# Patient Record
Sex: MALE | Race: BLACK OR AFRICAN AMERICAN | NOT HISPANIC OR LATINO
[De-identification: names, ages, dates, MRNs, and addresses within clinical notes are randomized per-mention and may not be internally consistent; named-entity substitution may affect disease eponyms.]

---

## 2017-02-23 ENCOUNTER — APPOINTMENT (OUTPATIENT)
Dept: ELECTROPHYSIOLOGY | Facility: CLINIC | Age: 72
End: 2017-02-23

## 2017-03-08 ENCOUNTER — APPOINTMENT (OUTPATIENT)
Dept: ELECTROPHYSIOLOGY | Facility: CLINIC | Age: 72
End: 2017-03-08

## 2017-03-08 VITALS
WEIGHT: 214 LBS | SYSTOLIC BLOOD PRESSURE: 128 MMHG | HEIGHT: 71 IN | DIASTOLIC BLOOD PRESSURE: 79 MMHG | HEART RATE: 75 BPM | BODY MASS INDEX: 29.96 KG/M2

## 2017-06-08 ENCOUNTER — APPOINTMENT (OUTPATIENT)
Dept: ELECTROPHYSIOLOGY | Facility: CLINIC | Age: 72
End: 2017-06-08

## 2017-06-08 DIAGNOSIS — J44.9 CHRONIC OBSTRUCTIVE PULMONARY DISEASE, UNSPECIFIED: ICD-10-CM

## 2017-06-08 RX ORDER — LACTULOSE 10 G/15 ML
SOLUTION, ORAL ORAL
Refills: 0 | Status: ACTIVE | COMMUNITY

## 2017-06-13 ENCOUNTER — APPOINTMENT (OUTPATIENT)
Dept: GASTROENTEROLOGY | Facility: CLINIC | Age: 72
End: 2017-06-13

## 2017-06-20 ENCOUNTER — APPOINTMENT (OUTPATIENT)
Dept: GASTROENTEROLOGY | Facility: CLINIC | Age: 72
End: 2017-06-20

## 2017-06-20 VITALS
TEMPERATURE: 98.5 F | HEART RATE: 84 BPM | RESPIRATION RATE: 16 BRPM | SYSTOLIC BLOOD PRESSURE: 128 MMHG | DIASTOLIC BLOOD PRESSURE: 82 MMHG | WEIGHT: 221 LBS | BODY MASS INDEX: 30.94 KG/M2 | HEIGHT: 71 IN

## 2017-06-20 DIAGNOSIS — K72.90 HEPATIC FAILURE, UNSPECIFIED W/OUT COMA: ICD-10-CM

## 2017-06-21 ENCOUNTER — OTHER (OUTPATIENT)
Age: 72
End: 2017-06-21

## 2017-06-21 ENCOUNTER — RESULT CHARGE (OUTPATIENT)
Age: 72
End: 2017-06-21

## 2017-06-21 LAB
ALBUMIN SERPL ELPH-MCNC: 4.1 G/DL
ALP BLD-CCNC: 87 U/L
ALT SERPL-CCNC: 12 U/L
ANION GAP SERPL CALC-SCNC: 16 MMOL/L
AST SERPL-CCNC: 19 U/L
BASOPHILS # BLD AUTO: 0.04 K/UL
BASOPHILS NFR BLD AUTO: 0.7 %
BILIRUB SERPL-MCNC: 1.1 MG/DL
BUN SERPL-MCNC: 16 MG/DL
CALCIUM SERPL-MCNC: 10.1 MG/DL
CHLORIDE SERPL-SCNC: 106 MMOL/L
CO2 SERPL-SCNC: 23 MMOL/L
CREAT SERPL-MCNC: 1.14 MG/DL
EOSINOPHIL # BLD AUTO: 0.12 K/UL
EOSINOPHIL NFR BLD AUTO: 2 %
GLUCOSE SERPL-MCNC: 106 MG/DL
HCT VFR BLD CALC: 39.6 %
HGB BLD-MCNC: 13.4 G/DL
IMM GRANULOCYTES NFR BLD AUTO: 0.3 %
INR PPP: >15 RATIO
LYMPHOCYTES # BLD AUTO: 1.87 K/UL
LYMPHOCYTES NFR BLD AUTO: 30.8 %
MAN DIFF?: NORMAL
MCHC RBC-ENTMCNC: 29.8 PG
MCHC RBC-ENTMCNC: 33.8 GM/DL
MCV RBC AUTO: 88 FL
MONOCYTES # BLD AUTO: 0.53 K/UL
MONOCYTES NFR BLD AUTO: 8.7 %
NEUTROPHILS # BLD AUTO: 3.49 K/UL
NEUTROPHILS NFR BLD AUTO: 57.5 %
PLATELET # BLD AUTO: 158 K/UL
POTASSIUM SERPL-SCNC: 4.1 MMOL/L
PROT SERPL-MCNC: 6.7 G/DL
PT BLD: > 200 SEC
RBC # BLD: 4.5 M/UL
RBC # FLD: 17 %
SODIUM SERPL-SCNC: 145 MMOL/L
WBC # FLD AUTO: 6.07 K/UL

## 2017-06-23 LAB
INR PPP: 1.02 RATIO
PT BLD: 11.5 SEC

## 2017-08-02 ENCOUNTER — RESULT REVIEW (OUTPATIENT)
Age: 72
End: 2017-08-02

## 2017-08-02 ENCOUNTER — OUTPATIENT (OUTPATIENT)
Dept: OUTPATIENT SERVICES | Facility: HOSPITAL | Age: 72
LOS: 1 days | Discharge: HOME | End: 2017-08-02
Payer: COMMERCIAL

## 2017-08-02 DIAGNOSIS — Z13.810 ENCOUNTER FOR SCREENING FOR UPPER GASTROINTESTINAL DISORDER: ICD-10-CM

## 2017-08-02 PROCEDURE — 88312 SPECIAL STAINS GROUP 1: CPT | Mod: 26

## 2017-08-02 PROCEDURE — 88305 TISSUE EXAM BY PATHOLOGIST: CPT | Mod: 26

## 2017-08-02 PROCEDURE — 88305 TISSUE EXAM BY PATHOLOGIST: CPT

## 2017-08-02 PROCEDURE — 43239 EGD BIOPSY SINGLE/MULTIPLE: CPT

## 2017-08-02 PROCEDURE — 88312 SPECIAL STAINS GROUP 1: CPT

## 2017-08-08 DIAGNOSIS — K29.50 UNSPECIFIED CHRONIC GASTRITIS WITHOUT BLEEDING: ICD-10-CM

## 2017-08-08 DIAGNOSIS — F17.210 NICOTINE DEPENDENCE, CIGARETTES, UNCOMPLICATED: ICD-10-CM

## 2017-08-08 DIAGNOSIS — Z95.5 PRESENCE OF CORONARY ANGIOPLASTY IMPLANT AND GRAFT: ICD-10-CM

## 2017-08-08 DIAGNOSIS — Z79.82 LONG TERM (CURRENT) USE OF ASPIRIN: ICD-10-CM

## 2017-08-08 DIAGNOSIS — M10.9 GOUT, UNSPECIFIED: ICD-10-CM

## 2017-08-08 DIAGNOSIS — I10 ESSENTIAL (PRIMARY) HYPERTENSION: ICD-10-CM

## 2017-08-08 DIAGNOSIS — I73.9 PERIPHERAL VASCULAR DISEASE, UNSPECIFIED: ICD-10-CM

## 2017-08-08 DIAGNOSIS — Z72.89 OTHER PROBLEMS RELATED TO LIFESTYLE: ICD-10-CM

## 2017-08-08 DIAGNOSIS — I50.20 UNSPECIFIED SYSTOLIC (CONGESTIVE) HEART FAILURE: ICD-10-CM

## 2017-08-08 DIAGNOSIS — E78.5 HYPERLIPIDEMIA, UNSPECIFIED: ICD-10-CM

## 2017-08-08 DIAGNOSIS — Z91.013 ALLERGY TO SEAFOOD: ICD-10-CM

## 2017-08-08 DIAGNOSIS — I25.810 ATHEROSCLEROSIS OF CORONARY ARTERY BYPASS GRAFT(S) WITHOUT ANGINA PECTORIS: ICD-10-CM

## 2017-08-08 DIAGNOSIS — J44.9 CHRONIC OBSTRUCTIVE PULMONARY DISEASE, UNSPECIFIED: ICD-10-CM

## 2017-08-08 DIAGNOSIS — E11.9 TYPE 2 DIABETES MELLITUS WITHOUT COMPLICATIONS: ICD-10-CM

## 2017-08-10 ENCOUNTER — APPOINTMENT (OUTPATIENT)
Dept: ELECTROPHYSIOLOGY | Facility: CLINIC | Age: 72
End: 2017-08-10
Payer: COMMERCIAL

## 2017-08-10 PROCEDURE — 93283 PRGRMG EVAL IMPLANTABLE DFB: CPT

## 2017-09-12 ENCOUNTER — NON-APPOINTMENT (OUTPATIENT)
Age: 72
End: 2017-09-12

## 2017-09-12 ENCOUNTER — APPOINTMENT (OUTPATIENT)
Dept: ELECTROPHYSIOLOGY | Facility: CLINIC | Age: 72
End: 2017-09-12
Payer: COMMERCIAL

## 2017-09-12 VITALS
WEIGHT: 231 LBS | HEART RATE: 69 BPM | BODY MASS INDEX: 32.34 KG/M2 | OXYGEN SATURATION: 98 % | HEIGHT: 71 IN | SYSTOLIC BLOOD PRESSURE: 119 MMHG | DIASTOLIC BLOOD PRESSURE: 75 MMHG

## 2017-09-12 DIAGNOSIS — Z80.0 FAMILY HISTORY OF MALIGNANT NEOPLASM OF DIGESTIVE ORGANS: ICD-10-CM

## 2017-09-12 DIAGNOSIS — Z45.02 ENCOUNTER FOR ADJUSTMENT AND MANAGEMENT OF AUTOMATIC IMPLANTABLE CARDIAC DEFIBRILLATOR: ICD-10-CM

## 2017-09-12 DIAGNOSIS — Z80.3 FAMILY HISTORY OF MALIGNANT NEOPLASM OF BREAST: ICD-10-CM

## 2017-09-12 DIAGNOSIS — I50.20 UNSPECIFIED SYSTOLIC (CONGESTIVE) HEART FAILURE: ICD-10-CM

## 2017-09-12 DIAGNOSIS — Z82.49 FAMILY HISTORY OF ISCHEMIC HEART DISEASE AND OTHER DISEASES OF THE CIRCULATORY SYSTEM: ICD-10-CM

## 2017-09-12 PROCEDURE — 99215 OFFICE O/P EST HI 40 MIN: CPT

## 2017-09-12 PROCEDURE — 93000 ELECTROCARDIOGRAM COMPLETE: CPT

## 2017-09-12 RX ORDER — LISINOPRIL 40 MG/1
40 TABLET ORAL DAILY
Refills: 0 | Status: ACTIVE | COMMUNITY

## 2017-09-14 LAB
ALBUMIN SERPL ELPH-MCNC: 4 G/DL
ALP BLD-CCNC: 90 U/L
ALT SERPL-CCNC: 10 U/L
ANION GAP SERPL CALC-SCNC: 21 MMOL/L
AST SERPL-CCNC: 19 U/L
BASOPHILS # BLD AUTO: 0.05 K/UL
BASOPHILS NFR BLD AUTO: 0.7 %
BILIRUB SERPL-MCNC: 0.7 MG/DL
BUN SERPL-MCNC: 12 MG/DL
CALCIUM SERPL-MCNC: 10.1 MG/DL
CHLORIDE SERPL-SCNC: 103 MMOL/L
CO2 SERPL-SCNC: 21 MMOL/L
CREAT SERPL-MCNC: 1.07 MG/DL
EOSINOPHIL # BLD AUTO: 0.08 K/UL
EOSINOPHIL NFR BLD AUTO: 1.2 %
GLUCOSE SERPL-MCNC: 98 MG/DL
HCT VFR BLD CALC: 43 %
HGB BLD-MCNC: 14.7 G/DL
IMM GRANULOCYTES NFR BLD AUTO: 0.3 %
LYMPHOCYTES # BLD AUTO: 1.96 K/UL
LYMPHOCYTES NFR BLD AUTO: 28.9 %
MAN DIFF?: NORMAL
MCHC RBC-ENTMCNC: 29.5 PG
MCHC RBC-ENTMCNC: 34.2 GM/DL
MCV RBC AUTO: 86.2 FL
MONOCYTES # BLD AUTO: 0.56 K/UL
MONOCYTES NFR BLD AUTO: 8.2 %
NEUTROPHILS # BLD AUTO: 4.12 K/UL
NEUTROPHILS NFR BLD AUTO: 60.7 %
PLATELET # BLD AUTO: 158 K/UL
POTASSIUM SERPL-SCNC: 4.2 MMOL/L
PROT SERPL-MCNC: 7.5 G/DL
RBC # BLD: 4.99 M/UL
RBC # FLD: 15.1 %
SODIUM SERPL-SCNC: 145 MMOL/L
WBC # FLD AUTO: 6.79 K/UL

## 2017-09-28 ENCOUNTER — OUTPATIENT (OUTPATIENT)
Dept: OUTPATIENT SERVICES | Facility: HOSPITAL | Age: 72
LOS: 1 days | Discharge: HOME | End: 2017-09-28
Payer: MEDICARE

## 2017-09-28 DIAGNOSIS — I50.20 UNSPECIFIED SYSTOLIC (CONGESTIVE) HEART FAILURE: ICD-10-CM

## 2017-09-28 PROCEDURE — 33263 RMVL & RPLCMT DFB GEN 2 LEAD: CPT

## 2017-09-28 PROCEDURE — 93010 ELECTROCARDIOGRAM REPORT: CPT

## 2017-09-28 NOTE — H&P CARDIOLOGY - PMH
AICD (automatic cardioverter/defibrillator) present    Chronic obstructive pulmonary disease    Coronary Artery Disease    Diabetes mellitus    Gout    Hypercholesteremia    Hypertension    Myocardial Infarction    Peripheral vascular disease    Renal insufficiency    S/P aortobifemoral bypass surgery    Sickle cell trait

## 2017-09-28 NOTE — CHART NOTE - NSCHARTNOTEFT_GEN_A_CORE
s/p ICD generator change by    Patient seen and examined.             New device Pacing and sensing well.             May shower in AM.            Post procedure device care provided and written instructions provided to patient.            Patient advised to be cautious with heavy lifting and arm activities as this may compromise the new implanted device            Discharge home today with PO abx as per protocol             Follow up in device clinic in 2 weeks (10/11/17 @ 2:45 pm)

## 2017-09-28 NOTE — H&P CARDIOLOGY - NEGATIVE NEUROLOGICAL SYMPTOMS
no difficulty walking/no loss of consciousness/no paresthesias/no syncope/no weakness/no facial palsy/no generalized seizures/no vertigo/no loss of sensation/no headache/no transient paralysis/no tremors/no hemiparesis/no focal seizures/no confusion

## 2017-09-28 NOTE — H&P CARDIOLOGY - NEGATIVE CARDIOVASCULAR SYMPTOMS
no dyspnea on exertion/no orthopnea/no peripheral edema/no paroxysmal nocturnal dyspnea/no palpitations/no claudication/no chest pain

## 2017-09-28 NOTE — H&P CARDIOLOGY - HISTORY OF PRESENT ILLNESS
72M PMHx CAD s/p MI and stents 2007, hypertension, dyslipidemia, diabetes, gout, renal insufficiency, COPD, peripheral vascular disease with aortobifemoral bypass presents for ICD gen. change 2/2 DOROTHY. See hard copy H&P from 9/12/17 in chart. No new complaints at this time.

## 2017-09-28 NOTE — H&P CARDIOLOGY - RS GEN PE MLT RESP DETAILS PC
no chest wall tenderness/respirations non-labored/clear to auscultation bilaterally/breath sounds equal/airway patent/good air movement

## 2017-10-11 ENCOUNTER — APPOINTMENT (OUTPATIENT)
Dept: ELECTROPHYSIOLOGY | Facility: CLINIC | Age: 72
End: 2017-10-11

## 2018-07-25 PROBLEM — Z80.0 FAMILY HISTORY OF COLON CANCER: Status: ACTIVE | Noted: 2017-09-12

## 2020-04-21 ENCOUNTER — INPATIENT (INPATIENT)
Facility: HOSPITAL | Age: 75
LOS: 5 days | Discharge: NURSING, REHAB OR LONG-TERM CARE | End: 2020-04-27
Attending: INTERNAL MEDICINE | Admitting: INTERNAL MEDICINE
Payer: MEDICARE

## 2020-04-21 VITALS
DIASTOLIC BLOOD PRESSURE: 98 MMHG | RESPIRATION RATE: 24 BRPM | TEMPERATURE: 98 F | SYSTOLIC BLOOD PRESSURE: 152 MMHG | OXYGEN SATURATION: 99 % | HEART RATE: 97 BPM | HEIGHT: 70 IN | WEIGHT: 210.1 LBS

## 2020-04-21 DIAGNOSIS — I10 ESSENTIAL (PRIMARY) HYPERTENSION: ICD-10-CM

## 2020-04-21 DIAGNOSIS — E83.52 HYPERCALCEMIA: ICD-10-CM

## 2020-04-21 DIAGNOSIS — R79.89 OTHER SPECIFIED ABNORMAL FINDINGS OF BLOOD CHEMISTRY: ICD-10-CM

## 2020-04-21 DIAGNOSIS — E11.9 TYPE 2 DIABETES MELLITUS WITHOUT COMPLICATIONS: ICD-10-CM

## 2020-04-21 DIAGNOSIS — E80.6 OTHER DISORDERS OF BILIRUBIN METABOLISM: ICD-10-CM

## 2020-04-21 DIAGNOSIS — I26.99 OTHER PULMONARY EMBOLISM WITHOUT ACUTE COR PULMONALE: ICD-10-CM

## 2020-04-21 DIAGNOSIS — I50.22 CHRONIC SYSTOLIC (CONGESTIVE) HEART FAILURE: ICD-10-CM

## 2020-04-21 DIAGNOSIS — G93.41 METABOLIC ENCEPHALOPATHY: ICD-10-CM

## 2020-04-21 DIAGNOSIS — E78.5 HYPERLIPIDEMIA, UNSPECIFIED: ICD-10-CM

## 2020-04-21 DIAGNOSIS — I25.10 ATHEROSCLEROTIC HEART DISEASE OF NATIVE CORONARY ARTERY WITHOUT ANGINA PECTORIS: ICD-10-CM

## 2020-04-21 LAB
ALBUMIN SERPL ELPH-MCNC: 3.2 G/DL — LOW (ref 3.3–5)
ALP SERPL-CCNC: 111 U/L — SIGNIFICANT CHANGE UP (ref 40–120)
ALT FLD-CCNC: 64 U/L — SIGNIFICANT CHANGE UP (ref 12–78)
ANION GAP SERPL CALC-SCNC: 11 MMOL/L — SIGNIFICANT CHANGE UP (ref 5–17)
AST SERPL-CCNC: 83 U/L — HIGH (ref 15–37)
BASE EXCESS BLDA CALC-SCNC: 1.9 MMOL/L — SIGNIFICANT CHANGE UP (ref -2–2)
BASOPHILS # BLD AUTO: 0.01 K/UL — SIGNIFICANT CHANGE UP (ref 0–0.2)
BASOPHILS NFR BLD AUTO: 0.1 % — SIGNIFICANT CHANGE UP (ref 0–2)
BILIRUB SERPL-MCNC: 10.4 MG/DL — HIGH (ref 0.2–1.2)
BLOOD GAS COMMENTS: SIGNIFICANT CHANGE UP
BLOOD GAS COMMENTS: SIGNIFICANT CHANGE UP
BLOOD GAS SOURCE: SIGNIFICANT CHANGE UP
BUN SERPL-MCNC: 23 MG/DL — SIGNIFICANT CHANGE UP (ref 7–23)
CALCIUM SERPL-MCNC: 10.9 MG/DL — HIGH (ref 8.5–10.1)
CHLORIDE SERPL-SCNC: 107 MMOL/L — SIGNIFICANT CHANGE UP (ref 96–108)
CO2 SERPL-SCNC: 25 MMOL/L — SIGNIFICANT CHANGE UP (ref 22–31)
CREAT SERPL-MCNC: 1.34 MG/DL — HIGH (ref 0.5–1.3)
CRP SERPL-MCNC: 12.23 MG/DL — HIGH (ref 0–0.4)
D DIMER BLD IA.RAPID-MCNC: 2605 NG/ML DDU — HIGH
EOSINOPHIL # BLD AUTO: 0.01 K/UL — SIGNIFICANT CHANGE UP (ref 0–0.5)
EOSINOPHIL NFR BLD AUTO: 0.1 % — SIGNIFICANT CHANGE UP (ref 0–6)
ETHANOL SERPL-MCNC: <10 MG/DL — SIGNIFICANT CHANGE UP (ref 0–10)
FERRITIN SERPL-MCNC: 88 NG/ML — SIGNIFICANT CHANGE UP (ref 30–400)
GLUCOSE SERPL-MCNC: 119 MG/DL — HIGH (ref 70–99)
HCO3 BLDA-SCNC: 26 MMOL/L — SIGNIFICANT CHANGE UP (ref 21–29)
HCT VFR BLD CALC: 46.6 % — SIGNIFICANT CHANGE UP (ref 39–50)
HGB BLD-MCNC: 15.1 G/DL — SIGNIFICANT CHANGE UP (ref 13–17)
HOROWITZ INDEX BLDA+IHG-RTO: 21 — SIGNIFICANT CHANGE UP
IMM GRANULOCYTES NFR BLD AUTO: 0.7 % — SIGNIFICANT CHANGE UP (ref 0–1.5)
LACTATE SERPL-SCNC: 3.9 MMOL/L — HIGH (ref 0.7–2)
LIDOCAIN IGE QN: 91 U/L — SIGNIFICANT CHANGE UP (ref 73–393)
LYMPHOCYTES # BLD AUTO: 0.72 K/UL — LOW (ref 1–3.3)
LYMPHOCYTES # BLD AUTO: 7.6 % — LOW (ref 13–44)
MCHC RBC-ENTMCNC: 25.7 PG — LOW (ref 27–34)
MCHC RBC-ENTMCNC: 32.4 GM/DL — SIGNIFICANT CHANGE UP (ref 32–36)
MCV RBC AUTO: 79.3 FL — LOW (ref 80–100)
MONOCYTES # BLD AUTO: 1.07 K/UL — HIGH (ref 0–0.9)
MONOCYTES NFR BLD AUTO: 11.3 % — SIGNIFICANT CHANGE UP (ref 2–14)
NEUTROPHILS # BLD AUTO: 7.62 K/UL — HIGH (ref 1.8–7.4)
NEUTROPHILS NFR BLD AUTO: 80.2 % — HIGH (ref 43–77)
NRBC # BLD: 0 /100 WBCS — SIGNIFICANT CHANGE UP (ref 0–0)
NT-PROBNP SERPL-SCNC: 9230 PG/ML — HIGH (ref 0–125)
PCO2 BLDA: 39 MMHG — SIGNIFICANT CHANGE UP (ref 32–46)
PH BLD: 7.44 — SIGNIFICANT CHANGE UP (ref 7.35–7.45)
PLATELET # BLD AUTO: 214 K/UL — SIGNIFICANT CHANGE UP (ref 150–400)
PO2 BLDA: 62 MMHG — LOW (ref 74–108)
POTASSIUM SERPL-MCNC: 4.4 MMOL/L — SIGNIFICANT CHANGE UP (ref 3.5–5.3)
POTASSIUM SERPL-SCNC: 4.4 MMOL/L — SIGNIFICANT CHANGE UP (ref 3.5–5.3)
PROCALCITONIN SERPL-MCNC: 0.43 NG/ML — HIGH (ref 0.02–0.1)
PROT SERPL-MCNC: 7.4 GM/DL — SIGNIFICANT CHANGE UP (ref 6–8.3)
RBC # BLD: 5.88 M/UL — HIGH (ref 4.2–5.8)
RBC # FLD: 18.3 % — HIGH (ref 10.3–14.5)
SAO2 % BLDA: 89 % — LOW (ref 92–96)
SARS-COV-2 RNA SPEC QL NAA+PROBE: SIGNIFICANT CHANGE UP
SODIUM SERPL-SCNC: 143 MMOL/L — SIGNIFICANT CHANGE UP (ref 135–145)
TROPONIN I SERPL-MCNC: 0.11 NG/ML — HIGH (ref 0.01–0.04)
WBC # BLD: 9.5 K/UL — SIGNIFICANT CHANGE UP (ref 3.8–10.5)
WBC # FLD AUTO: 9.5 K/UL — SIGNIFICANT CHANGE UP (ref 3.8–10.5)

## 2020-04-21 PROCEDURE — 71045 X-RAY EXAM CHEST 1 VIEW: CPT | Mod: 26

## 2020-04-21 PROCEDURE — 74177 CT ABD & PELVIS W/CONTRAST: CPT | Mod: 26

## 2020-04-21 PROCEDURE — 70450 CT HEAD/BRAIN W/O DYE: CPT | Mod: 26

## 2020-04-21 PROCEDURE — 71275 CT ANGIOGRAPHY CHEST: CPT | Mod: 26

## 2020-04-21 PROCEDURE — 93010 ELECTROCARDIOGRAM REPORT: CPT

## 2020-04-21 PROCEDURE — 99223 1ST HOSP IP/OBS HIGH 75: CPT

## 2020-04-21 PROCEDURE — 99285 EMERGENCY DEPT VISIT HI MDM: CPT

## 2020-04-21 RX ORDER — LISINOPRIL 2.5 MG/1
40 TABLET ORAL DAILY
Refills: 0 | Status: DISCONTINUED | OUTPATIENT
Start: 2020-04-21 | End: 2020-04-22

## 2020-04-21 RX ORDER — INSULIN LISPRO 100/ML
VIAL (ML) SUBCUTANEOUS
Refills: 0 | Status: DISCONTINUED | OUTPATIENT
Start: 2020-04-21 | End: 2020-04-27

## 2020-04-21 RX ORDER — DEXTROSE 50 % IN WATER 50 %
12.5 SYRINGE (ML) INTRAVENOUS ONCE
Refills: 0 | Status: DISCONTINUED | OUTPATIENT
Start: 2020-04-21 | End: 2020-04-27

## 2020-04-21 RX ORDER — HEPARIN SODIUM 5000 [USP'U]/ML
INJECTION INTRAVENOUS; SUBCUTANEOUS
Qty: 25000 | Refills: 0 | Status: DISCONTINUED | OUTPATIENT
Start: 2020-04-21 | End: 2020-04-21

## 2020-04-21 RX ORDER — HEPARIN SODIUM 5000 [USP'U]/ML
7500 INJECTION INTRAVENOUS; SUBCUTANEOUS EVERY 6 HOURS
Refills: 0 | Status: DISCONTINUED | OUTPATIENT
Start: 2020-04-21 | End: 2020-04-21

## 2020-04-21 RX ORDER — FUROSEMIDE 40 MG
40 TABLET ORAL DAILY
Refills: 0 | Status: DISCONTINUED | OUTPATIENT
Start: 2020-04-21 | End: 2020-04-27

## 2020-04-21 RX ORDER — HEPARIN SODIUM 5000 [USP'U]/ML
5000 INJECTION INTRAVENOUS; SUBCUTANEOUS EVERY 8 HOURS
Refills: 0 | Status: DISCONTINUED | OUTPATIENT
Start: 2020-04-21 | End: 2020-04-21

## 2020-04-21 RX ORDER — HEPARIN SODIUM 5000 [USP'U]/ML
3500 INJECTION INTRAVENOUS; SUBCUTANEOUS EVERY 6 HOURS
Refills: 0 | Status: DISCONTINUED | OUTPATIENT
Start: 2020-04-21 | End: 2020-04-21

## 2020-04-21 RX ORDER — HEPARIN SODIUM 5000 [USP'U]/ML
7500 INJECTION INTRAVENOUS; SUBCUTANEOUS ONCE
Refills: 0 | Status: DISCONTINUED | OUTPATIENT
Start: 2020-04-21 | End: 2020-04-21

## 2020-04-21 RX ORDER — SODIUM CHLORIDE 9 MG/ML
1000 INJECTION, SOLUTION INTRAVENOUS
Refills: 0 | Status: DISCONTINUED | OUTPATIENT
Start: 2020-04-21 | End: 2020-04-27

## 2020-04-21 RX ORDER — DEXTROSE 50 % IN WATER 50 %
25 SYRINGE (ML) INTRAVENOUS ONCE
Refills: 0 | Status: DISCONTINUED | OUTPATIENT
Start: 2020-04-21 | End: 2020-04-27

## 2020-04-21 RX ORDER — AZITHROMYCIN 500 MG/1
500 TABLET, FILM COATED ORAL ONCE
Refills: 0 | Status: COMPLETED | OUTPATIENT
Start: 2020-04-21 | End: 2020-04-21

## 2020-04-21 RX ORDER — DEXTROSE 50 % IN WATER 50 %
15 SYRINGE (ML) INTRAVENOUS ONCE
Refills: 0 | Status: DISCONTINUED | OUTPATIENT
Start: 2020-04-21 | End: 2020-04-27

## 2020-04-21 RX ORDER — ENOXAPARIN SODIUM 100 MG/ML
90 INJECTION SUBCUTANEOUS ONCE
Refills: 0 | Status: COMPLETED | OUTPATIENT
Start: 2020-04-21 | End: 2020-04-21

## 2020-04-21 RX ORDER — LATANOPROST 0.05 MG/ML
1 SOLUTION/ DROPS OPHTHALMIC; TOPICAL AT BEDTIME
Refills: 0 | Status: DISCONTINUED | OUTPATIENT
Start: 2020-04-21 | End: 2020-04-27

## 2020-04-21 RX ORDER — SIMVASTATIN 20 MG/1
40 TABLET, FILM COATED ORAL AT BEDTIME
Refills: 0 | Status: DISCONTINUED | OUTPATIENT
Start: 2020-04-21 | End: 2020-04-22

## 2020-04-21 RX ORDER — LACTULOSE 10 G/15ML
10 SOLUTION ORAL DAILY
Refills: 0 | Status: DISCONTINUED | OUTPATIENT
Start: 2020-04-21 | End: 2020-04-27

## 2020-04-21 RX ORDER — ASPIRIN/CALCIUM CARB/MAGNESIUM 324 MG
81 TABLET ORAL DAILY
Refills: 0 | Status: DISCONTINUED | OUTPATIENT
Start: 2020-04-21 | End: 2020-04-27

## 2020-04-21 RX ORDER — ALLOPURINOL 300 MG
300 TABLET ORAL DAILY
Refills: 0 | Status: DISCONTINUED | OUTPATIENT
Start: 2020-04-21 | End: 2020-04-27

## 2020-04-21 RX ORDER — GLUCAGON INJECTION, SOLUTION 0.5 MG/.1ML
1 INJECTION, SOLUTION SUBCUTANEOUS ONCE
Refills: 0 | Status: DISCONTINUED | OUTPATIENT
Start: 2020-04-21 | End: 2020-04-27

## 2020-04-21 RX ORDER — CARVEDILOL PHOSPHATE 80 MG/1
25 CAPSULE, EXTENDED RELEASE ORAL EVERY 12 HOURS
Refills: 0 | Status: DISCONTINUED | OUTPATIENT
Start: 2020-04-21 | End: 2020-04-27

## 2020-04-21 RX ORDER — CEFTRIAXONE 500 MG/1
1000 INJECTION, POWDER, FOR SOLUTION INTRAMUSCULAR; INTRAVENOUS ONCE
Refills: 0 | Status: COMPLETED | OUTPATIENT
Start: 2020-04-21 | End: 2020-04-21

## 2020-04-21 RX ADMIN — ENOXAPARIN SODIUM 90 MILLIGRAM(S): 100 INJECTION SUBCUTANEOUS at 22:57

## 2020-04-21 RX ADMIN — SIMVASTATIN 40 MILLIGRAM(S): 20 TABLET, FILM COATED ORAL at 22:57

## 2020-04-21 RX ADMIN — CEFTRIAXONE 100 MILLIGRAM(S): 500 INJECTION, POWDER, FOR SOLUTION INTRAMUSCULAR; INTRAVENOUS at 20:31

## 2020-04-21 RX ADMIN — AZITHROMYCIN 255 MILLIGRAM(S): 500 TABLET, FILM COATED ORAL at 20:31

## 2020-04-21 NOTE — ED PROVIDER NOTE - ATTENDING CONTRIBUTION TO CARE
I have seen the patient with the PA and agree with above examination and assessment and plan with the following addendum:        Focused PE:   General: NAD, alert and oriented  Head: Normocephalic, atraumatic  Eyes: PERRLA, EOMI. Scleral icterus  Cardiac: RRR, no murmurs, rubs or gallops  Resp: CTA, no wheezes, rales or ronchi  GI: Distended, nontender abdomen. Well healed Ex Lap scar.   Neuro: Alert and oriented, no focal deficits. Normal gait  Ext: Non edematous, nontender.    Ddx: CHF exacerbation/ Covid/ hepatitis/ No fluid wave or tenderness to suggest sbp  Plan: Cbc, cmp, cxr, ecg, abg, reassess

## 2020-04-21 NOTE — H&P ADULT - HISTORY OF PRESENT ILLNESS
75yo male with pmh of AICD, MI, DM, HTN (poor historian) presents complaining of sob. Says that it has been hard to breath since "the day the virus came". Denies sick contacts, cough, fever, chills, body aches, chest pain lower extremity swelling and other associated sx. Patient is a 74M with a PMH of CAD s/p MI w/stents 2007, CHF with 20% LVEF in 2016,  HTN, HLD, DM, gout, CKD, COPD, PVD s/p aortobifemoral bypass who presents to the ED for dyspnea.  Patient currently AAOx1 and unable to provide history.  As per ED attending, patient was sent in by family members due to cough and dyspnea.  Reportedly has been feeling ill since COVID pandemic started.  Vitals stable.  Labs show hypercalcemia, mild tropinema and elevated bilirubin.  CT head performed and was negative.  CT chest shows  bilateral PNA and a JUAREZ acute PE.  Started on therapeutic lovenox, will admit to tele.

## 2020-04-21 NOTE — H&P ADULT - NSHPLABSRESULTS_GEN_ALL_CORE
Recent Vitals  T(C): 36.8 (04-21-20 @ 22:24), Max: 36.8 (04-21-20 @ 22:24)  HR: 87 (04-21-20 @ 22:24) (85 - 97)  BP: 144/96 (04-21-20 @ 22:24) (142/90 - 152/98)  RR: 18 (04-21-20 @ 22:24) (16 - 24)  SpO2: 96% (04-21-20 @ 22:24) (95% - 99%)                        15.1   9.50  )-----------( 214      ( 21 Apr 2020 16:49 )             46.6     04-21    143  |  107  |  23  ----------------------------<  119<H>  4.4   |  25  |  1.34<H>    Ca    10.9<H>      21 Apr 2020 16:49    TPro  7.4  /  Alb  3.2<L>  /  TBili  10.4<H>  /  DBili  x   /  AST  83<H>  /  ALT  64  /  AlkPhos  111  04-21      LIVER FUNCTIONS - ( 21 Apr 2020 16:49 )  Alb: 3.2 g/dL / Pro: 7.4 gm/dL / ALK PHOS: 111 U/L / ALT: 64 U/L / AST: 83 U/L / GGT: x               Home Medications:  allopurinol 300 mg oral tablet: 1 tab(s) orally once a day (28 Sep 2017 13:45)  aspirin 81 mg oral tablet: 1 tab(s) orally once a day (28 Sep 2017 13:45)  Coreg 25 mg oral tablet: 1 tab(s) orally 2 times a day (28 Sep 2017 13:45)  lactulose 10 g/15 mL oral syrup: 15 milliliter(s) orally once a day (28 Sep 2017 13:45)  Lasix 40 mg oral tablet: 1 tab(s) orally once a day (28 Sep 2017 13:45)  lisinopril 40 mg oral tablet: 1 tab(s) orally once a day (28 Sep 2017 13:45)  simvastatin 40 mg oral tablet: 1 tab(s) orally once a day (at bedtime) (28 Sep 2017 13:45)  Travatan Z 0.004% ophthalmic solution: 1 drop(s) to each affected eye once a day (in the evening) (28 Sep 2017 13:45)

## 2020-04-21 NOTE — ED ADULT NURSE REASSESSMENT NOTE - REASSESS COMMUNICATION
put call out to nursing supervior and attending patient states that he dropped his plate of food and leaned over to pick it up and subsequently slipped on the floor, no obvious injuries noted will endorse to ED RN

## 2020-04-21 NOTE — ED ADULT NURSE NOTE - NSIMPLEMENTINTERV_GEN_ALL_ED
Implemented All Fall Risk Interventions:  [place] to call system. Call bell, personal items and telephone within reach. Instruct patient to call for assistance. Room bathroom lighting operational. Non-slip footwear when patient is off stretcher. Physically safe environment: no spills, clutter or unnecessary equipment. Stretcher in lowest position, wheels locked, appropriate side rails in place. Provide visual cue, wrist band, yellow gown, etc. Monitor gait and stability. Monitor for mental status changes and reorient to person, place, and time. Review medications for side effects contributing to fall risk. Reinforce activity limits and safety measures with patient and family.

## 2020-04-21 NOTE — ED PROVIDER NOTE - CPE EDP NEURO NORM
Patient is medically cleared for cataract surgery, no cardiopulmonary complaints, exam today is normal   Patient has chronic conditions of hypercholesterolemia and hypertension are well controlled  normal...

## 2020-04-21 NOTE — ED ADULT NURSE REASSESSMENT NOTE - REASSESS COMMUNICATION
notified Dr valenzuela of the fall, and no obvious injuries noted MD aware no further orders at this time.,

## 2020-04-21 NOTE — ED ADULT NURSE NOTE - OBJECTIVE STATEMENT
ptc/o SOB for 10 days worsening, denies pain, sts has defibrillator placed 10 years ago and just changed batteries

## 2020-04-21 NOTE — H&P ADULT - NSICDXPASTMEDICALHX_GEN_ALL_CORE_FT
PAST MEDICAL HISTORY:  AICD (automatic cardioverter/defibrillator) present     Chronic obstructive pulmonary disease     Coronary Artery Disease     Diabetes mellitus     Gout     Hypercholesteremia     Hypertension     Myocardial Infarction     Peripheral vascular disease     Renal insufficiency     S/P aortobifemoral bypass surgery     Sickle cell trait

## 2020-04-21 NOTE — ED PROVIDER NOTE - CARE PLAN
Principal Discharge DX:	CHF exacerbation Principal Discharge DX:	PE (pulmonary thromboembolism)  Secondary Diagnosis:	Pneumonia  Secondary Diagnosis:	CHF (congestive heart failure)

## 2020-04-21 NOTE — H&P ADULT - NSHPPHYSICALEXAM_GEN_ALL_CORE
ICU Vital Signs Last 24 Hrs  T(C): 36.6 (21 Apr 2020 15:45), Max: 36.6 (21 Apr 2020 15:45)  T(F): 97.9 (21 Apr 2020 15:45), Max: 97.9 (21 Apr 2020 15:45)  HR: 85 (21 Apr 2020 16:57) (85 - 97)  BP: 146/81 (21 Apr 2020 16:57) (146/81 - 152/98)  BP(mean): --  ABP: --  ABP(mean): --  RR: 19 (21 Apr 2020 16:57) (19 - 24)  SpO2: 96% (21 Apr 2020 16:57) (96% - 99%)  GENERAL: NAD well-developed  HEAD:  Atraumatic, Normocephalic  EYES: EOMI, PERRLA, conjunctiva and sclera clear  ENMT: No tonsillar erythema, exudates, or enlargement; Moist mucous membranes, Good dentition, No lesions  NECK: Supple, No JVD, Normal thyroid  NERVOUS SYSTEM:  Alert & Oriented X3, Good concentration; Motor Strength 5/5 B/L upper and lower extremities; DTRs 2+ intact and symmetric  CHEST/LUNG: Clear to percussion bilaterally; No rales, rhonchi, wheezing, or rubs  HEART: Regular rate and rhythm; No murmurs, rubs, or gallops  ABDOMEN: Soft, Nontender, Nondistended; Bowel sounds present  EXTREMITIES:  2+ Peripheral Pulses, No clubbing, cyanosis, or edema  LYMPH: No lymphadenopathy   SKIN: No rashes or lesions Physical exam:  General: patient in no acute distress, resting comfortably  Head:  Atraumatic, Normocephalic  Eyes: EOMI, PERRLA, clear sclera  Neck: Supple, thyroid nontender, non enlarged  Cardio: S1/S2 +ve, regular rate and rhythm, no M/G/R  Resp: bilaterally rales, trace pedal edema, no rales or wheezes  GI: abdomen soft, nontender, non distended, no guarding, BS +ve x 4  Ext: no significant pedal edema

## 2020-04-21 NOTE — H&P ADULT - PROBLEM SELECTOR PLAN 9
[address] [facility]  T [phone]  [email or web address]      8/7/2017      [address] [phone]            Do not take the metformin on the day of or the next day.  Then resume the metformin on the 3rd post operative day              Casandra Wall MD home meds

## 2020-04-21 NOTE — ED PROVIDER NOTE - CLINICAL SUMMARY MEDICAL DECISION MAKING FREE TEXT BOX
75yo male with pmh of AICD, MI, DM, HTN (poor historian) presents complaining of sob. Tachypneic, conversational dyspneic. O2 sat 99 on RA. Will get labs, cxr and covid testing.

## 2020-04-21 NOTE — H&P ADULT - PROBLEM SELECTOR PLAN 3
AMS? unknown baseline.  Given ceftriaxone and azithromycin in the ED.  Follow blood cultures.  CRP elevated but ferritin low.  Will reswab for COVID19

## 2020-04-21 NOTE — ED PROVIDER NOTE - OBJECTIVE STATEMENT
75yo male with pmh of AICD, MI, DM, HTN (poor historian) presents complaining of sob. Says that it has been hard to breath since "the day the virus came". Denies sick contacts, cough, fever, chills, body aches, chest pain lower extremity swelling and other associated sx.

## 2020-04-21 NOTE — ED ADULT NURSE REASSESSMENT NOTE - REASSESS COMMUNICATION
notified nursing supervisor that the patient states he lowered himself to the floor no obvious injuries decided to clean up the floor of the food he dropped, pt is cheerful and laughing, will endorse to the primary RN

## 2020-04-21 NOTE — H&P ADULT - ASSESSMENT
Patient is a 74M with a PMH of CAD s/p MI w/stents 2007, CHF with 20% LVEF in 2016,  HTN, HLD, DM, gout, CKD, COPD, PVD s/p aortobifemoral bypass who presents to the ED for dyspnea.  Patient currently AAOx1 and unable to provide history.  As per ED attending, patient was sent in by family members due to cough and dyspnea.  Reportedly has been feeling ill since COVID pandemic started.  Vitals stable.  Labs show hypercalcemia, mild tropinemia and elevated bilirubin.  CT head performed and was negative.  CT chest shows  bilateral PNA and a JUAREZ acute PE.  Started on therapeutic lovenox, will admit to tele.    IMPROVE VTE Individual Risk Assessment          RISK                                                          Points  [  ] Previous VTE                                                3  [  ] Thrombophilia                                             2  [  ] Lower limb paralysis                                    2        (unable to hold up >15 seconds)    [  ] Current Cancer                                             2         (within 6 months)  [  ] Immobilization > 24 hrs                              1  [  ] ICU/CCU stay > 24 hours                            1  [  ] Age > 60                                                    1    IMPROVE VTE Score - 1

## 2020-04-21 NOTE — ED ADULT TRIAGE NOTE - CHIEF COMPLAINT QUOTE
ems states, " Pt sent by family due to dry cough and sob , pt denies hx , pt admits he is unable to catch his breath , denies sick exposure "

## 2020-04-22 LAB
A1C WITH ESTIMATED AVERAGE GLUCOSE RESULT: 6.3 % — HIGH (ref 4–5.6)
ALBUMIN SERPL ELPH-MCNC: 2.7 G/DL — LOW (ref 3.3–5)
ALP SERPL-CCNC: 101 U/L — SIGNIFICANT CHANGE UP (ref 40–120)
ALT FLD-CCNC: 62 U/L — SIGNIFICANT CHANGE UP (ref 12–78)
ANION GAP SERPL CALC-SCNC: 10 MMOL/L — SIGNIFICANT CHANGE UP (ref 5–17)
APTT BLD: 33.3 SEC — SIGNIFICANT CHANGE UP (ref 28.5–37)
AST SERPL-CCNC: 81 U/L — HIGH (ref 15–37)
BILIRUB DIRECT SERPL-MCNC: 6.37 MG/DL — HIGH (ref 0.05–0.2)
BILIRUB INDIRECT FLD-MCNC: 3.2 MG/DL — HIGH (ref 0.2–1)
BILIRUB SERPL-MCNC: 9.3 MG/DL — HIGH (ref 0.2–1.2)
BILIRUB SERPL-MCNC: 9.6 MG/DL — HIGH (ref 0.2–1.2)
BUN SERPL-MCNC: 26 MG/DL — HIGH (ref 7–23)
CALCIUM SERPL-MCNC: 10.8 MG/DL — HIGH (ref 8.5–10.1)
CHLORIDE SERPL-SCNC: 109 MMOL/L — HIGH (ref 96–108)
CO2 SERPL-SCNC: 22 MMOL/L — SIGNIFICANT CHANGE UP (ref 22–31)
CREAT SERPL-MCNC: 1.37 MG/DL — HIGH (ref 0.5–1.3)
ESTIMATED AVERAGE GLUCOSE: 134 MG/DL — HIGH (ref 68–114)
GLUCOSE BLDC GLUCOMTR-MCNC: 120 MG/DL — HIGH (ref 70–99)
GLUCOSE BLDC GLUCOMTR-MCNC: 130 MG/DL — HIGH (ref 70–99)
GLUCOSE SERPL-MCNC: 143 MG/DL — HIGH (ref 70–99)
INR BLD: 2.42 RATIO — HIGH (ref 0.88–1.16)
POTASSIUM SERPL-MCNC: 4.4 MMOL/L — SIGNIFICANT CHANGE UP (ref 3.5–5.3)
POTASSIUM SERPL-SCNC: 4.4 MMOL/L — SIGNIFICANT CHANGE UP (ref 3.5–5.3)
PROT SERPL-MCNC: 6.9 GM/DL — SIGNIFICANT CHANGE UP (ref 6–8.3)
PROTHROM AB SERPL-ACNC: 27.8 SEC — HIGH (ref 10–12.9)
SARS-COV-2 RNA SPEC QL NAA+PROBE: SIGNIFICANT CHANGE UP
SODIUM SERPL-SCNC: 141 MMOL/L — SIGNIFICANT CHANGE UP (ref 135–145)
TROPONIN I SERPL-MCNC: 0.11 NG/ML — HIGH (ref 0.01–0.04)
TROPONIN I SERPL-MCNC: 0.15 NG/ML — HIGH (ref 0.01–0.04)

## 2020-04-22 PROCEDURE — 99223 1ST HOSP IP/OBS HIGH 75: CPT

## 2020-04-22 PROCEDURE — 93306 TTE W/DOPPLER COMPLETE: CPT | Mod: 26

## 2020-04-22 RX ORDER — ENOXAPARIN SODIUM 100 MG/ML
90 INJECTION SUBCUTANEOUS
Refills: 0 | Status: DISCONTINUED | OUTPATIENT
Start: 2020-04-22 | End: 2020-04-23

## 2020-04-22 RX ORDER — SACUBITRIL AND VALSARTAN 24; 26 MG/1; MG/1
1 TABLET, FILM COATED ORAL
Refills: 0 | Status: DISCONTINUED | OUTPATIENT
Start: 2020-04-23 | End: 2020-04-23

## 2020-04-22 RX ORDER — PIPERACILLIN AND TAZOBACTAM 4; .5 G/20ML; G/20ML
3.38 INJECTION, POWDER, LYOPHILIZED, FOR SOLUTION INTRAVENOUS EVERY 8 HOURS
Refills: 0 | Status: DISCONTINUED | OUTPATIENT
Start: 2020-04-22 | End: 2020-04-27

## 2020-04-22 RX ADMIN — LATANOPROST 1 DROP(S): 0.05 SOLUTION/ DROPS OPHTHALMIC; TOPICAL at 21:39

## 2020-04-22 RX ADMIN — Medication 40 MILLIGRAM(S): at 06:59

## 2020-04-22 RX ADMIN — Medication 300 MILLIGRAM(S): at 15:56

## 2020-04-22 RX ADMIN — ENOXAPARIN SODIUM 90 MILLIGRAM(S): 100 INJECTION SUBCUTANEOUS at 17:09

## 2020-04-22 RX ADMIN — CARVEDILOL PHOSPHATE 25 MILLIGRAM(S): 80 CAPSULE, EXTENDED RELEASE ORAL at 06:58

## 2020-04-22 RX ADMIN — LISINOPRIL 40 MILLIGRAM(S): 2.5 TABLET ORAL at 07:00

## 2020-04-22 RX ADMIN — Medication 81 MILLIGRAM(S): at 15:56

## 2020-04-22 RX ADMIN — PIPERACILLIN AND TAZOBACTAM 25 GRAM(S): 4; .5 INJECTION, POWDER, LYOPHILIZED, FOR SOLUTION INTRAVENOUS at 07:00

## 2020-04-22 RX ADMIN — PIPERACILLIN AND TAZOBACTAM 25 GRAM(S): 4; .5 INJECTION, POWDER, LYOPHILIZED, FOR SOLUTION INTRAVENOUS at 21:39

## 2020-04-22 RX ADMIN — LACTULOSE 10 GRAM(S): 10 SOLUTION ORAL at 15:56

## 2020-04-22 RX ADMIN — ENOXAPARIN SODIUM 90 MILLIGRAM(S): 100 INJECTION SUBCUTANEOUS at 06:59

## 2020-04-22 RX ADMIN — CARVEDILOL PHOSPHATE 25 MILLIGRAM(S): 80 CAPSULE, EXTENDED RELEASE ORAL at 17:09

## 2020-04-22 NOTE — PROGRESS NOTE ADULT - PROBLEM SELECTOR PLAN 3
AMS? unknown baseline.    Given ceftriaxone and azithromycin in the ED.    Follow blood cultures.    c/w Zosyn ID EVAL  CRP elevated but ferritin low.    reswab for COVID19 is neg

## 2020-04-22 NOTE — PROGRESS NOTE ADULT - ASSESSMENT
Patient is a 74M with a PMH of CAD s/p MI w/stents 2007, CHF with 20% LVEF in 2016, HTN, HLD, DM, gout, CKD, COPD, PVD s/p aortobifemoral bypass who presents to the ED for dyspnea.  Patient currently AAOx1 and unable to provide history.  As per ED attending, patient was sent in by family members due to cough and dyspnea.  Reportedly has been feeling ill since COVID pandemic started.  Vitals stable.  Labs show hypercalcemia, mild tropinemia and elevated bilirubin.  CT head performed and was negative.  CT chest shows  bilateral PNA and a JUAREZ acute PE.  Started on therapeutic lovenox

## 2020-04-22 NOTE — DIETITIAN INITIAL EVALUATION ADULT. - PERTINENT LABORATORY DATA
04-21 Na141 mmol/L Glu 143 mg/dL<H> K+ 4.4 mmol/L Cr  1.37 mg/dL<H> BUN 26 mg/dL<H> 04-21 Alb 2.7 g/dL<L>; 04-22 A1c 6.3; average Glu 134

## 2020-04-22 NOTE — PROGRESS NOTE ADULT - SUBJECTIVE AND OBJECTIVE BOX
Patient is a 74y old  Male who presents with a chief complaint of dyspnea (22 Apr 2020 11:09)      OVERNIGHT EVENTS: confused    REVIEW OF SYSTEMS: poor historian      MEDICATIONS  (STANDING):  allopurinol 300 milliGRAM(s) Oral daily  aspirin  chewable 81 milliGRAM(s) Oral daily  carvedilol 25 milliGRAM(s) Oral every 12 hours  dextrose 5%. 1000 milliLiter(s) (50 mL/Hr) IV Continuous <Continuous>  dextrose 50% Injectable 12.5 Gram(s) IV Push once  dextrose 50% Injectable 25 Gram(s) IV Push once  dextrose 50% Injectable 25 Gram(s) IV Push once  enoxaparin Injectable 90 milliGRAM(s) SubCutaneous two times a day  furosemide    Tablet 40 milliGRAM(s) Oral daily  insulin lispro (HumaLOG) corrective regimen sliding scale   SubCutaneous three times a day before meals  lactulose Syrup 10 Gram(s) Oral daily  latanoprost 0.005% Ophthalmic Solution 1 Drop(s) Both EYES at bedtime  lisinopril 40 milliGRAM(s) Oral daily  piperacillin/tazobactam IVPB.. 3.375 Gram(s) IV Intermittent every 8 hours  simvastatin 40 milliGRAM(s) Oral at bedtime    MEDICATIONS  (PRN):  dextrose 40% Gel 15 Gram(s) Oral once PRN Blood Glucose LESS THAN 70 milliGRAM(s)/deciliter  glucagon  Injectable 1 milliGRAM(s) IntraMuscular once PRN Glucose LESS THAN 70 milligrams/deciliter      Allergies    No Known Drug Allergies  shellfish (Other; Urticaria)    Intolerances        T(F): 97 (04-22-20 @ 11:20), Max: 98.3 (04-21-20 @ 22:24)  HR: 52 (04-22-20 @ 11:20) (52 - 87)  BP: 107/61 (04-22-20 @ 11:20) (107/61 - 146/81)  RR: 20 (04-22-20 @ 11:20) (16 - 20)  SpO2: 96% (04-22-20 @ 11:20) (95% - 100%)  Wt(kg): --    PHYSICAL EXAM:  GENERAL: NAD, obese  HEAD:  Atraumatic, Normocephalic  EYES:  PERRLA, conjunctiva and sclera clear  ENMT: Moist mucous membranes  NECK: Supple, No JVD, Normal thyroid  NERVOUS SYSTEM:  sleepy   CHEST/LUNG: decreased bs bilaterally;  HEART: Regular rate and rhythm;   ABDOMEN: Soft, Nontender, Nondistended; Bowel sounds present  EXTREMITIES:  2+ Peripheral Pulses, No clubbing, cyanosis, or edema BL LE  SKIN: moist    LABS:                        15.1   9.50  )-----------( 214      ( 21 Apr 2020 16:49 )             46.6     04-21    141  |  109<H>  |  26<H>  ----------------------------<  143<H>  4.4   |  22  |  1.37<H>    Ca    10.8<H>      21 Apr 2020 23:58    TPro  x   /  Alb  x   /  TBili  9.6<H>  /  DBili  6.37<H>  /  AST  x   /  ALT  x   /  AlkPhos  x   04-22    PT/INR - ( 21 Apr 2020 23:58 )   PT: 27.8 sec;   INR: 2.42 ratio         PTT - ( 21 Apr 2020 23:58 )  PTT:33.3 sec    Cultures;   CAPILLARY BLOOD GLUCOSE      POCT Blood Glucose.: 130 mg/dL (22 Apr 2020 16:01)    Lipid panel:     CARDIAC MARKERS ( 22 Apr 2020 04:09 )  .149 ng/mL / x     / x     / x     / x      CARDIAC MARKERS ( 21 Apr 2020 23:58 )  .108 ng/mL / x     / x     / x     / x      CARDIAC MARKERS ( 21 Apr 2020 16:49 )  .106 ng/mL / x     / x     / x     / x            RADIOLOGY & ADDITIONAL TESTS:    Imaging Personally Reviewed:  [x ] YES    < from: CT Angio Chest w/ IV Cont (04.21.20 @ 19:49) >  Bilateral pneumonia is most prominent in the left lower lobe.  Subsegmental left upper lobe pulmonary embolism  No acute pathology in the abdomen or pelvis  Left renal cyst  Enlarged prostate  Status post aortic bypass.    < from: TTE Echo Doppler w/o Cont (10.19.16 @ 20:03) >  Technically limited study  1. Dilated left ventricle with severe global left ventricular systolic   dysfunction  2. Mitral annular calcification with moderate mitral regurgitation and   left atrial enlargement  3. Aortic sclerosis with mild to moderate aortic regurgitation  4. Moderate tricuspid regurgitation  5. Bilateral pleural effusions      < end of copied text >      Consultant(s) Notes Reviewed:  [x ] YES     Care Discussed with [x ] Consultants [X ] Patient [ ] Family  [x ]    [x ]  Other; RN

## 2020-04-22 NOTE — DIETITIAN INITIAL EVALUATION ADULT. - OTHER INFO
Unable to conduct face-to-face interview c pt due to isolation precautions for possible COVID-19 (not noted in header but isolation instruction as provider order & room door indicates pt on isolation); also pt c AMS at this time (septic encephalopathy); unable to contact family for information; phone # for daughter listed in medical record not in service. Pt was placed on Dysphagia 1 diet (pureed/nectar) as chewing difficulty noted in flow sheet; no swallow eval conducted. Per conversation c RN pt c poor dentition; agreed mechanical soft better choice; & thin liquids are appropriate as pt has no swallowing difficulty.  No reports of N/V/C/D.  Will send educational material to home address for future reference.

## 2020-04-22 NOTE — DIETITIAN INITIAL EVALUATION ADULT. - ENERGY NEEDS
Height (cm): 177.8 (04-22)  Weight (kg): 108.7 (04-21)  BMI (kg/m2): 34.4 (04-22)  IBW:   75.5 kg +/- 10%  % IBW:  144%   UBW: unknown    %UBW: unknown

## 2020-04-22 NOTE — CONSULT NOTE ADULT - SUBJECTIVE AND OBJECTIVE BOX
CARDIOLOGY CONSULTATION NOTE                                                                               ALEXANDR SARAVIA is a 74y Male with a known h/o CAD s/p MI (+PCI in 2007), h/o chr systolic/diastolic HF with reported EF of 20% in 2016,  HTN, HLD, DM, gout, CKD, COPD, PVD s/p aortobifemoral bypass.    He presented to the ED with reported c/o dyspnea, patient was disoriented and unable to give a history.  As per ED attending, patient was sent in by family members due to cough and dyspnea.  Reportedly has been feeling ill since COVID pandemic started.  Vitals stable.  Labs show hypercalcemia, mild tropinema and elevated bilirubin.  CT head performed and was negative.    CXR suggestive of heart failure but CT chest shows  bilateral PNA and evidence of a JUAREZ acute PE.     Consult requested for CHf exacerbation and elevated cardiac enzymes.    REVIEW OF SYSTEMS: NA  ------------------------------    Home Medications:  allopurinol 300 mg oral tablet: 1 tab(s) orally once a day (28 Sep 2017 13:45)  aspirin 81 mg oral tablet: 1 tab(s) orally once a day (28 Sep 2017 13:45)  Coreg 25 mg oral tablet: 1 tab(s) orally 2 times a day (28 Sep 2017 13:45)  lactulose 10 g/15 mL oral syrup: 15 milliliter(s) orally once a day (28 Sep 2017 13:45)  Lasix 40 mg oral tablet: 1 tab(s) orally once a day (28 Sep 2017 13:45)  lisinopril 40 mg oral tablet: 1 tab(s) orally once a day (28 Sep 2017 13:45)  simvastatin 40 mg oral tablet: 1 tab(s) orally once a day (at bedtime) (28 Sep 2017 13:45)  Travatan Z 0.004% ophthalmic solution: 1 drop(s) to each affected eye once a day (in the evening) (28 Sep 2017 13:45)      MEDICATIONS  (STANDING):  allopurinol 300 milliGRAM(s) Oral daily  aspirin  chewable 81 milliGRAM(s) Oral daily  carvedilol 25 milliGRAM(s) Oral every 12 hours  dextrose 5%. 1000 milliLiter(s) (50 mL/Hr) IV Continuous <Continuous>  dextrose 50% Injectable 12.5 Gram(s) IV Push once  dextrose 50% Injectable 25 Gram(s) IV Push once  dextrose 50% Injectable 25 Gram(s) IV Push once  enoxaparin Injectable 90 milliGRAM(s) SubCutaneous two times a day  furosemide    Tablet 40 milliGRAM(s) Oral daily  insulin lispro (HumaLOG) corrective regimen sliding scale   SubCutaneous three times a day before meals  lactulose Syrup 10 Gram(s) Oral daily  latanoprost 0.005% Ophthalmic Solution 1 Drop(s) Both EYES at bedtime  lisinopril 40 milliGRAM(s) Oral daily  piperacillin/tazobactam IVPB.. 3.375 Gram(s) IV Intermittent every 8 hours  simvastatin 40 milliGRAM(s) Oral at bedtime      ALLERGIES: No Known Drug Allergies  shellfish (Other; Urticaria)      FAMILY HISTORY: No pertinent family history in first degree relatives      PHYSICAL EXAMINATION:  -----------------------------  T(C): 36.2 (04-22-20 @ 03:40), Max: 36.8 (04-21-20 @ 22:24)  HR: 72 (04-22-20 @ 03:40) (72 - 97)  BP: 135/72 (04-22-20 @ 03:40) (135/72 - 152/98)  RR: 20 (04-22-20 @ 03:40) (16 - 24)  SpO2: 100% (04-22-20 @ 03:40) (95% - 100%)  Wt(kg): --    Height (cm): 177.8 (04-22 @ 03:40)  Weight (kg): 95.3 (04-21 @ 15:45)  BMI (kg/m2): 30.1 (04-22 @ 03:40)  BSA (m2): 2.13 (04-22 @ 03:40)    Constitutional: well developed, normal appearance, well groomed, well nourished, no deformities and no acute distress.   Eyes: the conjunctiva exhibited no abnormalities and the eyelids demonstrated no xanthelasmas.   HEENT: normal oral mucosa, no oral pallor and no oral cyanosis.   Neck: normal jugular venous A waves present, normal jugular venous V waves present and no jugular venous nagel A waves.   Pulmonary: no respiratory distress, normal respiratory rhythm and effort, no accessory muscle use and lungs were clear to auscultation bilaterally.   Cardiovascular: heart rate and rhythm were normal, normal S1 and S2 and no murmur, gallop, rub, heave or thrill are present.   Abdomen: soft, non-tender, no hepato-splenomegaly and no abdominal mass palpated.   Musculoskeletal: the gait could not be assessed..   Extremities: no clubbing of the fingernails, no localized cyanosis, no petechial hemorrhages and no ischemic changes.   Skin: normal skin color and pigmentation, no rash, no venous stasis, no skin lesions, no skin ulcer and no xanthoma was observed.   Psychiatric: oriented to person, place, and time, the affect was normal, the mood was normal and not feeling anxious.     ECG:  -------        LABS:   --------  04-21    141  |  109<H>  |  26<H>  ----------------------------<  143<H>  4.4   |  22  |  1.37<H>    Ca    10.8<H>      21 Apr 2020 23:58    TPro  x   /  Alb  x   /  TBili  9.6<H>  /  DBili  6.37<H>  /  AST  x   /  ALT  x   /  AlkPhos  x   04-22                         15.1   9.50  )-----------( 214      ( 21 Apr 2020 16:49 )             46.6     PT/INR - ( 21 Apr 2020 23:58 )   PT: 27.8 sec;   INR: 2.42 ratio         PTT - ( 21 Apr 2020 23:58 )  PTT:33.3 sec  04-21 @ 16:49 BNP: 9230 pg/mL    04-22 @ 04:09 CPK total:--, CKMB --, Troponin I - .149 ng/mL<H>  04-21 @ 23:58 CPK total:--, CKMB --, Troponin I - .108 ng/mL<H>  04-21 @ 16:49 CPK total:--, CKMB --, Troponin I - .106 ng/mL<H>          RADIOLOGY REPORTS:  -----------------------------  < from: CT Angio Chest w/ IV Cont (04.21.20 @ 19:49) >    EXAM:  CT ABDOMEN AND PELVIS IC                          EXAM:  CT ANGIO CHEST (W)AW IC                            PROCEDURE DATE:  04/21/2020          INTERPRETATION:  CLINICAL INFORMATION: Painless jaundice with elevated bilirubin, elevated d-dimer, rule out pulmonary embolism     COMPARISON: CT  10/19/2016    PROCEDURE:   CT of the Chest, Abdomen and Pelvis was performed with intravenous contrast.   Intravenous contrast: 90 ml Omnipaque 350. 10 ml discarded.  Oral contrast: None.  Sagittal and coronal reformats were performed. The patient could not raise the arms above the head for the study.    FINDINGS:    CHEST:     LUNGS AND LARGE AIRWAYS: Patent central airways. There is extensive left lower lobe infiltrate and a smaller anterior right lower lobe infiltrate. There is small focal infiltrate in the left upper lobe anterolaterally.  PLEURA: Small right pleural effusion.  VESSELS: Left-sided pacemaker is seen. There is atherosclerotic calcification of the aorta and coronary arteries. There is subsegmental pulmonary embolism to the left upper lobe best seen on image 197 of series 4  HEART: Heart size is normal. No pericardial effusion.  MEDIASTINUM AND MIKAL: No lymphadenopathy.  CHEST WALL AND LOWER NECK: Within normal limits.    ABDOMEN AND PELVIS:    LIVER: Within normal limits.  BILE DUCTS: Normal caliber.  GALLBLADDER: Within normal limits.  SPLEEN: Within normal limits.  PANCREAS: Within normal limits.  ADRENALS: Within normal limits.  KIDNEYS/URETERS: Left renal cysts are noted.    BLADDER: Within normal limits.  REPRODUCTIVE ORGANS: Prostate is enlarged.    BOWEL: No bowel obstruction. Appendix is normal.  PERITONEUM: No ascites.  VESSELS: Within normal limits.  RETROPERITONEUM/LYMPH NODES: No lymphadenopathy. Retroperitoneal clips are seen. Patient appears status post vascular bypass bypass  ABDOMINAL WALL: Within normal limits.  BONES: Within normal limits.    IMPRESSION: Bilateral pneumonia is most prominent in the left lower lobe.  Subsegmental left upper lobe pulmonary embolism  No acute pathology in the abdomen or pelvis  Left renal cyst  Enlarged prostate  Status post aortic bypass.  results discussed with Dr. Mar at 8:00 PM on 4/21/2020.      MARCELL RIBERA M.D.,ATTENDING RADIOLOGIST  This document has been electronically signed. Apr 21 2020  8:04PM                < end of copied text >        ECHOCARDIOGRAM:  ---------------------------  < from: TTE Echo Doppler w/o Cont (10.19.16 @ 20:03) >     EXAM:  ECHO TTE W/O CON COMP W/DOPPLR           PROCEDURE DATE:  10/19/2016      INTERPRETATION:  Ordering Physician: HERMELINDO SUH    Indication: Dyspnea    Technician: BHAVIN    Study Quality: Poor   A complete echocardiographic study was performed utilizing standard   protocol including spectral and color Doppler in all echocardiographic   windows.    Height: 177 cm  Weight: 121 kg  BSA: 2.36m2  Blood Pressure: 120/80    MEASUREMENTS  IVS: 0.9cm  PWT: 0.1cm  LA: 4.7cm  AO: 3.4cm  LVIDd: 5.4cm  LVIDs: 4.9cm  LVOT:    cm    LVEF: 20%  RVSP: 32mm/Hg  RA Pressure: 10mm/Hg  IVC:    cm    FINDINGS  Left Ventricle: The left ventricle is dilated with severe global left   ventricular systolic dysfunction  Right Ventricle: Normal  Left Atrium: Moderate dilatation  Right Atrium: Normal  Mitral Valve: Mitral annular calcification with moderate mitral   regurgitation  Aortic Valve: Aortic sclerosis with mild to moderate aortic regurgitation  Tricuspid Valve: Moderate tricuspid regurgitation  Pulmonic Valve: Not visualized  Diastolic Function:      Pericardium/Pleura: Bilateral pleural effusions      CONCLUSIONS:  Technically limited study  1. Dilated left ventricle with severe global left ventricular systolic   dysfunction  2. Mitral annular calcification with moderate mitral regurgitation and   left atrial enlargement  3. Aortic sclerosis with mild to moderate aortic regurgitation  4. Moderate tricuspid regurgitation  5. Bilateral pleural effusions      LANE MCPHERSON M.D., ATTENDING CARDIOLOGIST  This document has been electronically signed. Oct 20 2016  4:52P CARDIOLOGY CONSULTATION NOTE                                                                               ALEXANDR SARAVIA is a 74y Male with a known h/o CAD s/p MI (+PCI in 2007), h/o chr systolic/diastolic HF with reported EF of 20% in 2016,  HTN, HLD, DM, gout, CKD, COPD, PVD s/p aortobifemoral bypass.    He presented to the ED with reported c/o dyspnea, patient was disoriented and unable to give a history.  As per ED attending, patient was sent in by family members due to cough and dyspnea.  Reportedly has been feeling ill since COVID pandemic started.  Vitals stable.  Labs show hypercalcemia, mild tropinema and elevated bilirubin.  CT head performed and was negative.    CXR suggestive of heart failure but CT chest shows  bilateral PNA and evidence of a JUAREZ acute PE.     Consult requested for CHf exacerbation and elevated cardiac enzymes.    Patient somewhat limited historian. Says he was seen recently by his Cardiologist and told everything was fine. Denies any chest pain, palpitations or LE edema.  COVID not detected x 2.    REVIEW OF SYSTEMS: NA  ------------------------------    Home Medications:  allopurinol 300 mg oral tablet: 1 tab(s) orally once a day (28 Sep 2017 13:45)  aspirin 81 mg oral tablet: 1 tab(s) orally once a day (28 Sep 2017 13:45)  Coreg 25 mg oral tablet: 1 tab(s) orally 2 times a day (28 Sep 2017 13:45)  lactulose 10 g/15 mL oral syrup: 15 milliliter(s) orally once a day (28 Sep 2017 13:45)  Lasix 40 mg oral tablet: 1 tab(s) orally once a day (28 Sep 2017 13:45)  lisinopril 40 mg oral tablet: 1 tab(s) orally once a day (28 Sep 2017 13:45)  simvastatin 40 mg oral tablet: 1 tab(s) orally once a day (at bedtime) (28 Sep 2017 13:45)  Travatan Z 0.004% ophthalmic solution: 1 drop(s) to each affected eye once a day (in the evening) (28 Sep 2017 13:45)      MEDICATIONS  (STANDING):  allopurinol 300 milliGRAM(s) Oral daily  aspirin  chewable 81 milliGRAM(s) Oral daily  carvedilol 25 milliGRAM(s) Oral every 12 hours  dextrose 5%. 1000 milliLiter(s) (50 mL/Hr) IV Continuous <Continuous>  dextrose 50% Injectable 12.5 Gram(s) IV Push once  dextrose 50% Injectable 25 Gram(s) IV Push once  dextrose 50% Injectable 25 Gram(s) IV Push once  enoxaparin Injectable 90 milliGRAM(s) SubCutaneous two times a day  furosemide    Tablet 40 milliGRAM(s) Oral daily  insulin lispro (HumaLOG) corrective regimen sliding scale   SubCutaneous three times a day before meals  lactulose Syrup 10 Gram(s) Oral daily  latanoprost 0.005% Ophthalmic Solution 1 Drop(s) Both EYES at bedtime  lisinopril 40 milliGRAM(s) Oral daily  piperacillin/tazobactam IVPB.. 3.375 Gram(s) IV Intermittent every 8 hours  simvastatin 40 milliGRAM(s) Oral at bedtime      ALLERGIES: No Known Drug Allergies  shellfish (Other; Urticaria)      FAMILY HISTORY: No pertinent family history in first degree relatives      PHYSICAL EXAMINATION:  -----------------------------  T(C): 36.2 (04-22-20 @ 03:40), Max: 36.8 (04-21-20 @ 22:24)  HR: 72 (04-22-20 @ 03:40) (72 - 97)  BP: 135/72 (04-22-20 @ 03:40) (135/72 - 152/98)  RR: 20 (04-22-20 @ 03:40) (16 - 24)  SpO2: 100% (04-22-20 @ 03:40) (95% - 100%)  Wt(kg): --    Height (cm): 177.8 (04-22 @ 03:40)  Weight (kg): 95.3 (04-21 @ 15:45)  BMI (kg/m2): 30.1 (04-22 @ 03:40)  BSA (m2): 2.13 (04-22 @ 03:40)    Constitutional: well developed, normal appearance, well groomed, well nourished, no deformities and no acute distress.   Eyes: the conjunctiva exhibited no abnormalities and the eyelids demonstrated no xanthelasmas.   HEENT: normal oral mucosa, no oral pallor and no oral cyanosis.   Neck: normal jugular venous A waves present, normal jugular venous V waves present and no jugular venous nagel A waves.   Pulmonary: no respiratory distress, normal respiratory rhythm and effort, no accessory muscle use and lungs demonstrated diminished a/e bilaterally at bases.   Cardiovascular: heart rate and rhythm were normal, normal S1 and S2 and no murmur, gallop, rub, heave or thrill are present.   Abdomen: soft, non-tender, no hepato-splenomegaly and no abdominal mass palpated.   Musculoskeletal: the gait could not be assessed..   Extremities: no clubbing of the fingernails, no localized cyanosis, no petechial hemorrhages and no ischemic changes.   Skin: normal skin color and pigmentation, no rash, no venous stasis, no skin lesions, no skin ulcer and no xanthoma was observed.   Psychiatric: oriented to person, place, and time, the affect was normal, the mood was normal and not feeling anxious.     ECG:  -------  < from: 12 Lead ECG (04.21.20 @ 16:00) >    Ventricular Rate 90 BPM    Atrial Rate 90 BPM    QRS Duration 118 ms    Q-T Interval 388 ms    QTC Calculation(Bezet) 474 ms    R Axis -33 degrees    T Axis 118 degrees    Diagnosis Line *** Poor data quality, interpretation may be adversely affected  Sinus rhythm with  premature atrial complexes  Left axis deviation  Nonspecific intraventricular conduction delay  T wave abnormality, consider lateral ischemia  Abnormal ECG  No previous ECGs available  Confirmed by Nick BLAIR, Justus ([id number]) on 4/22/2020 11:55:11 AM    < end of copied text >        LABS:   --------  04-21    141  |  109<H>  |  26<H>  ----------------------------<  143<H>  4.4   |  22  |  1.37<H>    Ca    10.8<H>      21 Apr 2020 23:58    TPro  x   /  Alb  x   /  TBili  9.6<H>  /  DBili  6.37<H>  /  AST  x   /  ALT  x   /  AlkPhos  x   04-22                         15.1   9.50  )-----------( 214      ( 21 Apr 2020 16:49 )             46.6     PT/INR - ( 21 Apr 2020 23:58 )   PT: 27.8 sec;   INR: 2.42 ratio         PTT - ( 21 Apr 2020 23:58 )  PTT:33.3 sec  04-21 @ 16:49 BNP: 9230 pg/mL    04-22 @ 04:09 CPK total:--, CKMB --, Troponin I - .149 ng/mL<H>  04-21 @ 23:58 CPK total:--, CKMB --, Troponin I - .108 ng/mL<H>  04-21 @ 16:49 CPK total:--, CKMB --, Troponin I - .106 ng/mL<H>          RADIOLOGY REPORTS:  -----------------------------  < from: CT Angio Chest w/ IV Cont (04.21.20 @ 19:49) >    EXAM:  CT ABDOMEN AND PELVIS IC                          EXAM:  CT ANGIO CHEST (W)AW IC                            PROCEDURE DATE:  04/21/2020          INTERPRETATION:  CLINICAL INFORMATION: Painless jaundice with elevated bilirubin, elevated d-dimer, rule out pulmonary embolism     COMPARISON: CT  10/19/2016    PROCEDURE:   CT of the Chest, Abdomen and Pelvis was performed with intravenous contrast.   Intravenous contrast: 90 ml Omnipaque 350. 10 ml discarded.  Oral contrast: None.  Sagittal and coronal reformats were performed. The patient could not raise the arms above the head for the study.    FINDINGS:    CHEST:     LUNGS AND LARGE AIRWAYS: Patent central airways. There is extensive left lower lobe infiltrate and a smaller anterior right lower lobe infiltrate. There is small focal infiltrate in the left upper lobe anterolaterally.  PLEURA: Small right pleural effusion.  VESSELS: Left-sided pacemaker is seen. There is atherosclerotic calcification of the aorta and coronary arteries. There is subsegmental pulmonary embolism to the left upper lobe best seen on image 197 of series 4  HEART: Heart size is normal. No pericardial effusion.  MEDIASTINUM AND MIKAL: No lymphadenopathy.  CHEST WALL AND LOWER NECK: Within normal limits.    ABDOMEN AND PELVIS:    LIVER: Within normal limits.  BILE DUCTS: Normal caliber.  GALLBLADDER: Within normal limits.  SPLEEN: Within normal limits.  PANCREAS: Within normal limits.  ADRENALS: Within normal limits.  KIDNEYS/URETERS: Left renal cysts are noted.    BLADDER: Within normal limits.  REPRODUCTIVE ORGANS: Prostate is enlarged.    BOWEL: No bowel obstruction. Appendix is normal.  PERITONEUM: No ascites.  VESSELS: Within normal limits.  RETROPERITONEUM/LYMPH NODES: No lymphadenopathy. Retroperitoneal clips are seen. Patient appears status post vascular bypass bypass  ABDOMINAL WALL: Within normal limits.  BONES: Within normal limits.    IMPRESSION: Bilateral pneumonia is most prominent in the left lower lobe.  Subsegmental left upper lobe pulmonary embolism  No acute pathology in the abdomen or pelvis  Left renal cyst  Enlarged prostate  Status post aortic bypass.  results discussed with Dr. Mar at 8:00 PM on 4/21/2020.      MARCELL RIBERA M.D.,ATTENDING RADIOLOGIST  This document has been electronically signed. Apr 21 2020  8:04PM                < end of copied text >        ECHOCARDIOGRAM:  ---------------------------  < from: TTE Echo Doppler w/o Cont (10.19.16 @ 20:03) >     EXAM:  ECHO TTE W/O CON COMP W/DOPPLR           PROCEDURE DATE:  10/19/2016      INTERPRETATION:  Ordering Physician: HERMELINDO SUH    Indication: Dyspnea    Technician: MM    Study Quality: Poor   A complete echocardiographic study was performed utilizing standard   protocol including spectral and color Doppler in all echocardiographic   windows.    Height: 177 cm  Weight: 121 kg  BSA: 2.36m2  Blood Pressure: 120/80    MEASUREMENTS  IVS: 0.9cm  PWT: 0.1cm  LA: 4.7cm  AO: 3.4cm  LVIDd: 5.4cm  LVIDs: 4.9cm  LVOT:    cm    LVEF: 20%  RVSP: 32mm/Hg  RA Pressure: 10mm/Hg  IVC:    cm    FINDINGS  Left Ventricle: The left ventricle is dilated with severe global left   ventricular systolic dysfunction  Right Ventricle: Normal  Left Atrium: Moderate dilatation  Right Atrium: Normal  Mitral Valve: Mitral annular calcification with moderate mitral   regurgitation  Aortic Valve: Aortic sclerosis with mild to moderate aortic regurgitation  Tricuspid Valve: Moderate tricuspid regurgitation  Pulmonic Valve: Not visualized  Diastolic Function:      Pericardium/Pleura: Bilateral pleural effusions      CONCLUSIONS:  Technically limited study  1. Dilated left ventricle with severe global left ventricular systolic   dysfunction  2. Mitral annular calcification with moderate mitral regurgitation and   left atrial enlargement  3. Aortic sclerosis with mild to moderate aortic regurgitation  4. Moderate tricuspid regurgitation  5. Bilateral pleural effusions      LANE MCPHERSON M.D., ATTENDING CARDIOLOGIST  This document has been electronically signed. Oct 20 2016  4:52P

## 2020-04-22 NOTE — CONSULT NOTE ADULT - SUBJECTIVE AND OBJECTIVE BOX
HPI:  Patient is a 74M with a PMH of CAD s/p MI w/stents 2007, CHF with 20% LVEF in 2016,  HTN, HLD, DM, gout, CKD, COPD, PVD s/p aortobifemoral bypass who presents to the ED for dyspnea.  Patient currently AAOx1 and unable to provide history.  As per ED attending, patient was sent in by family members due to cough and dyspnea.  Reportedly has been feeling ill since COVID pandemic started.  Vitals stable.  Labs show hypercalcemia, mild tropinema and elevated bilirubin.  CT head performed and was negative.  CT chest shows  bilateral PNA and a JUAREZ acute PE.  Started on therapeutic lovenox, will admit to tele. (21 Apr 2020 18:15)  --------------- As Above ------------ Patient seen earlier today  Consult called for elevated bilirubin. Patient confused. The patient denies history of liver disease, melena, hematochezia, hematemesis, nausea, vomiting, abdominal pain, constipation, diarrhea, or change in bowel movements Denies ETOH abuse. History of EF ~ 20 %  See labs / CT scan      PAST MEDICAL & SURGICAL HISTORY:  AICD (automatic cardioverter/defibrillator) present  Sickle cell trait  S/P aortobifemoral bypass surgery  Peripheral vascular disease  Chronic obstructive pulmonary disease  Renal insufficiency  Diabetes mellitus  Gout  Hypertension  Myocardial Infarction  Hypercholesteremia  Coronary Artery Disease  s/p Femoral-Popliteal Bypass Surgery  Abdominal Hernia      MEDICATIONS  (STANDING):  allopurinol 300 milliGRAM(s) Oral daily  aspirin  chewable 81 milliGRAM(s) Oral daily  carvedilol 25 milliGRAM(s) Oral every 12 hours  dextrose 5%. 1000 milliLiter(s) (50 mL/Hr) IV Continuous <Continuous>  dextrose 50% Injectable 12.5 Gram(s) IV Push once  dextrose 50% Injectable 25 Gram(s) IV Push once  dextrose 50% Injectable 25 Gram(s) IV Push once  enoxaparin Injectable 90 milliGRAM(s) SubCutaneous two times a day  furosemide    Tablet 40 milliGRAM(s) Oral daily  insulin lispro (HumaLOG) corrective regimen sliding scale   SubCutaneous three times a day before meals  lactulose Syrup 10 Gram(s) Oral daily  latanoprost 0.005% Ophthalmic Solution 1 Drop(s) Both EYES at bedtime  piperacillin/tazobactam IVPB.. 3.375 Gram(s) IV Intermittent every 8 hours  simvastatin 40 milliGRAM(s) Oral at bedtime    MEDICATIONS  (PRN):  dextrose 40% Gel 15 Gram(s) Oral once PRN Blood Glucose LESS THAN 70 milliGRAM(s)/deciliter  glucagon  Injectable 1 milliGRAM(s) IntraMuscular once PRN Glucose LESS THAN 70 milligrams/deciliter      Allergies    No Known Drug Allergies  shellfish (Other; Urticaria)    Intolerances        FAMILY HISTORY:  No pertinent family history in first degree relatives      REVIEW OF SYSTEMS:    CONSTITUTIONAL: No fever, weight loss,  EYES: No eye pain, visual disturbances, or discharge  ENMT:  No difficulty hearing, tinnitus, vertigo; No sinus or throat pain  NECK: No pain or stiffness  BREASTS: No pain, masses, or nipple discharge  RESPIRATORY: No cough, wheezing, chills or hemoptysis; No shortness of breath  CARDIOVASCULAR: No chest pain, palpitations, dizziness, or leg swelling  GASTROINTESTINAL:  See above  GENITOURINARY: No dysuria, frequency, hematuria, or incontinence  NEUROLOGICAL: No headaches, memory loss, loss of strength, numbness, or tremors  SKIN: No itching, burning, rashes, or lesions   LYMPH NODES: No enlarged glands  ENDOCRINE: No heat or cold intolerance; No hair loss  MUSCULOSKELETAL: No joint pain or swelling; No muscle, back, or extremity pain  PSYCHIATRIC: No depression, anxiety, mood swings, or difficulty sleeping  HEME/LYMPH: No easy bruising, or bleeding gums  ALLERGY AND IMMUNOLOGIC: No hives or eczema          SOCIAL HISTORY:    FAMILY HISTORY:  No pertinent family history in first degree relatives      Vital Signs Last 24 Hrs  T(C): 36.1 (22 Apr 2020 11:20), Max: 36.8 (21 Apr 2020 22:24)  T(F): 97 (22 Apr 2020 11:20), Max: 98.3 (21 Apr 2020 22:24)  HR: 52 (22 Apr 2020 11:20) (52 - 87)  BP: 107/61 (22 Apr 2020 11:20) (107/61 - 144/96)  BP(mean): --  RR: 20 (22 Apr 2020 11:20) (16 - 20)  SpO2: 96% (22 Apr 2020 11:20) (95% - 100%)    PHYSICAL EXAM:    GENERAL: NAD, well-groomed, well-developed  HEAD:  Atraumatic, Normocephalic  EYES: EOMI, PERRLA, conjunctiva and sclera clear  ENMT: No tonsillar erythema, exudates, or enlargement; Moist mucous membranes, Good dentition, No lesions  NECK: Supple, No JVD, Normal thyroid  NERVOUS SYSTEM:  Alert & Oriented X3, Good concentration; Motor Strength 5/5 B/L upper and lower extremities; DTRs 2+ intact and symmetric  CHEST/LUNG: Clear to percussion bilaterally; No rales, rhonchi, wheezing, or rubs  HEART: Regular rate and rhythm; No murmurs, rubs, or gallops  ABDOMEN: Soft, Nontender, Nondistended; Bowel sounds present  EXTREMITIES:  2+ Peripheral Pulses, No clubbing, cyanosis, or edema  LYMPH: No lymphadenopathy noted   RECTAL: No masses, prostate normal size and smooth, Guaiaci negative   BREAST: No palpable masses, skin no lesions no retractions, no discharges. adnexal no palpable masses noted   GYN: uterus normal size, adnexal, no palpable masses, no CMT, no uterine discharge   SKIN: No rashes or lesions    LABS:                        15.1   9.50  )-----------( 214      ( 21 Apr 2020 16:49 )             46.6       CBC:  04-21 @ 16:49  WBC  9.50  HGB 15.1  HCT 46.6 Plate 214  MCV 79.3           21 Apr 2020 23:58    141    |  109    |  26     ----------------------------<  143    4.4     |  22     |  1.37   21 Apr 2020 16:49    143    |  107    |  23     ----------------------------<  119    4.4     |  25     |  1.34     Ca    10.8       21 Apr 2020 23:58  Ca    10.9       21 Apr 2020 16:49    TPro  x      /  Alb  x      /  TBili  9.6    /  DBili  6.37   /  AST  x      /  ALT  x      /  AlkPhos  x      22 Apr 2020 04:09  TPro  6.9    /  Alb  2.7    /  TBili  9.3    /  DBili  x      /  AST  81     /  ALT  62     /  AlkPhos  101    21 Apr 2020 23:58  TPro  7.4    /  Alb  3.2    /  TBili  10.4   /  DBili  x      /  AST  83     /  ALT  64     /  AlkPhos  111    21 Apr 2020 16:49    PT/INR - ( 21 Apr 2020 23:58 )   PT: 27.8 sec;   INR: 2.42 ratio         PTT - ( 21 Apr 2020 23:58 )  PTT:33.3 sec    C-Reactive Protein, Serum: 12.23 mg/dL (04-21 @ 22:29)      RADIOLOGY & ADDITIONAL STUDIES: HPI:  Patient is a 74M with a PMH of CAD s/p MI w/stents 2007, CHF with 20% LVEF in 2016,  HTN, HLD, DM, gout, CKD, COPD, PVD s/p aortobifemoral bypass who presents to the ED for dyspnea.  Patient currently AAOx1 and unable to provide history.  As per ED attending, patient was sent in by family members due to cough and dyspnea.  Reportedly has been feeling ill since COVID pandemic started.  Vitals stable.  Labs show hypercalcemia, mild tropinema and elevated bilirubin.  CT head performed and was negative.  CT chest shows  bilateral PNA and a JUAREZ acute PE.  Started on therapeutic lovenox, will admit to tele. (21 Apr 2020 18:15)  --------------- As Above ------------ Patient seen earlier today  Consult called for elevated bilirubin. Patient confused. The patient denies history of liver disease, melena, hematochezia, hematemesis, nausea, vomiting, abdominal pain, constipation, diarrhea, or change in bowel movements Denies ETOH abuse. History of EF ~ 20 %  On Zocor  See labs / CT scan      PAST MEDICAL & SURGICAL HISTORY:  AICD (automatic cardioverter/defibrillator) present  Sickle cell trait  S/P aortobifemoral bypass surgery  Peripheral vascular disease  Chronic obstructive pulmonary disease  Renal insufficiency  Diabetes mellitus  Gout  Hypertension  Myocardial Infarction  Hypercholesteremia  Coronary Artery Disease  s/p Femoral-Popliteal Bypass Surgery  Abdominal Hernia      MEDICATIONS  (STANDING):  allopurinol 300 milliGRAM(s) Oral daily  aspirin  chewable 81 milliGRAM(s) Oral daily  carvedilol 25 milliGRAM(s) Oral every 12 hours  dextrose 5%. 1000 milliLiter(s) (50 mL/Hr) IV Continuous <Continuous>  dextrose 50% Injectable 12.5 Gram(s) IV Push once  dextrose 50% Injectable 25 Gram(s) IV Push once  dextrose 50% Injectable 25 Gram(s) IV Push once  enoxaparin Injectable 90 milliGRAM(s) SubCutaneous two times a day  furosemide    Tablet 40 milliGRAM(s) Oral daily  insulin lispro (HumaLOG) corrective regimen sliding scale   SubCutaneous three times a day before meals  lactulose Syrup 10 Gram(s) Oral daily  latanoprost 0.005% Ophthalmic Solution 1 Drop(s) Both EYES at bedtime  piperacillin/tazobactam IVPB.. 3.375 Gram(s) IV Intermittent every 8 hours  simvastatin 40 milliGRAM(s) Oral at bedtime    MEDICATIONS  (PRN):  dextrose 40% Gel 15 Gram(s) Oral once PRN Blood Glucose LESS THAN 70 milliGRAM(s)/deciliter  glucagon  Injectable 1 milliGRAM(s) IntraMuscular once PRN Glucose LESS THAN 70 milligrams/deciliter      Allergies    No Known Drug Allergies  shellfish (Other; Urticaria)    Intolerances        FAMILY HISTORY:  No pertinent family history in first degree relatives      REVIEW OF SYSTEMS:    CONSTITUTIONAL: No fever, weight loss,  EYES: No eye pain, visual disturbances, or discharge  ENMT:  No difficulty hearing, tinnitus, vertigo; No sinus or throat pain  NECK: No pain or stiffness  BREASTS: No pain, masses, or nipple discharge  RESPIRATORY: No cough, wheezing, chills or hemoptysis; No shortness of breath  CARDIOVASCULAR: No chest pain, palpitations, dizziness,   GASTROINTESTINAL:  See above  GENITOURINARY: No dysuria, frequency, hematuria, or incontinence  NEUROLOGICAL: No headaches, memory loss, loss of strength, numbness, or tremors  SKIN: No itching, burning, rashes, or lesions   LYMPH NODES: No enlarged glands  ENDOCRINE: No heat or cold intolerance; No hair loss  MUSCULOSKELETAL: No joint pain or swelling; No muscle, back, or extremity pain  PSYCHIATRIC: No depression, anxiety, mood swings, or difficulty sleeping  HEME/LYMPH: No easy bruising, or bleeding gums  ALLERGY AND IMMUNOLOGIC: No hives or eczema          SOCIAL HISTORY:    FAMILY HISTORY:  No pertinent family history in first degree relatives      Vital Signs Last 24 Hrs  T(C): 36.1 (22 Apr 2020 11:20), Max: 36.8 (21 Apr 2020 22:24)  T(F): 97 (22 Apr 2020 11:20), Max: 98.3 (21 Apr 2020 22:24)  HR: 52 (22 Apr 2020 11:20) (52 - 87)  BP: 107/61 (22 Apr 2020 11:20) (107/61 - 144/96)  BP(mean): --  RR: 20 (22 Apr 2020 11:20) (16 - 20)  SpO2: 96% (22 Apr 2020 11:20) (95% - 100%)    PHYSICAL EXAM:    GENERAL: NAD, well-groomed, well-developed  HEAD:  Atraumatic, Normocephalic  EYES: EOMI, PERRLA, conjunctiva and sclera clear  NECK: Supple, No JVD, Normal thyroid  NERVOUS SYSTEM:  Alert & Confused, Good concentration; Motor Strength 5/5 B/L upper and lower extremities;   CHEST/LUNG: Clear to percussion bilaterally; No rales, rhonchi, wheezing, or rubs  HEART: Regular rate and rhythm; No murmurs, rubs, or gallops  ABDOMEN: Soft, Nontender, Nondistended; Bowel sounds present  EXTREMITIES:  2+ Peripheral Pulses, No clubbing, cyanosis,   LYMPH: No lymphadenopathy noted   RECTAL: No masses, brown stool  SKIN: No rashes or lesions    LABS:                        15.1   9.50  )-----------( 214      ( 21 Apr 2020 16:49 )             46.6       CBC:  04-21 @ 16:49  WBC  9.50  HGB 15.1  HCT 46.6 Plate 214  MCV 79.3           21 Apr 2020 23:58    141    |  109    |  26     ----------------------------<  143    4.4     |  22     |  1.37   21 Apr 2020 16:49    143    |  107    |  23     ----------------------------<  119    4.4     |  25     |  1.34     Ca    10.8       21 Apr 2020 23:58  Ca    10.9       21 Apr 2020 16:49    TPro  x      /  Alb  x      /  TBili  9.6    /  DBili  6.37   /  AST  x      /  ALT  x      /  AlkPhos  x      22 Apr 2020 04:09  TPro  6.9    /  Alb  2.7    /  TBili  9.3    /  DBili  x      /  AST  81     /  ALT  62     /  AlkPhos  101    21 Apr 2020 23:58  TPro  7.4    /  Alb  3.2    /  TBili  10.4   /  DBili  x      /  AST  83     /  ALT  64     /  AlkPhos  111    21 Apr 2020 16:49    PT/INR - ( 21 Apr 2020 23:58 )   PT: 27.8 sec;   INR: 2.42 ratio         PTT - ( 21 Apr 2020 23:58 )  PTT:33.3 sec    C-Reactive Protein, Serum: 12.23 mg/dL (04-21 @ 22:29)      RADIOLOGY & ADDITIONAL STUDIES:  < from: CT Abdomen and Pelvis w/ IV Cont (04.21.20 @ 19:49) >    EXAM:  CT ABDOMEN AND PELVIS IC                          EXAM:  CT ANGIO CHEST (W)AW IC                            PROCEDURE DATE:  04/21/2020          INTERPRETATION:  CLINICAL INFORMATION: Painless jaundice with elevated bilirubin, elevated d-dimer, rule out pulmonary embolism     COMPARISON: CT  10/19/2016    PROCEDURE:   CT of the Chest, Abdomen and Pelvis was performed with intravenous contrast.   Intravenous contrast: 90 ml Omnipaque 350. 10 ml discarded.  Oral contrast: None.  Sagittal and coronal reformats were performed. The patient could not raise the arms above the head for the study.    FINDINGS:    CHEST:     LUNGS AND LARGE AIRWAYS: Patent central airways. There is extensive left lower lobe infiltrate and a smaller anterior right lower lobe infiltrate. There is small focal infiltrate in the left upper lobe anterolaterally.  PLEURA: Small right pleural effusion.  VESSELS: Left-sided pacemaker is seen. There is atherosclerotic calcification of the aorta and coronary arteries. There is subsegmental pulmonary embolism to the left upper lobe best seen on image 197 of series 4  HEART: Heart size is normal. No pericardial effusion.  MEDIASTINUM AND MIKAL: No lymphadenopathy.  CHEST WALL AND LOWER NECK: Within normal limits.    ABDOMEN AND PELVIS:    LIVER: Within normal limits.  BILE DUCTS: Normal caliber.  GALLBLADDER: Within normal limits.  SPLEEN: Within normal limits.  PANCREAS: Within normal limits.  ADRENALS: Within normal limits.  KIDNEYS/URETERS: Left renal cysts are noted.    BLADDER: Within normal limits.  REPRODUCTIVE ORGANS: Prostate is enlarged.    BOWEL: No bowel obstruction. Appendix is normal.  PERITONEUM: No ascites.  VESSELS: Within normal limits.  RETROPERITONEUM/LYMPH NODES: No lymphadenopathy. Retroperitoneal clips are seen. Patient appears status post vascular bypass bypass  ABDOMINAL WALL: Within normal limits.  BONES: Within normal limits.    IMPRESSION: Bilateral pneumonia is most prominent in the left lower lobe.  Subsegmental left upper lobe pulmonary embolism  No acute pathology in the abdomen or pelvis  Left renal cyst  Enlarged prostate  Status post aortic bypass.  results discussed with Dr. Mar at 8:00 PM on 4/21/2020.                    MARCELL RIBERA M.D.,ATTENDING RADIOLOGIST  This document has been electronically signed. Apr 21 2020  8:04PM              < end of copied text >

## 2020-04-23 DIAGNOSIS — J96.01 ACUTE RESPIRATORY FAILURE WITH HYPOXIA: ICD-10-CM

## 2020-04-23 LAB
ALBUMIN SERPL ELPH-MCNC: 2.3 G/DL — LOW (ref 3.3–5)
ALP SERPL-CCNC: 108 U/L — SIGNIFICANT CHANGE UP (ref 40–120)
ALT FLD-CCNC: 54 U/L — SIGNIFICANT CHANGE UP (ref 12–78)
ANION GAP SERPL CALC-SCNC: 8 MMOL/L — SIGNIFICANT CHANGE UP (ref 5–17)
AST SERPL-CCNC: 66 U/L — HIGH (ref 15–37)
BILIRUB DIRECT SERPL-MCNC: 5.33 MG/DL — HIGH (ref 0.05–0.2)
BILIRUB DIRECT SERPL-MCNC: 5.33 MG/DL — HIGH (ref 0.05–0.2)
BILIRUB INDIRECT FLD-MCNC: 2.2 MG/DL — HIGH (ref 0.2–1)
BILIRUB INDIRECT FLD-MCNC: 2.2 MG/DL — HIGH (ref 0.2–1)
BILIRUB SERPL-MCNC: 7.5 MG/DL — HIGH (ref 0.2–1.2)
BILIRUB SERPL-MCNC: 7.5 MG/DL — HIGH (ref 0.2–1.2)
BUN SERPL-MCNC: 33 MG/DL — HIGH (ref 7–23)
CA-I BLD-SCNC: 1.41 MMOL/L — HIGH (ref 1.12–1.3)
CALCIUM SERPL-MCNC: 9.9 MG/DL — SIGNIFICANT CHANGE UP (ref 8.5–10.1)
CHLORIDE SERPL-SCNC: 111 MMOL/L — HIGH (ref 96–108)
CO2 SERPL-SCNC: 28 MMOL/L — SIGNIFICANT CHANGE UP (ref 22–31)
CREAT SERPL-MCNC: 1.31 MG/DL — HIGH (ref 0.5–1.3)
CRP SERPL-MCNC: 8.58 MG/DL — HIGH (ref 0–0.4)
D DIMER BLD IA.RAPID-MCNC: 2264 NG/ML DDU — HIGH
FERRITIN SERPL-MCNC: 74 NG/ML — SIGNIFICANT CHANGE UP (ref 30–400)
GLUCOSE BLDC GLUCOMTR-MCNC: 115 MG/DL — HIGH (ref 70–99)
GLUCOSE BLDC GLUCOMTR-MCNC: 123 MG/DL — HIGH (ref 70–99)
GLUCOSE BLDC GLUCOMTR-MCNC: 131 MG/DL — HIGH (ref 70–99)
GLUCOSE BLDC GLUCOMTR-MCNC: 138 MG/DL — HIGH (ref 70–99)
GLUCOSE SERPL-MCNC: 112 MG/DL — HIGH (ref 70–99)
HCT VFR BLD CALC: 37.9 % — LOW (ref 39–50)
HGB BLD-MCNC: 12.5 G/DL — LOW (ref 13–17)
INR BLD: 1.87 RATIO — HIGH (ref 0.88–1.16)
LDH SERPL L TO P-CCNC: 366 U/L — HIGH (ref 50–242)
MCHC RBC-ENTMCNC: 25.7 PG — LOW (ref 27–34)
MCHC RBC-ENTMCNC: 33 GM/DL — SIGNIFICANT CHANGE UP (ref 32–36)
MCV RBC AUTO: 78 FL — LOW (ref 80–100)
NRBC # BLD: 0 /100 WBCS — SIGNIFICANT CHANGE UP (ref 0–0)
PLATELET # BLD AUTO: 152 K/UL — SIGNIFICANT CHANGE UP (ref 150–400)
POTASSIUM SERPL-MCNC: 4.1 MMOL/L — SIGNIFICANT CHANGE UP (ref 3.5–5.3)
POTASSIUM SERPL-SCNC: 4.1 MMOL/L — SIGNIFICANT CHANGE UP (ref 3.5–5.3)
PROT SERPL-MCNC: 6.1 GM/DL — SIGNIFICANT CHANGE UP (ref 6–8.3)
PROTHROM AB SERPL-ACNC: 21.3 SEC — HIGH (ref 10–12.9)
RBC # BLD: 4.86 M/UL — SIGNIFICANT CHANGE UP (ref 4.2–5.8)
RBC # FLD: 17.6 % — HIGH (ref 10.3–14.5)
SODIUM SERPL-SCNC: 147 MMOL/L — HIGH (ref 135–145)
WBC # BLD: 8.73 K/UL — SIGNIFICANT CHANGE UP (ref 3.8–10.5)
WBC # FLD AUTO: 8.73 K/UL — SIGNIFICANT CHANGE UP (ref 3.8–10.5)

## 2020-04-23 PROCEDURE — 99233 SBSQ HOSP IP/OBS HIGH 50: CPT

## 2020-04-23 PROCEDURE — 93970 EXTREMITY STUDY: CPT | Mod: 26

## 2020-04-23 PROCEDURE — 99222 1ST HOSP IP/OBS MODERATE 55: CPT

## 2020-04-23 PROCEDURE — 76700 US EXAM ABDOM COMPLETE: CPT | Mod: 26

## 2020-04-23 RX ORDER — ALBUTEROL 90 UG/1
2 AEROSOL, METERED ORAL EVERY 4 HOURS
Refills: 0 | Status: COMPLETED | OUTPATIENT
Start: 2020-04-23 | End: 2021-01-01

## 2020-04-23 RX ORDER — SACUBITRIL AND VALSARTAN 24; 26 MG/1; MG/1
1 TABLET, FILM COATED ORAL
Refills: 0 | Status: DISCONTINUED | OUTPATIENT
Start: 2020-04-23 | End: 2020-04-27

## 2020-04-23 RX ORDER — ENOXAPARIN SODIUM 100 MG/ML
90 INJECTION SUBCUTANEOUS
Refills: 0 | Status: COMPLETED | OUTPATIENT
Start: 2020-04-23 | End: 2020-04-23

## 2020-04-23 RX ORDER — APIXABAN 2.5 MG/1
10 TABLET, FILM COATED ORAL EVERY 12 HOURS
Refills: 0 | Status: DISCONTINUED | OUTPATIENT
Start: 2020-04-24 | End: 2020-04-27

## 2020-04-23 RX ORDER — ALBUTEROL 90 UG/1
2 AEROSOL, METERED ORAL EVERY 4 HOURS
Refills: 0 | Status: DISCONTINUED | OUTPATIENT
Start: 2020-04-23 | End: 2020-04-27

## 2020-04-23 RX ADMIN — PIPERACILLIN AND TAZOBACTAM 25 GRAM(S): 4; .5 INJECTION, POWDER, LYOPHILIZED, FOR SOLUTION INTRAVENOUS at 22:13

## 2020-04-23 RX ADMIN — Medication 40 MILLIGRAM(S): at 05:17

## 2020-04-23 RX ADMIN — LATANOPROST 1 DROP(S): 0.05 SOLUTION/ DROPS OPHTHALMIC; TOPICAL at 22:13

## 2020-04-23 RX ADMIN — ENOXAPARIN SODIUM 90 MILLIGRAM(S): 100 INJECTION SUBCUTANEOUS at 17:13

## 2020-04-23 RX ADMIN — Medication 300 MILLIGRAM(S): at 12:25

## 2020-04-23 RX ADMIN — PIPERACILLIN AND TAZOBACTAM 25 GRAM(S): 4; .5 INJECTION, POWDER, LYOPHILIZED, FOR SOLUTION INTRAVENOUS at 05:17

## 2020-04-23 RX ADMIN — PIPERACILLIN AND TAZOBACTAM 25 GRAM(S): 4; .5 INJECTION, POWDER, LYOPHILIZED, FOR SOLUTION INTRAVENOUS at 12:26

## 2020-04-23 RX ADMIN — Medication 81 MILLIGRAM(S): at 12:25

## 2020-04-23 RX ADMIN — ENOXAPARIN SODIUM 90 MILLIGRAM(S): 100 INJECTION SUBCUTANEOUS at 05:16

## 2020-04-23 RX ADMIN — LACTULOSE 10 GRAM(S): 10 SOLUTION ORAL at 12:25

## 2020-04-23 NOTE — PHYSICAL THERAPY INITIAL EVALUATION ADULT - ADDITIONAL COMMENTS
As per pt, he lives in an apartment building with 3 steps to enter with bilateral rails to the building, once inside there is an elevator access, Pt was independent in all functional mobility including community ambulation without any AD

## 2020-04-23 NOTE — PROGRESS NOTE ADULT - SUBJECTIVE AND OBJECTIVE BOX
Patient is a 74y old  Male who presents with a chief complaint of dyspnea (22 Apr 2020 18:16)    PAST MEDICAL & SURGICAL HISTORY:  AICD (automatic cardioverter/defibrillator) present  Sickle cell trait  S/P aortobifemoral bypass surgery  Peripheral vascular disease  Chronic obstructive pulmonary disease  Renal insufficiency  Diabetes mellitus  Gout  Hypertension  Myocardial Infarction  Hypercholesteremia  Coronary Artery Disease  s/p Femoral-Popliteal Bypass Surgery  Abdominal Hernia    INTERVAL HISTORY:  	  MEDICATIONS:  MEDICATIONS  (STANDING):  allopurinol 300 milliGRAM(s) Oral daily  aspirin  chewable 81 milliGRAM(s) Oral daily  carvedilol 25 milliGRAM(s) Oral every 12 hours  enoxaparin Injectable 90 milliGRAM(s) SubCutaneous two times a day  furosemide Tablet 40 milliGRAM(s) Oral daily  insulin lispro (HumaLOG) corrective regimen sliding scale SubCutaneous three times a day before meals  lactulose Syrup 10 Gram(s) Oral daily  latanoprost 0.005% Ophthalmic Solution 1 Drop(s) Both EYES at bedtime  piperacillin/tazobactam IVPB.. 3.375 Gram(s) IV Intermittent every 8 hours  sacubitril 49 mG/valsartan 51 mG 1 Tablet(s) Oral two times a day    MEDICATIONS  (PRN):  dextrose 40% Gel 15 Gram(s) Oral once PRN Blood Glucose LESS THAN 70 milliGRAM(s)/deciliter  glucagon  Injectable 1 milliGRAM(s) IntraMuscular once PRN Glucose LESS THAN 70 milligrams/deciliter    Vitals:  T(F): 97 (04-23-20 @ 00:34), Max: 97.6 (04-22-20 @ 19:21)  HR: 52 (04-23-20 @ 00:34) (52 - 59)  BP: 127/94 (04-23-20 @ 00:34) (127/94 - 138/70)  RR: 19 (04-22-20 @ 19:21) (19 - 19)  SpO2: 97% (04-23-20 @ 00:34) (97% - 98%)  Wt(kg): --    Weight (kg): 95.3 (04-21 @ 15:45)  BMI (kg/m2): 30.1 (04-22 @ 03:40)    PHYSICAL EXAM:  Neuro: Awake, responsive  CV: S1 S2 RRR  Lungs: CTABL  GI: Soft, BS +, ND, NT  Extremities: No edema    TELEMETRY: Paced sinus rhythm @56bpm  	    ECG:   < from: 12 Lead ECG (04.21.20 @ 16:00) >  Diagnosis Line *** Poor data quality, interpretation may be adversely affected  Sinus rhythm with  premature atrial complexes  Left axis deviation  Nonspecific intraventricular conduction delay  T wave abnormality, consider lateral ischemia  Abnormal ECG  No previous ECGs available    < end of copied text >   	    RADIOLOGY:   < from: CT Angio Chest w/ IV Cont (04.21.20 @ 19:49) >  IMPRESSION: Bilateral pneumonia is most prominent in the left lower lobe.  Subsegmental left upper lobe pulmonary embolism  No acute pathology in the abdomen or pelvis  Left renal cyst  Enlarged prostate  Status post aortic bypass.    < end of copied text >    < from: CT Head No Cont (04.21.20 @ 19:48) >  IMPRESSION:    1)  scattered chronic ischemic changes with volume loss. No acute abnormality suggested..  2)  no intracerebral hemorrhage or contusion is identified.    < end of copied text >    DIAGNOSTIC TESTING:  [x] Echocardiogram:    < from: TTE Echo Complete w/o contrast w/ Doppler (04.22.20 @ 17:37) >  Summary:   1. Left ventricular ejection fraction, by visual estimation, is 25 to 30%.   2. Technically difficult study.   3. Severely decreased segmental left ventricular systolic function.   4. Dilated cardiomyopathy.   5. Elevated mean left atrial pressure.   6. Spectral Doppler shows pseudonormal pattern of left ventricular myocardial filling (Grade II diastolic dysfunction).   7. Apical and lateral wall segments are not clearly visualized in this study; suggest contrast study, if clinically warranted, for better evaluation of wall motion abnormalites.   8. Moderate tricuspid regurgitation.   9. Aortic valve is sclerotic.  10. Moderate aortic regurgitation.  11. Estimated pulmonary artery systolic pressure is 53.7 mmHg assuming a right atrial pressure of 15 mmHg, which is consistent with moderate pulmonary hypertension.    < end of copied text >    [x] Stress Test:    < from: Nuclear stress test, pharmacologic (08.04.08 @ 09:30) >  IMPRESSIONS: Abnormal Study  * Negative Regadenoson ECG.  * Myocardial Perfusion SPECT results are abnormal.  * There are large, severe defects in the apical, mid and  distal anterior and anterolateral walls that are fixed,  suggestive of infarct.  * There is a small, mild defect in the basal inferolateral  wall that is fixed and consistent with diaphragmatic  attenuation.  * Post-stress gated imaging was performed  (LVEF = 37 %;  LVEDV = 215 ml.), revealing moderately reduced  LV  function with markely dilated LV at baseline.  There is  severe hypokinesis of the apex, anterior and anterolateral  walls.  The time activity curve suggests diastolic  dysfunction.  * No clear evidence of ischemia.    < end of copied text >    LABS: 	 	    CARDIAC MARKERS:  Troponin I, Serum: .149 ng/mL (04-22 @ 04:09)  Troponin I, Serum: .108 ng/mL (04-21 @ 23:58)  Troponin I, Serum: .106 ng/mL (04-21 @ 16:49)    23 Apr 2020 08:51    147    |  111    |  33     ----------------------------<  112    4.1     |  28     |  1.31   21 Apr 2020 23:58    141    |  109    |  26     ----------------------------<  143    4.4     |  22     |  1.37   21 Apr 2020 16:49    143    |  107    |  23     ----------------------------<  119    4.4     |  25     |  1.34     Ca    9.9        23 Apr 2020 08:51    TPro  6.1    /  Alb  2.3    /  TBili  7.5    /  DBili  5.33   /  AST  66     /  ALT  54     /  AlkPhos  108    23 Apr 2020 08:51                          12.5   8.73  )-----------( 152      ( 23 Apr 2020 08:51 )             37.9 ,                       15.1   9.50  )-----------( 214      ( 21 Apr 2020 16:49 )             46.6   proBNP: Serum Pro-Brain Natriuretic Peptide: 9230 pg/mL (04-21 @ 16:49)      PT/PTT- ( 23 Apr 2020 08:51 )   PT: 21.3 sec;  PTT: x             INR: 1.87 ratio (04-23 @ 08:51)  INR: 2.42 ratio (04-21 @ 23:58) Patient is a 74y old  Male who presents with a chief complaint of dyspnea (22 Apr 2020 18:16)    PAST MEDICAL & SURGICAL HISTORY:  AICD (automatic cardioverter/defibrillator) present  Sickle cell trait  S/P aortobifemoral bypass surgery  Peripheral vascular disease  Chronic obstructive pulmonary disease  Renal insufficiency  Diabetes mellitus  Gout  Hypertension  Myocardial Infarction  Hypercholesteremia  Coronary Artery Disease  s/p Femoral-Popliteal Bypass Surgery  Abdominal Hernia    INTERVAL HISTORY:  	  MEDICATIONS:  MEDICATIONS  (STANDING):  allopurinol 300 milliGRAM(s) Oral daily  aspirin  chewable 81 milliGRAM(s) Oral daily  carvedilol 25 milliGRAM(s) Oral every 12 hours  enoxaparin Injectable 90 milliGRAM(s) SubCutaneous two times a day  furosemide Tablet 40 milliGRAM(s) Oral daily  insulin lispro (HumaLOG) corrective regimen sliding scale SubCutaneous three times a day before meals  lactulose Syrup 10 Gram(s) Oral daily  latanoprost 0.005% Ophthalmic Solution 1 Drop(s) Both EYES at bedtime  piperacillin/tazobactam IVPB.. 3.375 Gram(s) IV Intermittent every 8 hours  sacubitril 49 mG/valsartan 51 mG 1 Tablet(s) Oral two times a day    MEDICATIONS  (PRN):  dextrose 40% Gel 15 Gram(s) Oral once PRN Blood Glucose LESS THAN 70 milliGRAM(s)/deciliter  glucagon  Injectable 1 milliGRAM(s) IntraMuscular once PRN Glucose LESS THAN 70 milligrams/deciliter    Vitals:  T(F): 97 (04-23-20 @ 00:34), Max: 97.6 (04-22-20 @ 19:21)  HR: 52 (04-23-20 @ 00:34) (52 - 59)  BP: 127/94 (04-23-20 @ 00:34) (127/94 - 138/70)  RR: 19 (04-22-20 @ 19:21) (19 - 19)  SpO2: 97% (04-23-20 @ 00:34) (97% - 98%)  Wt(kg): --    Weight (kg): 95.3 (04-21 @ 15:45)  BMI (kg/m2): 30.1 (04-22 @ 03:40)    PHYSICAL EXAM:  Neuro: Awake, responsive  CV: S1 S2 RRR  Lungs: CTABL  GI: Soft, BS +, ND, NT  Extremities: No edema    TELEMETRY: Paced sinus rhythm @56bpm  	    ECG:   < from: 12 Lead ECG (04.21.20 @ 16:00) >  Diagnosis Line *** Poor data quality, interpretation may be adversely affected  Sinus rhythm with  premature atrial complexes  Left axis deviation  Nonspecific intraventricular conduction delay  T wave abnormality, consider lateral ischemia  Abnormal ECG  No previous ECGs available    < end of copied text >   	    RADIOLOGY:   < from: Xray Chest 1 View- PORTABLE-Urgent (04.21.20 @ 16:51) >  There is an AICD device overlying the left chest wall.    There is new moderate pulmonary edema with small layering bilateral pleural effusions. The heart size is enlarged. Multilevel degenerative changes are noted within the imaged potions of the spine.    IMPRESSION: Congestive heart failure.    < end of copied text >  	  < from: CT Angio Chest w/ IV Cont (04.21.20 @ 19:49) >  IMPRESSION: Bilateral pneumonia is most prominent in the left lower lobe.  Subsegmental left upper lobe pulmonary embolism  No acute pathology in the abdomen or pelvis  Left renal cyst  Enlarged prostate  Status post aortic bypass.    < end of copied text >    < from: CT Head No Cont (04.21.20 @ 19:48) >  IMPRESSION:    1)  scattered chronic ischemic changes with volume loss. No acute abnormality suggested..  2)  no intracerebral hemorrhage or contusion is identified.    < end of copied text >    DIAGNOSTIC TESTING:  [x] Echocardiogram:    < from: TTE Echo Complete w/o contrast w/ Doppler (04.22.20 @ 17:37) >  Summary:   1. Left ventricular ejection fraction, by visual estimation, is 25 to 30%.   2. Technically difficult study.   3. Severely decreased segmental left ventricular systolic function.   4. Dilated cardiomyopathy.   5. Elevated mean left atrial pressure.   6. Spectral Doppler shows pseudonormal pattern of left ventricular myocardial filling (Grade II diastolic dysfunction).   7. Apical and lateral wall segments are not clearly visualized in this study; suggest contrast study, if clinically warranted, for better evaluation of wall motion abnormalites.   8. Moderate tricuspid regurgitation.   9. Aortic valve is sclerotic.  10. Moderate aortic regurgitation.  11. Estimated pulmonary artery systolic pressure is 53.7 mmHg assuming a right atrial pressure of 15 mmHg, which is consistent with moderate pulmonary hypertension.    < end of copied text >    [x] Stress Test:    < from: Nuclear stress test, pharmacologic (08.04.08 @ 09:30) >  IMPRESSIONS: Abnormal Study  * Negative Regadenoson ECG.  * Myocardial Perfusion SPECT results are abnormal.  * There are large, severe defects in the apical, mid and  distal anterior and anterolateral walls that are fixed,  suggestive of infarct.  * There is a small, mild defect in the basal inferolateral  wall that is fixed and consistent with diaphragmatic  attenuation.  * Post-stress gated imaging was performed  (LVEF = 37 %;  LVEDV = 215 ml.), revealing moderately reduced  LV  function with markely dilated LV at baseline.  There is  severe hypokinesis of the apex, anterior and anterolateral  walls.  The time activity curve suggests diastolic  dysfunction.  * No clear evidence of ischemia.    < end of copied text >    LABS: 	 	    CARDIAC MARKERS:  Troponin I, Serum: .149 ng/mL (04-22 @ 04:09)  Troponin I, Serum: .108 ng/mL (04-21 @ 23:58)  Troponin I, Serum: .106 ng/mL (04-21 @ 16:49)    23 Apr 2020 08:51    147    |  111    |  33     ----------------------------<  112    4.1     |  28     |  1.31   21 Apr 2020 23:58    141    |  109    |  26     ----------------------------<  143    4.4     |  22     |  1.37   21 Apr 2020 16:49    143    |  107    |  23     ----------------------------<  119    4.4     |  25     |  1.34     Ca    9.9        23 Apr 2020 08:51    TPro  6.1    /  Alb  2.3    /  TBili  7.5    /  DBili  5.33   /  AST  66     /  ALT  54     /  AlkPhos  108    23 Apr 2020 08:51                          12.5   8.73  )-----------( 152      ( 23 Apr 2020 08:51 )             37.9 ,                       15.1   9.50  )-----------( 214      ( 21 Apr 2020 16:49 )             46.6   proBNP: Serum Pro-Brain Natriuretic Peptide: 9230 pg/mL (04-21 @ 16:49)      PT/PTT- ( 23 Apr 2020 08:51 )   PT: 21.3 sec;  PTT: x             INR: 1.87 ratio (04-23 @ 08:51)  INR: 2.42 ratio (04-21 @ 23:58) Patient is a 74y old  Male who presents with a chief complaint of dyspnea (22 Apr 2020 18:16)    PAST MEDICAL & SURGICAL HISTORY:  AICD (automatic cardioverter/defibrillator) present  Sickle cell trait  S/P aortobifemoral bypass surgery  Peripheral vascular disease  Chronic obstructive pulmonary disease  Renal insufficiency  Diabetes mellitus  Gout  Hypertension  Myocardial Infarction  Hypercholesteremia  Coronary Artery Disease  s/p Femoral-Popliteal Bypass Surgery  Abdominal Hernia    INTERVAL HISTORY: Pt sitting in bed, states feels better. Appears in no acute distress. Mild sob.  	  MEDICATIONS:  MEDICATIONS  (STANDING):  allopurinol 300 milliGRAM(s) Oral daily  aspirin  chewable 81 milliGRAM(s) Oral daily  carvedilol 25 milliGRAM(s) Oral every 12 hours  enoxaparin Injectable 90 milliGRAM(s) SubCutaneous two times a day  furosemide Tablet 40 milliGRAM(s) Oral daily  insulin lispro (HumaLOG) corrective regimen sliding scale SubCutaneous three times a day before meals  lactulose Syrup 10 Gram(s) Oral daily  latanoprost 0.005% Ophthalmic Solution 1 Drop(s) Both EYES at bedtime  piperacillin/tazobactam IVPB.. 3.375 Gram(s) IV Intermittent every 8 hours  sacubitril 49 mG/valsartan 51 mG 1 Tablet(s) Oral two times a day    MEDICATIONS  (PRN):  dextrose 40% Gel 15 Gram(s) Oral once PRN Blood Glucose LESS THAN 70 milliGRAM(s)/deciliter  glucagon  Injectable 1 milliGRAM(s) IntraMuscular once PRN Glucose LESS THAN 70 milligrams/deciliter    Vitals:  T(F): 97 (04-23-20 @ 00:34), Max: 97.6 (04-22-20 @ 19:21)  HR: 52 (04-23-20 @ 00:34) (52 - 59)  BP: 127/94 (04-23-20 @ 00:34) (127/94 - 138/70)  RR: 19 (04-22-20 @ 19:21) (19 - 19)  SpO2: 97% (04-23-20 @ 00:34) (97% - 98%)  Wt(kg): --    Weight (kg): 95.3 (04-21 @ 15:45)  BMI (kg/m2): 30.1 (04-22 @ 03:40)    PHYSICAL EXAM:  Neuro: Awake, responsive  CV: S1 S2 RRR  Lungs: CTABL  GI: Soft, BS +, ND, NT  Extremities: No edema    TELEMETRY: Paced sinus rhythm @56bpm  	    ECG:   < from: 12 Lead ECG (04.21.20 @ 16:00) >  Diagnosis Line *** Poor data quality, interpretation may be adversely affected  Sinus rhythm with  premature atrial complexes  Left axis deviation  Nonspecific intraventricular conduction delay  T wave abnormality, consider lateral ischemia  Abnormal ECG  No previous ECGs available    < end of copied text >   	    RADIOLOGY:   < from: Xray Chest 1 View- PORTABLE-Urgent (04.21.20 @ 16:51) >  There is an AICD device overlying the left chest wall.    There is new moderate pulmonary edema with small layering bilateral pleural effusions. The heart size is enlarged. Multilevel degenerative changes are noted within the imaged potions of the spine.    IMPRESSION: Congestive heart failure.    < end of copied text >  	  < from: CT Angio Chest w/ IV Cont (04.21.20 @ 19:49) >  IMPRESSION: Bilateral pneumonia is most prominent in the left lower lobe.  Subsegmental left upper lobe pulmonary embolism  No acute pathology in the abdomen or pelvis  Left renal cyst  Enlarged prostate  Status post aortic bypass.    < end of copied text >    < from: CT Head No Cont (04.21.20 @ 19:48) >  IMPRESSION:    1)  scattered chronic ischemic changes with volume loss. No acute abnormality suggested..  2)  no intracerebral hemorrhage or contusion is identified.    < end of copied text >    DIAGNOSTIC TESTING:  [x] Echocardiogram:    < from: TTE Echo Complete w/o contrast w/ Doppler (04.22.20 @ 17:37) >  Summary:   1. Left ventricular ejection fraction, by visual estimation, is 25 to 30%.   2. Technically difficult study.   3. Severely decreased segmental left ventricular systolic function.   4. Dilated cardiomyopathy.   5. Elevated mean left atrial pressure.   6. Spectral Doppler shows pseudonormal pattern of left ventricular myocardial filling (Grade II diastolic dysfunction).   7. Apical and lateral wall segments are not clearly visualized in this study; suggest contrast study, if clinically warranted, for better evaluation of wall motion abnormalites.   8. Moderate tricuspid regurgitation.   9. Aortic valve is sclerotic.  10. Moderate aortic regurgitation.  11. Estimated pulmonary artery systolic pressure is 53.7 mmHg assuming a right atrial pressure of 15 mmHg, which is consistent with moderate pulmonary hypertension.    < end of copied text >    [x] Stress Test:    < from: Nuclear stress test, pharmacologic (08.04.08 @ 09:30) >  IMPRESSIONS: Abnormal Study  * Negative Regadenoson ECG.  * Myocardial Perfusion SPECT results are abnormal.  * There are large, severe defects in the apical, mid and  distal anterior and anterolateral walls that are fixed,  suggestive of infarct.  * There is a small, mild defect in the basal inferolateral  wall that is fixed and consistent with diaphragmatic  attenuation.  * Post-stress gated imaging was performed  (LVEF = 37 %;  LVEDV = 215 ml.), revealing moderately reduced  LV  function with markely dilated LV at baseline.  There is  severe hypokinesis of the apex, anterior and anterolateral  walls.  The time activity curve suggests diastolic  dysfunction.  * No clear evidence of ischemia.    < end of copied text >    LABS: 	 	    CARDIAC MARKERS:  Troponin I, Serum: .149 ng/mL (04-22 @ 04:09)  Troponin I, Serum: .108 ng/mL (04-21 @ 23:58)  Troponin I, Serum: .106 ng/mL (04-21 @ 16:49)    23 Apr 2020 08:51    147    |  111    |  33     ----------------------------<  112    4.1     |  28     |  1.31   21 Apr 2020 23:58    141    |  109    |  26     ----------------------------<  143    4.4     |  22     |  1.37   21 Apr 2020 16:49    143    |  107    |  23     ----------------------------<  119    4.4     |  25     |  1.34     Ca    9.9        23 Apr 2020 08:51    TPro  6.1    /  Alb  2.3    /  TBili  7.5    /  DBili  5.33   /  AST  66     /  ALT  54     /  AlkPhos  108    23 Apr 2020 08:51                          12.5   8.73  )-----------( 152      ( 23 Apr 2020 08:51 )             37.9 ,                       15.1   9.50  )-----------( 214      ( 21 Apr 2020 16:49 )             46.6   proBNP: Serum Pro-Brain Natriuretic Peptide: 9230 pg/mL (04-21 @ 16:49)      PT/PTT- ( 23 Apr 2020 08:51 )   PT: 21.3 sec;  PTT: x             INR: 1.87 ratio (04-23 @ 08:51)  INR: 2.42 ratio (04-21 @ 23:58) Patient is a 74y old  Male who presents with a chief complaint of dyspnea (22 Apr 2020 18:16)    PAST MEDICAL & SURGICAL HISTORY:  AICD (automatic cardioverter/defibrillator) present  Sickle cell trait  S/P aortobifemoral bypass surgery  Peripheral vascular disease  Chronic obstructive pulmonary disease  Renal insufficiency  Diabetes mellitus  Gout  Hypertension  Myocardial Infarction  Hypercholesteremia  Coronary Artery Disease  s/p Femoral-Popliteal Bypass Surgery  Abdominal Hernia    INTERVAL HISTORY: Pt sitting in bed, states feels better. Appears in no acute distress. Mild sob.  	  MEDICATIONS:  MEDICATIONS  (STANDING):  allopurinol 300 milliGRAM(s) Oral daily  aspirin  chewable 81 milliGRAM(s) Oral daily  carvedilol 25 milliGRAM(s) Oral every 12 hours  enoxaparin Injectable 90 milliGRAM(s) SubCutaneous two times a day  furosemide Tablet 40 milliGRAM(s) Oral daily  insulin lispro (HumaLOG) corrective regimen sliding scale SubCutaneous three times a day before meals  lactulose Syrup 10 Gram(s) Oral daily  latanoprost 0.005% Ophthalmic Solution 1 Drop(s) Both EYES at bedtime  piperacillin/tazobactam IVPB.. 3.375 Gram(s) IV Intermittent every 8 hours  sacubitril 49 mG/valsartan 51 mG 1 Tablet(s) Oral two times a day    MEDICATIONS  (PRN):  dextrose 40% Gel 15 Gram(s) Oral once PRN Blood Glucose LESS THAN 70 milliGRAM(s)/deciliter  glucagon  Injectable 1 milliGRAM(s) IntraMuscular once PRN Glucose LESS THAN 70 milligrams/deciliter    Vitals:  T(F): 97 (04-23-20 @ 00:34), Max: 97.6 (04-22-20 @ 19:21)  HR: 52 (04-23-20 @ 00:34) (52 - 59)  BP: 127/94 (04-23-20 @ 00:34) (127/94 - 138/70)  RR: 19 (04-22-20 @ 19:21) (19 - 19)  SpO2: 97% (04-23-20 @ 00:34) (97% - 98%)  Wt(kg): --    Weight (kg): 95.3 (04-21 @ 15:45)  BMI (kg/m2): 30.1 (04-22 @ 03:40)    PHYSICAL EXAM:  Neuro: Awake, responsive, obese, mild sob  CV: S1 S2, paced sinus @56  Lungs: CTABL  GI: Soft, BS +, ND, NT  Extremities: No edema    TELEMETRY: Paced sinus rhythm @56bpm  	    ECG:   < from: 12 Lead ECG (04.21.20 @ 16:00) >  Diagnosis Line *** Poor data quality, interpretation may be adversely affected  Sinus rhythm with  premature atrial complexes  Left axis deviation  Nonspecific intraventricular conduction delay  T wave abnormality, consider lateral ischemia  Abnormal ECG  No previous ECGs available    < end of copied text >   	    RADIOLOGY:   < from: Xray Chest 1 View- PORTABLE-Urgent (04.21.20 @ 16:51) >  There is an AICD device overlying the left chest wall.    There is new moderate pulmonary edema with small layering bilateral pleural effusions. The heart size is enlarged. Multilevel degenerative changes are noted within the imaged potions of the spine.    IMPRESSION: Congestive heart failure.    < end of copied text >  	  < from: CT Angio Chest w/ IV Cont (04.21.20 @ 19:49) >  IMPRESSION: Bilateral pneumonia is most prominent in the left lower lobe.  Subsegmental left upper lobe pulmonary embolism  No acute pathology in the abdomen or pelvis  Left renal cyst  Enlarged prostate  Status post aortic bypass.    < end of copied text >    < from: CT Head No Cont (04.21.20 @ 19:48) >  IMPRESSION:    1)  scattered chronic ischemic changes with volume loss. No acute abnormality suggested..  2)  no intracerebral hemorrhage or contusion is identified.    < end of copied text >    DIAGNOSTIC TESTING:  [x] Echocardiogram:    < from: TTE Echo Complete w/o contrast w/ Doppler (04.22.20 @ 17:37) >  Summary:   1. Left ventricular ejection fraction, by visual estimation, is 25 to 30%.   2. Technically difficult study.   3. Severely decreased segmental left ventricular systolic function.   4. Dilated cardiomyopathy.   5. Elevated mean left atrial pressure.   6. Spectral Doppler shows pseudonormal pattern of left ventricular myocardial filling (Grade II diastolic dysfunction).   7. Apical and lateral wall segments are not clearly visualized in this study; suggest contrast study, if clinically warranted, for better evaluation of wall motion abnormalites.   8. Moderate tricuspid regurgitation.   9. Aortic valve is sclerotic.  10. Moderate aortic regurgitation.  11. Estimated pulmonary artery systolic pressure is 53.7 mmHg assuming a right atrial pressure of 15 mmHg, which is consistent with moderate pulmonary hypertension.    < end of copied text >    [x] Stress Test:    < from: Nuclear stress test, pharmacologic (08.04.08 @ 09:30) >  IMPRESSIONS: Abnormal Study  * Negative Regadenoson ECG.  * Myocardial Perfusion SPECT results are abnormal.  * There are large, severe defects in the apical, mid and  distal anterior and anterolateral walls that are fixed,  suggestive of infarct.  * There is a small, mild defect in the basal inferolateral  wall that is fixed and consistent with diaphragmatic  attenuation.  * Post-stress gated imaging was performed  (LVEF = 37 %;  LVEDV = 215 ml.), revealing moderately reduced  LV  function with markely dilated LV at baseline.  There is  severe hypokinesis of the apex, anterior and anterolateral  walls.  The time activity curve suggests diastolic  dysfunction.  * No clear evidence of ischemia.    < end of copied text >    LABS: 	 	    CARDIAC MARKERS:  Troponin I, Serum: .149 ng/mL (04-22 @ 04:09)  Troponin I, Serum: .108 ng/mL (04-21 @ 23:58)  Troponin I, Serum: .106 ng/mL (04-21 @ 16:49)    23 Apr 2020 08:51    147    |  111    |  33     ----------------------------<  112    4.1     |  28     |  1.31   21 Apr 2020 23:58    141    |  109    |  26     ----------------------------<  143    4.4     |  22     |  1.37   21 Apr 2020 16:49    143    |  107    |  23     ----------------------------<  119    4.4     |  25     |  1.34     Ca    9.9        23 Apr 2020 08:51    TPro  6.1    /  Alb  2.3    /  TBili  7.5    /  DBili  5.33   /  AST  66     /  ALT  54     /  AlkPhos  108    23 Apr 2020 08:51                          12.5   8.73  )-----------( 152      ( 23 Apr 2020 08:51 )             37.9 ,                       15.1   9.50  )-----------( 214      ( 21 Apr 2020 16:49 )             46.6   proBNP: Serum Pro-Brain Natriuretic Peptide: 9230 pg/mL (04-21 @ 16:49)      PT/PTT- ( 23 Apr 2020 08:51 )   PT: 21.3 sec;  PTT: x             INR: 1.87 ratio (04-23 @ 08:51)  INR: 2.42 ratio (04-21 @ 23:58)

## 2020-04-23 NOTE — PROGRESS NOTE ADULT - ASSESSMENT
HPI:  Patient is a 74M with a PMH of CAD s/p MI w/stents 2007, CHF with 20% LVEF in 2016,  HTN, HLD, DM, gout, CKD, COPD, PVD s/p aortobifemoral bypass who presents to the ED for dyspnea.  Patient currently AAOx1 and unable to provide history.  As per ED attending, patient was sent in by family members due to cough and dyspnea.  Reportedly has been feeling ill since COVID pandemic started.  Vitals stable.  Labs show hypercalcemia, mild tropinema and elevated bilirubin.  CT head performed and was negative.  CT chest shows  bilateral PNA and a JUAREZ acute PE.  Started on therapeutic lovenox, will admit to tele. (21 Apr 2020 18:15)  --------------- As Above ------------ Patient seen earlier today  Consult called for elevated bilirubin. Patient confused. The patient denies history of liver disease, melena, hematochezia, hematemesis, nausea, vomiting, abdominal pain, constipation, diarrhea, or change in bowel movements Denies ETOH abuse. History of EF ~ 20 %  On Zocor  See labs / CT scan    Elevated LFTs  Bili>>  Better . 10.4 / 83 / 64 / 111--> 9.3 / 81 / 62 / 101 ( 9.6 total, 6.37 direct ) --> 7.5 ( 5.33 ) 66/54/108 . r/o congestive liver, meds, cirrhosis , Bud Chiari  1) abdominal sonogram with doppler 2) Treat CHF 3) Hold Lipitor 4) f/u labs 5) additional blood work HPI:  Patient is a 74M with a PMH of CAD s/p MI w/stents 2007, CHF with 20% LVEF in 2016,  HTN, HLD, DM, gout, CKD, COPD, PVD s/p aortobifemoral bypass who presents to the ED for dyspnea.  Patient currently AAOx1 and unable to provide history.  As per ED attending, patient was sent in by family members due to cough and dyspnea.  Reportedly has been feeling ill since COVID pandemic started.  Vitals stable.  Labs show hypercalcemia, mild tropinema and elevated bilirubin.  CT head performed and was negative.  CT chest shows  bilateral PNA and a JUAREZ acute PE.  Started on therapeutic lovenox, will admit to tele. (21 Apr 2020 18:15)  --------------- As Above ------------ Patient seen earlier today  Consult called for elevated bilirubin. Patient confused. The patient denies history of liver disease, melena, hematochezia, hematemesis, nausea, vomiting, abdominal pain, constipation, diarrhea, or change in bowel movements Denies ETOH abuse. History of EF ~ 20 %  On Zocor  See labs / CT scan    Elevated LFTs  Bili>>  Better . 10.4 / 83 / 64 / 111--> 9.3 / 81 / 62 / 101 ( 9.6 total, 6.37 direct ) --> 7.5 ( 5.33 ) 66/54/108 . r/o congestive liver, meds, cirrhosis , Bud Chiari  : Ultra sound - Mild hepatomegaly 1) Treat CHF 2) Hold Lipitor 3) f/u labs 4) additional blood work

## 2020-04-23 NOTE — PHYSICAL THERAPY INITIAL EVALUATION ADULT - TRANSFER TRAINING, PT EVAL
Pt will be able to perform sit to stand, stand pivot transfer using [RW] without LOB independently in 2 weeks

## 2020-04-23 NOTE — PROGRESS NOTE ADULT - PROBLEM SELECTOR PLAN 3
Lasix qd  cards on board Gram-neg PNA  AMS? unknown baseline.    CT Angio Chest w/Bilateral pneumonia is most prominent in the left lower lobe.  Subsegmental left upper lobe pulmonary embolism  Given ceftriaxone and azithromycin in the ED.    Follow blood cultures. NGTD   COVID-19 PCR neg  c/w Zosyn 4/22, ID EVAL  CRP elevated but ferritin low.

## 2020-04-23 NOTE — CONSULT NOTE ADULT - SUBJECTIVE AND OBJECTIVE BOX
ALEXANDR SARAVIA    [address]    Patient is a 74y old  Male who presents with a chief complaint of dyspnea (23 Apr 2020 14:39)       Allergies    No Known Drug Allergies  shellfish (Other; Urticaria)    Intolerances        HPI:  Patient is a 74M with a PMH of CAD s/p MI w/stents 2007, CHF with 20% LVEF in 2016,  HTN, HLD, DM, gout, CKD, COPD, PVD s/p aortobifemoral bypass who presents to the ED for dyspnea.  Patient currently AAOx1 and unable to provide history.  As per ED attending, patient was sent in by family members due to cough and dyspnea.  Reportedly has been feeling ill since COVID pandemic started.  Vitals stable.  Labs show hypercalcemia, mild tropinema and elevated bilirubin.  CT head performed and was negative.  CT chest shows  bilateral PNA and a JUAREZ acute PE.  Started on therapeutic lovenox, will admit to tele. (21 Apr 2020 18:15)      PAST MEDICAL & SURGICAL HISTORY:  AICD (automatic cardioverter/defibrillator) present  Sickle cell trait  S/P aortobifemoral bypass surgery  Peripheral vascular disease  Chronic obstructive pulmonary disease  Renal insufficiency  Diabetes mellitus  Gout  Hypertension  Myocardial Infarction  Hypercholesteremia  Coronary Artery Disease  s/p Femoral-Popliteal Bypass Surgery  Abdominal Hernia      FAMILY HISTORY:  No pertinent family history in first degree relatives        MEDICATIONS   ALBUTerol    90 MICROgram(s) HFA Inhaler 2 Puff(s) Inhalation every 4 hours PRN  allopurinol 300 milliGRAM(s) Oral daily  aspirin  chewable 81 milliGRAM(s) Oral daily  carvedilol 25 milliGRAM(s) Oral every 12 hours  dextrose 40% Gel 15 Gram(s) Oral once PRN  dextrose 5%. 1000 milliLiter(s) IV Continuous <Continuous>  dextrose 50% Injectable 12.5 Gram(s) IV Push once  dextrose 50% Injectable 25 Gram(s) IV Push once  dextrose 50% Injectable 25 Gram(s) IV Push once  enoxaparin Injectable 90 milliGRAM(s) SubCutaneous <User Schedule>  furosemide    Tablet 40 milliGRAM(s) Oral daily  glucagon  Injectable 1 milliGRAM(s) IntraMuscular once PRN  insulin lispro (HumaLOG) corrective regimen sliding scale   SubCutaneous three times a day before meals  lactulose Syrup 10 Gram(s) Oral daily  latanoprost 0.005% Ophthalmic Solution 1 Drop(s) Both EYES at bedtime  piperacillin/tazobactam IVPB.. 3.375 Gram(s) IV Intermittent every 8 hours  sacubitril 24 mG/valsartan 26 mG 1 Tablet(s) Oral two times a day      Vital Signs Last 24 Hrs  T(C): 35.7 (23 Apr 2020 15:35), Max: 36.4 (22 Apr 2020 19:21)  T(F): 96.2 (23 Apr 2020 15:35), Max: 97.6 (22 Apr 2020 19:21)  HR: 53 (23 Apr 2020 15:35) (52 - 59)  BP: 110/50 (23 Apr 2020 15:35) (91/55 - 138/70)  BP(mean): --  RR: 32 (23 Apr 2020 15:35) (19 - 32)  SpO2: 97% (23 Apr 2020 15:35) (97% - 98%)            LABS:                        12.5   8.73  )-----------( 152      ( 23 Apr 2020 08:51 )             37.9     04-23    147<H>  |  111<H>  |  33<H>  ----------------------------<  112<H>  4.1   |  28  |  1.31<H>    Ca    9.9      23 Apr 2020 08:51    TPro  6.1  /  Alb  2.3<L>  /  TBili  7.5<H>  /  DBili  5.33<H>  /  AST  66<H>  /  ALT  54  /  AlkPhos  108  04-23    PT/INR - ( 23 Apr 2020 08:51 )   PT: 21.3 sec;   INR: 1.87 ratio         PTT - ( 21 Apr 2020 23:58 )  PTT:33.3 sec      ABG - ( 21 Apr 2020 17:42 )  pH, Arterial: x     pH, Blood: 7.44  /  pCO2: 39    /  pO2: 62    / HCO3: 26    / Base Excess: 1.9   /  SaO2: 89                  WBC:  WBC Count: 8.73 K/uL (04-23 @ 08:51)  WBC Count: 9.50 K/uL (04-21 @ 16:49)      MICROBIOLOGY:  RECENT CULTURES:  04-22 .Blood Blood. aero rec'd XXXX XXXX   No growth to date.    04-22 .Blood Blood. only aero rec'd XXXX XXXX   No growth to date.          CARDIAC MARKERS ( 22 Apr 2020 04:09 )  .149 ng/mL / x     / x     / x     / x      CARDIAC MARKERS ( 21 Apr 2020 23:58 )  .108 ng/mL / x     / x     / x     / x      CARDIAC MARKERS ( 21 Apr 2020 16:49 )  .106 ng/mL / x     / x     / x     / x            PT/INR - ( 23 Apr 2020 08:51 )   PT: 21.3 sec;   INR: 1.87 ratio         PTT - ( 21 Apr 2020 23:58 )  PTT:33.3 sec    Sodium:  Sodium, Serum: 147 mmol/L (04-23 @ 08:51)  Sodium, Serum: 141 mmol/L (04-21 @ 23:58)  Sodium, Serum: 143 mmol/L (04-21 @ 16:49)      1.31 mg/dL 04-23 @ 08:51  1.37 mg/dL 04-21 @ 23:58  1.34 mg/dL 04-21 @ 16:49      Hemoglobin:  Hemoglobin: 12.5 g/dL (04-23 @ 08:51)  Hemoglobin: 15.1 g/dL (04-21 @ 16:49)      Platelets: Platelet Count - Automated: 152 K/uL (04-23 @ 08:51)  Platelet Count - Automated: 214 K/uL (04-21 @ 16:49)      LIVER FUNCTIONS - ( 23 Apr 2020 08:51 )  Alb: 2.3 g/dL / Pro: 6.1 gm/dL / ALK PHOS: 108 U/L / ALT: 54 U/L / AST: 66 U/L / GGT: x                 RADIOLOGY & ADDITIONAL STUDIES:

## 2020-04-23 NOTE — PROGRESS NOTE ADULT - PROBLEM SELECTOR PLAN 4
AMS? unknown baseline.    Given ceftriaxone and azithromycin in the ED.    Follow blood cultures.    c/w Zosyn ID EVAL  CRP elevated but ferritin low.    reswab for COVID19 is neg Continue low dose ASA, bbl, Entresto  Lasix qd  No ischemia evaluation needed at present per cards

## 2020-04-23 NOTE — PHYSICAL THERAPY INITIAL EVALUATION ADULT - BALANCE TRAINING, PT EVAL
Pt will improve static & dynamic standing balance to Good using [Rolling walker] without LOB to perform ADL, Gait independently in 2 weeks

## 2020-04-23 NOTE — PROGRESS NOTE ADULT - PROBLEM SELECTOR PLAN 5
Troponin elevated.    No current chest pain.  Cardio recs to follow  will obtain TTE Troponin elevated.    No current chest pain.  Cardio recs to follow

## 2020-04-23 NOTE — PHYSICAL THERAPY INITIAL EVALUATION ADULT - STRENGTHENING, PT EVAL
Pt will improve muscle strength in all extremities to WFL in 1 to 2 weeks to perform Gait & ADL independently without LOB

## 2020-04-23 NOTE — PHYSICAL THERAPY INITIAL EVALUATION ADULT - CRITERIA FOR SKILLED THERAPEUTIC INTERVENTIONS
rehab potential/anticipated discharge recommendation/functional limitations in following categories/predicted duration of therapy intervention/anticipated equipment needs at discharge/therapy frequency/impairments found/risk reduction/prevention

## 2020-04-23 NOTE — PROGRESS NOTE ADULT - ASSESSMENT
74 YOM with PMHx of Sickle Cell, PVD s/p aortobifemoral bypass, COPD, renal insufficiency, DM, Gout, HTN, CAD, MI s/p AICD, Abd hernia p/w dyspnea and cough.  Significant h/o ischemic cardiomyopathy and h/o heart failure.  Respiratory distress may be multi factorial - evidence on CXR for pulm vascular congestion but CT chest shows bilateral pneumonia and possible JUAREZ acute PE.  Minimally elevated cardiac enzymes appear consistent with demand ischemia rather than primary cardiac event.  Echo EF: 25% with dilated cardiomyopathy    Plan:  - Continue tx for CAP as per medicine.   - ON PE dosing of Lovenox, segue to oral anticoagulant in AM.  - Suggest LE dopplers for source of embolism.  - Treated empirically for acute on chronic systolic/diastolic heart failure but patient appears clinically euvolemic.  - Continue low dose ASA, bbl   - Continue Entresto 1mg BID, hold lisinopril  - No ischemia evaluation needed at present. 74 YOM with PMHx of Sickle Cell, PVD s/p aortobifemoral bypass, COPD, renal insufficiency, DM, Gout, HTN, CAD, MI s/p AICD, Abd hernia p/w dyspnea and cough.  Significant h/o ischemic cardiomyopathy and h/o heart failure.  Respiratory distress may be multi factorial - CXR with pulm vascular congestion but CT chest with B/L PNA and possible JUAREZ acute PE.  Minimally elevated cardiac enzymes appear consistent with demand ischemia rather than primary cardiac event.  Echo EF: 25% with dilated cardiomyopathy, Grade II DD, Mod TR/AR/pHTN,      Plan:  - Continue tx for CAP as per medicine.   - On PE dosing of Lovenox, can be transitioned to DOAC   - Treated empirically for acute on chronic systolic/diastolic heart failure but patient appears clinically euvolemic.  - Continue low dose ASA, bbl   - Continue Entresto BID, will titrate dose as per BP  - No ischemia evaluation needed at present.  - Continue Telemetry 74 YOM with PMHx of Sickle Cell, PVD s/p aortobifemoral bypass, COPD, renal insufficiency, DM, Gout, HTN, CAD, MI s/p AICD, Abd hernia p/w dyspnea and cough. COVID neg x 2. Elevated D-dimer 2200 and Cr 1.31.  Significant h/o ischemic cardiomyopathy and h/o heart failure.  Respiratory distress may be multi factorial - CXR with pulm vascular congestion but CT chest with B/L PNA and possible JUAREZ acute PE.  Minimally elevated cardiac enzymes appear consistent with demand ischemia rather than primary cardiac event.  Echo with acute on chronic combined SHF/DHF, EF: 25% with dilated cardiomyopathy, Grade II DD, Mod TR/AR/pHTN,      Plan:  - Continue tx for CAP as per medicine.   - On PE dosing of Lovenox, can be transitioned to DOAC   - Treated empirically for acute on chronic systolic/diastolic heart failure but patient appears clinically euvolemic.  - Continue low dose ASA, bbl   - Continue Entresto BID, will lower dose as BP on low side  - Monitor daily electrolytes and weight  - No ischemia evaluation needed at present.  - Continue Telemetry 74 YOM with PMHx of Sickle Cell, PVD s/p aortobifemoral bypass, COPD, renal insufficiency, DM, Gout, HTN, CAD, MI s/p AICD, Abd hernia p/w dyspnea and cough. COVID neg x 2. Elevated D-dimer 2200 and Cr 1.31.  Significant h/o ischemic cardiomyopathy and h/o heart failure.  Respiratory distress may be multi factorial - CXR with pulm vascular congestion but CT chest with B/L PNA and possible JUAREZ acute PE.  Minimally elevated cardiac enzymes appear consistent with demand ischemia rather than primary cardiac event.  Echo with acute on chronic combined SHF/DHF, EF: 25% with dilated cardiomyopathy, Grade II DD, Mod TR/AR/pHTN    Former smoker, [id number] quit in October 19.  Follows up with outpatient Cardiologist Dr Escoto in [facility]. Last PPM interrogation 4 weeks ago.      Plan:  - Continue tx for CAP as per medicine.   - On PE dosing of Lovenox, can be transitioned to DOAC   - Treated empirically for acute on chronic systolic/diastolic heart failure but patient appears clinically euvolemic.  - Continue low dose ASA, bbl   - Continue Entresto BID, will lower dose as BP on low side  - Monitor daily electrolytes and weight  - No ischemia evaluation needed at present.  - Former smoker, albuterol inhaler for dyspnea  - Continue Telemetry 74 YOM with PMHx of Sickle Cell, PVD s/p aortobifemoral bypass, COPD, renal insufficiency, DM, Gout, HTN, CAD, MI s/p AICD, Abd hernia p/w dyspnea and cough. COVID neg x 2. Elevated D-dimer 2200 and Cr 1.31.  Significant h/o ischemic cardiomyopathy and h/o heart failure.  Respiratory distress may be multi factorial - CXR with pulm vascular congestion but CT chest with B/L PNA and possible JUAREZ acute PE.  Minimally elevated cardiac enzymes appear consistent with demand ischemia rather than primary cardiac event.  Echo with acute on chronic combined SHF/DHF, EF: 25% with dilated cardiomyopathy, Grade II DD, Mod TR/AR/pHTN    Former smoker, [id number] quit in October 19.  Follows up with outpatient Cardiologist Dr Escoto in [facility]. Last AICD interrogation 4 weeks ago with normal results as per pt.      Plan:  - Continue tx for CAP as per medicine.   - On PE dosing of Lovenox, can be transitioned to DOAC   - Treated empirically for acute on chronic systolic/diastolic heart failure but patient appears clinically euvolemic.  - Continue low dose ASA, bbl   - Continue Entresto BID, will lower dose as BP on low side  - Monitor daily electrolytes and weight  - No ischemia evaluation needed at present.  - Former smoker, albuterol inhaler for dyspnea  - Continue Telemetry 74 YOM with PMHx of Sickle Cell, PVD s/p aortobifemoral bypass, COPD, renal insufficiency, DM, Gout, HTN, CAD, MI s/p AICD, Abd hernia p/w dyspnea and cough. COVID neg x 2. Elevated D-dimer 2200 and Cr 1.31.  Significant h/o ischemic cardiomyopathy and h/o heart failure.  Respiratory distress may be multi factorial - CXR with pulm vascular congestion but CT chest with B/L PNA and possible JUAREZ acute PE.  Minimally elevated cardiac enzymes appear consistent with demand ischemia rather than primary cardiac event.  Echo with acute on chronic combined SHF/DHF, EF: 25% with dilated cardiomyopathy, Grade II DD, Mod TR/AR/pHTN    Former smoker, [id number] quit in October 2019.     Plan:  - Continue tx for CAP as per medicine.   - On PE dosing of Lovenox, can be transitioned to DOAC   - Treated empirically for acute on chronic systolic/diastolic heart failure but patient appears clinically euvolemic.  - Continue low dose ASA, bbl   - Continue Entresto BID, will lower dose as BP on low side  - Monitor daily electrolytes and weight and renal function  - No ischemia evaluation needed at present.  - Former smoker, will add albuterol inhaler for dyspnea   - Continue Telemetry  - Follows up with outpatient Cardiologist Dr Escoto in [facility].   - Last AICD interrogation 4 weeks ago with normal results as per pt.

## 2020-04-23 NOTE — PROGRESS NOTE ADULT - PROBLEM SELECTOR PLAN 1
Started on therapeutic lovenox  Will add Coumadin today, monitor INR  Pulm eval Started on therapeutic lovenox  Will add eliquis today  Pulm eval  will obtain LE dopplers for source of embolism.

## 2020-04-23 NOTE — PROGRESS NOTE ADULT - PROBLEM SELECTOR PROBLEM 7
Hypercalcemia Coronary artery disease involving native coronary artery of native heart without angina pectoris

## 2020-04-23 NOTE — PROGRESS NOTE ADULT - SUBJECTIVE AND OBJECTIVE BOX
Patient is a 74y old  Male who presents with a chief complaint of dyspnea (23 Apr 2020 16:37)      HPI:  Patient is a 74M with a PMH of CAD s/p MI w/stents 2007, CHF with 20% LVEF in 2016,  HTN, HLD, DM, gout, CKD, COPD, PVD s/p aortobifemoral bypass who presents to the ED for dyspnea.  Patient currently AAOx1 and unable to provide history.  As per ED attending, patient was sent in by family members due to cough and dyspnea.  Reportedly has been feeling ill since COVID pandemic started.  Vitals stable.  Labs show hypercalcemia, mild tropinema and elevated bilirubin.  CT head performed and was negative.  CT chest shows  bilateral PNA and a JUAREZ acute PE.  Started on therapeutic lovenox, will admit to tele. (21 Apr 2020 18:15)      INTERVAL HPI/OVERNIGHT EVENTS:  The patient denies melena, hematochezia, hematemesis, nausea, vomiting, abdominal pain, constipation, diarrhea, or change in bowel movements     MEDICATIONS  (STANDING):  allopurinol 300 milliGRAM(s) Oral daily  aspirin  chewable 81 milliGRAM(s) Oral daily  carvedilol 25 milliGRAM(s) Oral every 12 hours  dextrose 5%. 1000 milliLiter(s) (50 mL/Hr) IV Continuous <Continuous>  dextrose 50% Injectable 12.5 Gram(s) IV Push once  dextrose 50% Injectable 25 Gram(s) IV Push once  dextrose 50% Injectable 25 Gram(s) IV Push once  furosemide    Tablet 40 milliGRAM(s) Oral daily  insulin lispro (HumaLOG) corrective regimen sliding scale   SubCutaneous three times a day before meals  lactulose Syrup 10 Gram(s) Oral daily  latanoprost 0.005% Ophthalmic Solution 1 Drop(s) Both EYES at bedtime  piperacillin/tazobactam IVPB.. 3.375 Gram(s) IV Intermittent every 8 hours  sacubitril 24 mG/valsartan 26 mG 1 Tablet(s) Oral two times a day    MEDICATIONS  (PRN):  ALBUTerol    90 MICROgram(s) HFA Inhaler 2 Puff(s) Inhalation every 4 hours PRN Shortness of Breath  dextrose 40% Gel 15 Gram(s) Oral once PRN Blood Glucose LESS THAN 70 milliGRAM(s)/deciliter  glucagon  Injectable 1 milliGRAM(s) IntraMuscular once PRN Glucose LESS THAN 70 milligrams/deciliter      FAMILY HISTORY:  No pertinent family history in first degree relatives      Allergies    No Known Drug Allergies  shellfish (Other; Urticaria)    Intolerances        PMH/PSH:  AICD (automatic cardioverter/defibrillator) present  Sickle cell trait  S/P aortobifemoral bypass surgery  Peripheral vascular disease  Chronic obstructive pulmonary disease  Renal insufficiency  Diabetes mellitus  Gout  Hypertension  Myocardial Infarction  Hypercholesteremia  Coronary Artery Disease  s/p Femoral-Popliteal Bypass Surgery  Abdominal Hernia  Peripheral Vascular Disease        REVIEW OF SYSTEMS:  CONSTITUTIONAL: No fever, weight loss,   EYES: No eye pain, visual disturbances, or discharge  ENMT:  No difficulty hearing, tinnitus, vertigo; No sinus or throat pain  NECK: No pain or stiffness  BREASTS: No pain, masses, or nipple discharge  RESPIRATORY: No cough, wheezing, chills or hemoptysis; No shortness of breath  CARDIOVASCULAR: No chest pain, palpitations, dizziness, or leg swelling  GASTROINTESTINAL: See above  GENITOURINARY: No dysuria, frequency, hematuria, or incontinence  NEUROLOGICAL: No headaches, memory loss, loss of strength, numbness, or tremors  SKIN: No itching, burning, rashes, or lesions   LYMPH NODES: No enlarged glands  ENDOCRINE: No heat or cold intolerance; No hair loss  MUSCULOSKELETAL: No joint pain or swelling; No muscle, back, or extremity pain  PSYCHIATRIC: No depression, anxiety, mood swings, or difficulty sleeping  HEME/LYMPH: No easy bruising, or bleeding gums  ALLERGY AND IMMUNOLOGIC: No hives or eczema    Vital Signs Last 24 Hrs  T(C): 36.3 (23 Apr 2020 18:33), Max: 36.3 (23 Apr 2020 18:33)  T(F): 97.4 (23 Apr 2020 18:33), Max: 97.4 (23 Apr 2020 18:33)  HR: 62 (23 Apr 2020 18:33) (52 - 62)  BP: 92/53 (23 Apr 2020 18:33) (91/55 - 127/94)  BP(mean): --  RR: 22 (23 Apr 2020 18:33) (19 - 32)  SpO2: 98% (23 Apr 2020 18:33) (96% - 98%)    PHYSICAL EXAM:  GENERAL: NAD, well-groomed, well-developed  HEAD:  Atraumatic, Normocephalic  EYES: EOMI, PERRLA, conjunctiva and sclera clear  NECK: Supple, No JVD, Normal thyroid  NERVOUS SYSTEM:  Alert & Oriented X3, Good concentration; Motor Strength 5/5 B/L upper and lower extremities;   CHEST/LUNG: Clear to percussion bilaterally; No rales, rhonchi, wheezing, or rubs  HEART: Regular rate and rhythm; No murmurs, rubs, or gallops  ABDOMEN: Soft, Nontender, Nondistended; Bowel sounds present  EXTREMITIES:  2+ Peripheral Pulses, No clubbing, cyanosis, or edema  LYMPH: No lymphadenopathy noted  SKIN: No rashes or lesions    LAB  04-21 @ 16:49  amylase --   lipase 91                           12.5   8.73  )-----------( 152      ( 23 Apr 2020 08:51 )             37.9       CBC:  04-23 @ 08:51  WBC 8.73   Hgb 12.5   Hct 37.9   Plts 152  MCV 78.0  04-21 @ 16:49  WBC 9.50   Hgb 15.1   Hct 46.6   Plts 214  MCV 79.3      Chemistry:  04-23 @ 08:51  Na+ 147  K+ 4.1  Cl- 111  CO2 28  BUN 33  Cr 1.31     04-21 @ 23:58  Na+ 141  K+ 4.4  Cl- 109  CO2 22  BUN 26  Cr 1.37     04-21 @ 16:49  Na+ 143  K+ 4.4  Cl- 107  CO2 25  BUN 23  Cr 1.34         Glucose, Serum: 112 mg/dL (04-23 @ 08:51)  Glucose, Serum: 143 mg/dL (04-21 @ 23:58)  Glucose, Serum: 119 mg/dL (04-21 @ 16:49)      23 Apr 2020 08:51    147    |  111    |  33     ----------------------------<  112    4.1     |  28     |  1.31   21 Apr 2020 23:58    141    |  109    |  26     ----------------------------<  143    4.4     |  22     |  1.37   21 Apr 2020 16:49    143    |  107    |  23     ----------------------------<  119    4.4     |  25     |  1.34     Ca    9.9        23 Apr 2020 08:51  Ca    10.8       21 Apr 2020 23:58  Ca    10.9       21 Apr 2020 16:49    TPro  6.1    /  Alb  2.3    /  TBili  7.5    /  DBili  5.33   /  AST  66     /  ALT  54     /  AlkPhos  108    23 Apr 2020 08:51  TPro  x      /  Alb  x      /  TBili  9.6    /  DBili  6.37   /  AST  x      /  ALT  x      /  AlkPhos  x      22 Apr 2020 04:09  TPro  6.9    /  Alb  2.7    /  TBili  9.3    /  DBili  x      /  AST  81     /  ALT  62     /  AlkPhos  101    21 Apr 2020 23:58  TPro  7.4    /  Alb  3.2    /  TBili  10.4   /  DBili  x      /  AST  83     /  ALT  64     /  AlkPhos  111    21 Apr 2020 16:49      PT/INR - ( 23 Apr 2020 08:51 )   PT: 21.3 sec;   INR: 1.87 ratio         PTT - ( 21 Apr 2020 23:58 )  PTT:33.3 sec        CAPILLARY BLOOD GLUCOSE      POCT Blood Glucose.: 123 mg/dL (23 Apr 2020 16:07)  POCT Blood Glucose.: 138 mg/dL (23 Apr 2020 11:28)  POCT Blood Glucose.: 115 mg/dL (23 Apr 2020 08:21)  POCT Blood Glucose.: 120 mg/dL (22 Apr 2020 21:58)      C-Reactive Protein, Serum: 8.58 mg/dL (04-23 @ 10:30)  C-Reactive Protein, Serum: 12.23 mg/dL (04-21 @ 22:29)      RADIOLOGY & ADDITIONAL TESTS:    Imaging Personally Reviewed:  [ ] YES  [ ] NO    Consultant(s) Notes Reviewed:  [ ] YES  [ ] NO    Care Discussed with Consultants/Other Providers [ ] YES  [ ] NO

## 2020-04-23 NOTE — PROGRESS NOTE ADULT - SUBJECTIVE AND OBJECTIVE BOX
Patient is a 74y old  Male who presents with a chief complaint of dyspnea (23 Apr 2020 13:52)      OVERNIGHT EVENTS: on 3 L NC    REVIEW OF SYSTEMS: poor historian     MEDICATIONS  (STANDING):  allopurinol 300 milliGRAM(s) Oral daily  aspirin  chewable 81 milliGRAM(s) Oral daily  carvedilol 25 milliGRAM(s) Oral every 12 hours  dextrose 5%. 1000 milliLiter(s) (50 mL/Hr) IV Continuous <Continuous>  dextrose 50% Injectable 12.5 Gram(s) IV Push once  dextrose 50% Injectable 25 Gram(s) IV Push once  dextrose 50% Injectable 25 Gram(s) IV Push once  enoxaparin Injectable 90 milliGRAM(s) SubCutaneous two times a day  furosemide    Tablet 40 milliGRAM(s) Oral daily  insulin lispro (HumaLOG) corrective regimen sliding scale   SubCutaneous three times a day before meals  lactulose Syrup 10 Gram(s) Oral daily  latanoprost 0.005% Ophthalmic Solution 1 Drop(s) Both EYES at bedtime  piperacillin/tazobactam IVPB.. 3.375 Gram(s) IV Intermittent every 8 hours  sacubitril 49 mG/valsartan 51 mG 1 Tablet(s) Oral two times a day    MEDICATIONS  (PRN):  dextrose 40% Gel 15 Gram(s) Oral once PRN Blood Glucose LESS THAN 70 milliGRAM(s)/deciliter  glucagon  Injectable 1 milliGRAM(s) IntraMuscular once PRN Glucose LESS THAN 70 milligrams/deciliter      Allergies    No Known Drug Allergies  shellfish (Other; Urticaria)    Intolerances        T(F): 96.2 (04-23-20 @ 12:50), Max: 97.6 (04-22-20 @ 19:21)  HR: 52 (04-23-20 @ 12:50) (52 - 59)  BP: 91/55 (04-23-20 @ 12:50) (91/55 - 138/70)  RR: 28 (04-23-20 @ 12:50) (19 - 28)  SpO2: 97% (04-23-20 @ 12:50) (97% - 98%)  Wt(kg): --    PHYSICAL EXAM:  GENERAL: NAD, obese  HEAD:  Atraumatic, Normocephalic  EYES:  PERRLA, conjunctiva and sclera clear  ENMT: Moist mucous membranes  NECK: Supple, No JVD, Normal thyroid  NERVOUS SYSTEM:  sleepy   CHEST/LUNG: decreased bs bilaterally;  HEART: Regular rate and rhythm;   ABDOMEN: Soft, Nontender, Nondistended; Bowel sounds present  EXTREMITIES:  2+ Peripheral Pulses, No clubbing, cyanosis, or edema BL LE  SKIN: moist      LABS:                        12.5   8.73  )-----------( 152      ( 23 Apr 2020 08:51 )             37.9     04-23    147<H>  |  111<H>  |  33<H>  ----------------------------<  112<H>  4.1   |  28  |  1.31<H>    Ca    9.9      23 Apr 2020 08:51    TPro  6.1  /  Alb  2.3<L>  /  TBili  7.5<H>  /  DBili  5.33<H>  /  AST  66<H>  /  ALT  54  /  AlkPhos  108  04-23    PT/INR - ( 23 Apr 2020 08:51 )   PT: 21.3 sec;   INR: 1.87 ratio         PTT - ( 21 Apr 2020 23:58 )  PTT:33.3 sec    Cultures;   CAPILLARY BLOOD GLUCOSE      POCT Blood Glucose.: 138 mg/dL (23 Apr 2020 11:28)  POCT Blood Glucose.: 115 mg/dL (23 Apr 2020 08:21)  POCT Blood Glucose.: 120 mg/dL (22 Apr 2020 21:58)  POCT Blood Glucose.: 130 mg/dL (22 Apr 2020 16:01)    Lipid panel:     CARDIAC MARKERS ( 22 Apr 2020 04:09 )  .149 ng/mL / x     / x     / x     / x      CARDIAC MARKERS ( 21 Apr 2020 23:58 )  .108 ng/mL / x     / x     / x     / x      CARDIAC MARKERS ( 21 Apr 2020 16:49 )  .106 ng/mL / x     / x     / x     / x            RADIOLOGY & ADDITIONAL TESTS:    Imaging Personally Reviewed:  [x ] YES    < from: TTE Echo Complete w/o contrast w/ Doppler (04.22.20 @ 17:37) >  1. Left ventricular ejection fraction, by visual estimation, is 25 to 30%.   2. Technically difficult study.   3. Severely decreased segmental left ventricular systolic function.   4. Dilated cardiomyopathy.   5. Elevated mean left atrial pressure.   6. Spectral Doppler shows pseudonormal pattern of left ventricular myocardial filling (Grade II diastolic dysfunction).   7. Apical and lateral wall segments are not clearly visualized in this study; suggest contrast study, if clinically warranted, for better evaluation of wall motion abnormalites.   8. Moderate tricuspid regurgitation.   9. Aortic valve is sclerotic.  10. Moderate aortic regurgitation.  11. Estimated pulmonary artery systolic pressure is 53.7 mmHg assuming a right atrial pressure of 15 mmHg, which is consistent with moderate pulmonary hypertension.    < end of copied text >    Consultant(s) Notes Reviewed:  [x ] YES     Care Discussed with [x ] Consultants [X ] Patient [ ] Family  [x ]    [x ]  Other; RN

## 2020-04-23 NOTE — PROGRESS NOTE ADULT - PROBLEM SELECTOR PROBLEM 8
Coronary artery disease involving native coronary artery of native heart without angina pectoris Type 2 diabetes mellitus without complication, without long-term current use of insulin

## 2020-04-24 LAB
ALBUMIN SERPL ELPH-MCNC: 2.4 G/DL — LOW (ref 3.3–5)
ALP SERPL-CCNC: 138 U/L — HIGH (ref 40–120)
ALT FLD-CCNC: 64 U/L — SIGNIFICANT CHANGE UP (ref 12–78)
ANION GAP SERPL CALC-SCNC: 7 MMOL/L — SIGNIFICANT CHANGE UP (ref 5–17)
AST SERPL-CCNC: 88 U/L — HIGH (ref 15–37)
BILIRUB SERPL-MCNC: 7.4 MG/DL — HIGH (ref 0.2–1.2)
BUN SERPL-MCNC: 38 MG/DL — HIGH (ref 7–23)
CALCIUM SERPL-MCNC: 9.9 MG/DL — SIGNIFICANT CHANGE UP (ref 8.5–10.1)
CHLORIDE SERPL-SCNC: 106 MMOL/L — SIGNIFICANT CHANGE UP (ref 96–108)
CO2 SERPL-SCNC: 30 MMOL/L — SIGNIFICANT CHANGE UP (ref 22–31)
CREAT SERPL-MCNC: 1.53 MG/DL — HIGH (ref 0.5–1.3)
CRP SERPL-MCNC: 8.74 MG/DL — HIGH (ref 0–0.4)
D DIMER BLD IA.RAPID-MCNC: 802 NG/ML DDU — HIGH
FERRITIN SERPL-MCNC: 81 NG/ML — SIGNIFICANT CHANGE UP (ref 30–400)
GLUCOSE BLDC GLUCOMTR-MCNC: 110 MG/DL — HIGH (ref 70–99)
GLUCOSE BLDC GLUCOMTR-MCNC: 115 MG/DL — HIGH (ref 70–99)
GLUCOSE BLDC GLUCOMTR-MCNC: 118 MG/DL — HIGH (ref 70–99)
GLUCOSE SERPL-MCNC: 107 MG/DL — HIGH (ref 70–99)
HCT VFR BLD CALC: 38.3 % — LOW (ref 39–50)
HGB BLD-MCNC: 12.5 G/DL — LOW (ref 13–17)
INR BLD: 1.67 RATIO — HIGH (ref 0.88–1.16)
LDH SERPL L TO P-CCNC: 410 U/L — HIGH (ref 50–242)
MAGNESIUM SERPL-MCNC: 2.6 MG/DL — SIGNIFICANT CHANGE UP (ref 1.6–2.6)
MCHC RBC-ENTMCNC: 25 PG — LOW (ref 27–34)
MCHC RBC-ENTMCNC: 32.6 GM/DL — SIGNIFICANT CHANGE UP (ref 32–36)
MCV RBC AUTO: 76.4 FL — LOW (ref 80–100)
NRBC # BLD: 0 /100 WBCS — SIGNIFICANT CHANGE UP (ref 0–0)
PHOSPHATE SERPL-MCNC: 2.2 MG/DL — LOW (ref 2.5–4.5)
PLATELET # BLD AUTO: 163 K/UL — SIGNIFICANT CHANGE UP (ref 150–400)
POTASSIUM SERPL-MCNC: 4.1 MMOL/L — SIGNIFICANT CHANGE UP (ref 3.5–5.3)
POTASSIUM SERPL-SCNC: 4.1 MMOL/L — SIGNIFICANT CHANGE UP (ref 3.5–5.3)
PROCALCITONIN SERPL-MCNC: 0.54 NG/ML — HIGH (ref 0.02–0.1)
PROT SERPL-MCNC: 7.1 GM/DL — SIGNIFICANT CHANGE UP (ref 6–8.3)
PROTHROM AB SERPL-ACNC: 19 SEC — HIGH (ref 10–12.9)
RBC # BLD: 5.01 M/UL — SIGNIFICANT CHANGE UP (ref 4.2–5.8)
RBC # FLD: 17.6 % — HIGH (ref 10.3–14.5)
SODIUM SERPL-SCNC: 143 MMOL/L — SIGNIFICANT CHANGE UP (ref 135–145)
TROPONIN I SERPL-MCNC: 0.08 NG/ML — HIGH (ref 0.01–0.04)
WBC # BLD: 8.98 K/UL — SIGNIFICANT CHANGE UP (ref 3.8–10.5)
WBC # FLD AUTO: 8.98 K/UL — SIGNIFICANT CHANGE UP (ref 3.8–10.5)

## 2020-04-24 PROCEDURE — 99233 SBSQ HOSP IP/OBS HIGH 50: CPT

## 2020-04-24 PROCEDURE — 99283 EMERGENCY DEPT VISIT LOW MDM: CPT

## 2020-04-24 PROCEDURE — 99231 SBSQ HOSP IP/OBS SF/LOW 25: CPT

## 2020-04-24 PROCEDURE — 99232 SBSQ HOSP IP/OBS MODERATE 35: CPT

## 2020-04-24 RX ORDER — POTASSIUM PHOSPHATE, MONOBASIC POTASSIUM PHOSPHATE, DIBASIC 236; 224 MG/ML; MG/ML
15 INJECTION, SOLUTION INTRAVENOUS ONCE
Refills: 0 | Status: COMPLETED | OUTPATIENT
Start: 2020-04-24 | End: 2020-04-24

## 2020-04-24 RX ADMIN — PIPERACILLIN AND TAZOBACTAM 25 GRAM(S): 4; .5 INJECTION, POWDER, LYOPHILIZED, FOR SOLUTION INTRAVENOUS at 12:37

## 2020-04-24 RX ADMIN — Medication 300 MILLIGRAM(S): at 12:37

## 2020-04-24 RX ADMIN — Medication 40 MILLIGRAM(S): at 06:40

## 2020-04-24 RX ADMIN — Medication 81 MILLIGRAM(S): at 12:37

## 2020-04-24 RX ADMIN — POTASSIUM PHOSPHATE, MONOBASIC POTASSIUM PHOSPHATE, DIBASIC 62.5 MILLIMOLE(S): 236; 224 INJECTION, SOLUTION INTRAVENOUS at 12:13

## 2020-04-24 RX ADMIN — LACTULOSE 10 GRAM(S): 10 SOLUTION ORAL at 12:37

## 2020-04-24 RX ADMIN — APIXABAN 10 MILLIGRAM(S): 2.5 TABLET, FILM COATED ORAL at 06:39

## 2020-04-24 RX ADMIN — SACUBITRIL AND VALSARTAN 1 TABLET(S): 24; 26 TABLET, FILM COATED ORAL at 06:40

## 2020-04-24 RX ADMIN — PIPERACILLIN AND TAZOBACTAM 25 GRAM(S): 4; .5 INJECTION, POWDER, LYOPHILIZED, FOR SOLUTION INTRAVENOUS at 22:11

## 2020-04-24 RX ADMIN — PIPERACILLIN AND TAZOBACTAM 25 GRAM(S): 4; .5 INJECTION, POWDER, LYOPHILIZED, FOR SOLUTION INTRAVENOUS at 06:40

## 2020-04-24 RX ADMIN — LATANOPROST 1 DROP(S): 0.05 SOLUTION/ DROPS OPHTHALMIC; TOPICAL at 22:11

## 2020-04-24 NOTE — PROGRESS NOTE ADULT - SUBJECTIVE AND OBJECTIVE BOX
Patient is a 74y old  Male who presents with a chief complaint of dyspnea (24 Apr 2020 16:18)      HPI:  Patient is a 74M with a PMH of CAD s/p MI w/stents 2007, CHF with 20% LVEF in 2016,  HTN, HLD, DM, gout, CKD, COPD, PVD s/p aortobifemoral bypass who presents to the ED for dyspnea.  Patient currently AAOx1 and unable to provide history.  As per ED attending, patient was sent in by family members due to cough and dyspnea.  Reportedly has been feeling ill since COVID pandemic started.  Vitals stable.  Labs show hypercalcemia, mild tropinema and elevated bilirubin.  CT head performed and was negative.  CT chest shows  bilateral PNA and a JUAREZ acute PE.  Started on therapeutic lovenox, will admit to tele. (21 Apr 2020 18:15)      INTERVAL HPI/OVERNIGHT EVENTS:    MEDICATIONS  (STANDING):  allopurinol 300 milliGRAM(s) Oral daily  apixaban 10 milliGRAM(s) Oral every 12 hours  aspirin  chewable 81 milliGRAM(s) Oral daily  carvedilol 25 milliGRAM(s) Oral every 12 hours  dextrose 5%. 1000 milliLiter(s) (50 mL/Hr) IV Continuous <Continuous>  dextrose 50% Injectable 12.5 Gram(s) IV Push once  dextrose 50% Injectable 25 Gram(s) IV Push once  dextrose 50% Injectable 25 Gram(s) IV Push once  furosemide    Tablet 40 milliGRAM(s) Oral daily  insulin lispro (HumaLOG) corrective regimen sliding scale   SubCutaneous three times a day before meals  lactulose Syrup 10 Gram(s) Oral daily  latanoprost 0.005% Ophthalmic Solution 1 Drop(s) Both EYES at bedtime  piperacillin/tazobactam IVPB.. 3.375 Gram(s) IV Intermittent every 8 hours  sacubitril 24 mG/valsartan 26 mG 1 Tablet(s) Oral two times a day    MEDICATIONS  (PRN):  ALBUTerol    90 MICROgram(s) HFA Inhaler 2 Puff(s) Inhalation every 4 hours PRN Shortness of Breath  dextrose 40% Gel 15 Gram(s) Oral once PRN Blood Glucose LESS THAN 70 milliGRAM(s)/deciliter  glucagon  Injectable 1 milliGRAM(s) IntraMuscular once PRN Glucose LESS THAN 70 milligrams/deciliter      FAMILY HISTORY:  No pertinent family history in first degree relatives      Allergies    No Known Drug Allergies  shellfish (Other; Urticaria)    Intolerances        PMH/PSH:  AICD (automatic cardioverter/defibrillator) present  Sickle cell trait  S/P aortobifemoral bypass surgery  Peripheral vascular disease  Chronic obstructive pulmonary disease  Renal insufficiency  Diabetes mellitus  Gout  Hypertension  Myocardial Infarction  Hypercholesteremia  Coronary Artery Disease  s/p Femoral-Popliteal Bypass Surgery  Abdominal Hernia  Peripheral Vascular Disease        REVIEW OF SYSTEMS:  CONSTITUTIONAL: No fever, weight loss, or fatigue  EYES: No eye pain, visual disturbances, or discharge  ENMT:  No difficulty hearing, tinnitus, vertigo; No sinus or throat pain  NECK: No pain or stiffness  BREASTS: No pain, masses, or nipple discharge  RESPIRATORY: No cough, wheezing, chills or hemoptysis; No shortness of breath  CARDIOVASCULAR: No chest pain, palpitations, dizziness, or leg swelling  GASTROINTESTINAL: No abdominal or epigastric pain. No nausea, vomiting, or hematemesis; No diarrhea or constipation. No melena or hematochezia.  GENITOURINARY: No dysuria, frequency, hematuria, or incontinence  NEUROLOGICAL: No headaches, memory loss, loss of strength, numbness, or tremors  SKIN: No itching, burning, rashes, or lesions   LYMPH NODES: No enlarged glands  ENDOCRINE: No heat or cold intolerance; No hair loss  MUSCULOSKELETAL: No joint pain or swelling; No muscle, back, or extremity pain  PSYCHIATRIC: No depression, anxiety, mood swings, or difficulty sleeping  HEME/LYMPH: No easy bruising, or bleeding gums  ALLERGY AND IMMUNOLOGIC: No hives or eczema    Vital Signs Last 24 Hrs  T(C): 36 (24 Apr 2020 11:15), Max: 36.3 (23 Apr 2020 18:33)  T(F): 96.8 (24 Apr 2020 11:15), Max: 97.4 (23 Apr 2020 18:33)  HR: 54 (24 Apr 2020 11:15) (53 - 62)  BP: 92/56 (24 Apr 2020 11:15) (92/53 - 118/67)  BP(mean): --  RR: 17 (24 Apr 2020 11:15) (17 - 22)  SpO2: 95% (24 Apr 2020 11:15) (95% - 100%)    PHYSICAL EXAM:  GENERAL: NAD, well-groomed, well-developed  HEAD:  Atraumatic, Normocephalic  EYES: EOMI, PERRLA, conjunctiva and sclera clear  ENMT: No tonsillar erythema, exudates, or enlargement; Moist mucous membranes, Good dentition, No lesions  NECK: Supple, No JVD, Normal thyroid  NERVOUS SYSTEM:  Alert & Oriented X3, Good concentration; Motor Strength 5/5 B/L upper and lower extremities; DTRs 2+ intact and symmetric  CHEST/LUNG: Clear to percussion bilaterally; No rales, rhonchi, wheezing, or rubs  HEART: Regular rate and rhythm; No murmurs, rubs, or gallops  ABDOMEN: Soft, Nontender, Nondistended; Bowel sounds present  EXTREMITIES:  2+ Peripheral Pulses, No clubbing, cyanosis, or edema  LYMPH: No lymphadenopathy noted  SKIN: No rashes or lesions    LAB  04-21 @ 16:49  amylase --   lipase 91                           12.5   8.98  )-----------( 163      ( 24 Apr 2020 07:01 )             38.3       CBC:  04-24 @ 07:01  WBC 8.98   Hgb 12.5   Hct 38.3   Plts 163  MCV 76.4  04-23 @ 08:51  WBC 8.73   Hgb 12.5   Hct 37.9   Plts 152  MCV 78.0  04-21 @ 16:49  WBC 9.50   Hgb 15.1   Hct 46.6   Plts 214  MCV 79.3      Chemistry:  04-24 @ 07:01  Na+ 143  K+ 4.1  Cl- 106  CO2 30  BUN 38  Cr 1.53     04-23 @ 08:51  Na+ 147  K+ 4.1  Cl- 111  CO2 28  BUN 33  Cr 1.31     04-21 @ 23:58  Na+ 141  K+ 4.4  Cl- 109  CO2 22  BUN 26  Cr 1.37     04-21 @ 16:49  Na+ 143  K+ 4.4  Cl- 107  CO2 25  BUN 23  Cr 1.34         Glucose, Serum: 107 mg/dL (04-24 @ 07:01)  Glucose, Serum: 112 mg/dL (04-23 @ 08:51)  Glucose, Serum: 143 mg/dL (04-21 @ 23:58)  Glucose, Serum: 119 mg/dL (04-21 @ 16:49)      24 Apr 2020 07:01    143    |  106    |  38     ----------------------------<  107    4.1     |  30     |  1.53   23 Apr 2020 08:51    147    |  111    |  33     ----------------------------<  112    4.1     |  28     |  1.31   21 Apr 2020 23:58    141    |  109    |  26     ----------------------------<  143    4.4     |  22     |  1.37   21 Apr 2020 16:49    143    |  107    |  23     ----------------------------<  119    4.4     |  25     |  1.34     Ca    9.9        24 Apr 2020 07:01  Ca    9.9        23 Apr 2020 08:51  Ca    10.8       21 Apr 2020 23:58  Ca    10.9       21 Apr 2020 16:49  Phos  2.2       24 Apr 2020 07:01  Mg     2.6       24 Apr 2020 07:01    TPro  7.1    /  Alb  2.4    /  TBili  7.4    /  DBili  x      /  AST  88     /  ALT  64     /  AlkPhos  138    24 Apr 2020 07:01  TPro  6.1    /  Alb  2.3    /  TBili  7.5    /  DBili  5.33   /  AST  66     /  ALT  54     /  AlkPhos  108    23 Apr 2020 08:51  TPro  x      /  Alb  x      /  TBili  9.6    /  DBili  6.37   /  AST  x      /  ALT  x      /  AlkPhos  x      22 Apr 2020 04:09  TPro  6.9    /  Alb  2.7    /  TBili  9.3    /  DBili  x      /  AST  81     /  ALT  62     /  AlkPhos  101    21 Apr 2020 23:58  TPro  7.4    /  Alb  3.2    /  TBili  10.4   /  DBili  x      /  AST  83     /  ALT  64     /  AlkPhos  111    21 Apr 2020 16:49      PT/INR - ( 24 Apr 2020 07:01 )   PT: 19.0 sec;   INR: 1.67 ratio                 CAPILLARY BLOOD GLUCOSE      POCT Blood Glucose.: 115 mg/dL (24 Apr 2020 16:15)  POCT Blood Glucose.: 118 mg/dL (24 Apr 2020 10:51)  POCT Blood Glucose.: 110 mg/dL (24 Apr 2020 08:03)  POCT Blood Glucose.: 131 mg/dL (23 Apr 2020 21:44)      C-Reactive Protein, Serum: 8.74 mg/dL (04-24 @ 08:11)  C-Reactive Protein, Serum: 8.58 mg/dL (04-23 @ 10:30)  C-Reactive Protein, Serum: 12.23 mg/dL (04-21 @ 22:29)      RADIOLOGY & ADDITIONAL TESTS:    Imaging Personally Reviewed:  [ ] YES  [ ] NO    Consultant(s) Notes Reviewed:  [ ] YES  [ ] NO    Care Discussed with Consultants/Other Providers [ ] YES  [ ] NO

## 2020-04-24 NOTE — PROGRESS NOTE ADULT - SUBJECTIVE AND OBJECTIVE BOX
Patient is a 74y old  Male who presents with a chief complaint of dyspnea (23 Apr 2020 19:36)      OVERNIGHT EVENTS:  on 3 L NC, had vtach overnight    REVIEW OF SYSTEMS: poor historian     MEDICATIONS  (STANDING):  allopurinol 300 milliGRAM(s) Oral daily  apixaban 10 milliGRAM(s) Oral every 12 hours  aspirin  chewable 81 milliGRAM(s) Oral daily  carvedilol 25 milliGRAM(s) Oral every 12 hours  dextrose 5%. 1000 milliLiter(s) (50 mL/Hr) IV Continuous <Continuous>  dextrose 50% Injectable 12.5 Gram(s) IV Push once  dextrose 50% Injectable 25 Gram(s) IV Push once  dextrose 50% Injectable 25 Gram(s) IV Push once  furosemide    Tablet 40 milliGRAM(s) Oral daily  insulin lispro (HumaLOG) corrective regimen sliding scale   SubCutaneous three times a day before meals  lactulose Syrup 10 Gram(s) Oral daily  latanoprost 0.005% Ophthalmic Solution 1 Drop(s) Both EYES at bedtime  piperacillin/tazobactam IVPB.. 3.375 Gram(s) IV Intermittent every 8 hours  sacubitril 24 mG/valsartan 26 mG 1 Tablet(s) Oral two times a day    MEDICATIONS  (PRN):  ALBUTerol    90 MICROgram(s) HFA Inhaler 2 Puff(s) Inhalation every 4 hours PRN Shortness of Breath  dextrose 40% Gel 15 Gram(s) Oral once PRN Blood Glucose LESS THAN 70 milliGRAM(s)/deciliter  glucagon  Injectable 1 milliGRAM(s) IntraMuscular once PRN Glucose LESS THAN 70 milligrams/deciliter      Allergies    No Known Drug Allergies  shellfish (Other; Urticaria)    Intolerances        T(F): 94.2 (04-24-20 @ 06:25), Max: 97.4 (04-23-20 @ 18:33)  HR: 53 (04-24-20 @ 06:25) (53 - 62)  BP: 118/67 (04-24-20 @ 06:25) (92/53 - 118/67)  RR: 20 (04-24-20 @ 06:25) (19 - 32)  SpO2: 100% (04-24-20 @ 06:25) (96% - 100%)  Wt(kg): --    PHYSICAL EXAM:  GENERAL: NAD  HEAD:  Atraumatic, Normocephalic  EYES: PERRLA, conjunctiva and sclera clear  ENMT: Moist mucous membranes  NECK: Supple, No JVD, Normal thyroid  NERVOUS SYSTEM:  Alert & Awake  CHEST/LUNG: Clear to percussion bilaterally;   HEART: Regular rate and rhythm;   ABDOMEN: Soft, Nontender, Nondistended; Bowel sounds present  EXTREMITIES:  no edema BL LE  SKIN: moist    LABS:                        12.5   8.98  )-----------( 163      ( 24 Apr 2020 07:01 )             38.3     04-24    143  |  106  |  38<H>  ----------------------------<  107<H>  4.1   |  30  |  1.53<H>    Ca    9.9      24 Apr 2020 07:01  Phos  2.2     04-24  Mg     2.6     04-24    TPro  7.1  /  Alb  2.4<L>  /  TBili  7.4<H>  /  DBili  x   /  AST  88<H>  /  ALT  64  /  AlkPhos  138<H>  04-24    PT/INR - ( 24 Apr 2020 07:01 )   PT: 19.0 sec;   INR: 1.67 ratio             Cultures;   CAPILLARY BLOOD GLUCOSE      POCT Blood Glucose.: 118 mg/dL (24 Apr 2020 10:51)  POCT Blood Glucose.: 110 mg/dL (24 Apr 2020 08:03)  POCT Blood Glucose.: 131 mg/dL (23 Apr 2020 21:44)  POCT Blood Glucose.: 123 mg/dL (23 Apr 2020 16:07)    Lipid panel:     CARDIAC MARKERS ( 24 Apr 2020 07:01 )  .081 ng/mL / x     / x     / x     / x        RADIOLOGY & ADDITIONAL TESTS:    Imaging Personally Reviewed:  [x ] YES    < from: TTE Echo Complete w/o contrast w/ Doppler (04.22.20 @ 17:37) >  1. Left ventricular ejection fraction, by visual estimation, is 25 to 30%.   2. Technically difficult study.   3. Severely decreased segmental left ventricular systolic function.   4. Dilated cardiomyopathy.   5. Elevated mean left atrial pressure.   6. Spectral Doppler shows pseudonormal pattern of left ventricular myocardial filling (Grade II diastolic dysfunction).   7. Apical and lateral wall segments are not clearly visualized in this study; suggest contrast study, if clinically warranted, for better evaluation of wall motion abnormalites.   8. Moderate tricuspid regurgitation.   9. Aortic valve is sclerotic.  10. Moderate aortic regurgitation.  11. Estimated pulmonary artery systolic pressure is 53.7 mmHg assuming a right atrial pressure of 15 mmHg, which is consistent with moderate pulmonary hypertension.    < end of copied text >    Consultant(s) Notes Reviewed:  [x ] YES     Care Discussed with [x ] Consultants [X ] Patient [ ] Family  [x ]    [x ]  Other; RN

## 2020-04-24 NOTE — PROGRESS NOTE ADULT - SUBJECTIVE AND OBJECTIVE BOX
Patient is a 74y old  Male who presents with a chief complaint of dyspnea (24 Apr 2020 16:18)    PAST MEDICAL & SURGICAL HISTORY:  AICD (automatic cardioverter/defibrillator) present  CHF  Sickle cell trait  S/P aortobifemoral bypass surgery  Peripheral vascular disease  Chronic obstructive pulmonary disease  Renal insufficiency  Diabetes mellitus  Gout  Hypertension  Myocardial Infarction  Hypercholesteremia  Coronary Artery Disease  s/p Femoral-Popliteal Bypass Surgery  Abdominal Hernia    INTERVAL HISTORY: Resting in bed, not in any acute distress   	  MEDICATIONS:  MEDICATIONS  (STANDING):  allopurinol 300 milliGRAM(s) Oral daily  apixaban 10 milliGRAM(s) Oral every 12 hours  aspirin  chewable 81 milliGRAM(s) Oral daily  carvedilol 25 milliGRAM(s) Oral every 12 hours  furosemide    Tablet 40 milliGRAM(s) Oral daily  insulin lispro (HumaLOG) corrective regimen sliding scale   SubCutaneous three times a day before meals  lactulose Syrup 10 Gram(s) Oral daily  latanoprost 0.005% Ophthalmic Solution 1 Drop(s) Both EYES at bedtime  piperacillin/tazobactam IVPB.. 3.375 Gram(s) IV Intermittent every 8 hours  sacubitril 24 mG/valsartan 26 mG 1 Tablet(s) Oral two times a day    MEDICATIONS  (PRN):  ALBUTerol    90 MICROgram(s) HFA Inhaler 2 Puff(s) Inhalation every 4 hours PRN Shortness of Breath  dextrose 40% Gel 15 Gram(s) Oral once PRN Blood Glucose LESS THAN 70 milliGRAM(s)/deciliter  glucagon  Injectable 1 milliGRAM(s) IntraMuscular once PRN Glucose LESS THAN 70 milligrams/deciliter    Vitals:  T(F): 96.8 (04-24-20 @ 11:15), Max: 97.4 (04-23-20 @ 18:33)  HR: 54 (04-24-20 @ 11:15) (53 - 62)  BP: 92/56 (04-24-20 @ 11:15) (92/53 - 118/67)  RR: 17 (04-24-20 @ 11:15) (17 - 22)  SpO2: 95% (04-24-20 @ 11:15) (95% - 100%)    04-23 @ 07:01  -  04-24 @ 07:00  --------------------------------------------------------  IN:    Oral Fluid: 720 mL  Total IN: 720 mL    OUT:    Indwelling Catheter - Urethral: 450 mL  Total OUT: 450 mL    Total NET: 270 mL    PHYSICAL EXAM:  Neuro: Awake, responsive  CV: S1 S2 RRR  Lungs: CTABL  GI: Soft, BS +, ND, NT  Extremities: No edema    TELEMETRY: paced    RADIOLOGY: < from: CT Angio Chest w/ IV Cont (04.21.20 @ 19:49) >  Bilateral pneumonia is most prominent in the left lower lobe.  Subsegmental left upper lobe pulmonary embolism  No acute pathology in the abdomen or pelvis  Left renal cyst  Enlarged prostate  Status post aortic bypass.  results discussed with      < end of copied text >    < from: US Duplex Venous Lower Ext Complete, Bilateral (04.23.20 @ 16:58) >   Proximal right DVT as noted.    < end of copied text >    DIAGNOSTIC TESTING:    [x ] Echocardiogram: < from: TTE Echo Complete w/o contrast w/ Doppler (04.22.20 @ 17:37) >   1. Left ventricular ejection fraction, by visual estimation, is 25 to 30%.   2. Technically difficult study.   3. Severely decreased segmental left ventricular systolic function.   4. Dilated cardiomyopathy.   5. Elevated mean left atrial pressure.   6. Spectral Doppler shows pseudonormal pattern of left ventricular myocardial filling (Grade II diastolic dysfunction).   7. Apical and lateral wall segments are not clearly visualized in this study; suggest contrast study, if clinically warranted, for better evaluation of wall motion abnormalites.   8. Moderate tricuspid regurgitation.   9. Aortic valve is sclerotic.  10. Moderate aortic regurgitation.  11. Estimated pulmonary artery systolic pressure is 53.7 mmHg assuming a right atrial pressure of 15 mmHg, which is consistent with moderate pulmonary hypertension.    < end of copied text >    LABS:	 	    CARDIAC MARKERS:  Troponin I, Serum: .081 ng/mL (04-24 @ 07:01)  Troponin I, Serum: .149 ng/mL (04-22 @ 04:09)  Troponin I, Serum: .108 ng/mL (04-21 @ 23:58)    24 Apr 2020 07:01    143    |  106    |  38     ----------------------------<  107    4.1     |  30     |  1.53   23 Apr 2020 08:51    147    |  111    |  33     ----------------------------<  112    4.1     |  28     |  1.31   21 Apr 2020 23:58    141    |  109    |  26     ----------------------------<  143    4.4     |  22     |  1.37     Ca    9.9        24 Apr 2020 07:01  Phos  2.2       24 Apr 2020 07:01  Mg     2.6       24 Apr 2020 07:01    TPro  7.1    /  Alb  2.4    /  TBili  7.4    /  DBili  x      /  AST  88     /  ALT  64     /  AlkPhos  138    24 Apr 2020 07:01                          12.5   8.98  )-----------( 163      ( 24 Apr 2020 07:01 )             38.3 ,                       12.5   8.73  )-----------( 152      ( 23 Apr 2020 08:51 )             37.9     PT/PTT- ( 24 Apr 2020 07:01 )   PT: 19.0 sec;  PTT: x        INR: 1.67 ratio (04-24 @ 07:01)  INR: 1.87 ratio (04-23 @ 08:51)  INR: 2.42 ratio (04-21 @ 23:58)

## 2020-04-24 NOTE — PROGRESS NOTE ADULT - ASSESSMENT
Patient is a 74M with a PMH of CAD s/p MI w/stents 2007, CHF with 20% LVEF in 2016, HTN, HLD, DM, gout, CKD, COPD, PVD s/p aortobifemoral bypass who presents to the ED for dyspnea.  Patient currently AAOx1 and unable to provide history.  As per ED attending, patient was sent in by family members due to cough and dyspnea.  Reportedly has been feeling ill since COVID pandemic started.  Vitals stable.  Labs show hypercalcemia, mild tropinemia and elevated bilirubin.  CT head performed and was negative.  CT chest shows  bilateral PNA and a JUAREZ acute PE.  Started on therapeutic lovenox transitioned to Eliquis.     Acute pulmonary embolism without acute cor pulmonale, unspecified pulmonary embolism type  -Eliquis, ASA, change Eliquis dose after 7d  -Pulm on board  -LE dopplers- Nonocclusive thrombus is identified within the visualized right external iliac vein and common femoral vein.    Acute respiratory failure with hypoxia  -O2 supplement    Septic encephalopathy  -Gram-neg PNA  -AMS? unknown baseline.    -CT Angio Chest w/Bilateral pneumonia is most prominent in the left lower lobe. Subsegmental left upper lobe pulmonary embolism  -Given ceftriaxone and azithromycin in the ED.    -Follow blood cultures. NGTD   -COVID-19 PCR neg  -c/w Zosyn 4/22, ID EVAL  -CRP elevated but ferritin low.    Vtach  -Continue low dose ASA, bbl   -No ischemia evaluation needed at present per cards  -f/up cards recs  -Continue Telemetry    Chronic systolic congestive heart failure  Troponin level elevated  CAD  -Continue low dose ASA, bbl, Entresto  -Lasix qd  -No ischemia evaluation needed at present per cards    Type 2 diabetes mellitus without complication, without long-term current use of insulin  -RISS, monitor BG    Hyperbilirubinemia  -GI conuslt - [place]  -Ultra sound - Mild hepatomegaly  -hold Lipitor

## 2020-04-24 NOTE — PROGRESS NOTE ADULT - ASSESSMENT
75 y/o male with PMHx of Sickle Cell, PVD s/p aortobifemoral bypass, COPD, renal insufficiency, DM, Gout, HTN, CAD, MI s/p AICD, Abd hernia p/w dyspnea and cough. COVID neg x 2. Elevated D-dimer 2200 and Cr 1.31.  Significant h/o ischemic cardiomyopathy and h/o heart failure.  Respiratory distress may be multi factorial - CXR with pulm vascular congestion but CT chest with B/L PNA and JUAREZ acute PE.  Minimally elevated cardiac enzymes appear consistent with demand ischemia rather than primary cardiac event.  Echo with acute on chronic combined SHF/DHF, EF: 25% with dilated cardiomyopathy, Grade II DD, Mod TR/AR/pHTN    Former smoker, [id number] quit in October 2019.     Plan:  - Continue tx for CAP as per medicine.   - On Eliquis fo AC fo PE/DVT   - Treated empirically for acute on chronic systolic/diastolic heart failure but patient appears clinically euvolemic.  - Continue low dose ASA  - Continue coreg/low dose Entresto   - Monitor daily electrolytes/ renal function/weight   - No ischemia evaluation needed at present.  - Former smoker, cont albuterol inhaler for dyspnea   - Continue Telemetry for now  - Follows up with outpatient Cardiologist Dr Escoto in [facility].   - Last AICD interrogation 4 weeks ago with normal results as per pt.

## 2020-04-24 NOTE — CONSULT NOTE ADULT - SUBJECTIVE AND OBJECTIVE BOX
Infectious Diseases - Attending at Dr. Coats    HPI:  Patient is a 74M with a PMH of CAD s/p MI w/stents 2007, CHF with 20% LVEF in 2016,  HTN, HLD, DM, gout, CKD, COPD, PVD s/p aortobifemoral bypass who presents to the ED for dyspnea.  Patient currently AAOx1 and unable to provide history.  As per ED attending, patient was sent in by family members due to cough and dyspnea.  Reportedly has been feeling ill since COVID pandemic started.  Vitals stable.  Labs show hypercalcemia, mild tropinema and elevated bilirubin.  CT head performed and was negative.  CT chest shows  bilateral PNA and a JUAREZ acute PE.  Started on therapeutic lovenox, will admit to tele. (21 Apr 2020 18:15)      PAST MEDICAL & SURGICAL HISTORY:  AICD (automatic cardioverter/defibrillator) present  Sickle cell trait  S/P aortobifemoral bypass surgery  Peripheral vascular disease  Chronic obstructive pulmonary disease  Renal insufficiency  Diabetes mellitus  Gout  Hypertension  Myocardial Infarction  Hypercholesteremia  Coronary Artery Disease  s/p Femoral-Popliteal Bypass Surgery  Abdominal Hernia      Allergies    No Known Drug Allergies  shellfish (Other; Urticaria)    Intolerances        FAMILY HISTORY:  No pertinent family history in first degree relatives      SOCIAL HISTORY:    REVIEW OF SYSTEMS:    Constitutional: No fever, weight loss or fatigue  Eyes: No eye pain, visual disturbances, or discharge  ENT:  No difficulty hearing, tinnitus, vertigo; No sinus or throat pain  Neck: No pain or stiffness  Respiratory: No cough, wheezing, chills or hemoptysis  Cardiovascular: No chest pain, palpitations, shortness of breath, dizziness or leg swelling  Gastrointestinal: No abdominal or epigastric pain. No nausea, vomiting or hematemesis; No diarrhea or constipation. No melena or hematochezia.  Genitourinary: No dysuria, frequency, hematuria or incontinence  Neurological: No headaches, memory loss, loss of strength, numbness or tremors  Skin: No itching, burning, rashes or lesions       MEDICATIONS  (STANDING):  allopurinol 300 milliGRAM(s) Oral daily  apixaban 10 milliGRAM(s) Oral every 12 hours  aspirin  chewable 81 milliGRAM(s) Oral daily  carvedilol 25 milliGRAM(s) Oral every 12 hours  dextrose 5%. 1000 milliLiter(s) (50 mL/Hr) IV Continuous <Continuous>  dextrose 50% Injectable 12.5 Gram(s) IV Push once  dextrose 50% Injectable 25 Gram(s) IV Push once  dextrose 50% Injectable 25 Gram(s) IV Push once  furosemide    Tablet 40 milliGRAM(s) Oral daily  insulin lispro (HumaLOG) corrective regimen sliding scale   SubCutaneous three times a day before meals  lactulose Syrup 10 Gram(s) Oral daily  latanoprost 0.005% Ophthalmic Solution 1 Drop(s) Both EYES at bedtime  piperacillin/tazobactam IVPB.. 3.375 Gram(s) IV Intermittent every 8 hours  sacubitril 24 mG/valsartan 26 mG 1 Tablet(s) Oral two times a day    MEDICATIONS  (PRN):  ALBUTerol    90 MICROgram(s) HFA Inhaler 2 Puff(s) Inhalation every 4 hours PRN Shortness of Breath  dextrose 40% Gel 15 Gram(s) Oral once PRN Blood Glucose LESS THAN 70 milliGRAM(s)/deciliter  glucagon  Injectable 1 milliGRAM(s) IntraMuscular once PRN Glucose LESS THAN 70 milligrams/deciliter      Vital Signs Last 24 Hrs  T(C): 36 (24 Apr 2020 11:15), Max: 36.3 (23 Apr 2020 18:33)  T(F): 96.8 (24 Apr 2020 11:15), Max: 97.4 (23 Apr 2020 18:33)  HR: 54 (24 Apr 2020 11:15) (53 - 62)  BP: 92/56 (24 Apr 2020 11:15) (92/53 - 118/67)  BP(mean): --  RR: 17 (24 Apr 2020 11:15) (17 - 22)  SpO2: 95% (24 Apr 2020 11:15) (95% - 100%)    PHYSICAL EXAM:    Constitutional: NAD, well-groomed, well-developed  HEENT: PERRLA, EOMI, Normal Hearing, MMM  Neck: No LAD, No JVD  Back: Normal spine flexure, No CVA tenderness  Respiratory: CTAB/L  Cardiovascular: S1 and S2, RRR, no M/G/R  Gastrointestinal: BS+, soft, NT/ND  Extremities: No peripheral edema  Vascular: 2+ peripheral pulses  Neurological: A/O x 3, no focal deficits  Skin: No rashes      LABS:                        12.5   8.98  )-----------( 163      ( 24 Apr 2020 07:01 )             38.3     04-24    143  |  106  |  38<H>  ----------------------------<  107<H>  4.1   |  30  |  1.53<H>    Ca    9.9      24 Apr 2020 07:01  Phos  2.2     04-24  Mg     2.6     04-24    TPro  7.1  /  Alb  2.4<L>  /  TBili  7.4<H>  /  DBili  x   /  AST  88<H>  /  ALT  64  /  AlkPhos  138<H>  04-24    PT/INR - ( 24 Apr 2020 07:01 )   PT: 19.0 sec;   INR: 1.67 ratio               MICROBIOLOGY:  RECENT CULTURES:  04-22 .Blood Blood. aero rec'd XXXX XXXX   No growth to date.    04-22 .Blood Blood. only aero rec'd XXXX XXXX   No growth to date.          RADIOLOGY & ADDITIONAL STUDIES:

## 2020-04-24 NOTE — PROGRESS NOTE ADULT - ASSESSMENT
MANJIT STRANGE  [id number]  1945 DOA 2020 DR KYLER SOUSA       REVIEW OF SYMPTOMS      Able to give ROS  NO     PHYSICAL EXAM    HEENT Unremarkable PERRLA atraumatic   RESP Fair air entry EXP prolonged    Harsh breath sound Resp distres mild   CARDIAC S1 S2 No S3     NO JVD    ABDOMEN SOFT BS PRESENT NOT DISTENDED No hepatosplenomegaly PEDAL EDEMA present No calf tenderness  NO rash   GENERAL Not TOXIC looking    VITALS/LABS      2020 afeb 58 100/50    2020 w 8.9 Hb 12.5 Plt 163     PT DATA/BEST PRACTICE  ALLERGY     NOTEWORTHY  POINTS/CHANGES ROS/PE                                  WT  95 (2020)                    BMI    30 (2020)   CrCl           ADVANCED DIRECTIVE       Goals of care discussion                                                                                      HEAD OF BED ELEVATION Yes  DYSPHAGIA EVAL                                  DIET    mech soift cons carb ()                                      IV F                                                       DVT PROPHYLAXIS        apixaban 10.2 () (cta  john pe subseg)     PATIENT SUMMARY   Patient is a 74M with a PMH of CAD s/p MI w/stents , CHF with 20% LVEF in 2016,  HTN, HLD, DM, gout, CKD, COPD, PVD s/p aortobifemoral bypass  was admitted 2020 with dyspnea ams     Labs showed hypercalcemia, mild tropinema and elevated bilirubin.  CT head performed and was negative.  CT chest shows  bilateral PNA and a JOHN acute PE.  Started on therapeutic lovenox  Pulm consulted     hospital course   DYSPNEA  PNEUMONIA   PULM EMBOLISM (2020)   S CHF         ASSESSMENT PLAN  DYSPNEA POA 2020   Likely sec CHF PE possible pneumonia On rx  OXYGENATION  2020 ra 97%   GAS EXCHANGE   2020 ra 744/39/62   PNEUMONIA POA 2020 w 8.7   2020 blod c n   cta ch  bl pneum sophia lll subsegmental john pulmonary embolism   zosyn () (Dr Agudelo)   On zosyn Chck mrsa pc   COVID RULED OUT  -  pcr n  PULMONARY EMBOLISM  POA 2020 d-dimer 2264  cta ch  bl pneum sophia lll subsegmental john pulmonary embolism   apixaban 10.2 ()    v duplex prox r r dvt    echo ef 25% pasp 53 dd2   On apixaban   PULMONARY HYPERTENSION  Will need further workup as out pts Pl refer to my office   HO COPD          TROPONINEMIA POA 2020   HO CAD W STENTS    Felt to be demand ischemia  Cardio on case   HO CHF EF 20% IN 2016   On sacubitril 24 valsartan 26x2 (  HO PVD SP AORTOBIFEMORAL BPG   HO DM   ACE   HYPERCALCEMIA POA 2020   ELEVATED BILIRIBUN POA 2020   Likely sec r hf   ALTERED MENTAL STATE POA 2020                               ct h was n                                         TIME SPENT Over 25 minutes aggregate care time spent on encounter; activities included   direct pt care, counseling and/or coordinating care reviewing notes, lab data/ imaging , discussion with multidisciplinary team/ pt /family. Risks, benefits, alternatives  discussed in detail.      MANJIT STRANGE  [id number]  1945 DOA 2020 DR KYLER SOUSA

## 2020-04-24 NOTE — PROGRESS NOTE ADULT - ASSESSMENT
HPI:  Patient is a 74M with a PMH of CAD s/p MI w/stents 2007, CHF with 20% LVEF in 2016,  HTN, HLD, DM, gout, CKD, COPD, PVD s/p aortobifemoral bypass who presents to the ED for dyspnea.  Patient currently AAOx1 and unable to provide history.  As per ED attending, patient was sent in by family members due to cough and dyspnea.  Reportedly has been feeling ill since COVID pandemic started.  Vitals stable.  Labs show hypercalcemia, mild tropinema and elevated bilirubin.  CT head performed and was negative.  CT chest shows  bilateral PNA and a JUAREZ acute PE.  Started on therapeutic lovenox, will admit to tele. (21 Apr 2020 18:15)  --------------- As Above ------------ Patient seen earlier today  Consult called for elevated bilirubin. Patient confused. The patient denies history of liver disease, melena, hematochezia, hematemesis, nausea, vomiting, abdominal pain, constipation, diarrhea, or change in bowel movements Denies ETOH abuse. History of EF ~ 20 %  On Zocor  See labs / CT scan    Elevated LFTs  Bili>>  Better . 10.4 / 83 / 64 / 111--> 9.3 / 81 / 62 / 101 ( 9.6 total, 6.37 direct ) --> 7.5 ( 5.33 ) 66/54/108 --> 7.4/88/64/138 . r/o congestive liver, meds, cirrhosis , : Ultra sound - Mild hepatomegaly 1) Treat CHF 2) Hold Lipitor 3) f/u labs 4) additional blood work

## 2020-04-24 NOTE — PROGRESS NOTE ADULT - ASSESSMENT
74 YOM with PMHx of Sickle Cell, PVD s/p aortobifemoral bypass, COPD, renal insufficiency, DM, Gout, HTN, CAD, MI s/p AICD, Abd hernia p/w dyspnea and cough. COVID neg x 2. Elevated D-dimer 2200 and Cr 1.31.  Significant h/o ischemic cardiomyopathy and h/o heart failure.  Respiratory distress may be multi factorial - CXR with pulm vascular congestion but CT chest with B/L PNA and possible JUAREZ acute PE.  Minimally elevated cardiac enzymes appear consistent with demand ischemia rather than primary cardiac event.  Echo with acute on chronic combined SHF/DHF, EF: 25% with dilated cardiomyopathy, Grade II DD, Mod TR/AR/pHTN    Former smoker, [id number] quit in October 2019.     Plan:  - Continue tx for CAP as per medicine.   - On Eliquis for AC for PE/DVT  - Treated empirically for acute on chronic systolic/diastolic heart failure but patient appears clinically euvolemic.  - Continue low dose ASA, holding statin with elevated LFTs  - Continue po Lasix/coreg/ low dose Entresto  - Monitor daily electrolytes/renal function/daily weight  - No ischemia evaluation needed at present.  - Former smoker, cont albuterol inhaler for dyspnea   - Continue Telemetry for now  - Follows up with outpatient Cardiologist Dr Escoto in [facility].   - Last AICD interrogation 4 weeks ago with normal results as per pt.

## 2020-04-24 NOTE — PROGRESS NOTE ADULT - SUBJECTIVE AND OBJECTIVE BOX
Patient is a 74y old  Male who presents with a chief complaint of dyspnea (24 Apr 2020 17:12)      HPI:  Patient is a 74M with a PMH of CAD s/p MI w/stents 2007, CHF with 20% LVEF in 2016,  HTN, HLD, DM, gout, CKD, COPD, PVD s/p aortobifemoral bypass who presents to the ED for dyspnea.  Patient currently AAOx1 and unable to provide history.  As per ED attending, patient was sent in by family members due to cough and dyspnea.  Reportedly has been feeling ill since COVID pandemic started.  Vitals stable.  Labs show hypercalcemia, mild tropinema and elevated bilirubin.  CT head performed and was negative.  CT chest shows  bilateral PNA and a JUAREZ acute PE.  Started on therapeutic lovenox, will admit to tele. (21 Apr 2020 18:15)      INTERVAL HPI/OVERNIGHT EVENTS:  The patient denies melena, hematochezia, hematemesis, nausea, vomiting, abdominal pain, constipation, diarrhea, or change in bowel movements     MEDICATIONS  (STANDING):  allopurinol 300 milliGRAM(s) Oral daily  apixaban 10 milliGRAM(s) Oral every 12 hours  aspirin  chewable 81 milliGRAM(s) Oral daily  carvedilol 25 milliGRAM(s) Oral every 12 hours  dextrose 5%. 1000 milliLiter(s) (50 mL/Hr) IV Continuous <Continuous>  dextrose 50% Injectable 12.5 Gram(s) IV Push once  dextrose 50% Injectable 25 Gram(s) IV Push once  dextrose 50% Injectable 25 Gram(s) IV Push once  furosemide    Tablet 40 milliGRAM(s) Oral daily  insulin lispro (HumaLOG) corrective regimen sliding scale   SubCutaneous three times a day before meals  lactulose Syrup 10 Gram(s) Oral daily  latanoprost 0.005% Ophthalmic Solution 1 Drop(s) Both EYES at bedtime  piperacillin/tazobactam IVPB.. 3.375 Gram(s) IV Intermittent every 8 hours  sacubitril 24 mG/valsartan 26 mG 1 Tablet(s) Oral two times a day    MEDICATIONS  (PRN):  ALBUTerol    90 MICROgram(s) HFA Inhaler 2 Puff(s) Inhalation every 4 hours PRN Shortness of Breath  dextrose 40% Gel 15 Gram(s) Oral once PRN Blood Glucose LESS THAN 70 milliGRAM(s)/deciliter  glucagon  Injectable 1 milliGRAM(s) IntraMuscular once PRN Glucose LESS THAN 70 milligrams/deciliter      FAMILY HISTORY:  No pertinent family history in first degree relatives      Allergies    No Known Drug Allergies  shellfish (Other; Urticaria)    Intolerances        PMH/PSH:  AICD (automatic cardioverter/defibrillator) present  Sickle cell trait  S/P aortobifemoral bypass surgery  Peripheral vascular disease  Chronic obstructive pulmonary disease  Renal insufficiency  Diabetes mellitus  Gout  Hypertension  Myocardial Infarction  Hypercholesteremia  Coronary Artery Disease  s/p Femoral-Popliteal Bypass Surgery  Abdominal Hernia  Peripheral Vascular Disease        REVIEW OF SYSTEMS:  CONSTITUTIONAL: No fever, weight loss, or fatigue  EYES: No eye pain, visual disturbances, or discharge  ENMT:  No difficulty hearing, tinnitus, vertigo; No sinus or throat pain  NECK: No pain or stiffness  BREASTS: No pain, masses, or nipple discharge  RESPIRATORY: No cough, wheezing, chills or hemoptysis; No shortness of breath  CARDIOVASCULAR: No chest pain, palpitations, dizziness, or leg swelling  GASTROINTESTINAL: See above  GENITOURINARY: No dysuria, frequency, hematuria, or incontinence  NEUROLOGICAL: No headaches, memory loss, loss of strength, numbness, or tremors  SKIN: No itching, burning, rashes, or lesions   LYMPH NODES: No enlarged glands  ENDOCRINE: No heat or cold intolerance; No hair loss  MUSCULOSKELETAL: No joint pain or swelling; No muscle, back, or extremity pain  PSYCHIATRIC: No depression, anxiety, mood swings, or difficulty sleeping  HEME/LYMPH: No easy bruising, or bleeding gums  ALLERGY AND IMMUNOLOGIC: No hives or eczema    Vital Signs Last 24 Hrs  T(C): 36 (24 Apr 2020 11:15), Max: 36.3 (23 Apr 2020 18:33)  T(F): 96.8 (24 Apr 2020 11:15), Max: 97.4 (23 Apr 2020 18:33)  HR: 54 (24 Apr 2020 11:15) (53 - 62)  BP: 92/56 (24 Apr 2020 11:15) (92/53 - 118/67)  BP(mean): --  RR: 17 (24 Apr 2020 11:15) (17 - 22)  SpO2: 95% (24 Apr 2020 11:15) (95% - 100%)    PHYSICAL EXAM:  GENERAL: NAD, well-groomed, well-developed  HEAD:  Atraumatic, Normocephalic  EYES: EOMI, PERRLA, conjunctiva and sclera clear  NECK: Supple, No JVD, Normal thyroid  NERVOUS SYSTEM:  Alert & Oriented X3, Good concentration; Motor Strength 5/5 B/L upper and lower extremities;  CHEST/LUNG: Clear to percussion bilaterally; No rales, rhonchi, wheezing, or rubs  HEART: Regular rate and rhythm; No murmurs, rubs, or gallops  ABDOMEN: Soft, Nontender, Nondistended; Bowel sounds present  EXTREMITIES:  2+ Peripheral Pulses, No clubbing, cyanosis, or edema  LYMPH: No lymphadenopathy noted  SKIN: No rashes or lesions    LAB  04-21 @ 16:49  amylase --   lipase 91                           12.5   8.98  )-----------( 163      ( 24 Apr 2020 07:01 )             38.3       CBC:  04-24 @ 07:01  WBC 8.98   Hgb 12.5   Hct 38.3   Plts 163  MCV 76.4  04-23 @ 08:51  WBC 8.73   Hgb 12.5   Hct 37.9   Plts 152  MCV 78.0  04-21 @ 16:49  WBC 9.50   Hgb 15.1   Hct 46.6   Plts 214  MCV 79.3      Chemistry:  04-24 @ 07:01  Na+ 143  K+ 4.1  Cl- 106  CO2 30  BUN 38  Cr 1.53     04-23 @ 08:51  Na+ 147  K+ 4.1  Cl- 111  CO2 28  BUN 33  Cr 1.31     04-21 @ 23:58  Na+ 141  K+ 4.4  Cl- 109  CO2 22  BUN 26  Cr 1.37     04-21 @ 16:49  Na+ 143  K+ 4.4  Cl- 107  CO2 25  BUN 23  Cr 1.34         Glucose, Serum: 107 mg/dL (04-24 @ 07:01)  Glucose, Serum: 112 mg/dL (04-23 @ 08:51)  Glucose, Serum: 143 mg/dL (04-21 @ 23:58)  Glucose, Serum: 119 mg/dL (04-21 @ 16:49)      24 Apr 2020 07:01    143    |  106    |  38     ----------------------------<  107    4.1     |  30     |  1.53   23 Apr 2020 08:51    147    |  111    |  33     ----------------------------<  112    4.1     |  28     |  1.31   21 Apr 2020 23:58    141    |  109    |  26     ----------------------------<  143    4.4     |  22     |  1.37   21 Apr 2020 16:49    143    |  107    |  23     ----------------------------<  119    4.4     |  25     |  1.34     Ca    9.9        24 Apr 2020 07:01  Ca    9.9        23 Apr 2020 08:51  Ca    10.8       21 Apr 2020 23:58  Ca    10.9       21 Apr 2020 16:49  Phos  2.2       24 Apr 2020 07:01  Mg     2.6       24 Apr 2020 07:01    TPro  7.1    /  Alb  2.4    /  TBili  7.4    /  DBili  x      /  AST  88     /  ALT  64     /  AlkPhos  138    24 Apr 2020 07:01  TPro  6.1    /  Alb  2.3    /  TBili  7.5    /  DBili  5.33   /  AST  66     /  ALT  54     /  AlkPhos  108    23 Apr 2020 08:51  TPro  x      /  Alb  x      /  TBili  9.6    /  DBili  6.37   /  AST  x      /  ALT  x      /  AlkPhos  x      22 Apr 2020 04:09  TPro  6.9    /  Alb  2.7    /  TBili  9.3    /  DBili  x      /  AST  81     /  ALT  62     /  AlkPhos  101    21 Apr 2020 23:58  TPro  7.4    /  Alb  3.2    /  TBili  10.4   /  DBili  x      /  AST  83     /  ALT  64     /  AlkPhos  111    21 Apr 2020 16:49      PT/INR - ( 24 Apr 2020 07:01 )   PT: 19.0 sec;   INR: 1.67 ratio                 CAPILLARY BLOOD GLUCOSE      POCT Blood Glucose.: 115 mg/dL (24 Apr 2020 16:15)  POCT Blood Glucose.: 118 mg/dL (24 Apr 2020 10:51)  POCT Blood Glucose.: 110 mg/dL (24 Apr 2020 08:03)  POCT Blood Glucose.: 131 mg/dL (23 Apr 2020 21:44)      C-Reactive Protein, Serum: 8.74 mg/dL (04-24 @ 08:11)  C-Reactive Protein, Serum: 8.58 mg/dL (04-23 @ 10:30)  C-Reactive Protein, Serum: 12.23 mg/dL (04-21 @ 22:29)      RADIOLOGY & ADDITIONAL TESTS:    Imaging Personally Reviewed:  [ ] YES  [ ] NO    Consultant(s) Notes Reviewed:  [ ] YES  [ ] NO    Care Discussed with Consultants/Other Providers [ ] YES  [ ] NO

## 2020-04-24 NOTE — PROGRESS NOTE ADULT - SUBJECTIVE AND OBJECTIVE BOX
ALEXANDR SARAVIA    [address]    Patient is a 74y old  Male who presents with a chief complaint of dyspnea (24 Apr 2020 17:47)       Allergies    No Known Drug Allergies  shellfish (Other; Urticaria)    Intolerances        HPI:  Patient is a 74M with a PMH of CAD s/p MI w/stents 2007, CHF with 20% LVEF in 2016,  HTN, HLD, DM, gout, CKD, COPD, PVD s/p aortobifemoral bypass who presents to the ED for dyspnea.  Patient currently AAOx1 and unable to provide history.  As per ED attending, patient was sent in by family members due to cough and dyspnea.  Reportedly has been feeling ill since COVID pandemic started.  Vitals stable.  Labs show hypercalcemia, mild tropinema and elevated bilirubin.  CT head performed and was negative.  CT chest shows  bilateral PNA and a JUAREZ acute PE.  Started on therapeutic lovenox, will admit to tele. (21 Apr 2020 18:15)      PAST MEDICAL & SURGICAL HISTORY:  AICD (automatic cardioverter/defibrillator) present  Sickle cell trait  S/P aortobifemoral bypass surgery  Peripheral vascular disease  Chronic obstructive pulmonary disease  Renal insufficiency  Diabetes mellitus  Gout  Hypertension  Myocardial Infarction  Hypercholesteremia  Coronary Artery Disease  s/p Femoral-Popliteal Bypass Surgery  Abdominal Hernia      FAMILY HISTORY:  No pertinent family history in first degree relatives        MEDICATIONS   ALBUTerol    90 MICROgram(s) HFA Inhaler 2 Puff(s) Inhalation every 4 hours PRN  allopurinol 300 milliGRAM(s) Oral daily  apixaban 10 milliGRAM(s) Oral every 12 hours  aspirin  chewable 81 milliGRAM(s) Oral daily  carvedilol 25 milliGRAM(s) Oral every 12 hours  dextrose 40% Gel 15 Gram(s) Oral once PRN  dextrose 5%. 1000 milliLiter(s) IV Continuous <Continuous>  dextrose 50% Injectable 12.5 Gram(s) IV Push once  dextrose 50% Injectable 25 Gram(s) IV Push once  dextrose 50% Injectable 25 Gram(s) IV Push once  furosemide    Tablet 40 milliGRAM(s) Oral daily  glucagon  Injectable 1 milliGRAM(s) IntraMuscular once PRN  insulin lispro (HumaLOG) corrective regimen sliding scale   SubCutaneous three times a day before meals  lactulose Syrup 10 Gram(s) Oral daily  latanoprost 0.005% Ophthalmic Solution 1 Drop(s) Both EYES at bedtime  piperacillin/tazobactam IVPB.. 3.375 Gram(s) IV Intermittent every 8 hours  sacubitril 24 mG/valsartan 26 mG 1 Tablet(s) Oral two times a day      Vital Signs Last 24 Hrs  T(C): 36.8 (24 Apr 2020 17:28), Max: 36.8 (24 Apr 2020 17:28)  T(F): 98.2 (24 Apr 2020 17:28), Max: 98.2 (24 Apr 2020 17:28)  HR: 58 (24 Apr 2020 17:28) (53 - 58)  BP: 100/50 (24 Apr 2020 17:28) (92/56 - 118/67)  BP(mean): --  RR: 18 (24 Apr 2020 17:28) (17 - 20)  SpO2: 97% (24 Apr 2020 17:28) (95% - 100%)      04-23-20 @ 07:01  -  04-24-20 @ 07:00  --------------------------------------------------------  IN: 720 mL / OUT: 450 mL / NET: 270 mL    04-24-20 @ 07:01  -  04-24-20 @ 19:10  --------------------------------------------------------  IN: 360 mL / OUT: 0 mL / NET: 360 mL            LABS:                        12.5   8.98  )-----------( 163      ( 24 Apr 2020 07:01 )             38.3     04-24    143  |  106  |  38<H>  ----------------------------<  107<H>  4.1   |  30  |  1.53<H>    Ca    9.9      24 Apr 2020 07:01  Phos  2.2     04-24  Mg     2.6     04-24    TPro  7.1  /  Alb  2.4<L>  /  TBili  7.4<H>  /  DBili  x   /  AST  88<H>  /  ALT  64  /  AlkPhos  138<H>  04-24    PT/INR - ( 24 Apr 2020 07:01 )   PT: 19.0 sec;   INR: 1.67 ratio                   WBC:  WBC Count: 8.98 K/uL (04-24 @ 07:01)  WBC Count: 8.73 K/uL (04-23 @ 08:51)  WBC Count: 9.50 K/uL (04-21 @ 16:49)      MICROBIOLOGY:  RECENT CULTURES:  04-22 .Blood Blood. aero rec'd XXXX XXXX   No growth to date.    04-22 .Blood Blood. only aero rec'd XXXX XXXX   No growth to date.          CARDIAC MARKERS ( 24 Apr 2020 07:01 )  .081 ng/mL / x     / x     / x     / x            PT/INR - ( 24 Apr 2020 07:01 )   PT: 19.0 sec;   INR: 1.67 ratio             Sodium:  Sodium, Serum: 143 mmol/L (04-24 @ 07:01)  Sodium, Serum: 147 mmol/L (04-23 @ 08:51)  Sodium, Serum: 141 mmol/L (04-21 @ 23:58)  Sodium, Serum: 143 mmol/L (04-21 @ 16:49)      1.53 mg/dL 04-24 @ 07:01  1.31 mg/dL 04-23 @ 08:51  1.37 mg/dL 04-21 @ 23:58  1.34 mg/dL 04-21 @ 16:49      Hemoglobin:  Hemoglobin: 12.5 g/dL (04-24 @ 07:01)  Hemoglobin: 12.5 g/dL (04-23 @ 08:51)  Hemoglobin: 15.1 g/dL (04-21 @ 16:49)      Platelets: Platelet Count - Automated: 163 K/uL (04-24 @ 07:01)  Platelet Count - Automated: 152 K/uL (04-23 @ 08:51)  Platelet Count - Automated: 214 K/uL (04-21 @ 16:49)      LIVER FUNCTIONS - ( 24 Apr 2020 07:01 )  Alb: 2.4 g/dL / Pro: 7.1 gm/dL / ALK PHOS: 138 U/L / ALT: 64 U/L / AST: 88 U/L / GGT: x                 RADIOLOGY & ADDITIONAL STUDIES:

## 2020-04-24 NOTE — PROGRESS NOTE ADULT - SUBJECTIVE AND OBJECTIVE BOX
Patient is a 74y old  Male who presents with a chief complaint of dyspnea (24 Apr 2020 17:19)    PAST MEDICAL & SURGICAL HISTORY:  AICD (automatic cardioverter/defibrillator) present  Sickle cell trait  S/P aortobifemoral bypass surgery  Peripheral vascular disease  Chronic obstructive pulmonary disease  Renal insufficiency  Diabetes mellitus  Gout  Hypertension  Myocardial Infarction  Hypercholesteremia  Coronary Artery Disease  s/p Femoral-Popliteal Bypass Surgery  Abdominal Hernia    INTERVAL HISTORY: Resting in bed, not in any acute distress   	  MEDICATIONS:  MEDICATIONS  (STANDING):  allopurinol 300 milliGRAM(s) Oral daily  apixaban 10 milliGRAM(s) Oral every 12 hours  aspirin  chewable 81 milliGRAM(s) Oral daily  carvedilol 25 milliGRAM(s) Oral every 12 hours  furosemide    Tablet 40 milliGRAM(s) Oral daily  insulin lispro (HumaLOG) corrective regimen sliding scale   SubCutaneous three times a day before meals  lactulose Syrup 10 Gram(s) Oral daily  latanoprost 0.005% Ophthalmic Solution 1 Drop(s) Both EYES at bedtime  piperacillin/tazobactam IVPB.. 3.375 Gram(s) IV Intermittent every 8 hours  sacubitril 24 mG/valsartan 26 mG 1 Tablet(s) Oral two times a day    MEDICATIONS  (PRN):  ALBUTerol    90 MICROgram(s) HFA Inhaler 2 Puff(s) Inhalation every 4 hours PRN Shortness of Breath  dextrose 40% Gel 15 Gram(s) Oral once PRN Blood Glucose LESS THAN 70 milliGRAM(s)/deciliter  glucagon  Injectable 1 milliGRAM(s) IntraMuscular once PRN Glucose LESS THAN 70 milligrams/deciliter    Vitals:  T(F): 98.2 (04-24-20 @ 17:28), Max: 98.2 (04-24-20 @ 17:28)  HR: 58 (04-24-20 @ 17:28) (53 - 62)  BP: 100/50 (04-24-20 @ 17:28) (92/53 - 118/67)  RR: 18 (04-24-20 @ 17:28) (17 - 22)  SpO2: 97% (04-24-20 @ 17:28) (95% - 100%)    04-23 @ 07:01  -  04-24 @ 07:00  --------------------------------------------------------  IN:    Oral Fluid: 720 mL  Total IN: 720 mL    OUT:    Indwelling Catheter - Urethral: 450 mL  Total OUT: 450 mL    Total NET: 270 mL      04-24 @ 07:01  -  04-24 @ 17:48  --------------------------------------------------------  IN:    Oral Fluid: 360 mL  Total IN: 360 mL    OUT:  Total OUT: 0 mL    Total NET: 360 mL    PHYSICAL EXAM:  Neuro: Awake, responsive  CV: S1 S2 RRR  Lungs: diminished to bases   GI: Soft, BS +, ND, NT  Extremities: No edema    TELEMETRY: paced    RADIOLOGY: < from: US Duplex Venous Lower Ext Complete, Bilateral (04.23.20 @ 16:58) >  Proximal right DVT as noted.    < end of copied text >  < from: CT Angio Chest w/ IV Cont (04.21.20 @ 19:49) >   Bilateral pneumonia is most prominent in the left lower lobe.  Subsegmental left upper lobe pulmonary embolism  No acute pathology in the abdomen or pelvis  Left renal cyst  Enlarged prostate  Status post aortic bypass.    < end of copied text >    DIAGNOSTIC TESTING:    [x ] Echocardiogram: < from: TTE Echo Complete w/o contrast w/ Doppler (04.22.20 @ 17:37) >   1. Left ventricular ejection fraction, by visual estimation, is 25 to 30%.   2. Technically difficult study.   3. Severely decreased segmental left ventricular systolic function.   4. Dilated cardiomyopathy.   5. Elevated mean left atrial pressure.   6. Spectral Doppler shows pseudonormal pattern of left ventricular myocardial filling (Grade II diastolic dysfunction).   7. Apical and lateral wall segments are not clearly visualized in this study; suggest contrast study, if clinically warranted, for better evaluation of wall motion abnormalites.   8. Moderate tricuspid regurgitation.   9. Aortic valve is sclerotic.  10. Moderate aortic regurgitation.  11. Estimated pulmonary artery systolic pressure is 53.7 mmHg assuming a right atrial pressure of 15 mmHg, which is consistent with moderate pulmonary hypertension.    < end of copied text >    LABS:	 	    CARDIAC MARKERS:  Troponin I, Serum: .081 ng/mL (04-24 @ 07:01)  Troponin I, Serum: .149 ng/mL (04-22 @ 04:09)  Troponin I, Serum: .108 ng/mL (04-21 @ 23:58)    24 Apr 2020 07:01    143    |  106    |  38     ----------------------------<  107    4.1     |  30     |  1.53   23 Apr 2020 08:51    147    |  111    |  33     ----------------------------<  112    4.1     |  28     |  1.31   21 Apr 2020 23:58    141    |  109    |  26     ----------------------------<  143    4.4     |  22     |  1.37     Ca    9.9        24 Apr 2020 07:01  Phos  2.2       24 Apr 2020 07:01  Mg     2.6       24 Apr 2020 07:01    TPro  7.1    /  Alb  2.4    /  TBili  7.4    /  DBili  x      /  AST  88     /  ALT  64     /  AlkPhos  138    24 Apr 2020 07:01                        12.5   8.98  )-----------( 163      ( 24 Apr 2020 07:01 )             38.3 ,                       12.5   8.73  )-----------( 152      ( 23 Apr 2020 08:51 )             37.9     PT/PTT- ( 24 Apr 2020 07:01 )   PT: 19.0 sec;  PTT: x      INR: 1.67 ratio (04-24 @ 07:01)  INR: 1.87 ratio (04-23 @ 08:51)  INR: 2.42 ratio (04-21 @ 23:58)

## 2020-04-25 LAB
ANA PAT FLD IF-IMP: ABNORMAL
ANA TITR SER: ABNORMAL
ANION GAP SERPL CALC-SCNC: 6 MMOL/L — SIGNIFICANT CHANGE UP (ref 5–17)
BUN SERPL-MCNC: 33 MG/DL — HIGH (ref 7–23)
CALCIUM SERPL-MCNC: 9.8 MG/DL — SIGNIFICANT CHANGE UP (ref 8.5–10.1)
CHLORIDE SERPL-SCNC: 106 MMOL/L — SIGNIFICANT CHANGE UP (ref 96–108)
CO2 SERPL-SCNC: 29 MMOL/L — SIGNIFICANT CHANGE UP (ref 22–31)
CREAT SERPL-MCNC: 1.29 MG/DL — SIGNIFICANT CHANGE UP (ref 0.5–1.3)
CRP SERPL-MCNC: 9.37 MG/DL — HIGH (ref 0–0.4)
D DIMER BLD IA.RAPID-MCNC: 829 NG/ML DDU — HIGH
FERRITIN SERPL-MCNC: 85 NG/ML — SIGNIFICANT CHANGE UP (ref 30–400)
GLUCOSE BLDC GLUCOMTR-MCNC: 107 MG/DL — HIGH (ref 70–99)
GLUCOSE BLDC GLUCOMTR-MCNC: 111 MG/DL — HIGH (ref 70–99)
GLUCOSE BLDC GLUCOMTR-MCNC: 143 MG/DL — HIGH (ref 70–99)
GLUCOSE BLDC GLUCOMTR-MCNC: 146 MG/DL — HIGH (ref 70–99)
GLUCOSE SERPL-MCNC: 108 MG/DL — HIGH (ref 70–99)
HCT VFR BLD CALC: 38.5 % — LOW (ref 39–50)
HGB BLD-MCNC: 13.2 G/DL — SIGNIFICANT CHANGE UP (ref 13–17)
LDH SERPL L TO P-CCNC: 452 U/L — HIGH (ref 50–242)
MCHC RBC-ENTMCNC: 25.8 PG — LOW (ref 27–34)
MCHC RBC-ENTMCNC: 34.3 GM/DL — SIGNIFICANT CHANGE UP (ref 32–36)
MCV RBC AUTO: 75.2 FL — LOW (ref 80–100)
MRSA PCR RESULT.: SIGNIFICANT CHANGE UP
NRBC # BLD: 0 /100 WBCS — SIGNIFICANT CHANGE UP (ref 0–0)
NT-PROBNP SERPL-SCNC: 1811 PG/ML — HIGH (ref 0–125)
PHOSPHATE SERPL-MCNC: 1.9 MG/DL — LOW (ref 2.5–4.5)
PLATELET # BLD AUTO: 189 K/UL — SIGNIFICANT CHANGE UP (ref 150–400)
POTASSIUM SERPL-MCNC: 3.6 MMOL/L — SIGNIFICANT CHANGE UP (ref 3.5–5.3)
POTASSIUM SERPL-SCNC: 3.6 MMOL/L — SIGNIFICANT CHANGE UP (ref 3.5–5.3)
RBC # BLD: 5.12 M/UL — SIGNIFICANT CHANGE UP (ref 4.2–5.8)
RBC # FLD: 17.5 % — HIGH (ref 10.3–14.5)
S AUREUS DNA NOSE QL NAA+PROBE: SIGNIFICANT CHANGE UP
SMOOTH MUSCLE AB SER-ACNC: ABNORMAL
SODIUM SERPL-SCNC: 141 MMOL/L — SIGNIFICANT CHANGE UP (ref 135–145)
WBC # BLD: 9.81 K/UL — SIGNIFICANT CHANGE UP (ref 3.8–10.5)
WBC # FLD AUTO: 9.81 K/UL — SIGNIFICANT CHANGE UP (ref 3.8–10.5)

## 2020-04-25 PROCEDURE — 99233 SBSQ HOSP IP/OBS HIGH 50: CPT

## 2020-04-25 PROCEDURE — 99232 SBSQ HOSP IP/OBS MODERATE 35: CPT

## 2020-04-25 RX ORDER — POTASSIUM PHOSPHATE, MONOBASIC POTASSIUM PHOSPHATE, DIBASIC 236; 224 MG/ML; MG/ML
15 INJECTION, SOLUTION INTRAVENOUS ONCE
Refills: 0 | Status: COMPLETED | OUTPATIENT
Start: 2020-04-25 | End: 2020-04-25

## 2020-04-25 RX ADMIN — Medication 81 MILLIGRAM(S): at 11:11

## 2020-04-25 RX ADMIN — APIXABAN 10 MILLIGRAM(S): 2.5 TABLET, FILM COATED ORAL at 05:14

## 2020-04-25 RX ADMIN — LACTULOSE 10 GRAM(S): 10 SOLUTION ORAL at 11:12

## 2020-04-25 RX ADMIN — PIPERACILLIN AND TAZOBACTAM 25 GRAM(S): 4; .5 INJECTION, POWDER, LYOPHILIZED, FOR SOLUTION INTRAVENOUS at 21:49

## 2020-04-25 RX ADMIN — CARVEDILOL PHOSPHATE 25 MILLIGRAM(S): 80 CAPSULE, EXTENDED RELEASE ORAL at 17:43

## 2020-04-25 RX ADMIN — PIPERACILLIN AND TAZOBACTAM 25 GRAM(S): 4; .5 INJECTION, POWDER, LYOPHILIZED, FOR SOLUTION INTRAVENOUS at 05:14

## 2020-04-25 RX ADMIN — Medication 40 MILLIGRAM(S): at 05:14

## 2020-04-25 RX ADMIN — PIPERACILLIN AND TAZOBACTAM 25 GRAM(S): 4; .5 INJECTION, POWDER, LYOPHILIZED, FOR SOLUTION INTRAVENOUS at 13:33

## 2020-04-25 RX ADMIN — Medication 300 MILLIGRAM(S): at 11:11

## 2020-04-25 RX ADMIN — LATANOPROST 1 DROP(S): 0.05 SOLUTION/ DROPS OPHTHALMIC; TOPICAL at 22:37

## 2020-04-25 RX ADMIN — APIXABAN 10 MILLIGRAM(S): 2.5 TABLET, FILM COATED ORAL at 17:43

## 2020-04-25 RX ADMIN — SACUBITRIL AND VALSARTAN 1 TABLET(S): 24; 26 TABLET, FILM COATED ORAL at 17:43

## 2020-04-25 RX ADMIN — POTASSIUM PHOSPHATE, MONOBASIC POTASSIUM PHOSPHATE, DIBASIC 62.5 MILLIMOLE(S): 236; 224 INJECTION, SOLUTION INTRAVENOUS at 13:56

## 2020-04-25 NOTE — PROGRESS NOTE ADULT - SUBJECTIVE AND OBJECTIVE BOX
ALEXANDR SARAVIA    [address]    Patient is a 74y old  Male who presents with a chief complaint of dyspnea (25 Apr 2020 13:39)       Allergies    No Known Drug Allergies  shellfish (Other; Urticaria)    Intolerances        HPI:  Patient is a 74M with a PMH of CAD s/p MI w/stents 2007, CHF with 20% LVEF in 2016,  HTN, HLD, DM, gout, CKD, COPD, PVD s/p aortobifemoral bypass who presents to the ED for dyspnea.  Patient currently AAOx1 and unable to provide history.  As per ED attending, patient was sent in by family members due to cough and dyspnea.  Reportedly has been feeling ill since COVID pandemic started.  Vitals stable.  Labs show hypercalcemia, mild tropinema and elevated bilirubin.  CT head performed and was negative.  CT chest shows  bilateral PNA and a JUAREZ acute PE.  Started on therapeutic lovenox, will admit to tele. (21 Apr 2020 18:15)      PAST MEDICAL & SURGICAL HISTORY:  AICD (automatic cardioverter/defibrillator) present  Sickle cell trait  S/P aortobifemoral bypass surgery  Peripheral vascular disease  Chronic obstructive pulmonary disease  Renal insufficiency  Diabetes mellitus  Gout  Hypertension  Myocardial Infarction  Hypercholesteremia  Coronary Artery Disease  s/p Femoral-Popliteal Bypass Surgery  Abdominal Hernia      FAMILY HISTORY:  No pertinent family history in first degree relatives        MEDICATIONS   ALBUTerol    90 MICROgram(s) HFA Inhaler 2 Puff(s) Inhalation every 4 hours PRN  allopurinol 300 milliGRAM(s) Oral daily  apixaban 10 milliGRAM(s) Oral every 12 hours  aspirin  chewable 81 milliGRAM(s) Oral daily  carvedilol 25 milliGRAM(s) Oral every 12 hours  dextrose 40% Gel 15 Gram(s) Oral once PRN  dextrose 5%. 1000 milliLiter(s) IV Continuous <Continuous>  dextrose 50% Injectable 12.5 Gram(s) IV Push once  dextrose 50% Injectable 25 Gram(s) IV Push once  dextrose 50% Injectable 25 Gram(s) IV Push once  furosemide    Tablet 40 milliGRAM(s) Oral daily  glucagon  Injectable 1 milliGRAM(s) IntraMuscular once PRN  insulin lispro (HumaLOG) corrective regimen sliding scale   SubCutaneous three times a day before meals  lactulose Syrup 10 Gram(s) Oral daily  latanoprost 0.005% Ophthalmic Solution 1 Drop(s) Both EYES at bedtime  piperacillin/tazobactam IVPB.. 3.375 Gram(s) IV Intermittent every 8 hours  sacubitril 24 mG/valsartan 26 mG 1 Tablet(s) Oral two times a day      Vital Signs Last 24 Hrs  T(C): 36.8 (25 Apr 2020 11:54), Max: 36.8 (24 Apr 2020 17:28)  T(F): 98.2 (25 Apr 2020 11:54), Max: 98.2 (24 Apr 2020 17:28)  HR: 52 (25 Apr 2020 11:54) (52 - 69)  BP: 124/65 (25 Apr 2020 11:54) (94/50 - 124/65)  BP(mean): --  RR: 18 (25 Apr 2020 11:54) (17 - 19)  SpO2: 95% (25 Apr 2020 11:54) (94% - 98%)      04-24-20 @ 07:01  -  04-25-20 @ 07:00  --------------------------------------------------------  IN: 720 mL / OUT: 451 mL / NET: 269 mL            LABS:                        13.2   9.81  )-----------( 189      ( 25 Apr 2020 08:11 )             38.5     04-25    141  |  106  |  33<H>  ----------------------------<  108<H>  3.6   |  29  |  1.29    Ca    9.8      25 Apr 2020 08:11  Phos  1.9     04-25  Mg     2.6     04-24    TPro  7.1  /  Alb  2.4<L>  /  TBili  7.4<H>  /  DBili  x   /  AST  88<H>  /  ALT  64  /  AlkPhos  138<H>  04-24    PT/INR - ( 24 Apr 2020 07:01 )   PT: 19.0 sec;   INR: 1.67 ratio                   WBC:  WBC Count: 9.81 K/uL (04-25 @ 08:11)  WBC Count: 8.98 K/uL (04-24 @ 07:01)  WBC Count: 8.73 K/uL (04-23 @ 08:51)  WBC Count: 9.50 K/uL (04-21 @ 16:49)      MICROBIOLOGY:  RECENT CULTURES:  04-22 .Blood Blood. aero rec'd XXXX XXXX   No growth to date.    04-22 .Blood Blood. only aero rec'd XXXX XXXX   No growth to date.          CARDIAC MARKERS ( 24 Apr 2020 07:01 )  .081 ng/mL / x     / x     / x     / x            PT/INR - ( 24 Apr 2020 07:01 )   PT: 19.0 sec;   INR: 1.67 ratio             Sodium:  Sodium, Serum: 141 mmol/L (04-25 @ 08:11)  Sodium, Serum: 143 mmol/L (04-24 @ 07:01)  Sodium, Serum: 147 mmol/L (04-23 @ 08:51)  Sodium, Serum: 141 mmol/L (04-21 @ 23:58)  Sodium, Serum: 143 mmol/L (04-21 @ 16:49)      1.29 mg/dL 04-25 @ 08:11  1.53 mg/dL 04-24 @ 07:01  1.31 mg/dL 04-23 @ 08:51  1.37 mg/dL 04-21 @ 23:58  1.34 mg/dL 04-21 @ 16:49      Hemoglobin:  Hemoglobin: 13.2 g/dL (04-25 @ 08:11)  Hemoglobin: 12.5 g/dL (04-24 @ 07:01)  Hemoglobin: 12.5 g/dL (04-23 @ 08:51)  Hemoglobin: 15.1 g/dL (04-21 @ 16:49)      Platelets: Platelet Count - Automated: 189 K/uL (04-25 @ 08:11)  Platelet Count - Automated: 163 K/uL (04-24 @ 07:01)  Platelet Count - Automated: 152 K/uL (04-23 @ 08:51)  Platelet Count - Automated: 214 K/uL (04-21 @ 16:49)      LIVER FUNCTIONS - ( 24 Apr 2020 07:01 )  Alb: 2.4 g/dL / Pro: 7.1 gm/dL / ALK PHOS: 138 U/L / ALT: 64 U/L / AST: 88 U/L / GGT: x                 RADIOLOGY & ADDITIONAL STUDIES:

## 2020-04-25 NOTE — PROGRESS NOTE ADULT - ASSESSMENT
74 YOM with PMHx of Sickle Cell, PVD s/p aortobifemoral bypass, COPD, renal insufficiency, DM, Gout, HTN, CAD, MI s/p AICD, Abd hernia p/w dyspnea and cough. COVID neg x 2. Elevated D-dimer 2200 and Cr 1.31.  Significant h/o ischemic cardiomyopathy and h/o heart failure.  Respiratory distress may be multi factorial - CXR with pulm vascular congestion but CT chest with B/L PNA and possible JUAREZ acute PE.  Minimally elevated cardiac enzymes appear consistent with demand ischemia rather than primary cardiac event.  Echo with acute on chronic combined SHF/DHF, EF: 25% with dilated cardiomyopathy, Grade II DD, Mod TR/AR/pHTN    Former smoker, [id number] quit in October 2019.     Plan:  - Continue tx for CAP as per medicine.   - On Eliquis for AC for PE/DVT  - Treated empirically for acute on chronic systolic/diastolic heart failure but patient appears clinically euvolemic.  - Continue low dose ASA, holding statin with elevated LFTs  - Continue po Lasix/coreg/ low dose Entresto  - Monitor daily electrolytes/renal function/daily weight  - No ischemia evaluation needed at present.  - Former smoker, cont albuterol inhaler for dyspnea   - Continue Telemetry for now  - Follows up with outpatient Cardiologist Dr Escoto in [facility].   - Last AICD interrogation 4 weeks ago with normal results as per pt.     CV stable, will follow only as needed.

## 2020-04-25 NOTE — PROGRESS NOTE ADULT - SUBJECTIVE AND OBJECTIVE BOX
Patient is a 74y old  Male who presents with a chief complaint of dyspnea (24 Apr 2020 17:19)    PAST MEDICAL & SURGICAL HISTORY:  AICD (automatic cardioverter/defibrillator) present  Sickle cell trait  S/P aortobifemoral bypass surgery  Peripheral vascular disease  Chronic obstructive pulmonary disease  Renal insufficiency  Diabetes mellitus  Gout  Hypertension  Myocardial Infarction  Hypercholesteremia  Coronary Artery Disease  s/p Femoral-Popliteal Bypass Surgery  Abdominal Hernia    INTERVAL HISTORY: Resting in bed, not in any acute distress   	  MEDICATIONS  (STANDING):  allopurinol 300 milliGRAM(s) Oral daily  apixaban 10 milliGRAM(s) Oral every 12 hours  aspirin  chewable 81 milliGRAM(s) Oral daily  carvedilol 25 milliGRAM(s) Oral every 12 hours  dextrose 5%. 1000 milliLiter(s) (50 mL/Hr) IV Continuous <Continuous>  dextrose 50% Injectable 12.5 Gram(s) IV Push once  dextrose 50% Injectable 25 Gram(s) IV Push once  dextrose 50% Injectable 25 Gram(s) IV Push once  furosemide    Tablet 40 milliGRAM(s) Oral daily  insulin lispro (HumaLOG) corrective regimen sliding scale   SubCutaneous three times a day before meals  lactulose Syrup 10 Gram(s) Oral daily  latanoprost 0.005% Ophthalmic Solution 1 Drop(s) Both EYES at bedtime  piperacillin/tazobactam IVPB.. 3.375 Gram(s) IV Intermittent every 8 hours  sacubitril 24 mG/valsartan 26 mG 1 Tablet(s) Oral two times a day      MEDICATIONS  (PRN):  ALBUTerol    90 MICROgram(s) HFA Inhaler 2 Puff(s) Inhalation every 4 hours PRN Shortness of Breath  dextrose 40% Gel 15 Gram(s) Oral once PRN Blood Glucose LESS THAN 70 milliGRAM(s)/deciliter  glucagon  Injectable 1 milliGRAM(s) IntraMuscular once PRN Glucose LESS THAN 70 milligrams/deciliter    Vital Signs Last 24 Hrs  T(C): 35.9 (25 Apr 2020 04:50), Max: 36.8 (24 Apr 2020 17:28)  T(F): 96.7 (25 Apr 2020 04:50), Max: 98.2 (24 Apr 2020 17:28)  HR: 69 (25 Apr 2020 04:50) (54 - 69)  BP: 94/50 (25 Apr 2020 04:50) (92/56 - 119/89)  BP(mean): --  RR: 19 (25 Apr 2020 04:50) (17 - 19)  SpO2: 98% (25 Apr 2020 04:50) (94% - 98%)    PHYSICAL EXAM:  Neuro: Awake, responsive  CV: S1 S2 RRR  Lungs: diminished to bases   GI: Soft, BS +, ND, NT  Extremities: No edema    TELEMETRY: paced    RADIOLOGY: < from: US Duplex Venous Lower Ext Complete, Bilateral (04.23.20 @ 16:58) >  Proximal right DVT as noted.    < end of copied text >  < from: CT Angio Chest w/ IV Cont (04.21.20 @ 19:49) >   Bilateral pneumonia is most prominent in the left lower lobe.  Subsegmental left upper lobe pulmonary embolism  No acute pathology in the abdomen or pelvis  Left renal cyst  Enlarged prostate  Status post aortic bypass.    < end of copied text >    DIAGNOSTIC TESTING:    [x ] Echocardiogram: < from: TTE Echo Complete w/o contrast w/ Doppler (04.22.20 @ 17:37) >   1. Left ventricular ejection fraction, by visual estimation, is 25 to 30%.   2. Technically difficult study.   3. Severely decreased segmental left ventricular systolic function.   4. Dilated cardiomyopathy.   5. Elevated mean left atrial pressure.   6. Spectral Doppler shows pseudonormal pattern of left ventricular myocardial filling (Grade II diastolic dysfunction).   7. Apical and lateral wall segments are not clearly visualized in this study; suggest contrast study, if clinically warranted, for better evaluation of wall motion abnormalites.   8. Moderate tricuspid regurgitation.   9. Aortic valve is sclerotic.  10. Moderate aortic regurgitation.  11. Estimated pulmonary artery systolic pressure is 53.7 mmHg assuming a right atrial pressure of 15 mmHg, which is consistent with moderate pulmonary hypertension.    < end of copied text >    LABS:	 	    CARDIAC MARKERS:  Troponin I, Serum: .081 ng/mL (04-24 @ 07:01)  Troponin I, Serum: .149 ng/mL (04-22 @ 04:09)  Troponin I, Serum: .108 ng/mL (04-21 @ 23:58)    24 Apr 2020 07:01    143    |  106    |  38     ----------------------------<  107    4.1     |  30     |  1.53   23 Apr 2020 08:51    147    |  111    |  33     ----------------------------<  112    4.1     |  28     |  1.31   21 Apr 2020 23:58    141    |  109    |  26     ----------------------------<  143    4.4     |  22     |  1.37     Ca    9.9        24 Apr 2020 07:01  Phos  2.2       24 Apr 2020 07:01  Mg     2.6       24 Apr 2020 07:01    TPro  7.1    /  Alb  2.4    /  TBili  7.4    /  DBili  x      /  AST  88     /  ALT  64     /  AlkPhos  138    24 Apr 2020 07:01                        12.5   8.98  )-----------( 163      ( 24 Apr 2020 07:01 )             38.3 ,                       12.5   8.73  )-----------( 152      ( 23 Apr 2020 08:51 )             37.9     PT/PTT- ( 24 Apr 2020 07:01 )   PT: 19.0 sec;  PTT: x      INR: 1.67 ratio (04-24 @ 07:01)  INR: 1.87 ratio (04-23 @ 08:51)  INR: 2.42 ratio (04-21 @ 23:58) Patient is a 74y old  Male who presents with a chief complaint of dyspnea (24 Apr 2020 17:19)    PAST MEDICAL & SURGICAL HISTORY:  AICD (automatic cardioverter/defibrillator) present  Sickle cell trait  S/P aortobifemoral bypass surgery  Peripheral vascular disease  Chronic obstructive pulmonary disease  Renal insufficiency  Diabetes mellitus  Gout  Hypertension  Myocardial Infarction  Hypercholesteremia  Coronary Artery Disease  s/p Femoral-Popliteal Bypass Surgery  Abdominal Hernia    INTERVAL HISTORY: Resting in bed, not in any acute distress   	  MEDICATIONS  (STANDING):  allopurinol 300 milliGRAM(s) Oral daily  apixaban 10 milliGRAM(s) Oral every 12 hours  aspirin  chewable 81 milliGRAM(s) Oral daily  carvedilol 25 milliGRAM(s) Oral every 12 hours  dextrose 5%. 1000 milliLiter(s) (50 mL/Hr) IV Continuous <Continuous>  dextrose 50% Injectable 12.5 Gram(s) IV Push once  dextrose 50% Injectable 25 Gram(s) IV Push once  dextrose 50% Injectable 25 Gram(s) IV Push once  furosemide    Tablet 40 milliGRAM(s) Oral daily  insulin lispro (HumaLOG) corrective regimen sliding scale   SubCutaneous three times a day before meals  lactulose Syrup 10 Gram(s) Oral daily  latanoprost 0.005% Ophthalmic Solution 1 Drop(s) Both EYES at bedtime  piperacillin/tazobactam IVPB.. 3.375 Gram(s) IV Intermittent every 8 hours  sacubitril 24 mG/valsartan 26 mG 1 Tablet(s) Oral two times a day      MEDICATIONS  (PRN):  ALBUTerol    90 MICROgram(s) HFA Inhaler 2 Puff(s) Inhalation every 4 hours PRN Shortness of Breath  dextrose 40% Gel 15 Gram(s) Oral once PRN Blood Glucose LESS THAN 70 milliGRAM(s)/deciliter  glucagon  Injectable 1 milliGRAM(s) IntraMuscular once PRN Glucose LESS THAN 70 milligrams/deciliter    Vital Signs Last 24 Hrs  T(C): 35.9 (25 Apr 2020 04:50), Max: 36.8 (24 Apr 2020 17:28)  T(F): 96.7 (25 Apr 2020 04:50), Max: 98.2 (24 Apr 2020 17:28)  HR: 69 (25 Apr 2020 04:50) (54 - 69)  BP: 94/50 (25 Apr 2020 04:50) (92/56 - 119/89)  BP(mean): --  RR: 19 (25 Apr 2020 04:50) (17 - 19)  SpO2: 98% (25 Apr 2020 04:50) (94% - 98%)    TELEMETRY: paced    RADIOLOGY: < from: US Duplex Venous Lower Ext Complete, Bilateral (04.23.20 @ 16:58) >  Proximal right DVT as noted.    < end of copied text >  < from: CT Angio Chest w/ IV Cont (04.21.20 @ 19:49) >   Bilateral pneumonia is most prominent in the left lower lobe.  Subsegmental left upper lobe pulmonary embolism  No acute pathology in the abdomen or pelvis  Left renal cyst  Enlarged prostate  Status post aortic bypass.    < end of copied text >    DIAGNOSTIC TESTING:    [x ] Echocardiogram: < from: TTE Echo Complete w/o contrast w/ Doppler (04.22.20 @ 17:37) >   1. Left ventricular ejection fraction, by visual estimation, is 25 to 30%.   2. Technically difficult study.   3. Severely decreased segmental left ventricular systolic function.   4. Dilated cardiomyopathy.   5. Elevated mean left atrial pressure.   6. Spectral Doppler shows pseudonormal pattern of left ventricular myocardial filling (Grade II diastolic dysfunction).   7. Apical and lateral wall segments are not clearly visualized in this study; suggest contrast study, if clinically warranted, for better evaluation of wall motion abnormalites.   8. Moderate tricuspid regurgitation.   9. Aortic valve is sclerotic.  10. Moderate aortic regurgitation.  11. Estimated pulmonary artery systolic pressure is 53.7 mmHg assuming a right atrial pressure of 15 mmHg, which is consistent with moderate pulmonary hypertension.    < end of copied text >    LABS:	 	    CARDIAC MARKERS:  Troponin I, Serum: .081 ng/mL (04-24 @ 07:01)  Troponin I, Serum: .149 ng/mL (04-22 @ 04:09)  Troponin I, Serum: .108 ng/mL (04-21 @ 23:58)    24 Apr 2020 07:01    143    |  106    |  38     ----------------------------<  107    4.1     |  30     |  1.53   23 Apr 2020 08:51    147    |  111    |  33     ----------------------------<  112    4.1     |  28     |  1.31   21 Apr 2020 23:58    141    |  109    |  26     ----------------------------<  143    4.4     |  22     |  1.37     Ca    9.9        24 Apr 2020 07:01  Phos  2.2       24 Apr 2020 07:01  Mg     2.6       24 Apr 2020 07:01    TPro  7.1    /  Alb  2.4    /  TBili  7.4    /  DBili  x      /  AST  88     /  ALT  64     /  AlkPhos  138    24 Apr 2020 07:01                        12.5   8.98  )-----------( 163      ( 24 Apr 2020 07:01 )             38.3 ,                       12.5   8.73  )-----------( 152      ( 23 Apr 2020 08:51 )             37.9     PT/PTT- ( 24 Apr 2020 07:01 )   PT: 19.0 sec;  PTT: x      INR: 1.67 ratio (04-24 @ 07:01)  INR: 1.87 ratio (04-23 @ 08:51)  INR: 2.42 ratio (04-21 @ 23:58)

## 2020-04-25 NOTE — PROGRESS NOTE ADULT - ASSESSMENT
Patient is a 74M with a PMH of CAD s/p MI w/stents 2007, CHF with 20% LVEF in 2016, HTN, HLD, DM, gout, CKD, COPD, PVD s/p aortobifemoral bypass who presents to the ED for dyspnea.  Patient currently AAOx1 and unable to provide history.  As per ED attending, patient was sent in by family members due to cough and dyspnea.  Reportedly has been feeling ill since COVID pandemic started.  Vitals stable.  Labs show hypercalcemia, mild tropinemia and elevated bilirubin.  CT head performed and was negative.  CT chest shows  bilateral PNA and a JUAREZ acute PE.  Started on Eliquis.     Acute pulmonary embolism without acute cor pulmonale, unspecified pulmonary embolism type  RLE DVT  -Eliquis, ASA, change Eliquis dose after 7d started on 4/24/20  -Pulm on board  -LE dopplers- Nonocclusive thrombus is identified within the visualized right external iliac vein and common femoral vein.    Acute respiratory failure with hypoxia  -O2 supplement    Septic encephalopathy  -Gram-neg PNA  -AMS? unknown baseline.    -CT Angio Chest w/Bilateral pneumonia is most prominent in the left lower lobe. Subsegmental left upper lobe pulmonary embolism  -Given ceftriaxone and azithromycin in the ED.    -Follow blood cultures. NGTD   -COVID-19 PCR neg  -c/w Zosyn 4/22, ID/  -CRP elevated but ferritin low.    s/p Vtach  -Continue low dose ASA, bbl   -No ischemia evaluation needed at present per cards  -f/up cards recs  -Continue Telemetry    Chronic systolic congestive heart failure  Troponin level elevated  CAD  -Continue low dose ASA, holding statin with elevated LFTs  -Continue po Lasix/coreg/ low dose Entresto  -No ischemia evaluation needed at present per cards    Type 2 diabetes mellitus without complication, without long-term current use of insulin  -RISS, monitor BG    Hyperbilirubinemia  -GI  - Bienstock  -Ultra sound - Mild hepatomegaly  -hold Lipitor

## 2020-04-25 NOTE — PROGRESS NOTE ADULT - SUBJECTIVE AND OBJECTIVE BOX
Patient is a 74y old  Male who presents with a chief complaint of dyspnea (25 Apr 2020 09:12)      OVERNIGHT EVENTS:  on NC    REVIEW OF SYSTEMS: denies chest pain/SOB, diaphoresis, no F/C, cough, dizziness, headache, blurry vision, nausea, vomiting, abdominal pain. All others review of systems negative     MEDICATIONS  (STANDING):  allopurinol 300 milliGRAM(s) Oral daily  apixaban 10 milliGRAM(s) Oral every 12 hours  aspirin  chewable 81 milliGRAM(s) Oral daily  carvedilol 25 milliGRAM(s) Oral every 12 hours  dextrose 5%. 1000 milliLiter(s) (50 mL/Hr) IV Continuous <Continuous>  dextrose 50% Injectable 12.5 Gram(s) IV Push once  dextrose 50% Injectable 25 Gram(s) IV Push once  dextrose 50% Injectable 25 Gram(s) IV Push once  furosemide    Tablet 40 milliGRAM(s) Oral daily  insulin lispro (HumaLOG) corrective regimen sliding scale   SubCutaneous three times a day before meals  lactulose Syrup 10 Gram(s) Oral daily  latanoprost 0.005% Ophthalmic Solution 1 Drop(s) Both EYES at bedtime  piperacillin/tazobactam IVPB.. 3.375 Gram(s) IV Intermittent every 8 hours  potassium phosphate IVPB 15 milliMole(s) IV Intermittent once  sacubitril 24 mG/valsartan 26 mG 1 Tablet(s) Oral two times a day    MEDICATIONS  (PRN):  ALBUTerol    90 MICROgram(s) HFA Inhaler 2 Puff(s) Inhalation every 4 hours PRN Shortness of Breath  dextrose 40% Gel 15 Gram(s) Oral once PRN Blood Glucose LESS THAN 70 milliGRAM(s)/deciliter  glucagon  Injectable 1 milliGRAM(s) IntraMuscular once PRN Glucose LESS THAN 70 milligrams/deciliter      Allergies    No Known Drug Allergies  shellfish (Other; Urticaria)    Intolerances        T(F): 98.2 (04-25-20 @ 11:54), Max: 98.2 (04-24-20 @ 17:28)  HR: 52 (04-25-20 @ 11:54) (52 - 69)  BP: 124/65 (04-25-20 @ 11:54) (94/50 - 124/65)  RR: 18 (04-25-20 @ 11:54) (17 - 19)  SpO2: 95% (04-25-20 @ 11:54) (94% - 98%)  Wt(kg): --    PHYSICAL EXAM:  GENERAL: NAD  HEAD:  Atraumatic, Normocephalic  EYES: PERRLA, conjunctiva and sclera clear  ENMT: Moist mucous membranes  NECK: Supple, No JVD, Normal thyroid  NERVOUS SYSTEM:  Alert & Awake  CHEST/LUNG: Clear to percussion bilaterally;   HEART: Regular rate and rhythm;   ABDOMEN: Soft, Nontender, Nondistended; Bowel sounds present  EXTREMITIES:  no edema BL LE  SKIN: moist    LABS:                        13.2   9.81  )-----------( 189      ( 25 Apr 2020 08:11 )             38.5     04-25    141  |  106  |  33<H>  ----------------------------<  108<H>  3.6   |  29  |  1.29    Ca    9.8      25 Apr 2020 08:11  Phos  1.9     04-25  Mg     2.6     04-24    TPro  7.1  /  Alb  2.4<L>  /  TBili  7.4<H>  /  DBili  x   /  AST  88<H>  /  ALT  64  /  AlkPhos  138<H>  04-24    PT/INR - ( 24 Apr 2020 07:01 )   PT: 19.0 sec;   INR: 1.67 ratio             Cultures;   CAPILLARY BLOOD GLUCOSE      POCT Blood Glucose.: 146 mg/dL (25 Apr 2020 11:03)  POCT Blood Glucose.: 107 mg/dL (25 Apr 2020 08:18)  POCT Blood Glucose.: 115 mg/dL (24 Apr 2020 16:15)    Lipid panel:     CARDIAC MARKERS ( 24 Apr 2020 07:01 )  .081 ng/mL / x     / x     / x     / x            RADIOLOGY & ADDITIONAL TESTS:    Imaging Personally Reviewed:  [x ] YES      Consultant(s) Notes Reviewed:  [x ] YES     Care Discussed with [x ] Consultants [X ] Patient [ ] Family  [x ]    [x ]  Other; RN

## 2020-04-25 NOTE — PROGRESS NOTE ADULT - ASSESSMENT
VITALS/LABS      2020 afeb 52 120/60   2020 w 9.8 H 13. Plt 189 Na 141 K 3.6 CO2 29 Cr 1.2     MANJIT STRANGE  [id number]  1945 DOA 2020 DR KYLER SOUSA       REVIEW OF SYMPTOMS      Able to give ROS  NO     PHYSICAL EXAM    HEENT Unremarkable PERRLA atraumatic   RESP Fair air entry EXP prolonged    Harsh breath sound Resp distres mild   CARDIAC S1 S2 No S3     NO JVD    ABDOMEN SOFT BS PRESENT NOT DISTENDED No hepatosplenomegaly PEDAL EDEMA present No calf tenderness  NO rash   GENERAL Not TOXIC looking    PT DATA/BEST PRACTICE  ALLERGY     NOTEWORTHY  POINTS/CHANGES ROS/PE                                  WT  95 (2020)                    BMI    30 (2020)   CrCl           ADVANCED DIRECTIVE       Goals of care discussion                                                                                      HEAD OF BED ELEVATION Yes  DYSPHAGIA EVAL                                  DIET    mech soift cons carb ()                                      IV F                                                       DVT PROPHYLAXIS        apixaban 10.2 () (cta  john pe subseg)     PATIENT SUMMARY   Patient is a 74M with a PMH of CAD s/p MI w/stents , CHF with 20% LVEF in 2016,  HTN, HLD, DM, gout, CKD, COPD, PVD s/p aortobifemoral bypass  was admitted 2020 with dyspnea ams     Labs showed hypercalcemia, mild tropinema and elevated bilirubin.  CT head performed and was negative.  CT chest shows  bilateral PNA and a JOHN acute PE.  Started on therapeutic lovenox  Pulm consulted     hospital course   DYSPNEA  PNEUMONIA   PULM EMBOLISM (2020)   S CHF         ASSESSMENT PLAN  DYSPNEA POA 2020   Likely sec CHF PE possible pneumonia On rx  OXYGENATION  2020 ra 97%   GAS EXCHANGE   2020 ra 744/39/62   2020 ra 97%   PNEUMONIA POA 2020 w 8.7 - 9.8   2020 blod c n   2020 mrsa p n   cta ch  bl pneum sophia lll subsegmental john pulmonary embolism   zosyn () (Dr Agudelo)   On zosyn -  COVID RULED OUT  -  pcr n  PULMONARY EMBOLISM  POA 2020 d-dimer 2264  cta ch  bl pneum sophia lll subsegmental john pulmonary embolism   apixaban 10.2 ()    v duplex prox r r dvt    echo ef 25% pasp 53 dd2   On apixaban   PULMONARY HYPERTENSION  Will need further workup as out pts Pl refer to my office   HO COPD          TROPONINEMIA POA 2020   HO CAD W STENTS 2007   Felt to be demand ischemia  Cardio on case   HO CHF EF 20% IN 2016   On sacubitril 24 valsartan 26x2 (  HO PVD SP AORTOBIFEMORAL BPG   HO DM   ACE   HYPERCALCEMIA POA 2020   ELEVATED BILIRIBUN POA 2020   Likely sec r hf   ALTERED MENTAL STATE POA 2020                               ct h was n                                   TIME SPENT Over 25 minutes aggregate care time spent on encounter; activities included   direct pt care, counseling and/or coordinating care reviewing notes, lab data/ imaging , discussion with multidisciplinary team/ pt /family. Risks, benefits, alternatives  discussed in detail.      MANJIT STRANGE  [id number]  1945 DOA 2020 DR KYLER SOUSA

## 2020-04-26 LAB
ALBUMIN SERPL ELPH-MCNC: 2.1 G/DL — LOW (ref 3.3–5)
ALP SERPL-CCNC: 144 U/L — HIGH (ref 40–120)
ALT FLD-CCNC: 66 U/L — SIGNIFICANT CHANGE UP (ref 12–78)
AST SERPL-CCNC: 82 U/L — HIGH (ref 15–37)
BILIRUB DIRECT SERPL-MCNC: 4.97 MG/DL — HIGH (ref 0.05–0.2)
BILIRUB INDIRECT FLD-MCNC: 1.6 MG/DL — HIGH (ref 0.2–1)
BILIRUB SERPL-MCNC: 6.6 MG/DL — HIGH (ref 0.2–1.2)
CRP SERPL-MCNC: 8.88 MG/DL — HIGH (ref 0–0.4)
D DIMER BLD IA.RAPID-MCNC: 744 NG/ML DDU — HIGH
FERRITIN SERPL-MCNC: 102 NG/ML — SIGNIFICANT CHANGE UP (ref 30–400)
GLUCOSE BLDC GLUCOMTR-MCNC: 117 MG/DL — HIGH (ref 70–99)
GLUCOSE BLDC GLUCOMTR-MCNC: 118 MG/DL — HIGH (ref 70–99)
GLUCOSE BLDC GLUCOMTR-MCNC: 122 MG/DL — HIGH (ref 70–99)
LDH SERPL L TO P-CCNC: 390 U/L — HIGH (ref 50–242)
PHOSPHATE SERPL-MCNC: 1.9 MG/DL — LOW (ref 2.5–4.5)
PROT SERPL-MCNC: 6 GM/DL — SIGNIFICANT CHANGE UP (ref 6–8.3)

## 2020-04-26 PROCEDURE — 99232 SBSQ HOSP IP/OBS MODERATE 35: CPT

## 2020-04-26 PROCEDURE — 99233 SBSQ HOSP IP/OBS HIGH 50: CPT

## 2020-04-26 RX ORDER — POTASSIUM PHOSPHATE, MONOBASIC POTASSIUM PHOSPHATE, DIBASIC 236; 224 MG/ML; MG/ML
30 INJECTION, SOLUTION INTRAVENOUS ONCE
Refills: 0 | Status: COMPLETED | OUTPATIENT
Start: 2020-04-26 | End: 2020-04-26

## 2020-04-26 RX ORDER — POTASSIUM PHOSPHATE, MONOBASIC POTASSIUM PHOSPHATE, DIBASIC 236; 224 MG/ML; MG/ML
15 INJECTION, SOLUTION INTRAVENOUS ONCE
Refills: 0 | Status: DISCONTINUED | OUTPATIENT
Start: 2020-04-26 | End: 2020-04-26

## 2020-04-26 RX ADMIN — POTASSIUM PHOSPHATE, MONOBASIC POTASSIUM PHOSPHATE, DIBASIC 83.33 MILLIMOLE(S): 236; 224 INJECTION, SOLUTION INTRAVENOUS at 11:01

## 2020-04-26 RX ADMIN — LATANOPROST 1 DROP(S): 0.05 SOLUTION/ DROPS OPHTHALMIC; TOPICAL at 22:09

## 2020-04-26 RX ADMIN — Medication 40 MILLIGRAM(S): at 05:08

## 2020-04-26 RX ADMIN — CARVEDILOL PHOSPHATE 25 MILLIGRAM(S): 80 CAPSULE, EXTENDED RELEASE ORAL at 05:03

## 2020-04-26 RX ADMIN — CARVEDILOL PHOSPHATE 25 MILLIGRAM(S): 80 CAPSULE, EXTENDED RELEASE ORAL at 16:33

## 2020-04-26 RX ADMIN — Medication 81 MILLIGRAM(S): at 11:14

## 2020-04-26 RX ADMIN — PIPERACILLIN AND TAZOBACTAM 25 GRAM(S): 4; .5 INJECTION, POWDER, LYOPHILIZED, FOR SOLUTION INTRAVENOUS at 22:10

## 2020-04-26 RX ADMIN — LACTULOSE 10 GRAM(S): 10 SOLUTION ORAL at 11:14

## 2020-04-26 RX ADMIN — PIPERACILLIN AND TAZOBACTAM 25 GRAM(S): 4; .5 INJECTION, POWDER, LYOPHILIZED, FOR SOLUTION INTRAVENOUS at 05:03

## 2020-04-26 RX ADMIN — Medication 300 MILLIGRAM(S): at 11:14

## 2020-04-26 RX ADMIN — APIXABAN 10 MILLIGRAM(S): 2.5 TABLET, FILM COATED ORAL at 05:08

## 2020-04-26 RX ADMIN — SACUBITRIL AND VALSARTAN 1 TABLET(S): 24; 26 TABLET, FILM COATED ORAL at 05:09

## 2020-04-26 RX ADMIN — SACUBITRIL AND VALSARTAN 1 TABLET(S): 24; 26 TABLET, FILM COATED ORAL at 16:33

## 2020-04-26 RX ADMIN — APIXABAN 10 MILLIGRAM(S): 2.5 TABLET, FILM COATED ORAL at 16:33

## 2020-04-26 RX ADMIN — PIPERACILLIN AND TAZOBACTAM 25 GRAM(S): 4; .5 INJECTION, POWDER, LYOPHILIZED, FOR SOLUTION INTRAVENOUS at 13:16

## 2020-04-26 NOTE — PROGRESS NOTE ADULT - ASSESSMENT
HPI:  Patient is a 74M with a PMH of CAD s/p MI w/stents 2007, CHF with 20% LVEF in 2016,  HTN, HLD, DM, gout, CKD, COPD, PVD s/p aortobifemoral bypass who presents to the ED for dyspnea.  Patient currently AAOx1 and unable to provide history.  As per ED attending, patient was sent in by family members due to cough and dyspnea.  Reportedly has been feeling ill since COVID pandemic started.  Vitals stable.  Labs show hypercalcemia, mild tropinema and elevated bilirubin.  CT head performed and was negative.  CT chest shows  bilateral PNA and a JUAREZ acute PE.  Started on therapeutic lovenox, will admit to tele. (21 Apr 2020 18:15)  --------------- As Above ------------ Patient seen earlier today  Consult called for elevated bilirubin. Patient confused. The patient denies history of liver disease, melena, hematochezia, hematemesis, nausea, vomiting, abdominal pain, constipation, diarrhea, or change in bowel movements Denies ETOH abuse. History of EF ~ 20 %  On Zocor  See labs / CT scan    Elevated LFTs  Bili>>  Better . 10.4 / 83 / 64 / 111--> 9.3 / 81 / 62 / 101 ( 9.6 total, 6.37 direct ) --> 7.5 ( 5.33 ) 66/54/108 --> 7.4/88/64/138 --> 6.6 / 82 / 66 / 144 . r/o congestive liver, meds, cirrhosis , : Ultra sound - Mild hepatomegaly 1) Treat CHF 2) Hold Lipitor 3) f/u labs 4) additional blood work

## 2020-04-26 NOTE — PROGRESS NOTE ADULT - SUBJECTIVE AND OBJECTIVE BOX
Patient is a 74y old  Male who presents with a chief complaint of dyspnea (25 Apr 2020 14:43)      OVERNIGHT EVENTS:  none    REVIEW OF SYSTEMS: denies chest pain/SOB, diaphoresis, no F/C, cough, dizziness, headache, blurry vision, nausea, vomiting, abdominal pain. All others review of systems negative     MEDICATIONS  (STANDING):  allopurinol 300 milliGRAM(s) Oral daily  apixaban 10 milliGRAM(s) Oral every 12 hours  aspirin  chewable 81 milliGRAM(s) Oral daily  carvedilol 25 milliGRAM(s) Oral every 12 hours  dextrose 5%. 1000 milliLiter(s) (50 mL/Hr) IV Continuous <Continuous>  dextrose 50% Injectable 12.5 Gram(s) IV Push once  dextrose 50% Injectable 25 Gram(s) IV Push once  dextrose 50% Injectable 25 Gram(s) IV Push once  furosemide    Tablet 40 milliGRAM(s) Oral daily  insulin lispro (HumaLOG) corrective regimen sliding scale   SubCutaneous three times a day before meals  lactulose Syrup 10 Gram(s) Oral daily  latanoprost 0.005% Ophthalmic Solution 1 Drop(s) Both EYES at bedtime  piperacillin/tazobactam IVPB.. 3.375 Gram(s) IV Intermittent every 8 hours  potassium phosphate IVPB 30 milliMole(s) IV Intermittent once  sacubitril 24 mG/valsartan 26 mG 1 Tablet(s) Oral two times a day    MEDICATIONS  (PRN):  ALBUTerol    90 MICROgram(s) HFA Inhaler 2 Puff(s) Inhalation every 4 hours PRN Shortness of Breath  dextrose 40% Gel 15 Gram(s) Oral once PRN Blood Glucose LESS THAN 70 milliGRAM(s)/deciliter  glucagon  Injectable 1 milliGRAM(s) IntraMuscular once PRN Glucose LESS THAN 70 milligrams/deciliter      Allergies    No Known Drug Allergies  shellfish (Other; Urticaria)    Intolerances        T(F): 96.7 (04-26-20 @ 04:55), Max: 98.2 (04-25-20 @ 11:54)  HR: 60 (04-26-20 @ 04:55) (52 - 76)  BP: 113/75 (04-26-20 @ 04:55) (112/48 - 126/73)  RR: 18 (04-26-20 @ 04:55) (18 - 19)  SpO2: 96% (04-26-20 @ 04:55) (95% - 99%)  Wt(kg): --    PHYSICAL EXAM:  GENERAL: NAD  HEAD:  Atraumatic, Normocephalic  EYES: PERRLA, conjunctiva and sclera clear  ENMT: Moist mucous membranes  NECK: Supple, No JVD, Normal thyroid  NERVOUS SYSTEM:  Alert & Awake  CHEST/LUNG: Clear to percussion bilaterally;   HEART: Regular rate and rhythm;   ABDOMEN: Soft, Nontender, Nondistended; Bowel sounds present  EXTREMITIES:  no edema BL LE  SKIN: moist    LABS:                        13.2   9.81  )-----------( 189      ( 25 Apr 2020 08:11 )             38.5     04-25    141  |  106  |  33<H>  ----------------------------<  108<H>  3.6   |  29  |  1.29    Ca    9.8      25 Apr 2020 08:11  Phos  1.9     04-26    TPro  6.0  /  Alb  2.1<L>  /  TBili  6.6<H>  /  DBili  4.97<H>  /  AST  82<H>  /  ALT  66  /  AlkPhos  144<H>  04-26        Cultures;   CAPILLARY BLOOD GLUCOSE      POCT Blood Glucose.: 117 mg/dL (26 Apr 2020 08:21)  POCT Blood Glucose.: 143 mg/dL (25 Apr 2020 21:52)  POCT Blood Glucose.: 111 mg/dL (25 Apr 2020 16:37)  POCT Blood Glucose.: 146 mg/dL (25 Apr 2020 11:03)    Lipid panel:           RADIOLOGY & ADDITIONAL TESTS:    Imaging Personally Reviewed:  [x ] YES      Consultant(s) Notes Reviewed:  [x ] YES     Care Discussed with [x ] Consultants [X ] Patient [ ] Family  [x ]    [x ]  Other; RN

## 2020-04-26 NOTE — PROGRESS NOTE ADULT - PROBLEM SELECTOR PROBLEM 5
Troponin level elevated
Hyperbilirubinemia
Troponin level elevated

## 2020-04-26 NOTE — PROGRESS NOTE ADULT - SUBJECTIVE AND OBJECTIVE BOX
Patient is a 74y old  Male who presents with a chief complaint of dyspnea (26 Apr 2020 10:45)      HPI:  Patient is a 74M with a PMH of CAD s/p MI w/stents 2007, CHF with 20% LVEF in 2016,  HTN, HLD, DM, gout, CKD, COPD, PVD s/p aortobifemoral bypass who presents to the ED for dyspnea.  Patient currently AAOx1 and unable to provide history.  As per ED attending, patient was sent in by family members due to cough and dyspnea.  Reportedly has been feeling ill since COVID pandemic started.  Vitals stable.  Labs show hypercalcemia, mild tropinema and elevated bilirubin.  CT head performed and was negative.  CT chest shows  bilateral PNA and a JUAREZ acute PE.  Started on therapeutic lovenox, will admit to tele. (21 Apr 2020 18:15)      INTERVAL HPI/OVERNIGHT EVENTS:  The patient denies melena, hematochezia, hematemesis, nausea, vomiting, abdominal pain, constipation, diarrhea, or change in bowel movements     MEDICATIONS  (STANDING):  allopurinol 300 milliGRAM(s) Oral daily  apixaban 10 milliGRAM(s) Oral every 12 hours  aspirin  chewable 81 milliGRAM(s) Oral daily  carvedilol 25 milliGRAM(s) Oral every 12 hours  dextrose 5%. 1000 milliLiter(s) (50 mL/Hr) IV Continuous <Continuous>  dextrose 50% Injectable 12.5 Gram(s) IV Push once  dextrose 50% Injectable 25 Gram(s) IV Push once  dextrose 50% Injectable 25 Gram(s) IV Push once  furosemide    Tablet 40 milliGRAM(s) Oral daily  insulin lispro (HumaLOG) corrective regimen sliding scale   SubCutaneous three times a day before meals  lactulose Syrup 10 Gram(s) Oral daily  latanoprost 0.005% Ophthalmic Solution 1 Drop(s) Both EYES at bedtime  piperacillin/tazobactam IVPB.. 3.375 Gram(s) IV Intermittent every 8 hours  sacubitril 24 mG/valsartan 26 mG 1 Tablet(s) Oral two times a day    MEDICATIONS  (PRN):  ALBUTerol    90 MICROgram(s) HFA Inhaler 2 Puff(s) Inhalation every 4 hours PRN Shortness of Breath  dextrose 40% Gel 15 Gram(s) Oral once PRN Blood Glucose LESS THAN 70 milliGRAM(s)/deciliter  glucagon  Injectable 1 milliGRAM(s) IntraMuscular once PRN Glucose LESS THAN 70 milligrams/deciliter      FAMILY HISTORY:  No pertinent family history in first degree relatives      Allergies    No Known Drug Allergies  shellfish (Other; Urticaria)    Intolerances        PMH/PSH:  AICD (automatic cardioverter/defibrillator) present  Sickle cell trait  S/P aortobifemoral bypass surgery  Peripheral vascular disease  Chronic obstructive pulmonary disease  Renal insufficiency  Diabetes mellitus  Gout  Hypertension  Myocardial Infarction  Hypercholesteremia  Coronary Artery Disease  s/p Femoral-Popliteal Bypass Surgery  Abdominal Hernia  Peripheral Vascular Disease        REVIEW OF SYSTEMS:  CONSTITUTIONAL: No fever, weight loss,   EYES: No eye pain, visual disturbances, or discharge  ENMT:  No difficulty hearing, tinnitus, vertigo; No sinus or throat pain  NECK: No pain or stiffness  BREASTS: No pain, masses, or nipple discharge  RESPIRATORY: No cough, wheezing, chills or hemoptysis; No shortness of breath  CARDIOVASCULAR: No chest pain, palpitations, dizziness, or leg swelling  GASTROINTESTINAL: See above  GENITOURINARY: No dysuria, frequency, hematuria, or incontinence  NEUROLOGICAL: No headaches, memory loss, loss of strength, numbness, or tremors  SKIN: No itching, burning, rashes, or lesions   LYMPH NODES: No enlarged glands  ENDOCRINE: No heat or cold intolerance; No hair loss  MUSCULOSKELETAL: No joint pain or swelling; No muscle, back, or extremity pain  PSYCHIATRIC: No depression, anxiety, mood swings, or difficulty sleeping  HEME/LYMPH: No easy bruising, or bleeding gums  ALLERGY AND IMMUNOLOGIC: No hives or eczema    Vital Signs Last 24 Hrs  T(C): 36.2 (26 Apr 2020 10:40), Max: 36.3 (25 Apr 2020 23:40)  T(F): 97.1 (26 Apr 2020 10:40), Max: 97.4 (25 Apr 2020 23:40)  HR: 54 (26 Apr 2020 10:40) (54 - 76)  BP: 112/57 (26 Apr 2020 10:40) (112/48 - 126/73)  BP(mean): --  RR: 18 (26 Apr 2020 10:40) (18 - 19)  SpO2: 97% (26 Apr 2020 10:40) (96% - 99%)    PHYSICAL EXAM:  GENERAL: NAD, well-groomed, well-developed  HEAD:  Atraumatic, Normocephalic  EYES: EOMI, PERRLA, conjunctiva and sclera clear  ENMT: No tonsillar erythema, exudates, or enlargement; Moist mucous membranes, Good dentition, No lesions  NECK: Supple, No JVD, Normal thyroid  NERVOUS SYSTEM:  Alert & Oriented X3, Good concentration; Motor Strength 5/5 B/L upper and lower extremities; DTRs 2+ intact and symmetric  CHEST/LUNG: Clear to percussion bilaterally; No rales, rhonchi, wheezing, or rubs  HEART: Regular rate and rhythm; No murmurs, rubs, or gallops  ABDOMEN: Soft, Nontender, Nondistended; Bowel sounds present  EXTREMITIES:  2+ Peripheral Pulses, No clubbing, cyanosis, or edema  LYMPH: No lymphadenopathy noted  SKIN: No rashes or lesions    LAB  04-21 @ 16:49  amylase --   lipase 91                           13.2   9.81  )-----------( 189      ( 25 Apr 2020 08:11 )             38.5       CBC:  04-25 @ 08:11  WBC 9.81   Hgb 13.2   Hct 38.5   Plts 189  MCV 75.2  04-24 @ 07:01  WBC 8.98   Hgb 12.5   Hct 38.3   Plts 163  MCV 76.4  04-23 @ 08:51  WBC 8.73   Hgb 12.5   Hct 37.9   Plts 152  MCV 78.0  04-21 @ 16:49  WBC 9.50   Hgb 15.1   Hct 46.6   Plts 214  MCV 79.3      Chemistry:  04-25 @ 08:11  Na+ 141  K+ 3.6  Cl- 106  CO2 29  BUN 33  Cr 1.29     04-24 @ 07:01  Na+ 143  K+ 4.1  Cl- 106  CO2 30  BUN 38  Cr 1.53     04-23 @ 08:51  Na+ 147  K+ 4.1  Cl- 111  CO2 28  BUN 33  Cr 1.31     04-21 @ 23:58  Na+ 141  K+ 4.4  Cl- 109  CO2 22  BUN 26  Cr 1.37     04-21 @ 16:49  Na+ 143  K+ 4.4  Cl- 107  CO2 25  BUN 23  Cr 1.34         Glucose, Serum: 108 mg/dL (04-25 @ 08:11)  Glucose, Serum: 107 mg/dL (04-24 @ 07:01)  Glucose, Serum: 112 mg/dL (04-23 @ 08:51)  Glucose, Serum: 143 mg/dL (04-21 @ 23:58)  Glucose, Serum: 119 mg/dL (04-21 @ 16:49)      25 Apr 2020 08:11    141    |  106    |  33     ----------------------------<  108    3.6     |  29     |  1.29   24 Apr 2020 07:01    143    |  106    |  38     ----------------------------<  107    4.1     |  30     |  1.53   23 Apr 2020 08:51    147    |  111    |  33     ----------------------------<  112    4.1     |  28     |  1.31   21 Apr 2020 23:58    141    |  109    |  26     ----------------------------<  143    4.4     |  22     |  1.37   21 Apr 2020 16:49    143    |  107    |  23     ----------------------------<  119    4.4     |  25     |  1.34     Ca    9.8        25 Apr 2020 08:11  Ca    9.9        24 Apr 2020 07:01  Ca    9.9        23 Apr 2020 08:51  Ca    10.8       21 Apr 2020 23:58  Ca    10.9       21 Apr 2020 16:49  Phos  1.9       26 Apr 2020 07:16  Phos  1.9       25 Apr 2020 08:11  Phos  2.2       24 Apr 2020 07:01  Mg     2.6       24 Apr 2020 07:01    TPro  6.0    /  Alb  2.1    /  TBili  6.6    /  DBili  4.97   /  AST  82     /  ALT  66     /  AlkPhos  144    26 Apr 2020 07:16  TPro  7.1    /  Alb  2.4    /  TBili  7.4    /  DBili  x      /  AST  88     /  ALT  64     /  AlkPhos  138    24 Apr 2020 07:01  TPro  6.1    /  Alb  2.3    /  TBili  7.5    /  DBili  5.33   /  AST  66     /  ALT  54     /  AlkPhos  108    23 Apr 2020 08:51  TPro  x      /  Alb  x      /  TBili  9.6    /  DBili  6.37   /  AST  x      /  ALT  x      /  AlkPhos  x      22 Apr 2020 04:09  TPro  6.9    /  Alb  2.7    /  TBili  9.3    /  DBili  x      /  AST  81     /  ALT  62     /  AlkPhos  101    21 Apr 2020 23:58  TPro  7.4    /  Alb  3.2    /  TBili  10.4   /  DBili  x      /  AST  83     /  ALT  64     /  AlkPhos  111    21 Apr 2020 16:49              CAPILLARY BLOOD GLUCOSE      POCT Blood Glucose.: 122 mg/dL (26 Apr 2020 10:46)  POCT Blood Glucose.: 117 mg/dL (26 Apr 2020 08:21)  POCT Blood Glucose.: 143 mg/dL (25 Apr 2020 21:52)  POCT Blood Glucose.: 111 mg/dL (25 Apr 2020 16:37)      C-Reactive Protein, Serum: 8.88 mg/dL (04-26 @ 11:18)  C-Reactive Protein, Serum: 9.37 mg/dL (04-25 @ 11:46)  C-Reactive Protein, Serum: 8.74 mg/dL (04-24 @ 08:11)  C-Reactive Protein, Serum: 8.58 mg/dL (04-23 @ 10:30)  C-Reactive Protein, Serum: 12.23 mg/dL (04-21 @ 22:29)      RADIOLOGY & ADDITIONAL TESTS:    Imaging Personally Reviewed:  [ ] YES  [ ] NO    Consultant(s) Notes Reviewed:  [ ] YES  [ ] NO    Care Discussed with Consultants/Other Providers [ ] YES  [ ] NO

## 2020-04-26 NOTE — PROGRESS NOTE ADULT - SUBJECTIVE AND OBJECTIVE BOX
ALEXANDR SARAVIA    [address]    Patient is a 74y old  Male who presents with a chief complaint of dyspnea (26 Apr 2020 14:14)       Allergies    No Known Drug Allergies  shellfish (Other; Urticaria)    Intolerances        HPI:  Patient is a 74M with a PMH of CAD s/p MI w/stents 2007, CHF with 20% LVEF in 2016,  HTN, HLD, DM, gout, CKD, COPD, PVD s/p aortobifemoral bypass who presents to the ED for dyspnea.  Patient currently AAOx1 and unable to provide history.  As per ED attending, patient was sent in by family members due to cough and dyspnea.  Reportedly has been feeling ill since COVID pandemic started.  Vitals stable.  Labs show hypercalcemia, mild tropinema and elevated bilirubin.  CT head performed and was negative.  CT chest shows  bilateral PNA and a JUAREZ acute PE.  Started on therapeutic lovenox, will admit to tele. (21 Apr 2020 18:15)      PAST MEDICAL & SURGICAL HISTORY:  AICD (automatic cardioverter/defibrillator) present  Sickle cell trait  S/P aortobifemoral bypass surgery  Peripheral vascular disease  Chronic obstructive pulmonary disease  Renal insufficiency  Diabetes mellitus  Gout  Hypertension  Myocardial Infarction  Hypercholesteremia  Coronary Artery Disease  s/p Femoral-Popliteal Bypass Surgery  Abdominal Hernia      FAMILY HISTORY:  No pertinent family history in first degree relatives        MEDICATIONS   ALBUTerol    90 MICROgram(s) HFA Inhaler 2 Puff(s) Inhalation every 4 hours PRN  allopurinol 300 milliGRAM(s) Oral daily  apixaban 10 milliGRAM(s) Oral every 12 hours  aspirin  chewable 81 milliGRAM(s) Oral daily  carvedilol 25 milliGRAM(s) Oral every 12 hours  dextrose 40% Gel 15 Gram(s) Oral once PRN  dextrose 5%. 1000 milliLiter(s) IV Continuous <Continuous>  dextrose 50% Injectable 12.5 Gram(s) IV Push once  dextrose 50% Injectable 25 Gram(s) IV Push once  dextrose 50% Injectable 25 Gram(s) IV Push once  furosemide    Tablet 40 milliGRAM(s) Oral daily  glucagon  Injectable 1 milliGRAM(s) IntraMuscular once PRN  insulin lispro (HumaLOG) corrective regimen sliding scale   SubCutaneous three times a day before meals  lactulose Syrup 10 Gram(s) Oral daily  latanoprost 0.005% Ophthalmic Solution 1 Drop(s) Both EYES at bedtime  piperacillin/tazobactam IVPB.. 3.375 Gram(s) IV Intermittent every 8 hours  sacubitril 24 mG/valsartan 26 mG 1 Tablet(s) Oral two times a day      Vital Signs Last 24 Hrs  T(C): 36.6 (26 Apr 2020 16:53), Max: 36.6 (26 Apr 2020 16:53)  T(F): 97.8 (26 Apr 2020 16:53), Max: 97.8 (26 Apr 2020 16:53)  HR: 62 (26 Apr 2020 17:00) (54 - 76)  BP: 110/68 (26 Apr 2020 17:00) (108/64 - 113/75)  BP(mean): --  RR: 18 (26 Apr 2020 16:53) (18 - 19)  SpO2: 96% (26 Apr 2020 16:53) (96% - 99%)      04-25-20 @ 07:01  -  04-26-20 @ 07:00  --------------------------------------------------------  IN: 840 mL / OUT: 0 mL / NET: 840 mL    04-26-20 @ 07:01  -  04-26-20 @ 21:09  --------------------------------------------------------  IN: 480 mL / OUT: 1 mL / NET: 479 mL            LABS:                        13.2   9.81  )-----------( 189      ( 25 Apr 2020 08:11 )             38.5     04-25    141  |  106  |  33<H>  ----------------------------<  108<H>  3.6   |  29  |  1.29    Ca    9.8      25 Apr 2020 08:11  Phos  1.9     04-26    TPro  6.0  /  Alb  2.1<L>  /  TBili  6.6<H>  /  DBili  4.97<H>  /  AST  82<H>  /  ALT  66  /  AlkPhos  144<H>  04-26              WBC:  WBC Count: 9.81 K/uL (04-25 @ 08:11)  WBC Count: 8.98 K/uL (04-24 @ 07:01)  WBC Count: 8.73 K/uL (04-23 @ 08:51)      MICROBIOLOGY:  RECENT CULTURES:  04-22 .Blood Blood. aero rec'd XXXX XXXX   No growth to date.    04-22 .Blood Blood. only aero rec'd XXXX XXXX   No growth to date.                    Sodium:  Sodium, Serum: 141 mmol/L (04-25 @ 08:11)  Sodium, Serum: 143 mmol/L (04-24 @ 07:01)  Sodium, Serum: 147 mmol/L (04-23 @ 08:51)      1.29 mg/dL 04-25 @ 08:11  1.53 mg/dL 04-24 @ 07:01  1.31 mg/dL 04-23 @ 08:51      Hemoglobin:  Hemoglobin: 13.2 g/dL (04-25 @ 08:11)  Hemoglobin: 12.5 g/dL (04-24 @ 07:01)  Hemoglobin: 12.5 g/dL (04-23 @ 08:51)      Platelets: Platelet Count - Automated: 189 K/uL (04-25 @ 08:11)  Platelet Count - Automated: 163 K/uL (04-24 @ 07:01)  Platelet Count - Automated: 152 K/uL (04-23 @ 08:51)      LIVER FUNCTIONS - ( 26 Apr 2020 07:16 )  Alb: 2.1 g/dL / Pro: 6.0 gm/dL / ALK PHOS: 144 U/L / ALT: 66 U/L / AST: 82 U/L / GGT: x                 RADIOLOGY & ADDITIONAL STUDIES:

## 2020-04-26 NOTE — PROGRESS NOTE ADULT - PROBLEM SELECTOR PROBLEM 2
Acute respiratory failure with hypoxia
Chronic systolic congestive heart failure

## 2020-04-26 NOTE — PROGRESS NOTE ADULT - ASSESSMENT
Patient is a 74M with a PMH of CAD s/p MI w/stents 2007, CHF with 20% LVEF in 2016, HTN, HLD, DM, gout, CKD, COPD, PVD s/p aortobifemoral bypass who presents to the ED for dyspnea.  Patient currently AAOx1 and unable to provide history.  As per ED attending, patient was sent in by family members due to cough and dyspnea.  Reportedly has been feeling ill since COVID pandemic started.  Vitals stable.  Labs show hypercalcemia, mild tropinemia and elevated bilirubin.  CT head performed and was negative.  CT chest shows  bilateral PNA and a JUAREZ acute PE.  Started on Eliquis.     Acute pulmonary embolism without acute cor pulmonale, unspecified pulmonary embolism type  RLE DVT  -Eliquis, ASA, change Eliquis dose after 7d started on 4/24/20  -Pulm on board  -LE dopplers- Nonocclusive thrombus is identified within the visualized right external iliac vein and common femoral vein.    Acute respiratory failure with hypoxia  -O2 supplement    Septic encephalopathy  -Gram-neg PNA  -AMS? unknown baseline.    -CT Angio Chest w/Bilateral pneumonia is most prominent in the left lower lobe. Subsegmental left upper lobe pulmonary embolism  -Given ceftriaxone and azithromycin in the ED.    -Follow blood cultures. NGTD   -COVID-19 PCR neg  -c/w Zosyn 4/22, ID/  -CRP elevated but ferritin low.    Hypo-phos  -electrolyte replaced, monitor    s/p Vtach  -Continue low dose ASA, bbl   -No ischemia evaluation needed at present per cards  -f/up cards recs  -Continue Telemetry    Chronic systolic congestive heart failure  Troponin level elevated c.w demand ischemia   CAD  -Continue low dose ASA, holding statin with elevated LFTs  -Continue po Lasix/coreg/ low dose Entresto  -No ischemia evaluation needed at present per cards  -Follows up with outpatient Cardiologist Dr Escoto in [facility].     Type 2 diabetes mellitus without complication, without long-term current use of insulin  -RISS, monitor BG    Hyperbilirubinemia  -GI  - [place]  -Ultra sound - Mild hepatomegaly  -hold Lipitor

## 2020-04-26 NOTE — PROGRESS NOTE ADULT - ASSESSMENT
VITALS/LABS      2020 afeb 55 100/60   2020 afeb 52 120/60   2020 w 9.8 H 13. Plt 189 Na 141 K 3.6 CO2 29 Cr 1.2     PT DATA/BEST PRACTICE  ALLERGY     NOTEWORTHY  POINTS/CHANGES ROS/PE                                  WT  95 (2020)                    BMI    30 (2020)   CrCl           ADVANCED DIRECTIVE       Goals of care discussion                                                                                      HEAD OF BED ELEVATION Yes  DYSPHAGIA EVAL                                  DIET    mech soift cons carb ()                                      IV F                                                       DVT PROPHYLAXIS        apixaban 10.2 () (cta  john pe subseg)                   PATIENT SUMMARY   Patient is a 74M with a PMH of CAD s/p MI w/stents , CHF with 20% LVEF in 2016,  HTN, HLD, DM, gout, CKD, COPD, PVD s/p aortobifemoral bypass  was admitted 2020 with dyspnea ams     Labs showed hypercalcemia, mild tropinema and elevated bilirubin.  CT head performed and was negative.  CT chest shows  bilateral PNA and a JOHN acute PE.  Started on therapeutic lovenox  Pulm consulted              hospital course   DYSPNEA  PNEUMONIA   PULM EMBOLISM (2020)   S CHF         ASSESSMENT PLAN  DYSPNEA POA 2020   Likely sec CHF PE possible pneumonia On rx  OXYGENATION  -2020 ra 97% - ra 96%   GAS EXCHANGE   2020 ra 744/39/62   2020 ra 97%   PNEUMONIA POA 2020 w 8.7 - 9.8   2020 blod c n   2020 mrsa p n   cta ch  bl pneum sophia lll subsegmental john pulmonary embolism   zosyn () (Dr Agudelo)   On zosyn -  COVID RULED OUT  -  pcr n  PULMONARY EMBOLISM  POA 2020 d-dimer 2264  cta ch  bl pneum sophia lll subsegmental john pulmonary embolism   apixaban 10.2 ()    v duplex prox r r dvt    echo ef 25% pasp 53 dd2   On apixaban   PULMONARY HYPERTENSION  Will need further workup as out pts Pl refer to my office   HO COPD          TROPONINEMIA POA 2020   HO CAD W STENTS 2007   Felt to be demand ischemia  Cardio on case   HO CHF EF 20% IN 2016   On sacubitril 24 valsartan 26x2 (  HO PVD SP AORTOBIFEMORAL BPG   HO DM   ACE   HYPERCALCEMIA POA 2020   ELEVATED BILIRIBUN POA 2020   Likely sec r hf   ALTERED MENTAL STATE POA 2020                               ct h was n                                         TIME SPENT Over 25 minutes aggregate care time spent on encounter; activities included   direct pt care, counseling and/or coordinating care reviewing notes, lab data/ imaging , discussion with multidisciplinary team/ pt /family. Risks, benefits, alternatives  discussed in detail.      MANJIT STRANGE  [id number] [phone]  1945 DOA 2020 DR KYLER SOUSA

## 2020-04-27 ENCOUNTER — TRANSCRIPTION ENCOUNTER (OUTPATIENT)
Age: 75
End: 2020-04-27

## 2020-04-27 VITALS
RESPIRATION RATE: 17 BRPM | DIASTOLIC BLOOD PRESSURE: 72 MMHG | HEART RATE: 62 BPM | OXYGEN SATURATION: 96 % | SYSTOLIC BLOOD PRESSURE: 112 MMHG

## 2020-04-27 DIAGNOSIS — I50.9 HEART FAILURE, UNSPECIFIED: ICD-10-CM

## 2020-04-27 LAB
ALBUMIN SERPL ELPH-MCNC: 2.3 G/DL — LOW (ref 3.3–5)
ALP SERPL-CCNC: 164 U/L — HIGH (ref 40–120)
ALT FLD-CCNC: 76 U/L — SIGNIFICANT CHANGE UP (ref 12–78)
ANION GAP SERPL CALC-SCNC: 4 MMOL/L — LOW (ref 5–17)
AST SERPL-CCNC: 88 U/L — HIGH (ref 15–37)
BILIRUB SERPL-MCNC: 6.4 MG/DL — HIGH (ref 0.2–1.2)
BUN SERPL-MCNC: 20 MG/DL — SIGNIFICANT CHANGE UP (ref 7–23)
CALCIUM SERPL-MCNC: 9.7 MG/DL — SIGNIFICANT CHANGE UP (ref 8.5–10.1)
CHLORIDE SERPL-SCNC: 107 MMOL/L — SIGNIFICANT CHANGE UP (ref 96–108)
CO2 SERPL-SCNC: 29 MMOL/L — SIGNIFICANT CHANGE UP (ref 22–31)
CREAT SERPL-MCNC: 1.17 MG/DL — SIGNIFICANT CHANGE UP (ref 0.5–1.3)
CULTURE RESULTS: SIGNIFICANT CHANGE UP
CULTURE RESULTS: SIGNIFICANT CHANGE UP
GLUCOSE BLDC GLUCOMTR-MCNC: 115 MG/DL — HIGH (ref 70–99)
GLUCOSE BLDC GLUCOMTR-MCNC: 122 MG/DL — HIGH (ref 70–99)
GLUCOSE BLDC GLUCOMTR-MCNC: 139 MG/DL — HIGH (ref 70–99)
GLUCOSE BLDC GLUCOMTR-MCNC: 154 MG/DL — HIGH (ref 70–99)
GLUCOSE SERPL-MCNC: 116 MG/DL — HIGH (ref 70–99)
HCT VFR BLD CALC: 37.6 % — LOW (ref 39–50)
HGB BLD-MCNC: 12.8 G/DL — LOW (ref 13–17)
MCHC RBC-ENTMCNC: 25.3 PG — LOW (ref 27–34)
MCHC RBC-ENTMCNC: 34 GM/DL — SIGNIFICANT CHANGE UP (ref 32–36)
MCV RBC AUTO: 74.5 FL — LOW (ref 80–100)
NRBC # BLD: 0 /100 WBCS — SIGNIFICANT CHANGE UP (ref 0–0)
PHOSPHATE SERPL-MCNC: 2.2 MG/DL — LOW (ref 2.5–4.5)
PLATELET # BLD AUTO: 203 K/UL — SIGNIFICANT CHANGE UP (ref 150–400)
POTASSIUM SERPL-MCNC: 4.1 MMOL/L — SIGNIFICANT CHANGE UP (ref 3.5–5.3)
POTASSIUM SERPL-SCNC: 4.1 MMOL/L — SIGNIFICANT CHANGE UP (ref 3.5–5.3)
PROT SERPL-MCNC: 6.8 GM/DL — SIGNIFICANT CHANGE UP (ref 6–8.3)
RBC # BLD: 5.05 M/UL — SIGNIFICANT CHANGE UP (ref 4.2–5.8)
RBC # FLD: 18.2 % — HIGH (ref 10.3–14.5)
SODIUM SERPL-SCNC: 140 MMOL/L — SIGNIFICANT CHANGE UP (ref 135–145)
SPECIMEN SOURCE: SIGNIFICANT CHANGE UP
SPECIMEN SOURCE: SIGNIFICANT CHANGE UP
WBC # BLD: 7.76 K/UL — SIGNIFICANT CHANGE UP (ref 3.8–10.5)
WBC # FLD AUTO: 7.76 K/UL — SIGNIFICANT CHANGE UP (ref 3.8–10.5)

## 2020-04-27 PROCEDURE — 99232 SBSQ HOSP IP/OBS MODERATE 35: CPT

## 2020-04-27 PROCEDURE — 99233 SBSQ HOSP IP/OBS HIGH 50: CPT

## 2020-04-27 RX ORDER — INSULIN LISPRO 100/ML
0 VIAL (ML) SUBCUTANEOUS
Qty: 0 | Refills: 0 | DISCHARGE
Start: 2020-04-27

## 2020-04-27 RX ORDER — APIXABAN 2.5 MG/1
2 TABLET, FILM COATED ORAL
Qty: 0 | Refills: 0 | DISCHARGE
Start: 2020-04-27

## 2020-04-27 RX ORDER — ALBUTEROL 90 UG/1
2 AEROSOL, METERED ORAL
Qty: 0 | Refills: 0 | DISCHARGE
Start: 2020-04-27

## 2020-04-27 RX ORDER — ALLOPURINOL 300 MG
1 TABLET ORAL
Qty: 0 | Refills: 0 | DISCHARGE
Start: 2020-04-27

## 2020-04-27 RX ORDER — ASPIRIN/CALCIUM CARB/MAGNESIUM 324 MG
1 TABLET ORAL
Qty: 0 | Refills: 0 | DISCHARGE
Start: 2020-04-27

## 2020-04-27 RX ORDER — FUROSEMIDE 40 MG
1 TABLET ORAL
Qty: 0 | Refills: 0 | DISCHARGE
Start: 2020-04-27

## 2020-04-27 RX ORDER — LATANOPROST 0.05 MG/ML
1 SOLUTION/ DROPS OPHTHALMIC; TOPICAL
Qty: 0 | Refills: 0 | DISCHARGE
Start: 2020-04-27

## 2020-04-27 RX ORDER — SACUBITRIL AND VALSARTAN 24; 26 MG/1; MG/1
1 TABLET, FILM COATED ORAL
Qty: 0 | Refills: 0 | DISCHARGE
Start: 2020-04-27

## 2020-04-27 RX ORDER — LACTULOSE 10 G/15ML
15 SOLUTION ORAL
Qty: 0 | Refills: 0 | DISCHARGE

## 2020-04-27 RX ORDER — FUROSEMIDE 40 MG
1 TABLET ORAL
Qty: 0 | Refills: 0 | DISCHARGE

## 2020-04-27 RX ORDER — CARVEDILOL PHOSPHATE 80 MG/1
1 CAPSULE, EXTENDED RELEASE ORAL
Qty: 0 | Refills: 0 | DISCHARGE
Start: 2020-04-27

## 2020-04-27 RX ORDER — LISINOPRIL 2.5 MG/1
1 TABLET ORAL
Qty: 0 | Refills: 0 | DISCHARGE

## 2020-04-27 RX ORDER — POTASSIUM PHOSPHATE, MONOBASIC POTASSIUM PHOSPHATE, DIBASIC 236; 224 MG/ML; MG/ML
15 INJECTION, SOLUTION INTRAVENOUS ONCE
Refills: 0 | Status: COMPLETED | OUTPATIENT
Start: 2020-04-27 | End: 2020-04-27

## 2020-04-27 RX ADMIN — APIXABAN 10 MILLIGRAM(S): 2.5 TABLET, FILM COATED ORAL at 17:19

## 2020-04-27 RX ADMIN — Medication 1: at 11:27

## 2020-04-27 RX ADMIN — LACTULOSE 10 GRAM(S): 10 SOLUTION ORAL at 11:28

## 2020-04-27 RX ADMIN — PIPERACILLIN AND TAZOBACTAM 25 GRAM(S): 4; .5 INJECTION, POWDER, LYOPHILIZED, FOR SOLUTION INTRAVENOUS at 14:59

## 2020-04-27 RX ADMIN — Medication 81 MILLIGRAM(S): at 11:28

## 2020-04-27 RX ADMIN — POTASSIUM PHOSPHATE, MONOBASIC POTASSIUM PHOSPHATE, DIBASIC 62.5 MILLIMOLE(S): 236; 224 INJECTION, SOLUTION INTRAVENOUS at 11:28

## 2020-04-27 RX ADMIN — SACUBITRIL AND VALSARTAN 1 TABLET(S): 24; 26 TABLET, FILM COATED ORAL at 06:16

## 2020-04-27 RX ADMIN — CARVEDILOL PHOSPHATE 25 MILLIGRAM(S): 80 CAPSULE, EXTENDED RELEASE ORAL at 06:15

## 2020-04-27 RX ADMIN — Medication 300 MILLIGRAM(S): at 11:28

## 2020-04-27 RX ADMIN — APIXABAN 10 MILLIGRAM(S): 2.5 TABLET, FILM COATED ORAL at 06:15

## 2020-04-27 RX ADMIN — Medication 40 MILLIGRAM(S): at 06:15

## 2020-04-27 RX ADMIN — PIPERACILLIN AND TAZOBACTAM 25 GRAM(S): 4; .5 INJECTION, POWDER, LYOPHILIZED, FOR SOLUTION INTRAVENOUS at 06:15

## 2020-04-27 NOTE — DISCHARGE NOTE PROVIDER - CARE PROVIDER_API CALL
Ángel Scott)  Critical Care Medicine; Internal Medicine; Pulmonary Disease  [facility] [place] [address]  Phone: [phone]  Fax: [phone]  Follow Up Time:     [place] [address]  Phone: [phone]  Fax: [phone]  Follow Up Time:

## 2020-04-27 NOTE — PROGRESS NOTE ADULT - REASON FOR ADMISSION
dyspnea

## 2020-04-27 NOTE — DISCHARGE NOTE NURSING/CASE MANAGEMENT/SOCIAL WORK - PATIENT PORTAL LINK FT
You can access the FollowMyHealth Patient Portal offered by [facility] by registering at the following website: [email or web address]. By joining [facility]’s FollowMyHealth portal, you will also be able to view your health information using other applications (apps) compatible with our system.

## 2020-04-27 NOTE — DISCHARGE NOTE PROVIDER - HOSPITAL COURSE
Patient is a 74M with a PMH of CAD s/p MI w/stents 2007, CHF with 20% LVEF in 2016, HTN, HLD, DM, gout, CKD, COPD, PVD s/p aortobifemoral bypass who presents to the ED for dyspnea.  Patient currently AAOx1 and unable to provide history.  As per ED attending, patient was sent in by family members due to cough and dyspnea.  Reportedly has been feeling ill since COVID pandemic started.  Vitals stable.  Labs show hypercalcemia, mild tropinemia and elevated bilirubin.  CT head performed and was negative.  CT chest shows  bilateral PNA and a JUAREZ acute PE.  Started on Eliquis.         Acute pulmonary embolism without acute cor pulmonale, unspecified pulmonary embolism type    RLE DVT    -Eliquis, ASA, change Eliquis dose after 7d started on 4/24/20    -seen by Pulm    -SHELBI villanuevas- Nonocclusive thrombus is identified within the visualized right external iliac vein and common femoral vein.        Acute respiratory failure with hypoxia    -O2 supplement        Septic encephalopathy    -Gram-neg PNA    -AMS? unknown baseline.      -CT Angio Chest w/Bilateral pneumonia is most prominent in the left lower lobe. Subsegmental left upper lobe pulmonary embolism    -Given ceftriaxone and azithromycin in the ED.      -Follow blood cultures. NGTD     -COVID-19 PCR neg    -c/w Zosyn 4/22, ID/, completed    -CRP elevated but ferritin low.        Hypo-phos    -electrolyte replaced, monitor        s/p Vtach    -Continue low dose ASA, bbl     -No ischemia evaluation needed at present per cards    -f/up cards recs        Chronic systolic congestive heart failure    Troponin level elevated c.w demand ischemia     CAD    -Continue low dose ASA, holding statin with elevated LFTs    -Continue po Lasix/coreg/ low dose Entresto    -No ischemia evaluation needed at present per cards    -Follows up with outpatient Cardiologist Dr Escoto in [facility].         Type 2 diabetes mellitus without complication, without long-term current use of insulin    -RISS, monitor BG        Hyperbilirubinemia    -Ultra sound - Mild hepatomegaly    -hold Lipitor

## 2020-04-27 NOTE — PROGRESS NOTE ADULT - SUBJECTIVE AND OBJECTIVE BOX
Patient is a 74y old  Male who presents with a chief complaint of dyspnea (27 Apr 2020 14:06)      HPI:  Patient is a 74M with a PMH of CAD s/p MI w/stents 2007, CHF with 20% LVEF in 2016,  HTN, HLD, DM, gout, CKD, COPD, PVD s/p aortobifemoral bypass who presents to the ED for dyspnea.  Patient currently AAOx1 and unable to provide history.  As per ED attending, patient was sent in by family members due to cough and dyspnea.  Reportedly has been feeling ill since COVID pandemic started.  Vitals stable.  Labs show hypercalcemia, mild tropinema and elevated bilirubin.  CT head performed and was negative.  CT chest shows  bilateral PNA and a JUAREZ acute PE.  Started on therapeutic lovenox, will admit to tele. (21 Apr 2020 18:15)      INTERVAL HPI/OVERNIGHT EVENTS:  The patient denies melena, hematochezia, hematemesis, nausea, vomiting, abdominal pain, constipation, diarrhea, or change in bowel movements Tolerating diet    MEDICATIONS  (STANDING):  allopurinol 300 milliGRAM(s) Oral daily  apixaban 10 milliGRAM(s) Oral every 12 hours  aspirin  chewable 81 milliGRAM(s) Oral daily  carvedilol 25 milliGRAM(s) Oral every 12 hours  dextrose 5%. 1000 milliLiter(s) (50 mL/Hr) IV Continuous <Continuous>  dextrose 50% Injectable 12.5 Gram(s) IV Push once  dextrose 50% Injectable 25 Gram(s) IV Push once  dextrose 50% Injectable 25 Gram(s) IV Push once  furosemide    Tablet 40 milliGRAM(s) Oral daily  insulin lispro (HumaLOG) corrective regimen sliding scale   SubCutaneous three times a day before meals  lactulose Syrup 10 Gram(s) Oral daily  latanoprost 0.005% Ophthalmic Solution 1 Drop(s) Both EYES at bedtime  piperacillin/tazobactam IVPB.. 3.375 Gram(s) IV Intermittent every 8 hours  sacubitril 24 mG/valsartan 26 mG 1 Tablet(s) Oral two times a day    MEDICATIONS  (PRN):  ALBUTerol    90 MICROgram(s) HFA Inhaler 2 Puff(s) Inhalation every 4 hours PRN Shortness of Breath  dextrose 40% Gel 15 Gram(s) Oral once PRN Blood Glucose LESS THAN 70 milliGRAM(s)/deciliter  glucagon  Injectable 1 milliGRAM(s) IntraMuscular once PRN Glucose LESS THAN 70 milligrams/deciliter      FAMILY HISTORY:  No pertinent family history in first degree relatives      Allergies    No Known Drug Allergies  shellfish (Other; Urticaria)    Intolerances        PMH/PSH:  AICD (automatic cardioverter/defibrillator) present  Sickle cell trait  S/P aortobifemoral bypass surgery  Peripheral vascular disease  Chronic obstructive pulmonary disease  Renal insufficiency  Diabetes mellitus  Gout  Hypertension  Myocardial Infarction  Hypercholesteremia  Coronary Artery Disease  s/p Femoral-Popliteal Bypass Surgery  Abdominal Hernia  Peripheral Vascular Disease        REVIEW OF SYSTEMS:  CONSTITUTIONAL: No fever, weight loss,  RESPIRATORY: No cough, wheezing, chills or hemoptysis; No shortness of breath  CARDIOVASCULAR: No chest pain, palpitations, dizziness, or leg swelling  GASTROINTESTINAL: See above  GENITOURINARY: No dysuria, frequency, hematuria, or incontinence      Vital Signs Last 24 Hrs  T(C): 35.8 (27 Apr 2020 11:45), Max: 36.6 (26 Apr 2020 16:53)  T(F): 96.4 (27 Apr 2020 11:45), Max: 97.8 (26 Apr 2020 16:53)  HR: 50 (27 Apr 2020 11:45) (50 - 62)  BP: 102/59 (27 Apr 2020 11:45) (98/64 - 125/71)  BP(mean): --  RR: 18 (27 Apr 2020 11:45) (18 - 18)  SpO2: 99% (27 Apr 2020 11:45) (94% - 100%)    PHYSICAL EXAM:  GENERAL: NAD, well-groomed, well-developed  NERVOUS SYSTEM:  Alert & Oriented X3, Good concentration;   CHEST/LUNG: Clear to percussion bilaterally; No rales, rhonchi, wheezing, or rubs  HEART: Regular rate and rhythm; No murmurs, rubs, or gallops  ABDOMEN: Soft, Nontender, Nondistended; Bowel sounds present      LAB                          12.8   7.76  )-----------( 203      ( 27 Apr 2020 06:37 )             37.6       CBC:  04-27 @ 06:37  WBC 7.76   Hgb 12.8   Hct 37.6   Plts 203  MCV 74.5  04-25 @ 08:11  WBC 9.81   Hgb 13.2   Hct 38.5   Plts 189  MCV 75.2  04-24 @ 07:01  WBC 8.98   Hgb 12.5   Hct 38.3   Plts 163  MCV 76.4  04-23 @ 08:51  WBC 8.73   Hgb 12.5   Hct 37.9   Plts 152  MCV 78.0  04-21 @ 16:49  WBC 9.50   Hgb 15.1   Hct 46.6   Plts 214  MCV 79.3      Chemistry:  04-27 @ 06:37  Na+ 140  K+ 4.1  Cl- 107  CO2 29  BUN 20  Cr 1.17     04-25 @ 08:11  Na+ 141  K+ 3.6  Cl- 106  CO2 29  BUN 33  Cr 1.29     04-24 @ 07:01  Na+ 143  K+ 4.1  Cl- 106  CO2 30  BUN 38  Cr 1.53     04-23 @ 08:51  Na+ 147  K+ 4.1  Cl- 111  CO2 28  BUN 33  Cr 1.31     04-21 @ 23:58  Na+ 141  K+ 4.4  Cl- 109  CO2 22  BUN 26  Cr 1.37     04-21 @ 16:49  Na+ 143  K+ 4.4  Cl- 107  CO2 25  BUN 23  Cr 1.34         Glucose, Serum: 116 mg/dL (04-27 @ 06:37)  Glucose, Serum: 108 mg/dL (04-25 @ 08:11)  Glucose, Serum: 107 mg/dL (04-24 @ 07:01)  Glucose, Serum: 112 mg/dL (04-23 @ 08:51)  Glucose, Serum: 143 mg/dL (04-21 @ 23:58)  Glucose, Serum: 119 mg/dL (04-21 @ 16:49)      27 Apr 2020 06:37    140    |  107    |  20     ----------------------------<  116    4.1     |  29     |  1.17   25 Apr 2020 08:11    141    |  106    |  33     ----------------------------<  108    3.6     |  29     |  1.29   24 Apr 2020 07:01    143    |  106    |  38     ----------------------------<  107    4.1     |  30     |  1.53   23 Apr 2020 08:51    147    |  111    |  33     ----------------------------<  112    4.1     |  28     |  1.31   21 Apr 2020 23:58    141    |  109    |  26     ----------------------------<  143    4.4     |  22     |  1.37   21 Apr 2020 16:49    143    |  107    |  23     ----------------------------<  119    4.4     |  25     |  1.34     Ca    9.7        27 Apr 2020 06:37  Ca    9.8        25 Apr 2020 08:11  Ca    9.9        24 Apr 2020 07:01  Ca    9.9        23 Apr 2020 08:51  Ca    10.8       21 Apr 2020 23:58  Ca    10.9       21 Apr 2020 16:49  Phos  2.2       27 Apr 2020 06:37  Phos  1.9       26 Apr 2020 07:16  Phos  1.9       25 Apr 2020 08:11  Phos  2.2       24 Apr 2020 07:01  Mg     2.6       24 Apr 2020 07:01    TPro  6.8    /  Alb  2.3    /  TBili  6.4    /  DBili  x      /  AST  88     /  ALT  76     /  AlkPhos  164    27 Apr 2020 06:37  TPro  6.0    /  Alb  2.1    /  TBili  6.6    /  DBili  4.97   /  AST  82     /  ALT  66     /  AlkPhos  144    26 Apr 2020 07:16  TPro  7.1    /  Alb  2.4    /  TBili  7.4    /  DBili  x      /  AST  88     /  ALT  64     /  AlkPhos  138    24 Apr 2020 07:01  TPro  6.1    /  Alb  2.3    /  TBili  7.5    /  DBili  5.33   /  AST  66     /  ALT  54     /  AlkPhos  108    23 Apr 2020 08:51  TPro  x      /  Alb  x      /  TBili  9.6    /  DBili  6.37   /  AST  x      /  ALT  x      /  AlkPhos  x      22 Apr 2020 04:09  TPro  6.9    /  Alb  2.7    /  TBili  9.3    /  DBili  x      /  AST  81     /  ALT  62     /  AlkPhos  101    21 Apr 2020 23:58  TPro  7.4    /  Alb  3.2    /  TBili  10.4   /  DBili  x      /  AST  83     /  ALT  64     /  AlkPhos  111    21 Apr 2020 16:49              CAPILLARY BLOOD GLUCOSE      POCT Blood Glucose.: 139 mg/dL (27 Apr 2020 11:54)  POCT Blood Glucose.: 154 mg/dL (27 Apr 2020 11:26)  POCT Blood Glucose.: 115 mg/dL (27 Apr 2020 09:11)  POCT Blood Glucose.: 118 mg/dL (26 Apr 2020 16:32)      C-Reactive Protein, Serum: 8.88 mg/dL (04-26 @ 11:18)  C-Reactive Protein, Serum: 9.37 mg/dL (04-25 @ 11:46)  C-Reactive Protein, Serum: 8.74 mg/dL (04-24 @ 08:11)  C-Reactive Protein, Serum: 8.58 mg/dL (04-23 @ 10:30)  C-Reactive Protein, Serum: 12.23 mg/dL (04-21 @ 22:29)      RADIOLOGY & ADDITIONAL TESTS:    Imaging Personally Reviewed:  [ ] YES  [ ] NO    Consultant(s) Notes Reviewed:  [ ] YES  [ ] NO    Care Discussed with Consultants/Other Providers [ ] YES  [ ] NO

## 2020-04-27 NOTE — PROGRESS NOTE ADULT - SUBJECTIVE AND OBJECTIVE BOX
ALEXANDR SARAVIA    LVS 1B 124 D    Patient is a 74y old  Male who presents with a chief complaint of dyspnea (27 Apr 2020 15:53)       Allergies    No Known Drug Allergies  shellfish (Other; Urticaria)    Intolerances        HPI:  Patient is a 74M with a PMH of CAD s/p MI w/stents 2007, CHF with 20% LVEF in 2016,  HTN, HLD, DM, gout, CKD, COPD, PVD s/p aortobifemoral bypass who presents to the ED for dyspnea.  Patient currently AAOx1 and unable to provide history.  As per ED attending, patient was sent in by family members due to cough and dyspnea.  Reportedly has been feeling ill since COVID pandemic started.  Vitals stable.  Labs show hypercalcemia, mild tropinema and elevated bilirubin.  CT head performed and was negative.  CT chest shows  bilateral PNA and a JUAREZ acute PE.  Started on therapeutic lovenox, will admit to tele. (21 Apr 2020 18:15)      PAST MEDICAL & SURGICAL HISTORY:  AICD (automatic cardioverter/defibrillator) present  Sickle cell trait  S/P aortobifemoral bypass surgery  Peripheral vascular disease  Chronic obstructive pulmonary disease  Renal insufficiency  Diabetes mellitus  Gout  Hypertension  Myocardial Infarction  Hypercholesteremia  Coronary Artery Disease  s/p Femoral-Popliteal Bypass Surgery  Abdominal Hernia      FAMILY HISTORY:  No pertinent family history in first degree relatives        MEDICATIONS   ALBUTerol    90 MICROgram(s) HFA Inhaler 2 Puff(s) Inhalation every 4 hours PRN  allopurinol 300 milliGRAM(s) Oral daily  apixaban 10 milliGRAM(s) Oral every 12 hours  aspirin  chewable 81 milliGRAM(s) Oral daily  carvedilol 25 milliGRAM(s) Oral every 12 hours  dextrose 40% Gel 15 Gram(s) Oral once PRN  dextrose 5%. 1000 milliLiter(s) IV Continuous <Continuous>  dextrose 50% Injectable 12.5 Gram(s) IV Push once  dextrose 50% Injectable 25 Gram(s) IV Push once  dextrose 50% Injectable 25 Gram(s) IV Push once  furosemide    Tablet 40 milliGRAM(s) Oral daily  glucagon  Injectable 1 milliGRAM(s) IntraMuscular once PRN  insulin lispro (HumaLOG) corrective regimen sliding scale   SubCutaneous three times a day before meals  lactulose Syrup 10 Gram(s) Oral daily  latanoprost 0.005% Ophthalmic Solution 1 Drop(s) Both EYES at bedtime  piperacillin/tazobactam IVPB.. 3.375 Gram(s) IV Intermittent every 8 hours  sacubitril 24 mG/valsartan 26 mG 1 Tablet(s) Oral two times a day      Vital Signs Last 24 Hrs  T(C): 35.8 (27 Apr 2020 11:45), Max: 36.6 (26 Apr 2020 16:53)  T(F): 96.4 (27 Apr 2020 11:45), Max: 97.8 (26 Apr 2020 16:53)  HR: 50 (27 Apr 2020 11:45) (50 - 62)  BP: 102/59 (27 Apr 2020 11:45) (98/64 - 125/71)  BP(mean): --  RR: 18 (27 Apr 2020 11:45) (18 - 18)  SpO2: 99% (27 Apr 2020 11:45) (94% - 100%)      04-26-20 @ 07:01  -  04-27-20 @ 07:00  --------------------------------------------------------  IN: 480 mL / OUT: 1 mL / NET: 479 mL            LABS:                        12.8   7.76  )-----------( 203      ( 27 Apr 2020 06:37 )             37.6     04-27    140  |  107  |  20  ----------------------------<  116<H>  4.1   |  29  |  1.17    Ca    9.7      27 Apr 2020 06:37  Phos  2.2     04-27    TPro  6.8  /  Alb  2.3<L>  /  TBili  6.4<H>  /  DBili  x   /  AST  88<H>  /  ALT  76  /  AlkPhos  164<H>  04-27              WBC:  WBC Count: 7.76 K/uL (04-27 @ 06:37)  WBC Count: 9.81 K/uL (04-25 @ 08:11)  WBC Count: 8.98 K/uL (04-24 @ 07:01)      MICROBIOLOGY:  RECENT CULTURES:  04-22 .Blood Blood. aero rec'd XXXX XXXX   No Growth Final    04-22 .Blood Blood. only aero rec'd XXXX XXXX   No Growth Final                    Sodium:  Sodium, Serum: 140 mmol/L (04-27 @ 06:37)  Sodium, Serum: 141 mmol/L (04-25 @ 08:11)  Sodium, Serum: 143 mmol/L (04-24 @ 07:01)      1.17 mg/dL 04-27 @ 06:37  1.29 mg/dL 04-25 @ 08:11  1.53 mg/dL 04-24 @ 07:01      Hemoglobin:  Hemoglobin: 12.8 g/dL (04-27 @ 06:37)  Hemoglobin: 13.2 g/dL (04-25 @ 08:11)  Hemoglobin: 12.5 g/dL (04-24 @ 07:01)      Platelets: Platelet Count - Automated: 203 K/uL (04-27 @ 06:37)  Platelet Count - Automated: 189 K/uL (04-25 @ 08:11)  Platelet Count - Automated: 163 K/uL (04-24 @ 07:01)      LIVER FUNCTIONS - ( 27 Apr 2020 06:37 )  Alb: 2.3 g/dL / Pro: 6.8 gm/dL / ALK PHOS: 164 U/L / ALT: 76 U/L / AST: 88 U/L / GGT: x                 RADIOLOGY & ADDITIONAL STUDIES:

## 2020-04-27 NOTE — PROGRESS NOTE ADULT - ASSESSMENT
PATIENT          REVIEW OF SYMPTOMS      Able to give ROS  Yes     RELIABLE No   CONSTITUTIONAL Weakness Yes  Chills No Vision changes No  ENDOCRINE No unexplained hair loss No heat or cold intolerance    ALLERGY No hives  Sore throat No   RESP Coughing blood no  Shortness of breath YES   NEURO No Headache  Confusion Pain neck No   CARDIAC No Chest pain No Palpitations   GI No Pain abdomen NO   Vomiting NO     PHYSICAL EXAM    HEENT Unremarkable PERRLA atraumatic   RESP Fair air entry EXP prolonged    Harsh breath sound Resp distres mild   CARDIAC S1 S2 No S3     NO JVD    ABDOMEN SOFT BS PRESENT NOT DISTENDED No hepatosplenomegaly PEDAL EDEMA present No calf tenderness  NO rash   GENERAL Not TOXIC looking    VITALS/LABS      2020 afeb 50 100/50 99%   2020 w 7.7 Hb 12.8 Plt 203 Na 140 K 4 CO2 29 Cr 1.1     PT DATA/BEST PRACTICE  ALLERGY     NOTEWORTHY  POINTS/CHANGES ROS/PE                                  WT  95 (2020)                    BMI    30 (2020)   CrCl           ADVANCED DIRECTIVE       Goals of care discussion                                                                                      HEAD OF BED ELEVATION Yes  DYSPHAGIA EVAL                                  DIET    mech soift cons carb ()                                      IV F                                                       DVT PROPHYLAXIS        apixaban 10.2 () (cta  john pe subseg)     PATIENT SUMMARY   Patient is a 74M with a PMH of CAD s/p MI w/stents , CHF with 20% LVEF in 2016,  HTN, HLD, DM, gout, CKD, COPD, PVD s/p aortobifemoral bypass  was admitted 2020 with dyspnea ams     Labs showed hypercalcemia, mild tropinema and elevated bilirubin.  CT head performed and was negative.  CT chest shows  bilateral PNA and a JOHN acute PE.  Started on therapeutic lovenox  Pulm consulted     hospital course   DYSPNEA  PNEUMONIA   PULM EMBOLISM (2020)   S CHF         ASSESSMENT PLAN  DYSPNEA POA 2020   Likely sec CHF PE possible pneumonia On rx  OXYGENATION  -2020 ra 97% - ra 96%   GAS EXCHANGE   2020 ra 744/39/62   2020 ra 97%   PNEUMONIA POA 2020 w 8.7 - 9.8   2020 blod c n   2020 mrsa p n   cta ch  bl pneum sophia lll subsegmental john pulmonary embolism   zosyn () (Dr Agudelo)   On zosyn -  COVID RULED OUT  -  pcr n  PULMONARY EMBOLISM  POA 2020 d-dimer 2264  cta ch  bl pneum sophia lll subsegmental john pulmonary embolism   apixaban 10.2 ()    v duplex prox r r dvt    echo ef 25% pasp 53 dd2   On apixaban   PULMONARY HYPERTENSION  Will need further workup as out pts Pl refer to my office   HO COPD      DC planning     TIME SPENT Over 25 minutes aggregate care time spent on encounter; activities included   direct pt care, counseling and/or coordinating care reviewing notes, lab data/ imaging , discussion with multidisciplinary team/ pt /family. Risks, benefits, alternatives  discussed in detail.      MANJIT STRANGE  [id number] [phone]  1945 DOA 2020 DR KYLER SOUSA

## 2020-04-27 NOTE — PROGRESS NOTE ADULT - ASSESSMENT
Patient is a 74M with a PMH of CAD s/p MI w/stents 2007, CHF with 20% LVEF in 2016, HTN, HLD, DM, gout, CKD, COPD, PVD s/p aortobifemoral bypass who presents to the ED for dyspnea.  Patient currently AAOx1 and unable to provide history.  As per ED attending, patient was sent in by family members due to cough and dyspnea.  Reportedly has been feeling ill since COVID pandemic started.  Vitals stable.  Labs show hypercalcemia, mild tropinemia and elevated bilirubin.  CT head performed and was negative.  CT chest shows  bilateral PNA and a JUAREZ acute PE.  Started on Eliquis.     Acute pulmonary embolism without acute cor pulmonale, unspecified pulmonary embolism type  RLE DVT  -Eliquis, ASA, change Eliquis dose after 7d started on 4/24/20  -Pulm on board  -LE dopplers- Nonocclusive thrombus is identified within the visualized right external iliac vein and common femoral vein.    Acute respiratory failure with hypoxia  -O2 supplement    Septic encephalopathy  -Gram-neg PNA  -AMS? unknown baseline.    -CT Angio Chest w/Bilateral pneumonia is most prominent in the left lower lobe. Subsegmental left upper lobe pulmonary embolism  -Given ceftriaxone and azithromycin in the ED.    -Follow blood cultures. NGTD   -COVID-19 PCR neg  -c/w Zosyn 4/22, ID/  -CRP elevated but ferritin low.    Hypo-phos  -electrolyte replaced, monitor    s/p Vtach  -Continue low dose ASA, bbl   -No ischemia evaluation needed at present per cards  -f/up cards recs  -Continue Telemetry    Chronic systolic congestive heart failure  Troponin level elevated c.w demand ischemia   CAD  -Continue low dose ASA, holding statin with elevated LFTs  -Continue po Lasix/coreg/ low dose Entresto  -No ischemia evaluation needed at present per cards  -Follows up with outpatient Cardiologist Dr Escoto in [facility].     Type 2 diabetes mellitus without complication, without long-term current use of insulin  -RISS, monitor BG    Hyperbilirubinemia  -GI  - [place]ienstock  -Ultra sound - Mild hepatomegaly  -hold Lipitor

## 2020-04-27 NOTE — PROGRESS NOTE ADULT - ASSESSMENT
HPI:  Patient is a 74M with a PMH of CAD s/p MI w/stents 2007, CHF with 20% LVEF in 2016,  HTN, HLD, DM, gout, CKD, COPD, PVD s/p aortobifemoral bypass who presents to the ED for dyspnea.  Patient currently AAOx1 and unable to provide history.  As per ED attending, patient was sent in by family members due to cough and dyspnea.  Reportedly has been feeling ill since COVID pandemic started.  Vitals stable.  Labs show hypercalcemia, mild tropinema and elevated bilirubin.  CT head performed and was negative.  CT chest shows  bilateral PNA and a JUAREZ acute PE.  Started on therapeutic lovenox, will admit to tele. (21 Apr 2020 18:15)  --------------- As Above ------------ Patient seen earlier today  Consult called for elevated bilirubin. Patient confused. The patient denies history of liver disease, melena, hematochezia, hematemesis, nausea, vomiting, abdominal pain, constipation, diarrhea, or change in bowel movements Denies ETOH abuse. History of EF ~ 20 %  On Zocor  See labs / CT scan    Elevated LFTs  Bili>>  Better . 10.4 / 83 / 64 / 111--> 9.3 / 81 / 62 / 101 ( 9.6 total, 6.37 direct ) --> 7.5 ( 5.33 ) 66/54/108 --> 7.4/88/64/138 --> 6.6 / 82 / 66 / 144 --> 6.4 / 88 / 76/ 164  . r/o congestive liver, meds, cirrhosis , : Ultra sound - Mild hepatomegaly 1) Treat CHF 2) Hold Lipitor 3) f/u labs 4) additional blood work HPI:  Patient is a 74M with a PMH of CAD s/p MI w/stents 2007, CHF with 20% LVEF in 2016,  HTN, HLD, DM, gout, CKD, COPD, PVD s/p aortobifemoral bypass who presents to the ED for dyspnea.  Patient currently AAOx1 and unable to provide history.  As per ED attending, patient was sent in by family members due to cough and dyspnea.  Reportedly has been feeling ill since COVID pandemic started.  Vitals stable.  Labs show hypercalcemia, mild tropinema and elevated bilirubin.  CT head performed and was negative.  CT chest shows  bilateral PNA and a JUAREZ acute PE.  Started on therapeutic lovenox, will admit to tele. (21 Apr 2020 18:15)  --------------- As Above ------------ Patient seen earlier today  Consult called for elevated bilirubin. Patient confused. The patient denies history of liver disease, melena, hematochezia, hematemesis, nausea, vomiting, abdominal pain, constipation, diarrhea, or change in bowel movements Denies ETOH abuse. History of EF ~ 20 %  On Zocor  See labs / CT scan    Elevated LFTs  Bili>>  Better . 10.4 / 83 / 64 / 111--> 9.3 / 81 / 62 / 101 ( 9.6 total, 6.37 direct ) --> 7.5 ( 5.33 ) 66/54/108 --> 7.4/88/64/138 --> 6.6 / 82 / 66 / 144 --> 6.4 / 88 / 76/ 164  . r/o congestive liver, meds, cirrhosis , : Ultra sound - Mild hepatomegaly 1) Treat CHF 2) Hold Lipitor 3) f/u labs 4) may need liver biopsy

## 2020-04-27 NOTE — PROGRESS NOTE ADULT - SUBJECTIVE AND OBJECTIVE BOX
Patient is a 74y old  Male who presents with a chief complaint of dyspnea (27 Apr 2020 11:52)      OVERNIGHT EVENTS: none     REVIEW OF SYSTEMS: denies chest pain/SOB, diaphoresis, no F/C, cough, dizziness, headache, blurry vision, nausea, vomiting, abdominal pain. All others review of systems negative     MEDICATIONS  (STANDING):  allopurinol 300 milliGRAM(s) Oral daily  apixaban 10 milliGRAM(s) Oral every 12 hours  aspirin  chewable 81 milliGRAM(s) Oral daily  carvedilol 25 milliGRAM(s) Oral every 12 hours  dextrose 5%. 1000 milliLiter(s) (50 mL/Hr) IV Continuous <Continuous>  dextrose 50% Injectable 12.5 Gram(s) IV Push once  dextrose 50% Injectable 25 Gram(s) IV Push once  dextrose 50% Injectable 25 Gram(s) IV Push once  furosemide    Tablet 40 milliGRAM(s) Oral daily  insulin lispro (HumaLOG) corrective regimen sliding scale   SubCutaneous three times a day before meals  lactulose Syrup 10 Gram(s) Oral daily  latanoprost 0.005% Ophthalmic Solution 1 Drop(s) Both EYES at bedtime  piperacillin/tazobactam IVPB.. 3.375 Gram(s) IV Intermittent every 8 hours  sacubitril 24 mG/valsartan 26 mG 1 Tablet(s) Oral two times a day    MEDICATIONS  (PRN):  ALBUTerol    90 MICROgram(s) HFA Inhaler 2 Puff(s) Inhalation every 4 hours PRN Shortness of Breath  dextrose 40% Gel 15 Gram(s) Oral once PRN Blood Glucose LESS THAN 70 milliGRAM(s)/deciliter  glucagon  Injectable 1 milliGRAM(s) IntraMuscular once PRN Glucose LESS THAN 70 milligrams/deciliter      Allergies    No Known Drug Allergies  shellfish (Other; Urticaria)    Intolerances        T(F): 96.4 (04-27-20 @ 11:45), Max: 97.8 (04-26-20 @ 16:53)  HR: 50 (04-27-20 @ 11:45) (50 - 62)  BP: 102/59 (04-27-20 @ 11:45) (98/64 - 125/71)  RR: 18 (04-27-20 @ 11:45) (18 - 18)  SpO2: 99% (04-27-20 @ 11:45) (94% - 100%)  Wt(kg): --    PHYSICAL EXAM:  GENERAL: NAD  HEAD:  Atraumatic, Normocephalic  EYES: PERRLA, conjunctiva and sclera clear  ENMT: Moist mucous membranes  NECK: Supple, No JVD, Normal thyroid  NERVOUS SYSTEM:  Alert & Awake  CHEST/LUNG: Clear to percussion bilaterally;   HEART: Regular rate and rhythm;   ABDOMEN: Soft, Nontender, Nondistended; Bowel sounds present  EXTREMITIES:  no edema BL LE  SKIN: moist    LABS:                        12.8   7.76  )-----------( 203      ( 27 Apr 2020 06:37 )             37.6     04-27    140  |  107  |  20  ----------------------------<  116<H>  4.1   |  29  |  1.17    Ca    9.7      27 Apr 2020 06:37  Phos  2.2     04-27    TPro  6.8  /  Alb  2.3<L>  /  TBili  6.4<H>  /  DBili  x   /  AST  88<H>  /  ALT  76  /  AlkPhos  164<H>  04-27        Cultures;   CAPILLARY BLOOD GLUCOSE      POCT Blood Glucose.: 139 mg/dL (27 Apr 2020 11:54)  POCT Blood Glucose.: 154 mg/dL (27 Apr 2020 11:26)  POCT Blood Glucose.: 115 mg/dL (27 Apr 2020 09:11)  POCT Blood Glucose.: 118 mg/dL (26 Apr 2020 16:32)    Lipid panel:           RADIOLOGY & ADDITIONAL TESTS:    Imaging Personally Reviewed:  [x ] YES      Consultant(s) Notes Reviewed:  [x ] YES     Care Discussed with [x ] Consultants [X ] Patient [ ] Family  [x ]    [x ]  Other; RN

## 2020-04-27 NOTE — PROGRESS NOTE ADULT - PROVIDER SPECIALTY LIST ADULT
Cardiology
Gastroenterology
Hospitalist
Pulmonology

## 2020-04-27 NOTE — DISCHARGE NOTE PROVIDER - NSDCMRMEDTOKEN_GEN_ALL_CORE_FT
albuterol 90 mcg/inh inhalation aerosol: 2 puff(s) inhaled every 4 hours, As needed, Shortness of Breath  allopurinol 300 mg oral tablet: 1 tab(s) orally once a day  apixaban 5 mg oral tablet: 2 tab(s) orally every 12 hours  aspirin 81 mg oral tablet, chewable: 1 tab(s) orally once a day  carvedilol 25 mg oral tablet: 1 tab(s) orally every 12 hours  furosemide 40 mg oral tablet: 1 tab(s) orally once a day  HumaLOG KwikPen 200 units/mL (Concentrated) subcutaneous solution:  subcutaneous   latanoprost 0.005% ophthalmic solution: 1 drop(s) to each affected eye once a day (at bedtime)  sacubitril-valsartan 24 mg-26 mg oral tablet: 1 tab(s) orally 2 times a day  Travatan Z 0.004% ophthalmic solution: 1 drop(s) to each affected eye once a day (in the evening)

## 2020-04-27 NOTE — PROGRESS NOTE ADULT - SUBJECTIVE AND OBJECTIVE BOX
ALEXANDR SARAVIA    [address]    Patient is a 74y old  Male who presents with a chief complaint of dyspnea (26 Apr 2020 21:09)       Allergies    No Known Drug Allergies  shellfish (Other; Urticaria)    Intolerances        HPI:  Patient is a 74M with a PMH of CAD s/p MI w/stents 2007, CHF with 20% LVEF in 2016,  HTN, HLD, DM, gout, CKD, COPD, PVD s/p aortobifemoral bypass who presents to the ED for dyspnea.  Patient currently AAOx1 and unable to provide history.  As per ED attending, patient was sent in by family members due to cough and dyspnea.  Reportedly has been feeling ill since COVID pandemic started.  Vitals stable.  Labs show hypercalcemia, mild tropinema and elevated bilirubin.  CT head performed and was negative.  CT chest shows  bilateral PNA and a JUAREZ acute PE.  Started on therapeutic lovenox, will admit to tele. (21 Apr 2020 18:15)      PAST MEDICAL & SURGICAL HISTORY:  AICD (automatic cardioverter/defibrillator) present  Sickle cell trait  S/P aortobifemoral bypass surgery  Peripheral vascular disease  Chronic obstructive pulmonary disease  Renal insufficiency  Diabetes mellitus  Gout  Hypertension  Myocardial Infarction  Hypercholesteremia  Coronary Artery Disease  s/p Femoral-Popliteal Bypass Surgery  Abdominal Hernia      FAMILY HISTORY:  No pertinent family history in first degree relatives        MEDICATIONS   ALBUTerol    90 MICROgram(s) HFA Inhaler 2 Puff(s) Inhalation every 4 hours PRN  allopurinol 300 milliGRAM(s) Oral daily  apixaban 10 milliGRAM(s) Oral every 12 hours  aspirin  chewable 81 milliGRAM(s) Oral daily  carvedilol 25 milliGRAM(s) Oral every 12 hours  dextrose 40% Gel 15 Gram(s) Oral once PRN  dextrose 5%. 1000 milliLiter(s) IV Continuous <Continuous>  dextrose 50% Injectable 12.5 Gram(s) IV Push once  dextrose 50% Injectable 25 Gram(s) IV Push once  dextrose 50% Injectable 25 Gram(s) IV Push once  furosemide    Tablet 40 milliGRAM(s) Oral daily  glucagon  Injectable 1 milliGRAM(s) IntraMuscular once PRN  insulin lispro (HumaLOG) corrective regimen sliding scale   SubCutaneous three times a day before meals  lactulose Syrup 10 Gram(s) Oral daily  latanoprost 0.005% Ophthalmic Solution 1 Drop(s) Both EYES at bedtime  piperacillin/tazobactam IVPB.. 3.375 Gram(s) IV Intermittent every 8 hours  sacubitril 24 mG/valsartan 26 mG 1 Tablet(s) Oral two times a day      Vital Signs Last 24 Hrs  T(C): 35.8 (27 Apr 2020 05:56), Max: 36.6 (26 Apr 2020 16:53)  T(F): 96.4 (27 Apr 2020 05:56), Max: 97.8 (26 Apr 2020 16:53)  HR: 54 (27 Apr 2020 05:56) (54 - 62)  BP: 125/71 (27 Apr 2020 05:56) (98/64 - 125/71)  BP(mean): --  RR: 18 (27 Apr 2020 05:56) (18 - 18)  SpO2: 94% (27 Apr 2020 05:56) (94% - 100%)      04-26-20 @ 07:01  -  04-27-20 @ 07:00  --------------------------------------------------------  IN: 480 mL / OUT: 1 mL / NET: 479 mL            LABS:                        12.8   7.76  )-----------( 203      ( 27 Apr 2020 06:37 )             37.6     04-27    140  |  107  |  20  ----------------------------<  116<H>  4.1   |  29  |  1.17    Ca    9.7      27 Apr 2020 06:37  Phos  2.2     04-27    TPro  6.8  /  Alb  2.3<L>  /  TBili  6.4<H>  /  DBili  x   /  AST  88<H>  /  ALT  76  /  AlkPhos  164<H>  04-27              WBC:  WBC Count: 7.76 K/uL (04-27 @ 06:37)  WBC Count: 9.81 K/uL (04-25 @ 08:11)  WBC Count: 8.98 K/uL (04-24 @ 07:01)      MICROBIOLOGY:  RECENT CULTURES:  04-22 .Blood Blood. aero rec'd XXXX XXXX   No Growth Final    04-22 .Blood Blood. only aero rec'd XXXX XXXX   No Growth Final                    Sodium:  Sodium, Serum: 140 mmol/L (04-27 @ 06:37)  Sodium, Serum: 141 mmol/L (04-25 @ 08:11)  Sodium, Serum: 143 mmol/L (04-24 @ 07:01)      1.17 mg/dL 04-27 @ 06:37  1.29 mg/dL 04-25 @ 08:11  1.53 mg/dL 04-24 @ 07:01      Hemoglobin:  Hemoglobin: 12.8 g/dL (04-27 @ 06:37)  Hemoglobin: 13.2 g/dL (04-25 @ 08:11)  Hemoglobin: 12.5 g/dL (04-24 @ 07:01)      Platelets: Platelet Count - Automated: 203 K/uL (04-27 @ 06:37)  Platelet Count - Automated: 189 K/uL (04-25 @ 08:11)  Platelet Count - Automated: 163 K/uL (04-24 @ 07:01)      LIVER FUNCTIONS - ( 27 Apr 2020 06:37 )  Alb: 2.3 g/dL / Pro: 6.8 gm/dL / ALK PHOS: 164 U/L / ALT: 76 U/L / AST: 88 U/L / GGT: x                 RADIOLOGY & ADDITIONAL STUDIES:

## 2020-04-27 NOTE — DISCHARGE NOTE PROVIDER - NSDCCPCAREPLAN_GEN_ALL_CORE_FT
PRINCIPAL DISCHARGE DIAGNOSIS  Diagnosis: PE (pulmonary thromboembolism)  Assessment and Plan of Treatment:       SECONDARY DISCHARGE DIAGNOSES  Diagnosis: Acute respiratory failure with hypoxia  Assessment and Plan of Treatment: Acute respiratory failure with hypoxia    Diagnosis: Septic encephalopathy  Assessment and Plan of Treatment: Septic encephalopathy    Diagnosis: Chronic systolic congestive heart failure  Assessment and Plan of Treatment: Chronic systolic congestive heart failure    Diagnosis: Type 2 diabetes mellitus without complication, without long-term current use of insulin  Assessment and Plan of Treatment: Type 2 diabetes mellitus without complication, without long-term current use of insulin    Diagnosis: Coronary artery disease involving native coronary artery of native heart without angina pectoris  Assessment and Plan of Treatment: Coronary artery disease involving native coronary artery of native heart without angina pectoris    Diagnosis: Pneumonia  Assessment and Plan of Treatment:     Diagnosis: CHF (congestive heart failure)  Assessment and Plan of Treatment:

## 2020-04-28 LAB — MITOCHONDRIA AB SER-ACNC: SIGNIFICANT CHANGE UP

## 2020-04-29 DIAGNOSIS — R06.00 DYSPNEA, UNSPECIFIED: ICD-10-CM

## 2020-04-29 DIAGNOSIS — J15.6 PNEUMONIA DUE TO OTHER GRAM-NEGATIVE BACTERIA: ICD-10-CM

## 2020-04-29 DIAGNOSIS — N17.9 ACUTE KIDNEY FAILURE, UNSPECIFIED: ICD-10-CM

## 2020-04-29 DIAGNOSIS — Z79.82 LONG TERM (CURRENT) USE OF ASPIRIN: ICD-10-CM

## 2020-04-29 DIAGNOSIS — Z11.59 ENCOUNTER FOR SCREENING FOR OTHER VIRAL DISEASES: ICD-10-CM

## 2020-04-29 DIAGNOSIS — K46.9 UNSPECIFIED ABDOMINAL HERNIA WITHOUT OBSTRUCTION OR GANGRENE: ICD-10-CM

## 2020-04-29 DIAGNOSIS — E83.52 HYPERCALCEMIA: ICD-10-CM

## 2020-04-29 DIAGNOSIS — J44.0 CHRONIC OBSTRUCTIVE PULMONARY DISEASE WITH (ACUTE) LOWER RESPIRATORY INFECTION: ICD-10-CM

## 2020-04-29 DIAGNOSIS — D57.3 SICKLE-CELL TRAIT: ICD-10-CM

## 2020-04-29 DIAGNOSIS — Z79.4 LONG TERM (CURRENT) USE OF INSULIN: ICD-10-CM

## 2020-04-29 DIAGNOSIS — R79.89 OTHER SPECIFIED ABNORMAL FINDINGS OF BLOOD CHEMISTRY: ICD-10-CM

## 2020-04-29 DIAGNOSIS — Z95.810 PRESENCE OF AUTOMATIC (IMPLANTABLE) CARDIAC DEFIBRILLATOR: ICD-10-CM

## 2020-04-29 DIAGNOSIS — E78.5 HYPERLIPIDEMIA, UNSPECIFIED: ICD-10-CM

## 2020-04-29 DIAGNOSIS — I13.0 HYPERTENSIVE HEART AND CHRONIC KIDNEY DISEASE WITH HEART FAILURE AND STAGE 1 THROUGH STAGE 4 CHRONIC KIDNEY DISEASE, OR UNSPECIFIED CHRONIC KIDNEY DISEASE: ICD-10-CM

## 2020-04-29 DIAGNOSIS — I50.22 CHRONIC SYSTOLIC (CONGESTIVE) HEART FAILURE: ICD-10-CM

## 2020-04-29 DIAGNOSIS — I25.2 OLD MYOCARDIAL INFARCTION: ICD-10-CM

## 2020-04-29 DIAGNOSIS — N18.9 CHRONIC KIDNEY DISEASE, UNSPECIFIED: ICD-10-CM

## 2020-04-29 DIAGNOSIS — M10.9 GOUT, UNSPECIFIED: ICD-10-CM

## 2020-04-29 DIAGNOSIS — J96.01 ACUTE RESPIRATORY FAILURE WITH HYPOXIA: ICD-10-CM

## 2020-04-29 DIAGNOSIS — Z87.891 PERSONAL HISTORY OF NICOTINE DEPENDENCE: ICD-10-CM

## 2020-04-29 DIAGNOSIS — Z95.820 PERIPHERAL VASCULAR ANGIOPLASTY STATUS WITH IMPLANTS AND GRAFTS: ICD-10-CM

## 2020-04-29 DIAGNOSIS — E83.39 OTHER DISORDERS OF PHOSPHORUS METABOLISM: ICD-10-CM

## 2020-04-29 DIAGNOSIS — Z95.5 PRESENCE OF CORONARY ANGIOPLASTY IMPLANT AND GRAFT: ICD-10-CM

## 2020-04-29 DIAGNOSIS — G93.41 METABOLIC ENCEPHALOPATHY: ICD-10-CM

## 2020-04-29 DIAGNOSIS — I26.99 OTHER PULMONARY EMBOLISM WITHOUT ACUTE COR PULMONALE: ICD-10-CM

## 2020-04-29 DIAGNOSIS — I47.2 VENTRICULAR TACHYCARDIA: ICD-10-CM

## 2020-04-29 DIAGNOSIS — E80.7 DISORDER OF BILIRUBIN METABOLISM, UNSPECIFIED: ICD-10-CM

## 2020-04-29 DIAGNOSIS — I25.10 ATHEROSCLEROTIC HEART DISEASE OF NATIVE CORONARY ARTERY WITHOUT ANGINA PECTORIS: ICD-10-CM

## 2020-04-29 DIAGNOSIS — Z91.013 ALLERGY TO SEAFOOD: ICD-10-CM

## 2020-04-29 DIAGNOSIS — E11.22 TYPE 2 DIABETES MELLITUS WITH DIABETIC CHRONIC KIDNEY DISEASE: ICD-10-CM

## 2021-01-01 ENCOUNTER — TRANSCRIPTION ENCOUNTER (OUTPATIENT)
Age: 76
End: 2021-01-01

## 2021-01-01 ENCOUNTER — INPATIENT (INPATIENT)
Facility: HOSPITAL | Age: 76
LOS: 10 days | Discharge: NURSING, REHAB OR LONG-TERM CARE | End: 2021-10-18
Attending: INTERNAL MEDICINE | Admitting: INTERNAL MEDICINE
Payer: MEDICARE

## 2021-01-01 VITALS
OXYGEN SATURATION: 96 % | SYSTOLIC BLOOD PRESSURE: 131 MMHG | HEART RATE: 88 BPM | HEIGHT: 70 IN | RESPIRATION RATE: 20 BRPM | TEMPERATURE: 99 F | DIASTOLIC BLOOD PRESSURE: 92 MMHG

## 2021-01-01 VITALS
SYSTOLIC BLOOD PRESSURE: 95 MMHG | HEART RATE: 57 BPM | OXYGEN SATURATION: 98 % | DIASTOLIC BLOOD PRESSURE: 55 MMHG | TEMPERATURE: 98 F | RESPIRATION RATE: 18 BRPM

## 2021-01-01 DIAGNOSIS — R58 HEMORRHAGE, NOT ELSEWHERE CLASSIFIED: ICD-10-CM

## 2021-01-01 DIAGNOSIS — Z79.899 OTHER LONG TERM (CURRENT) DRUG THERAPY: ICD-10-CM

## 2021-01-01 DIAGNOSIS — E83.52 HYPERCALCEMIA: ICD-10-CM

## 2021-01-01 DIAGNOSIS — R06.02 SHORTNESS OF BREATH: ICD-10-CM

## 2021-01-01 DIAGNOSIS — I26.99 OTHER PULMONARY EMBOLISM WITHOUT ACUTE COR PULMONALE: ICD-10-CM

## 2021-01-01 DIAGNOSIS — Z87.09 PERSONAL HISTORY OF OTHER DISEASES OF THE RESPIRATORY SYSTEM: ICD-10-CM

## 2021-01-01 DIAGNOSIS — I50.9 HEART FAILURE, UNSPECIFIED: ICD-10-CM

## 2021-01-01 DIAGNOSIS — G93.49 OTHER ENCEPHALOPATHY: ICD-10-CM

## 2021-01-01 DIAGNOSIS — R79.89 OTHER SPECIFIED ABNORMAL FINDINGS OF BLOOD CHEMISTRY: ICD-10-CM

## 2021-01-01 DIAGNOSIS — E11.9 TYPE 2 DIABETES MELLITUS WITHOUT COMPLICATIONS: ICD-10-CM

## 2021-01-01 LAB
% ALBUMIN: 39.8 % — SIGNIFICANT CHANGE UP
% ALPHA 1: 7 % — SIGNIFICANT CHANGE UP
% ALPHA 2: 13.1 % — SIGNIFICANT CHANGE UP
% BETA: 17.4 % — SIGNIFICANT CHANGE UP
% GAMMA: 22.7 % — SIGNIFICANT CHANGE UP
24R-OH-CALCIDIOL SERPL-MCNC: 47.5 NG/ML — SIGNIFICANT CHANGE UP (ref 30–80)
A1C WITH ESTIMATED AVERAGE GLUCOSE RESULT: 5.8 % — HIGH (ref 4–5.6)
ALBUMIN SERPL ELPH-MCNC: 2.47 G/DL — LOW (ref 3.3–4.4)
ALBUMIN SERPL ELPH-MCNC: 2.6 G/DL — LOW (ref 3.3–5)
ALBUMIN SERPL ELPH-MCNC: 2.7 G/DL — LOW (ref 3.3–5)
ALBUMIN SERPL ELPH-MCNC: 2.7 G/DL — LOW (ref 3.3–5)
ALBUMIN SERPL ELPH-MCNC: 3.4 G/DL — SIGNIFICANT CHANGE UP (ref 3.3–5)
ALBUMIN SERPL ELPH-MCNC: 3.6 G/DL — SIGNIFICANT CHANGE UP (ref 3.3–5)
ALBUMIN/GLOB SERPL ELPH: 0.7 RATIO — SIGNIFICANT CHANGE UP
ALP SERPL-CCNC: 111 U/L — SIGNIFICANT CHANGE UP (ref 40–120)
ALP SERPL-CCNC: 116 U/L — SIGNIFICANT CHANGE UP (ref 40–120)
ALP SERPL-CCNC: 121 U/L — HIGH (ref 40–120)
ALP SERPL-CCNC: 123 U/L — HIGH (ref 40–120)
ALP SERPL-CCNC: 125 U/L — HIGH (ref 40–120)
ALP SERPL-CCNC: 127 U/L — HIGH (ref 40–120)
ALP SERPL-CCNC: 99 U/L — SIGNIFICANT CHANGE UP (ref 40–120)
ALPHA1 GLOB SERPL ELPH-MCNC: 0.43 G/DL — HIGH (ref 0.1–0.3)
ALPHA2 GLOB SERPL ELPH-MCNC: 0.8 G/DL — SIGNIFICANT CHANGE UP (ref 0.6–1)
ALT FLD-CCNC: 21 U/L — SIGNIFICANT CHANGE UP (ref 4–41)
ALT FLD-CCNC: 25 U/L — SIGNIFICANT CHANGE UP (ref 4–41)
ALT FLD-CCNC: 26 U/L — SIGNIFICANT CHANGE UP (ref 4–41)
ALT FLD-CCNC: 29 U/L — SIGNIFICANT CHANGE UP (ref 4–41)
ALT FLD-CCNC: 31 U/L — SIGNIFICANT CHANGE UP (ref 4–41)
ALT FLD-CCNC: 32 U/L — SIGNIFICANT CHANGE UP (ref 4–41)
ALT FLD-CCNC: 33 U/L — SIGNIFICANT CHANGE UP (ref 4–41)
AMMONIA BLD-MCNC: 37 UMOL/L — SIGNIFICANT CHANGE UP (ref 11–55)
AMMONIA BLD-MCNC: 47 UMOL/L — SIGNIFICANT CHANGE UP (ref 11–55)
ANION GAP SERPL CALC-SCNC: 10 MMOL/L — SIGNIFICANT CHANGE UP (ref 7–14)
ANION GAP SERPL CALC-SCNC: 11 MMOL/L — SIGNIFICANT CHANGE UP (ref 7–14)
ANION GAP SERPL CALC-SCNC: 12 MMOL/L — SIGNIFICANT CHANGE UP (ref 7–14)
ANION GAP SERPL CALC-SCNC: 13 MMOL/L — SIGNIFICANT CHANGE UP (ref 7–14)
ANION GAP SERPL CALC-SCNC: 14 MMOL/L — SIGNIFICANT CHANGE UP (ref 7–14)
ANION GAP SERPL CALC-SCNC: 14 MMOL/L — SIGNIFICANT CHANGE UP (ref 7–14)
APTT BLD: 106.5 SEC — HIGH (ref 27–36.3)
APTT BLD: 118.7 SEC — SIGNIFICANT CHANGE UP (ref 27–36.3)
APTT BLD: 118.9 SEC — HIGH (ref 27–36.3)
APTT BLD: 125.2 SEC — CRITICAL HIGH (ref 27–36.3)
APTT BLD: 155.5 SEC — SIGNIFICANT CHANGE UP (ref 27–36.3)
APTT BLD: 28 SEC — SIGNIFICANT CHANGE UP (ref 27–36.3)
APTT BLD: 42.2 SEC — HIGH (ref 27–36.3)
APTT BLD: 49.2 SEC — HIGH (ref 27–36.3)
APTT BLD: 53.6 SEC — HIGH (ref 27–36.3)
APTT BLD: 69.1 SEC — HIGH (ref 27–36.3)
APTT BLD: 77.3 SEC — HIGH (ref 27–36.3)
APTT BLD: 84.1 SEC — HIGH (ref 27–36.3)
APTT BLD: 97.2 SEC — HIGH (ref 27–36.3)
APTT BLD: >200 SEC — CRITICAL HIGH (ref 27–36.3)
AST SERPL-CCNC: 38 U/L — SIGNIFICANT CHANGE UP (ref 4–40)
AST SERPL-CCNC: 41 U/L — HIGH (ref 4–40)
AST SERPL-CCNC: 43 U/L — HIGH (ref 4–40)
AST SERPL-CCNC: 43 U/L — HIGH (ref 4–40)
AST SERPL-CCNC: 44 U/L — HIGH (ref 4–40)
AST SERPL-CCNC: 48 U/L — HIGH (ref 4–40)
AST SERPL-CCNC: 57 U/L — HIGH (ref 4–40)
B PERT DNA SPEC QL NAA+PROBE: SIGNIFICANT CHANGE UP
B PERT+PARAPERT DNA PNL SPEC NAA+PROBE: SIGNIFICANT CHANGE UP
B-GLOBULIN SERPL ELPH-MCNC: 1.08 G/DL — SIGNIFICANT CHANGE UP (ref 0.6–1.1)
BASE EXCESS BLDA CALC-SCNC: 5.6 MMOL/L — HIGH (ref -2–3)
BASOPHILS # BLD AUTO: 0 K/UL — SIGNIFICANT CHANGE UP (ref 0–0.2)
BASOPHILS # BLD AUTO: 0.01 K/UL — SIGNIFICANT CHANGE UP (ref 0–0.2)
BASOPHILS NFR BLD AUTO: 0 % — SIGNIFICANT CHANGE UP (ref 0–2)
BASOPHILS NFR BLD AUTO: 0.1 % — SIGNIFICANT CHANGE UP (ref 0–2)
BILIRUB DIRECT SERPL-MCNC: 3.1 MG/DL — HIGH (ref 0–0.2)
BILIRUB SERPL-MCNC: 2.7 MG/DL — HIGH (ref 0.2–1.2)
BILIRUB SERPL-MCNC: 2.9 MG/DL — HIGH (ref 0.2–1.2)
BILIRUB SERPL-MCNC: 3.3 MG/DL — HIGH (ref 0.2–1.2)
BILIRUB SERPL-MCNC: 3.8 MG/DL — HIGH (ref 0.2–1.2)
BILIRUB SERPL-MCNC: 4.2 MG/DL — HIGH (ref 0.2–1.2)
BILIRUB SERPL-MCNC: 5.2 MG/DL — HIGH (ref 0.2–1.2)
BILIRUB SERPL-MCNC: 5.6 MG/DL — HIGH (ref 0.2–1.2)
BLD GP AB SCN SERPL QL: NEGATIVE — SIGNIFICANT CHANGE UP
BLOOD GAS VENOUS COMPREHENSIVE RESULT: SIGNIFICANT CHANGE UP
BORDETELLA PARAPERTUSSIS (RAPRVP): SIGNIFICANT CHANGE UP
BUN SERPL-MCNC: 15 MG/DL — SIGNIFICANT CHANGE UP (ref 7–23)
BUN SERPL-MCNC: 17 MG/DL — SIGNIFICANT CHANGE UP (ref 7–23)
BUN SERPL-MCNC: 17 MG/DL — SIGNIFICANT CHANGE UP (ref 7–23)
BUN SERPL-MCNC: 18 MG/DL — SIGNIFICANT CHANGE UP (ref 7–23)
BUN SERPL-MCNC: 20 MG/DL — SIGNIFICANT CHANGE UP (ref 7–23)
BUN SERPL-MCNC: 23 MG/DL — SIGNIFICANT CHANGE UP (ref 7–23)
BUN SERPL-MCNC: 24 MG/DL — HIGH (ref 7–23)
BUN SERPL-MCNC: 25 MG/DL — HIGH (ref 7–23)
BUN SERPL-MCNC: 25 MG/DL — HIGH (ref 7–23)
BUN SERPL-MCNC: 26 MG/DL — HIGH (ref 7–23)
C PNEUM DNA SPEC QL NAA+PROBE: SIGNIFICANT CHANGE UP
CALCIUM SERPL-MCNC: 10.1 MG/DL — SIGNIFICANT CHANGE UP (ref 8.4–10.5)
CALCIUM SERPL-MCNC: 10.2 MG/DL — SIGNIFICANT CHANGE UP (ref 8.4–10.5)
CALCIUM SERPL-MCNC: 10.2 MG/DL — SIGNIFICANT CHANGE UP (ref 8.4–10.5)
CALCIUM SERPL-MCNC: 10.7 MG/DL — HIGH (ref 8.4–10.5)
CALCIUM SERPL-MCNC: 11.2 MG/DL — HIGH (ref 8.4–10.5)
CALCIUM SERPL-MCNC: 11.4 MG/DL — HIGH (ref 8.4–10.5)
CALCIUM SERPL-MCNC: 11.5 MG/DL — HIGH (ref 8.4–10.5)
CALCIUM SERPL-MCNC: 12.1 MG/DL — HIGH (ref 8.4–10.5)
CALCIUM SERPL-MCNC: 12.3 MG/DL — HIGH (ref 8.4–10.5)
CALCIUM SERPL-MCNC: 8.5 MG/DL — SIGNIFICANT CHANGE UP (ref 8.4–10.5)
CALCIUM SERPL-MCNC: 8.7 MG/DL — SIGNIFICANT CHANGE UP (ref 8.4–10.5)
CALCIUM SERPL-MCNC: 9 MG/DL — SIGNIFICANT CHANGE UP (ref 8.4–10.5)
CHLORIDE SERPL-SCNC: 104 MMOL/L — SIGNIFICANT CHANGE UP (ref 98–107)
CHLORIDE SERPL-SCNC: 105 MMOL/L — SIGNIFICANT CHANGE UP (ref 98–107)
CHLORIDE SERPL-SCNC: 106 MMOL/L — SIGNIFICANT CHANGE UP (ref 98–107)
CHLORIDE SERPL-SCNC: 108 MMOL/L — HIGH (ref 98–107)
CHLORIDE SERPL-SCNC: 109 MMOL/L — HIGH (ref 98–107)
CHLORIDE SERPL-SCNC: 109 MMOL/L — HIGH (ref 98–107)
CHLORIDE SERPL-SCNC: 110 MMOL/L — HIGH (ref 98–107)
CHLORIDE SERPL-SCNC: 110 MMOL/L — HIGH (ref 98–107)
CHLORIDE SERPL-SCNC: 112 MMOL/L — HIGH (ref 98–107)
CHLORIDE SERPL-SCNC: 113 MMOL/L — HIGH (ref 98–107)
CK MB BLD-MCNC: SIGNIFICANT CHANGE UP % (ref 0–2.5)
CK MB CFR SERPL CALC: 1.6 NG/ML — SIGNIFICANT CHANGE UP
CK SERPL-CCNC: 78 U/L — SIGNIFICANT CHANGE UP (ref 30–200)
CO2 BLDA-SCNC: 32 MMOL/L — HIGH (ref 19–24)
CO2 SERPL-SCNC: 19 MMOL/L — LOW (ref 22–31)
CO2 SERPL-SCNC: 20 MMOL/L — LOW (ref 22–31)
CO2 SERPL-SCNC: 21 MMOL/L — LOW (ref 22–31)
CO2 SERPL-SCNC: 22 MMOL/L — SIGNIFICANT CHANGE UP (ref 22–31)
CO2 SERPL-SCNC: 25 MMOL/L — SIGNIFICANT CHANGE UP (ref 22–31)
CO2 SERPL-SCNC: 26 MMOL/L — SIGNIFICANT CHANGE UP (ref 22–31)
COVID-19 SPIKE DOMAIN AB INTERP: POSITIVE
COVID-19 SPIKE DOMAIN ANTIBODY RESULT: >250 U/ML — HIGH
CREAT SERPL-MCNC: 0.82 MG/DL — SIGNIFICANT CHANGE UP (ref 0.5–1.3)
CREAT SERPL-MCNC: 0.89 MG/DL — SIGNIFICANT CHANGE UP (ref 0.5–1.3)
CREAT SERPL-MCNC: 0.9 MG/DL — SIGNIFICANT CHANGE UP (ref 0.5–1.3)
CREAT SERPL-MCNC: 0.91 MG/DL — SIGNIFICANT CHANGE UP (ref 0.5–1.3)
CREAT SERPL-MCNC: 0.94 MG/DL — SIGNIFICANT CHANGE UP (ref 0.5–1.3)
CREAT SERPL-MCNC: 0.97 MG/DL — SIGNIFICANT CHANGE UP (ref 0.5–1.3)
CREAT SERPL-MCNC: 0.97 MG/DL — SIGNIFICANT CHANGE UP (ref 0.5–1.3)
CREAT SERPL-MCNC: 0.98 MG/DL — SIGNIFICANT CHANGE UP (ref 0.5–1.3)
CREAT SERPL-MCNC: 0.98 MG/DL — SIGNIFICANT CHANGE UP (ref 0.5–1.3)
CREAT SERPL-MCNC: 1.04 MG/DL — SIGNIFICANT CHANGE UP (ref 0.5–1.3)
CREAT SERPL-MCNC: 1.05 MG/DL — SIGNIFICANT CHANGE UP (ref 0.5–1.3)
CREAT SERPL-MCNC: 1.07 MG/DL — SIGNIFICANT CHANGE UP (ref 0.5–1.3)
CULTURE RESULTS: SIGNIFICANT CHANGE UP
CULTURE RESULTS: SIGNIFICANT CHANGE UP
EOSINOPHIL # BLD AUTO: 0 K/UL — SIGNIFICANT CHANGE UP (ref 0–0.5)
EOSINOPHIL # BLD AUTO: 0.04 K/UL — SIGNIFICANT CHANGE UP (ref 0–0.5)
EOSINOPHIL NFR BLD AUTO: 0 % — SIGNIFICANT CHANGE UP (ref 0–6)
EOSINOPHIL NFR BLD AUTO: 0.5 % — SIGNIFICANT CHANGE UP (ref 0–6)
ESTIMATED AVERAGE GLUCOSE: 120 — SIGNIFICANT CHANGE UP
FLUAV SUBTYP SPEC NAA+PROBE: SIGNIFICANT CHANGE UP
FLUBV RNA SPEC QL NAA+PROBE: SIGNIFICANT CHANGE UP
FOLATE SERPL-MCNC: 8.2 NG/ML — SIGNIFICANT CHANGE UP (ref 3.1–17.5)
FOLATE SERPL-MCNC: 8.9 NG/ML — SIGNIFICANT CHANGE UP (ref 3.1–17.5)
GAMMA GLOBULIN: 1.41 G/DL — SIGNIFICANT CHANGE UP (ref 0.7–1.7)
GLUCOSE BLDC GLUCOMTR-MCNC: 101 MG/DL — HIGH (ref 70–99)
GLUCOSE BLDC GLUCOMTR-MCNC: 101 MG/DL — HIGH (ref 70–99)
GLUCOSE BLDC GLUCOMTR-MCNC: 105 MG/DL — HIGH (ref 70–99)
GLUCOSE BLDC GLUCOMTR-MCNC: 109 MG/DL — HIGH (ref 70–99)
GLUCOSE BLDC GLUCOMTR-MCNC: 109 MG/DL — HIGH (ref 70–99)
GLUCOSE BLDC GLUCOMTR-MCNC: 111 MG/DL — HIGH (ref 70–99)
GLUCOSE BLDC GLUCOMTR-MCNC: 113 MG/DL — HIGH (ref 70–99)
GLUCOSE BLDC GLUCOMTR-MCNC: 114 MG/DL — HIGH (ref 70–99)
GLUCOSE BLDC GLUCOMTR-MCNC: 114 MG/DL — HIGH (ref 70–99)
GLUCOSE BLDC GLUCOMTR-MCNC: 117 MG/DL — HIGH (ref 70–99)
GLUCOSE BLDC GLUCOMTR-MCNC: 117 MG/DL — HIGH (ref 70–99)
GLUCOSE BLDC GLUCOMTR-MCNC: 120 MG/DL — HIGH (ref 70–99)
GLUCOSE BLDC GLUCOMTR-MCNC: 122 MG/DL — HIGH (ref 70–99)
GLUCOSE BLDC GLUCOMTR-MCNC: 122 MG/DL — HIGH (ref 70–99)
GLUCOSE BLDC GLUCOMTR-MCNC: 124 MG/DL — HIGH (ref 70–99)
GLUCOSE BLDC GLUCOMTR-MCNC: 130 MG/DL — HIGH (ref 70–99)
GLUCOSE BLDC GLUCOMTR-MCNC: 131 MG/DL — HIGH (ref 70–99)
GLUCOSE BLDC GLUCOMTR-MCNC: 133 MG/DL — HIGH (ref 70–99)
GLUCOSE BLDC GLUCOMTR-MCNC: 136 MG/DL — HIGH (ref 70–99)
GLUCOSE BLDC GLUCOMTR-MCNC: 136 MG/DL — HIGH (ref 70–99)
GLUCOSE BLDC GLUCOMTR-MCNC: 140 MG/DL — HIGH (ref 70–99)
GLUCOSE BLDC GLUCOMTR-MCNC: 141 MG/DL — HIGH (ref 70–99)
GLUCOSE BLDC GLUCOMTR-MCNC: 148 MG/DL — HIGH (ref 70–99)
GLUCOSE BLDC GLUCOMTR-MCNC: 148 MG/DL — HIGH (ref 70–99)
GLUCOSE BLDC GLUCOMTR-MCNC: 150 MG/DL — HIGH (ref 70–99)
GLUCOSE BLDC GLUCOMTR-MCNC: 150 MG/DL — HIGH (ref 70–99)
GLUCOSE BLDC GLUCOMTR-MCNC: 152 MG/DL — HIGH (ref 70–99)
GLUCOSE BLDC GLUCOMTR-MCNC: 158 MG/DL — HIGH (ref 70–99)
GLUCOSE BLDC GLUCOMTR-MCNC: 160 MG/DL — HIGH (ref 70–99)
GLUCOSE BLDC GLUCOMTR-MCNC: 160 MG/DL — HIGH (ref 70–99)
GLUCOSE BLDC GLUCOMTR-MCNC: 167 MG/DL — HIGH (ref 70–99)
GLUCOSE BLDC GLUCOMTR-MCNC: 168 MG/DL — HIGH (ref 70–99)
GLUCOSE BLDC GLUCOMTR-MCNC: 169 MG/DL — HIGH (ref 70–99)
GLUCOSE BLDC GLUCOMTR-MCNC: 174 MG/DL — HIGH (ref 70–99)
GLUCOSE BLDC GLUCOMTR-MCNC: 190 MG/DL — HIGH (ref 70–99)
GLUCOSE BLDC GLUCOMTR-MCNC: 200 MG/DL — HIGH (ref 70–99)
GLUCOSE BLDC GLUCOMTR-MCNC: 211 MG/DL — HIGH (ref 70–99)
GLUCOSE BLDC GLUCOMTR-MCNC: 76 MG/DL — SIGNIFICANT CHANGE UP (ref 70–99)
GLUCOSE BLDC GLUCOMTR-MCNC: 89 MG/DL — SIGNIFICANT CHANGE UP (ref 70–99)
GLUCOSE BLDC GLUCOMTR-MCNC: 94 MG/DL — SIGNIFICANT CHANGE UP (ref 70–99)
GLUCOSE BLDC GLUCOMTR-MCNC: 96 MG/DL — SIGNIFICANT CHANGE UP (ref 70–99)
GLUCOSE BLDC GLUCOMTR-MCNC: 98 MG/DL — SIGNIFICANT CHANGE UP (ref 70–99)
GLUCOSE SERPL-MCNC: 102 MG/DL — HIGH (ref 70–99)
GLUCOSE SERPL-MCNC: 110 MG/DL — HIGH (ref 70–99)
GLUCOSE SERPL-MCNC: 110 MG/DL — HIGH (ref 70–99)
GLUCOSE SERPL-MCNC: 115 MG/DL — HIGH (ref 70–99)
GLUCOSE SERPL-MCNC: 115 MG/DL — HIGH (ref 70–99)
GLUCOSE SERPL-MCNC: 117 MG/DL — HIGH (ref 70–99)
GLUCOSE SERPL-MCNC: 124 MG/DL — HIGH (ref 70–99)
GLUCOSE SERPL-MCNC: 127 MG/DL — HIGH (ref 70–99)
GLUCOSE SERPL-MCNC: 133 MG/DL — HIGH (ref 70–99)
GLUCOSE SERPL-MCNC: 81 MG/DL — SIGNIFICANT CHANGE UP (ref 70–99)
GLUCOSE SERPL-MCNC: 95 MG/DL — SIGNIFICANT CHANGE UP (ref 70–99)
GLUCOSE SERPL-MCNC: 97 MG/DL — SIGNIFICANT CHANGE UP (ref 70–99)
HADV DNA SPEC QL NAA+PROBE: SIGNIFICANT CHANGE UP
HAPTOGLOB SERPL-MCNC: 72 MG/DL — SIGNIFICANT CHANGE UP (ref 34–200)
HCO3 BLDA-SCNC: 31 MMOL/L — HIGH (ref 21–28)
HCOV 229E RNA SPEC QL NAA+PROBE: SIGNIFICANT CHANGE UP
HCOV HKU1 RNA SPEC QL NAA+PROBE: SIGNIFICANT CHANGE UP
HCOV NL63 RNA SPEC QL NAA+PROBE: SIGNIFICANT CHANGE UP
HCOV OC43 RNA SPEC QL NAA+PROBE: SIGNIFICANT CHANGE UP
HCT VFR BLD CALC: 30 % — LOW (ref 39–50)
HCT VFR BLD CALC: 30.1 % — LOW (ref 39–50)
HCT VFR BLD CALC: 31.5 % — LOW (ref 39–50)
HCT VFR BLD CALC: 33.3 % — LOW (ref 39–50)
HCT VFR BLD CALC: 33.7 % — LOW (ref 39–50)
HCT VFR BLD CALC: 34 % — LOW (ref 39–50)
HCT VFR BLD CALC: 35.3 % — LOW (ref 39–50)
HCT VFR BLD CALC: 37.5 % — LOW (ref 39–50)
HCT VFR BLD CALC: 37.6 % — LOW (ref 39–50)
HCT VFR BLD CALC: 38.9 % — LOW (ref 39–50)
HCT VFR BLD CALC: 40.3 % — SIGNIFICANT CHANGE UP (ref 39–50)
HCT VFR BLD CALC: 40.9 % — SIGNIFICANT CHANGE UP (ref 39–50)
HCT VFR BLD CALC: 41 % — SIGNIFICANT CHANGE UP (ref 39–50)
HCT VFR BLD CALC: 41.2 % — SIGNIFICANT CHANGE UP (ref 39–50)
HGB BLD-MCNC: 10.2 G/DL — LOW (ref 13–17)
HGB BLD-MCNC: 10.4 G/DL — LOW (ref 13–17)
HGB BLD-MCNC: 10.5 G/DL — LOW (ref 13–17)
HGB BLD-MCNC: 11.2 G/DL — LOW (ref 13–17)
HGB BLD-MCNC: 11.4 G/DL — LOW (ref 13–17)
HGB BLD-MCNC: 11.6 G/DL — LOW (ref 13–17)
HGB BLD-MCNC: 11.8 G/DL — LOW (ref 13–17)
HGB BLD-MCNC: 12.7 G/DL — LOW (ref 13–17)
HGB BLD-MCNC: 12.8 G/DL — LOW (ref 13–17)
HGB BLD-MCNC: 13.1 G/DL — SIGNIFICANT CHANGE UP (ref 13–17)
HGB BLD-MCNC: 13.3 G/DL — SIGNIFICANT CHANGE UP (ref 13–17)
HGB BLD-MCNC: 13.6 G/DL — SIGNIFICANT CHANGE UP (ref 13–17)
HGB BLD-MCNC: 13.6 G/DL — SIGNIFICANT CHANGE UP (ref 13–17)
HGB BLD-MCNC: 14.1 G/DL — SIGNIFICANT CHANGE UP (ref 13–17)
HMPV RNA SPEC QL NAA+PROBE: SIGNIFICANT CHANGE UP
HPIV1 RNA SPEC QL NAA+PROBE: SIGNIFICANT CHANGE UP
HPIV2 RNA SPEC QL NAA+PROBE: SIGNIFICANT CHANGE UP
HPIV3 RNA SPEC QL NAA+PROBE: SIGNIFICANT CHANGE UP
HPIV4 RNA SPEC QL NAA+PROBE: SIGNIFICANT CHANGE UP
IANC: 5.81 K/UL — SIGNIFICANT CHANGE UP (ref 1.5–8.5)
IANC: 7.01 K/UL — SIGNIFICANT CHANGE UP (ref 1.5–8.5)
IMM GRANULOCYTES NFR BLD AUTO: 0.4 % — SIGNIFICANT CHANGE UP (ref 0–1.5)
IMM GRANULOCYTES NFR BLD AUTO: 0.7 % — SIGNIFICANT CHANGE UP (ref 0–1.5)
INR BLD: 1.71 RATIO — HIGH (ref 0.88–1.16)
INR BLD: 1.72 RATIO — HIGH (ref 0.88–1.16)
INTERPRETATION SERPL IFE-IMP: SIGNIFICANT CHANGE UP
KAPPA LC SER QL IFE: 2.28 MG/DL — HIGH (ref 0.33–1.94)
KAPPA/LAMBDA FREE LIGHT CHAIN RATIO, SERUM: 0.68 RATIO — SIGNIFICANT CHANGE UP (ref 0.26–1.65)
LAMBDA LC SER QL IFE: 3.37 MG/DL — HIGH (ref 0.57–2.63)
LYMPHOCYTES # BLD AUTO: 0.69 K/UL — LOW (ref 1–3.3)
LYMPHOCYTES # BLD AUTO: 0.71 K/UL — LOW (ref 1–3.3)
LYMPHOCYTES # BLD AUTO: 8.2 % — LOW (ref 13–44)
LYMPHOCYTES # BLD AUTO: 9.4 % — LOW (ref 13–44)
M PNEUMO DNA SPEC QL NAA+PROBE: SIGNIFICANT CHANGE UP
MAGNESIUM SERPL-MCNC: 1.8 MG/DL — SIGNIFICANT CHANGE UP (ref 1.6–2.6)
MAGNESIUM SERPL-MCNC: 1.9 MG/DL — SIGNIFICANT CHANGE UP (ref 1.6–2.6)
MAGNESIUM SERPL-MCNC: 2 MG/DL — SIGNIFICANT CHANGE UP (ref 1.6–2.6)
MAGNESIUM SERPL-MCNC: 2.1 MG/DL — SIGNIFICANT CHANGE UP (ref 1.6–2.6)
MAGNESIUM SERPL-MCNC: 2.2 MG/DL — SIGNIFICANT CHANGE UP (ref 1.6–2.6)
MAGNESIUM SERPL-MCNC: 2.2 MG/DL — SIGNIFICANT CHANGE UP (ref 1.6–2.6)
MAGNESIUM SERPL-MCNC: 2.3 MG/DL — SIGNIFICANT CHANGE UP (ref 1.6–2.6)
MAGNESIUM SERPL-MCNC: 2.3 MG/DL — SIGNIFICANT CHANGE UP (ref 1.6–2.6)
MCHC RBC-ENTMCNC: 25.4 PG — LOW (ref 27–34)
MCHC RBC-ENTMCNC: 25.8 PG — LOW (ref 27–34)
MCHC RBC-ENTMCNC: 26 PG — LOW (ref 27–34)
MCHC RBC-ENTMCNC: 26.1 PG — LOW (ref 27–34)
MCHC RBC-ENTMCNC: 26.2 PG — LOW (ref 27–34)
MCHC RBC-ENTMCNC: 26.2 PG — LOW (ref 27–34)
MCHC RBC-ENTMCNC: 26.3 PG — LOW (ref 27–34)
MCHC RBC-ENTMCNC: 26.3 PG — LOW (ref 27–34)
MCHC RBC-ENTMCNC: 26.4 PG — LOW (ref 27–34)
MCHC RBC-ENTMCNC: 26.5 PG — LOW (ref 27–34)
MCHC RBC-ENTMCNC: 26.6 PG — LOW (ref 27–34)
MCHC RBC-ENTMCNC: 26.7 PG — LOW (ref 27–34)
MCHC RBC-ENTMCNC: 26.7 PG — LOW (ref 27–34)
MCHC RBC-ENTMCNC: 26.8 PG — LOW (ref 27–34)
MCHC RBC-ENTMCNC: 33 GM/DL — SIGNIFICANT CHANGE UP (ref 32–36)
MCHC RBC-ENTMCNC: 33.2 GM/DL — SIGNIFICANT CHANGE UP (ref 32–36)
MCHC RBC-ENTMCNC: 33.3 GM/DL — SIGNIFICANT CHANGE UP (ref 32–36)
MCHC RBC-ENTMCNC: 33.3 GM/DL — SIGNIFICANT CHANGE UP (ref 32–36)
MCHC RBC-ENTMCNC: 33.4 GM/DL — SIGNIFICANT CHANGE UP (ref 32–36)
MCHC RBC-ENTMCNC: 33.6 GM/DL — SIGNIFICANT CHANGE UP (ref 32–36)
MCHC RBC-ENTMCNC: 33.7 GM/DL — SIGNIFICANT CHANGE UP (ref 32–36)
MCHC RBC-ENTMCNC: 33.8 GM/DL — SIGNIFICANT CHANGE UP (ref 32–36)
MCHC RBC-ENTMCNC: 33.9 GM/DL — SIGNIFICANT CHANGE UP (ref 32–36)
MCHC RBC-ENTMCNC: 33.9 GM/DL — SIGNIFICANT CHANGE UP (ref 32–36)
MCHC RBC-ENTMCNC: 34 GM/DL — SIGNIFICANT CHANGE UP (ref 32–36)
MCHC RBC-ENTMCNC: 34.1 GM/DL — SIGNIFICANT CHANGE UP (ref 32–36)
MCHC RBC-ENTMCNC: 34.2 GM/DL — SIGNIFICANT CHANGE UP (ref 32–36)
MCHC RBC-ENTMCNC: 34.7 GM/DL — SIGNIFICANT CHANGE UP (ref 32–36)
MCV RBC AUTO: 76.3 FL — LOW (ref 80–100)
MCV RBC AUTO: 77.1 FL — LOW (ref 80–100)
MCV RBC AUTO: 77.1 FL — LOW (ref 80–100)
MCV RBC AUTO: 77.2 FL — LOW (ref 80–100)
MCV RBC AUTO: 77.6 FL — LOW (ref 80–100)
MCV RBC AUTO: 77.8 FL — LOW (ref 80–100)
MCV RBC AUTO: 78 FL — LOW (ref 80–100)
MCV RBC AUTO: 78.2 FL — LOW (ref 80–100)
MCV RBC AUTO: 78.3 FL — LOW (ref 80–100)
MCV RBC AUTO: 78.4 FL — LOW (ref 80–100)
MCV RBC AUTO: 78.6 FL — LOW (ref 80–100)
MCV RBC AUTO: 78.8 FL — LOW (ref 80–100)
MCV RBC AUTO: 79 FL — LOW (ref 80–100)
MCV RBC AUTO: 79 FL — LOW (ref 80–100)
MONOCYTES # BLD AUTO: 0.8 K/UL — SIGNIFICANT CHANGE UP (ref 0–0.9)
MONOCYTES # BLD AUTO: 0.92 K/UL — HIGH (ref 0–0.9)
MONOCYTES NFR BLD AUTO: 10.6 % — SIGNIFICANT CHANGE UP (ref 2–14)
MONOCYTES NFR BLD AUTO: 10.9 % — SIGNIFICANT CHANGE UP (ref 2–14)
NEUTROPHILS # BLD AUTO: 5.81 K/UL — SIGNIFICANT CHANGE UP (ref 1.8–7.4)
NEUTROPHILS # BLD AUTO: 7.01 K/UL — SIGNIFICANT CHANGE UP (ref 1.8–7.4)
NEUTROPHILS NFR BLD AUTO: 78.8 % — HIGH (ref 43–77)
NEUTROPHILS NFR BLD AUTO: 80.4 % — HIGH (ref 43–77)
NRBC # BLD: 0 /100 WBCS — SIGNIFICANT CHANGE UP
NRBC # FLD: 0 K/UL — SIGNIFICANT CHANGE UP
NRBC # FLD: 0.02 K/UL — HIGH
NRBC # FLD: 0.03 K/UL — HIGH
NRBC # FLD: 0.06 K/UL — HIGH
NT-PROBNP SERPL-SCNC: 7296 PG/ML — HIGH
PCO2 BLDA: 45 MMHG — SIGNIFICANT CHANGE UP (ref 35–48)
PH BLDA: 7.44 — SIGNIFICANT CHANGE UP (ref 7.35–7.45)
PHOSPHATE SERPL-MCNC: 1.9 MG/DL — LOW (ref 2.5–4.5)
PHOSPHATE SERPL-MCNC: 2.1 MG/DL — LOW (ref 2.5–4.5)
PHOSPHATE SERPL-MCNC: 2.1 MG/DL — LOW (ref 2.5–4.5)
PHOSPHATE SERPL-MCNC: 2.4 MG/DL — LOW (ref 2.5–4.5)
PHOSPHATE SERPL-MCNC: 2.5 MG/DL — SIGNIFICANT CHANGE UP (ref 2.5–4.5)
PHOSPHATE SERPL-MCNC: 2.5 MG/DL — SIGNIFICANT CHANGE UP (ref 2.5–4.5)
PHOSPHATE SERPL-MCNC: 2.6 MG/DL — SIGNIFICANT CHANGE UP (ref 2.5–4.5)
PLATELET # BLD AUTO: 130 K/UL — LOW (ref 150–400)
PLATELET # BLD AUTO: 149 K/UL — LOW (ref 150–400)
PLATELET # BLD AUTO: 149 K/UL — LOW (ref 150–400)
PLATELET # BLD AUTO: 151 K/UL — SIGNIFICANT CHANGE UP (ref 150–400)
PLATELET # BLD AUTO: 153 K/UL — SIGNIFICANT CHANGE UP (ref 150–400)
PLATELET # BLD AUTO: 158 K/UL — SIGNIFICANT CHANGE UP (ref 150–400)
PLATELET # BLD AUTO: 161 K/UL — SIGNIFICANT CHANGE UP (ref 150–400)
PLATELET # BLD AUTO: 162 K/UL — SIGNIFICANT CHANGE UP (ref 150–400)
PLATELET # BLD AUTO: 181 K/UL — SIGNIFICANT CHANGE UP (ref 150–400)
PLATELET # BLD AUTO: 187 K/UL — SIGNIFICANT CHANGE UP (ref 150–400)
PLATELET # BLD AUTO: 189 K/UL — SIGNIFICANT CHANGE UP (ref 150–400)
PLATELET # BLD AUTO: 198 K/UL — SIGNIFICANT CHANGE UP (ref 150–400)
PLATELET # BLD AUTO: 229 K/UL — SIGNIFICANT CHANGE UP (ref 150–400)
PLATELET # BLD AUTO: 231 K/UL — SIGNIFICANT CHANGE UP (ref 150–400)
PO2 BLDA: 182 MMHG — HIGH (ref 83–108)
POTASSIUM SERPL-MCNC: 3.2 MMOL/L — LOW (ref 3.5–5.3)
POTASSIUM SERPL-MCNC: 3.4 MMOL/L — LOW (ref 3.5–5.3)
POTASSIUM SERPL-MCNC: 3.5 MMOL/L — SIGNIFICANT CHANGE UP (ref 3.5–5.3)
POTASSIUM SERPL-MCNC: 3.5 MMOL/L — SIGNIFICANT CHANGE UP (ref 3.5–5.3)
POTASSIUM SERPL-MCNC: 3.6 MMOL/L — SIGNIFICANT CHANGE UP (ref 3.5–5.3)
POTASSIUM SERPL-MCNC: 3.6 MMOL/L — SIGNIFICANT CHANGE UP (ref 3.5–5.3)
POTASSIUM SERPL-MCNC: 3.9 MMOL/L — SIGNIFICANT CHANGE UP (ref 3.5–5.3)
POTASSIUM SERPL-MCNC: 4 MMOL/L — SIGNIFICANT CHANGE UP (ref 3.5–5.3)
POTASSIUM SERPL-MCNC: 4.2 MMOL/L — SIGNIFICANT CHANGE UP (ref 3.5–5.3)
POTASSIUM SERPL-MCNC: 4.3 MMOL/L — SIGNIFICANT CHANGE UP (ref 3.5–5.3)
POTASSIUM SERPL-MCNC: 4.6 MMOL/L — SIGNIFICANT CHANGE UP (ref 3.5–5.3)
POTASSIUM SERPL-MCNC: 4.7 MMOL/L — SIGNIFICANT CHANGE UP (ref 3.5–5.3)
POTASSIUM SERPL-SCNC: 3.2 MMOL/L — LOW (ref 3.5–5.3)
POTASSIUM SERPL-SCNC: 3.4 MMOL/L — LOW (ref 3.5–5.3)
POTASSIUM SERPL-SCNC: 3.5 MMOL/L — SIGNIFICANT CHANGE UP (ref 3.5–5.3)
POTASSIUM SERPL-SCNC: 3.5 MMOL/L — SIGNIFICANT CHANGE UP (ref 3.5–5.3)
POTASSIUM SERPL-SCNC: 3.6 MMOL/L — SIGNIFICANT CHANGE UP (ref 3.5–5.3)
POTASSIUM SERPL-SCNC: 3.6 MMOL/L — SIGNIFICANT CHANGE UP (ref 3.5–5.3)
POTASSIUM SERPL-SCNC: 3.9 MMOL/L — SIGNIFICANT CHANGE UP (ref 3.5–5.3)
POTASSIUM SERPL-SCNC: 4 MMOL/L — SIGNIFICANT CHANGE UP (ref 3.5–5.3)
POTASSIUM SERPL-SCNC: 4.2 MMOL/L — SIGNIFICANT CHANGE UP (ref 3.5–5.3)
POTASSIUM SERPL-SCNC: 4.3 MMOL/L — SIGNIFICANT CHANGE UP (ref 3.5–5.3)
POTASSIUM SERPL-SCNC: 4.6 MMOL/L — SIGNIFICANT CHANGE UP (ref 3.5–5.3)
POTASSIUM SERPL-SCNC: 4.7 MMOL/L — SIGNIFICANT CHANGE UP (ref 3.5–5.3)
PROT PATTERN SERPL ELPH-IMP: SIGNIFICANT CHANGE UP
PROT PATTERN SERPL ELPH-IMP: SIGNIFICANT CHANGE UP
PROT SERPL-MCNC: 5.7 G/DL — LOW (ref 6–8.3)
PROT SERPL-MCNC: 6.1 G/DL — SIGNIFICANT CHANGE UP (ref 6–8.3)
PROT SERPL-MCNC: 6.2 G/DL — SIGNIFICANT CHANGE UP
PROT SERPL-MCNC: 6.2 G/DL — SIGNIFICANT CHANGE UP (ref 6–8.3)
PROT SERPL-MCNC: 6.3 G/DL — SIGNIFICANT CHANGE UP (ref 6–8.3)
PROT SERPL-MCNC: 6.4 G/DL — SIGNIFICANT CHANGE UP (ref 6–8.3)
PROT SERPL-MCNC: 7.1 G/DL — SIGNIFICANT CHANGE UP (ref 6–8.3)
PROTHROM AB SERPL-ACNC: 19.1 SEC — HIGH (ref 10.6–13.6)
PROTHROM AB SERPL-ACNC: 19.2 SEC — HIGH (ref 10.6–13.6)
PTH RELATED PROT SERPL-MCNC: <2 PMOL/L — SIGNIFICANT CHANGE UP
PTH-INTACT FLD-MCNC: 114 PG/ML — HIGH (ref 15–65)
PTH-INTACT FLD-MCNC: 114 PG/ML — HIGH (ref 15–65)
RAPID RVP RESULT: SIGNIFICANT CHANGE UP
RBC # BLD: 3.82 M/UL — LOW (ref 4.2–5.8)
RBC # BLD: 3.89 M/UL — LOW (ref 4.2–5.8)
RBC # BLD: 4.13 M/UL — LOW (ref 4.2–5.8)
RBC # BLD: 4.27 M/UL — SIGNIFICANT CHANGE UP (ref 4.2–5.8)
RBC # BLD: 4.34 M/UL — SIGNIFICANT CHANGE UP (ref 4.2–5.8)
RBC # BLD: 4.41 M/UL — SIGNIFICANT CHANGE UP (ref 4.2–5.8)
RBC # BLD: 4.57 M/UL — SIGNIFICANT CHANGE UP (ref 4.2–5.8)
RBC # BLD: 4.77 M/UL — SIGNIFICANT CHANGE UP (ref 4.2–5.8)
RBC # BLD: 4.83 M/UL — SIGNIFICANT CHANGE UP (ref 4.2–5.8)
RBC # BLD: 4.96 M/UL — SIGNIFICANT CHANGE UP (ref 4.2–5.8)
RBC # BLD: 4.96 M/UL — SIGNIFICANT CHANGE UP (ref 4.2–5.8)
RBC # BLD: 5.1 M/UL — SIGNIFICANT CHANGE UP (ref 4.2–5.8)
RBC # BLD: 5.19 M/UL — SIGNIFICANT CHANGE UP (ref 4.2–5.8)
RBC # BLD: 5.23 M/UL — SIGNIFICANT CHANGE UP (ref 4.2–5.8)
RBC # BLD: 5.26 M/UL — SIGNIFICANT CHANGE UP (ref 4.2–5.8)
RBC # FLD: 17.4 % — HIGH (ref 10.3–14.5)
RBC # FLD: 17.6 % — HIGH (ref 10.3–14.5)
RBC # FLD: 17.7 % — HIGH (ref 10.3–14.5)
RBC # FLD: 17.8 % — HIGH (ref 10.3–14.5)
RBC # FLD: 17.8 % — HIGH (ref 10.3–14.5)
RBC # FLD: 17.9 % — HIGH (ref 10.3–14.5)
RBC # FLD: 18 % — HIGH (ref 10.3–14.5)
RBC # FLD: 18.1 % — HIGH (ref 10.3–14.5)
RBC # FLD: 18.3 % — HIGH (ref 10.3–14.5)
RBC # FLD: 18.3 % — HIGH (ref 10.3–14.5)
RBC # FLD: 18.5 % — HIGH (ref 10.3–14.5)
RBC # FLD: 18.6 % — HIGH (ref 10.3–14.5)
RETICS #: 96.7 K/UL — SIGNIFICANT CHANGE UP (ref 25–125)
RETICS/RBC NFR: 2 % — SIGNIFICANT CHANGE UP (ref 0.5–2.5)
RH IG SCN BLD-IMP: POSITIVE — SIGNIFICANT CHANGE UP
RSV RNA SPEC QL NAA+PROBE: SIGNIFICANT CHANGE UP
RV+EV RNA SPEC QL NAA+PROBE: SIGNIFICANT CHANGE UP
SAO2 % BLDA: 99.5 % — HIGH (ref 94–98)
SARS-COV-2 IGG+IGM SERPL QL IA: >250 U/ML — HIGH
SARS-COV-2 IGG+IGM SERPL QL IA: POSITIVE
SARS-COV-2 RNA SPEC QL NAA+PROBE: SIGNIFICANT CHANGE UP
SODIUM SERPL-SCNC: 139 MMOL/L — SIGNIFICANT CHANGE UP (ref 135–145)
SODIUM SERPL-SCNC: 140 MMOL/L — SIGNIFICANT CHANGE UP (ref 135–145)
SODIUM SERPL-SCNC: 141 MMOL/L — SIGNIFICANT CHANGE UP (ref 135–145)
SODIUM SERPL-SCNC: 143 MMOL/L — SIGNIFICANT CHANGE UP (ref 135–145)
SODIUM SERPL-SCNC: 144 MMOL/L — SIGNIFICANT CHANGE UP (ref 135–145)
SODIUM SERPL-SCNC: 145 MMOL/L — SIGNIFICANT CHANGE UP (ref 135–145)
SODIUM SERPL-SCNC: 146 MMOL/L — HIGH (ref 135–145)
SODIUM SERPL-SCNC: 147 MMOL/L — HIGH (ref 135–145)
SODIUM SERPL-SCNC: 148 MMOL/L — HIGH (ref 135–145)
SODIUM SERPL-SCNC: 149 MMOL/L — HIGH (ref 135–145)
SPECIMEN SOURCE: SIGNIFICANT CHANGE UP
SPECIMEN SOURCE: SIGNIFICANT CHANGE UP
TROPONIN T, HIGH SENSITIVITY RESULT: 30 NG/L — SIGNIFICANT CHANGE UP
TROPONIN T, HIGH SENSITIVITY RESULT: 31 NG/L — SIGNIFICANT CHANGE UP
TROPONIN T, HIGH SENSITIVITY RESULT: 32 NG/L — SIGNIFICANT CHANGE UP
TSH SERPL-MCNC: 0.5 UIU/ML — SIGNIFICANT CHANGE UP (ref 0.27–4.2)
TSH SERPL-MCNC: 0.94 UIU/ML — SIGNIFICANT CHANGE UP (ref 0.27–4.2)
VIT B12 SERPL-MCNC: 2000 PG/ML — HIGH (ref 200–900)
VIT B12 SERPL-MCNC: 2000 PG/ML — HIGH (ref 200–900)
VIT D25+D1,25 OH+D1,25 PNL SERPL-MCNC: 35.8 PG/ML — SIGNIFICANT CHANGE UP (ref 19.9–79.3)
WBC # BLD: 10.6 K/UL — HIGH (ref 3.8–10.5)
WBC # BLD: 6.79 K/UL — SIGNIFICANT CHANGE UP (ref 3.8–10.5)
WBC # BLD: 7.04 K/UL — SIGNIFICANT CHANGE UP (ref 3.8–10.5)
WBC # BLD: 7.08 K/UL — SIGNIFICANT CHANGE UP (ref 3.8–10.5)
WBC # BLD: 7.37 K/UL — SIGNIFICANT CHANGE UP (ref 3.8–10.5)
WBC # BLD: 7.37 K/UL — SIGNIFICANT CHANGE UP (ref 3.8–10.5)
WBC # BLD: 8.36 K/UL — SIGNIFICANT CHANGE UP (ref 3.8–10.5)
WBC # BLD: 8.52 K/UL — SIGNIFICANT CHANGE UP (ref 3.8–10.5)
WBC # BLD: 8.53 K/UL — SIGNIFICANT CHANGE UP (ref 3.8–10.5)
WBC # BLD: 8.71 K/UL — SIGNIFICANT CHANGE UP (ref 3.8–10.5)
WBC # BLD: 8.97 K/UL — SIGNIFICANT CHANGE UP (ref 3.8–10.5)
WBC # BLD: 8.98 K/UL — SIGNIFICANT CHANGE UP (ref 3.8–10.5)
WBC # BLD: 9.68 K/UL — SIGNIFICANT CHANGE UP (ref 3.8–10.5)
WBC # BLD: 9.84 K/UL — SIGNIFICANT CHANGE UP (ref 3.8–10.5)
WBC # FLD AUTO: 10.6 K/UL — HIGH (ref 3.8–10.5)
WBC # FLD AUTO: 6.79 K/UL — SIGNIFICANT CHANGE UP (ref 3.8–10.5)
WBC # FLD AUTO: 7.04 K/UL — SIGNIFICANT CHANGE UP (ref 3.8–10.5)
WBC # FLD AUTO: 7.08 K/UL — SIGNIFICANT CHANGE UP (ref 3.8–10.5)
WBC # FLD AUTO: 7.37 K/UL — SIGNIFICANT CHANGE UP (ref 3.8–10.5)
WBC # FLD AUTO: 7.37 K/UL — SIGNIFICANT CHANGE UP (ref 3.8–10.5)
WBC # FLD AUTO: 8.36 K/UL — SIGNIFICANT CHANGE UP (ref 3.8–10.5)
WBC # FLD AUTO: 8.52 K/UL — SIGNIFICANT CHANGE UP (ref 3.8–10.5)
WBC # FLD AUTO: 8.53 K/UL — SIGNIFICANT CHANGE UP (ref 3.8–10.5)
WBC # FLD AUTO: 8.71 K/UL — SIGNIFICANT CHANGE UP (ref 3.8–10.5)
WBC # FLD AUTO: 8.97 K/UL — SIGNIFICANT CHANGE UP (ref 3.8–10.5)
WBC # FLD AUTO: 8.98 K/UL — SIGNIFICANT CHANGE UP (ref 3.8–10.5)
WBC # FLD AUTO: 9.68 K/UL — SIGNIFICANT CHANGE UP (ref 3.8–10.5)
WBC # FLD AUTO: 9.84 K/UL — SIGNIFICANT CHANGE UP (ref 3.8–10.5)

## 2021-01-01 PROCEDURE — 93306 TTE W/DOPPLER COMPLETE: CPT | Mod: 26

## 2021-01-01 PROCEDURE — 70450 CT HEAD/BRAIN W/O DYE: CPT | Mod: 26

## 2021-01-01 PROCEDURE — 71046 X-RAY EXAM CHEST 2 VIEWS: CPT | Mod: 26

## 2021-01-01 PROCEDURE — 84165 PROTEIN E-PHORESIS SERUM: CPT | Mod: 26

## 2021-01-01 PROCEDURE — 74177 CT ABD & PELVIS W/CONTRAST: CPT | Mod: 26

## 2021-01-01 PROCEDURE — 93010 ELECTROCARDIOGRAM REPORT: CPT

## 2021-01-01 PROCEDURE — 99291 CRITICAL CARE FIRST HOUR: CPT

## 2021-01-01 PROCEDURE — 93970 EXTREMITY STUDY: CPT | Mod: 26

## 2021-01-01 PROCEDURE — 86334 IMMUNOFIX E-PHORESIS SERUM: CPT | Mod: 26

## 2021-01-01 PROCEDURE — 71275 CT ANGIOGRAPHY CHEST: CPT | Mod: 26

## 2021-01-01 PROCEDURE — 76705 ECHO EXAM OF ABDOMEN: CPT | Mod: 26

## 2021-01-01 PROCEDURE — 99223 1ST HOSP IP/OBS HIGH 75: CPT

## 2021-01-01 RX ORDER — LACTULOSE 10 G/15ML
15 SOLUTION ORAL ONCE
Refills: 0 | Status: COMPLETED | OUTPATIENT
Start: 2021-01-01 | End: 2021-01-01

## 2021-01-01 RX ORDER — GLUCAGON INJECTION, SOLUTION 0.5 MG/.1ML
1 INJECTION, SOLUTION SUBCUTANEOUS ONCE
Refills: 0 | Status: DISCONTINUED | OUTPATIENT
Start: 2021-01-01 | End: 2021-01-01

## 2021-01-01 RX ORDER — APIXABAN 2.5 MG/1
2 TABLET, FILM COATED ORAL
Qty: 16 | Refills: 0
Start: 2021-01-01 | End: 2021-01-01

## 2021-01-01 RX ORDER — SPIRONOLACTONE 25 MG/1
0.5 TABLET, FILM COATED ORAL
Qty: 0 | Refills: 0 | DISCHARGE

## 2021-01-01 RX ORDER — FUROSEMIDE 40 MG
40 TABLET ORAL DAILY
Refills: 0 | Status: DISCONTINUED | OUTPATIENT
Start: 2021-01-01 | End: 2021-01-01

## 2021-01-01 RX ORDER — SODIUM CHLORIDE 9 MG/ML
1000 INJECTION, SOLUTION INTRAVENOUS
Refills: 0 | Status: DISCONTINUED | OUTPATIENT
Start: 2021-01-01 | End: 2021-01-01

## 2021-01-01 RX ORDER — INSULIN LISPRO 100/ML
VIAL (ML) SUBCUTANEOUS
Refills: 0 | Status: DISCONTINUED | OUTPATIENT
Start: 2021-01-01 | End: 2021-01-01

## 2021-01-01 RX ORDER — TRAVOPROST 0.04 MG/ML
1 SOLUTION/ DROPS OPHTHALMIC
Qty: 0 | Refills: 0 | DISCHARGE

## 2021-01-01 RX ORDER — SACUBITRIL AND VALSARTAN 24; 26 MG/1; MG/1
1 TABLET, FILM COATED ORAL
Refills: 0 | Status: DISCONTINUED | OUTPATIENT
Start: 2021-01-01 | End: 2021-01-01

## 2021-01-01 RX ORDER — DEXTROSE 50 % IN WATER 50 %
25 SYRINGE (ML) INTRAVENOUS ONCE
Refills: 0 | Status: DISCONTINUED | OUTPATIENT
Start: 2021-01-01 | End: 2021-01-01

## 2021-01-01 RX ORDER — MEMANTINE HYDROCHLORIDE 10 MG/1
5 TABLET ORAL AT BEDTIME
Refills: 0 | Status: DISCONTINUED | OUTPATIENT
Start: 2021-01-01 | End: 2021-01-01

## 2021-01-01 RX ORDER — ASPIRIN/CALCIUM CARB/MAGNESIUM 324 MG
162 TABLET ORAL ONCE
Refills: 0 | Status: COMPLETED | OUTPATIENT
Start: 2021-01-01 | End: 2021-01-01

## 2021-01-01 RX ORDER — PIPERACILLIN AND TAZOBACTAM 4; .5 G/20ML; G/20ML
3.38 INJECTION, POWDER, LYOPHILIZED, FOR SOLUTION INTRAVENOUS ONCE
Refills: 0 | Status: COMPLETED | OUTPATIENT
Start: 2021-01-01 | End: 2021-01-01

## 2021-01-01 RX ORDER — HEPARIN SODIUM 5000 [USP'U]/ML
9000 INJECTION INTRAVENOUS; SUBCUTANEOUS EVERY 6 HOURS
Refills: 0 | Status: DISCONTINUED | OUTPATIENT
Start: 2021-01-01 | End: 2021-01-01

## 2021-01-01 RX ORDER — FUROSEMIDE 40 MG
1 TABLET ORAL
Qty: 30 | Refills: 0
Start: 2021-01-01 | End: 2021-01-01

## 2021-01-01 RX ORDER — POTASSIUM PHOSPHATE, MONOBASIC POTASSIUM PHOSPHATE, DIBASIC 236; 224 MG/ML; MG/ML
15 INJECTION, SOLUTION INTRAVENOUS ONCE
Refills: 0 | Status: COMPLETED | OUTPATIENT
Start: 2021-01-01 | End: 2021-01-01

## 2021-01-01 RX ORDER — DEXTROSE 50 % IN WATER 50 %
15 SYRINGE (ML) INTRAVENOUS ONCE
Refills: 0 | Status: DISCONTINUED | OUTPATIENT
Start: 2021-01-01 | End: 2021-01-01

## 2021-01-01 RX ORDER — CARVEDILOL PHOSPHATE 80 MG/1
25 CAPSULE, EXTENDED RELEASE ORAL EVERY 12 HOURS
Refills: 0 | Status: DISCONTINUED | OUTPATIENT
Start: 2021-01-01 | End: 2021-01-01

## 2021-01-01 RX ORDER — HEPARIN SODIUM 5000 [USP'U]/ML
1400 INJECTION INTRAVENOUS; SUBCUTANEOUS
Qty: 25000 | Refills: 0 | Status: DISCONTINUED | OUTPATIENT
Start: 2021-01-01 | End: 2021-01-01

## 2021-01-01 RX ORDER — POTASSIUM CHLORIDE 20 MEQ
40 PACKET (EA) ORAL EVERY 4 HOURS
Refills: 0 | Status: COMPLETED | OUTPATIENT
Start: 2021-01-01 | End: 2021-01-01

## 2021-01-01 RX ORDER — LANOLIN ALCOHOL/MO/W.PET/CERES
3 CREAM (GRAM) TOPICAL AT BEDTIME
Refills: 0 | Status: DISCONTINUED | OUTPATIENT
Start: 2021-01-01 | End: 2021-01-01

## 2021-01-01 RX ORDER — HEPARIN SODIUM 5000 [USP'U]/ML
1600 INJECTION INTRAVENOUS; SUBCUTANEOUS
Qty: 25000 | Refills: 0 | Status: DISCONTINUED | OUTPATIENT
Start: 2021-01-01 | End: 2021-01-01

## 2021-01-01 RX ORDER — HEPARIN SODIUM 5000 [USP'U]/ML
INJECTION INTRAVENOUS; SUBCUTANEOUS
Qty: 25000 | Refills: 0 | Status: DISCONTINUED | OUTPATIENT
Start: 2021-01-01 | End: 2021-01-01

## 2021-01-01 RX ORDER — PANTOPRAZOLE SODIUM 20 MG/1
1 TABLET, DELAYED RELEASE ORAL
Qty: 30 | Refills: 0
Start: 2021-01-01 | End: 2021-01-01

## 2021-01-01 RX ORDER — CINACALCET 30 MG/1
60 TABLET, FILM COATED ORAL DAILY
Refills: 0 | Status: DISCONTINUED | OUTPATIENT
Start: 2021-01-01 | End: 2021-01-01

## 2021-01-01 RX ORDER — ASPIRIN/CALCIUM CARB/MAGNESIUM 324 MG
81 TABLET ORAL DAILY
Refills: 0 | Status: DISCONTINUED | OUTPATIENT
Start: 2021-01-01 | End: 2021-01-01

## 2021-01-01 RX ORDER — DEXTROSE 50 % IN WATER 50 %
12.5 SYRINGE (ML) INTRAVENOUS ONCE
Refills: 0 | Status: DISCONTINUED | OUTPATIENT
Start: 2021-01-01 | End: 2021-01-01

## 2021-01-01 RX ORDER — INSULIN LISPRO 100/ML
VIAL (ML) SUBCUTANEOUS AT BEDTIME
Refills: 0 | Status: DISCONTINUED | OUTPATIENT
Start: 2021-01-01 | End: 2021-01-01

## 2021-01-01 RX ORDER — SACUBITRIL AND VALSARTAN 24; 26 MG/1; MG/1
1 TABLET, FILM COATED ORAL
Qty: 60 | Refills: 0
Start: 2021-01-01 | End: 2021-01-01

## 2021-01-01 RX ORDER — HEPARIN SODIUM 5000 [USP'U]/ML
4000 INJECTION INTRAVENOUS; SUBCUTANEOUS EVERY 6 HOURS
Refills: 0 | Status: DISCONTINUED | OUTPATIENT
Start: 2021-01-01 | End: 2021-01-01

## 2021-01-01 RX ORDER — SENNA PLUS 8.6 MG/1
2 TABLET ORAL
Qty: 60 | Refills: 0
Start: 2021-01-01 | End: 2021-01-01

## 2021-01-01 RX ORDER — POTASSIUM PHOSPHATE, MONOBASIC POTASSIUM PHOSPHATE, DIBASIC 236; 224 MG/ML; MG/ML
30 INJECTION, SOLUTION INTRAVENOUS ONCE
Refills: 0 | Status: COMPLETED | OUTPATIENT
Start: 2021-01-01 | End: 2021-01-01

## 2021-01-01 RX ORDER — LISINOPRIL 2.5 MG/1
1 TABLET ORAL
Qty: 0 | Refills: 0 | DISCHARGE

## 2021-01-01 RX ORDER — MEMANTINE HYDROCHLORIDE 10 MG/1
1 TABLET ORAL
Qty: 30 | Refills: 0
Start: 2021-01-01 | End: 2021-01-01

## 2021-01-01 RX ORDER — APIXABAN 2.5 MG/1
10 TABLET, FILM COATED ORAL EVERY 12 HOURS
Refills: 0 | Status: DISCONTINUED | OUTPATIENT
Start: 2021-01-01 | End: 2021-01-01

## 2021-01-01 RX ORDER — CINACALCET 30 MG/1
1 TABLET, FILM COATED ORAL
Qty: 30 | Refills: 0
Start: 2021-01-01 | End: 2021-01-01

## 2021-01-01 RX ORDER — PANTOPRAZOLE SODIUM 20 MG/1
40 TABLET, DELAYED RELEASE ORAL
Refills: 0 | Status: DISCONTINUED | OUTPATIENT
Start: 2021-01-01 | End: 2021-01-01

## 2021-01-01 RX ORDER — SODIUM,POTASSIUM PHOSPHATES 278-250MG
1 POWDER IN PACKET (EA) ORAL ONCE
Refills: 0 | Status: COMPLETED | OUTPATIENT
Start: 2021-01-01 | End: 2021-01-01

## 2021-01-01 RX ORDER — NITROGLYCERIN 6.5 MG
0.4 CAPSULE, EXTENDED RELEASE ORAL
Refills: 0 | Status: DISCONTINUED | OUTPATIENT
Start: 2021-01-01 | End: 2021-01-01

## 2021-01-01 RX ORDER — THIAMINE MONONITRATE (VIT B1) 100 MG
500 TABLET ORAL EVERY 8 HOURS
Refills: 0 | Status: COMPLETED | OUTPATIENT
Start: 2021-01-01 | End: 2021-01-01

## 2021-01-01 RX ORDER — POTASSIUM CHLORIDE 20 MEQ
20 PACKET (EA) ORAL ONCE
Refills: 0 | Status: COMPLETED | OUTPATIENT
Start: 2021-01-01 | End: 2021-01-01

## 2021-01-01 RX ORDER — LACTULOSE 10 G/15ML
10 SOLUTION ORAL DAILY
Refills: 0 | Status: DISCONTINUED | OUTPATIENT
Start: 2021-01-01 | End: 2021-01-01

## 2021-01-01 RX ORDER — SENNA PLUS 8.6 MG/1
2 TABLET ORAL AT BEDTIME
Refills: 0 | Status: DISCONTINUED | OUTPATIENT
Start: 2021-01-01 | End: 2021-01-01

## 2021-01-01 RX ORDER — APIXABAN 2.5 MG/1
1 TABLET, FILM COATED ORAL
Qty: 30 | Refills: 0
Start: 2021-01-01 | End: 2021-01-01

## 2021-01-01 RX ORDER — ALBUTEROL 90 UG/1
2 AEROSOL, METERED ORAL EVERY 6 HOURS
Refills: 0 | Status: DISCONTINUED | OUTPATIENT
Start: 2021-01-01 | End: 2021-01-01

## 2021-01-01 RX ORDER — HEPARIN SODIUM 5000 [USP'U]/ML
9000 INJECTION INTRAVENOUS; SUBCUTANEOUS ONCE
Refills: 0 | Status: COMPLETED | OUTPATIENT
Start: 2021-01-01 | End: 2021-01-01

## 2021-01-01 RX ADMIN — SACUBITRIL AND VALSARTAN 1 TABLET(S): 24; 26 TABLET, FILM COATED ORAL at 05:14

## 2021-01-01 RX ADMIN — CARVEDILOL PHOSPHATE 25 MILLIGRAM(S): 80 CAPSULE, EXTENDED RELEASE ORAL at 05:14

## 2021-01-01 RX ADMIN — Medication 1: at 12:38

## 2021-01-01 RX ADMIN — SODIUM CHLORIDE 50 MILLILITER(S): 9 INJECTION, SOLUTION INTRAVENOUS at 14:45

## 2021-01-01 RX ADMIN — HEPARIN SODIUM 1500 UNIT(S)/HR: 5000 INJECTION INTRAVENOUS; SUBCUTANEOUS at 03:58

## 2021-01-01 RX ADMIN — Medication 105 MILLIGRAM(S): at 16:35

## 2021-01-01 RX ADMIN — Medication 40 MILLIGRAM(S): at 06:01

## 2021-01-01 RX ADMIN — CARVEDILOL PHOSPHATE 25 MILLIGRAM(S): 80 CAPSULE, EXTENDED RELEASE ORAL at 06:01

## 2021-01-01 RX ADMIN — SACUBITRIL AND VALSARTAN 1 TABLET(S): 24; 26 TABLET, FILM COATED ORAL at 06:01

## 2021-01-01 RX ADMIN — POTASSIUM PHOSPHATE, MONOBASIC POTASSIUM PHOSPHATE, DIBASIC 83.33 MILLIMOLE(S): 236; 224 INJECTION, SOLUTION INTRAVENOUS at 18:04

## 2021-01-01 RX ADMIN — HEPARIN SODIUM 1200 UNIT(S)/HR: 5000 INJECTION INTRAVENOUS; SUBCUTANEOUS at 04:47

## 2021-01-01 RX ADMIN — PIPERACILLIN AND TAZOBACTAM 200 GRAM(S): 4; .5 INJECTION, POWDER, LYOPHILIZED, FOR SOLUTION INTRAVENOUS at 21:47

## 2021-01-01 RX ADMIN — CARVEDILOL PHOSPHATE 25 MILLIGRAM(S): 80 CAPSULE, EXTENDED RELEASE ORAL at 06:40

## 2021-01-01 RX ADMIN — Medication 1: at 12:09

## 2021-01-01 RX ADMIN — HEPARIN SODIUM 1200 UNIT(S)/HR: 5000 INJECTION INTRAVENOUS; SUBCUTANEOUS at 08:37

## 2021-01-01 RX ADMIN — APIXABAN 10 MILLIGRAM(S): 2.5 TABLET, FILM COATED ORAL at 10:33

## 2021-01-01 RX ADMIN — SODIUM CHLORIDE 50 MILLILITER(S): 9 INJECTION, SOLUTION INTRAVENOUS at 15:25

## 2021-01-01 RX ADMIN — Medication 3 MILLIGRAM(S): at 23:31

## 2021-01-01 RX ADMIN — Medication 105 MILLIGRAM(S): at 08:57

## 2021-01-01 RX ADMIN — Medication 40 MILLIGRAM(S): at 07:58

## 2021-01-01 RX ADMIN — APIXABAN 10 MILLIGRAM(S): 2.5 TABLET, FILM COATED ORAL at 06:43

## 2021-01-01 RX ADMIN — Medication 3 MILLIGRAM(S): at 22:17

## 2021-01-01 RX ADMIN — Medication 1: at 09:30

## 2021-01-01 RX ADMIN — Medication 40 MILLIGRAM(S): at 06:41

## 2021-01-01 RX ADMIN — SACUBITRIL AND VALSARTAN 1 TABLET(S): 24; 26 TABLET, FILM COATED ORAL at 06:54

## 2021-01-01 RX ADMIN — SODIUM CHLORIDE 50 MILLILITER(S): 9 INJECTION, SOLUTION INTRAVENOUS at 08:00

## 2021-01-01 RX ADMIN — CARVEDILOL PHOSPHATE 25 MILLIGRAM(S): 80 CAPSULE, EXTENDED RELEASE ORAL at 18:01

## 2021-01-01 RX ADMIN — SODIUM CHLORIDE 50 MILLILITER(S): 9 INJECTION, SOLUTION INTRAVENOUS at 08:07

## 2021-01-01 RX ADMIN — MEMANTINE HYDROCHLORIDE 5 MILLIGRAM(S): 10 TABLET ORAL at 22:17

## 2021-01-01 RX ADMIN — PANTOPRAZOLE SODIUM 40 MILLIGRAM(S): 20 TABLET, DELAYED RELEASE ORAL at 06:12

## 2021-01-01 RX ADMIN — PANTOPRAZOLE SODIUM 40 MILLIGRAM(S): 20 TABLET, DELAYED RELEASE ORAL at 06:55

## 2021-01-01 RX ADMIN — PANTOPRAZOLE SODIUM 40 MILLIGRAM(S): 20 TABLET, DELAYED RELEASE ORAL at 05:32

## 2021-01-01 RX ADMIN — Medication 3 MILLIGRAM(S): at 21:53

## 2021-01-01 RX ADMIN — Medication 3 MILLIGRAM(S): at 00:21

## 2021-01-01 RX ADMIN — PANTOPRAZOLE SODIUM 40 MILLIGRAM(S): 20 TABLET, DELAYED RELEASE ORAL at 06:40

## 2021-01-01 RX ADMIN — Medication 105 MILLIGRAM(S): at 16:57

## 2021-01-01 RX ADMIN — SODIUM CHLORIDE 60 MILLILITER(S): 9 INJECTION, SOLUTION INTRAVENOUS at 06:37

## 2021-01-01 RX ADMIN — Medication 105 MILLIGRAM(S): at 07:14

## 2021-01-01 RX ADMIN — Medication 81 MILLIGRAM(S): at 11:42

## 2021-01-01 RX ADMIN — CARVEDILOL PHOSPHATE 25 MILLIGRAM(S): 80 CAPSULE, EXTENDED RELEASE ORAL at 06:11

## 2021-01-01 RX ADMIN — CARVEDILOL PHOSPHATE 25 MILLIGRAM(S): 80 CAPSULE, EXTENDED RELEASE ORAL at 17:25

## 2021-01-01 RX ADMIN — SACUBITRIL AND VALSARTAN 1 TABLET(S): 24; 26 TABLET, FILM COATED ORAL at 06:47

## 2021-01-01 RX ADMIN — Medication 105 MILLIGRAM(S): at 00:39

## 2021-01-01 RX ADMIN — HEPARIN SODIUM 1300 UNIT(S)/HR: 5000 INJECTION INTRAVENOUS; SUBCUTANEOUS at 21:13

## 2021-01-01 RX ADMIN — HEPARIN SODIUM 1400 UNIT(S)/HR: 5000 INJECTION INTRAVENOUS; SUBCUTANEOUS at 08:05

## 2021-01-01 RX ADMIN — Medication 1: at 12:16

## 2021-01-01 RX ADMIN — APIXABAN 10 MILLIGRAM(S): 2.5 TABLET, FILM COATED ORAL at 18:00

## 2021-01-01 RX ADMIN — SACUBITRIL AND VALSARTAN 1 TABLET(S): 24; 26 TABLET, FILM COATED ORAL at 17:31

## 2021-01-01 RX ADMIN — PANTOPRAZOLE SODIUM 40 MILLIGRAM(S): 20 TABLET, DELAYED RELEASE ORAL at 06:01

## 2021-01-01 RX ADMIN — Medication 1: at 12:42

## 2021-01-01 RX ADMIN — CARVEDILOL PHOSPHATE 25 MILLIGRAM(S): 80 CAPSULE, EXTENDED RELEASE ORAL at 18:38

## 2021-01-01 RX ADMIN — SACUBITRIL AND VALSARTAN 1 TABLET(S): 24; 26 TABLET, FILM COATED ORAL at 07:58

## 2021-01-01 RX ADMIN — Medication 1 PACKET(S): at 08:02

## 2021-01-01 RX ADMIN — LACTULOSE 10 GRAM(S): 10 SOLUTION ORAL at 12:17

## 2021-01-01 RX ADMIN — Medication 1: at 17:28

## 2021-01-01 RX ADMIN — Medication 40 MILLIGRAM(S): at 06:43

## 2021-01-01 RX ADMIN — APIXABAN 10 MILLIGRAM(S): 2.5 TABLET, FILM COATED ORAL at 19:19

## 2021-01-01 RX ADMIN — LACTULOSE 10 GRAM(S): 10 SOLUTION ORAL at 18:04

## 2021-01-01 RX ADMIN — SENNA PLUS 2 TABLET(S): 8.6 TABLET ORAL at 23:30

## 2021-01-01 RX ADMIN — SACUBITRIL AND VALSARTAN 1 TABLET(S): 24; 26 TABLET, FILM COATED ORAL at 18:38

## 2021-01-01 RX ADMIN — SACUBITRIL AND VALSARTAN 1 TABLET(S): 24; 26 TABLET, FILM COATED ORAL at 17:14

## 2021-01-01 RX ADMIN — PANTOPRAZOLE SODIUM 40 MILLIGRAM(S): 20 TABLET, DELAYED RELEASE ORAL at 08:00

## 2021-01-01 RX ADMIN — HEPARIN SODIUM 1500 UNIT(S)/HR: 5000 INJECTION INTRAVENOUS; SUBCUTANEOUS at 13:59

## 2021-01-01 RX ADMIN — CARVEDILOL PHOSPHATE 25 MILLIGRAM(S): 80 CAPSULE, EXTENDED RELEASE ORAL at 06:54

## 2021-01-01 RX ADMIN — Medication 3 MILLIGRAM(S): at 21:51

## 2021-01-01 RX ADMIN — LACTULOSE 10 GRAM(S): 10 SOLUTION ORAL at 12:14

## 2021-01-01 RX ADMIN — HEPARIN SODIUM 1500 UNIT(S)/HR: 5000 INJECTION INTRAVENOUS; SUBCUTANEOUS at 06:45

## 2021-01-01 RX ADMIN — Medication 40 MILLIGRAM(S): at 06:11

## 2021-01-01 RX ADMIN — SACUBITRIL AND VALSARTAN 1 TABLET(S): 24; 26 TABLET, FILM COATED ORAL at 17:46

## 2021-01-01 RX ADMIN — CINACALCET 60 MILLIGRAM(S): 30 TABLET, FILM COATED ORAL at 17:25

## 2021-01-01 RX ADMIN — SODIUM CHLORIDE 50 MILLILITER(S): 9 INJECTION, SOLUTION INTRAVENOUS at 09:59

## 2021-01-01 RX ADMIN — APIXABAN 10 MILLIGRAM(S): 2.5 TABLET, FILM COATED ORAL at 17:40

## 2021-01-01 RX ADMIN — HEPARIN SODIUM 1300 UNIT(S)/HR: 5000 INJECTION INTRAVENOUS; SUBCUTANEOUS at 13:42

## 2021-01-01 RX ADMIN — Medication 105 MILLIGRAM(S): at 21:50

## 2021-01-01 RX ADMIN — HEPARIN SODIUM 1400 UNIT(S)/HR: 5000 INJECTION INTRAVENOUS; SUBCUTANEOUS at 21:44

## 2021-01-01 RX ADMIN — SACUBITRIL AND VALSARTAN 1 TABLET(S): 24; 26 TABLET, FILM COATED ORAL at 18:01

## 2021-01-01 RX ADMIN — Medication 162 MILLIGRAM(S): at 19:19

## 2021-01-01 RX ADMIN — HEPARIN SODIUM 2300 UNIT(S)/HR: 5000 INJECTION INTRAVENOUS; SUBCUTANEOUS at 23:55

## 2021-01-01 RX ADMIN — Medication 3 MILLIGRAM(S): at 22:35

## 2021-01-01 RX ADMIN — Medication 40 MILLIGRAM(S): at 05:14

## 2021-01-01 RX ADMIN — HEPARIN SODIUM 1600 UNIT(S)/HR: 5000 INJECTION INTRAVENOUS; SUBCUTANEOUS at 09:58

## 2021-01-01 RX ADMIN — CARVEDILOL PHOSPHATE 25 MILLIGRAM(S): 80 CAPSULE, EXTENDED RELEASE ORAL at 17:14

## 2021-01-01 RX ADMIN — Medication 40 MILLIEQUIVALENT(S): at 17:32

## 2021-01-01 RX ADMIN — CINACALCET 60 MILLIGRAM(S): 30 TABLET, FILM COATED ORAL at 11:43

## 2021-01-01 RX ADMIN — SACUBITRIL AND VALSARTAN 1 TABLET(S): 24; 26 TABLET, FILM COATED ORAL at 18:27

## 2021-01-01 RX ADMIN — Medication 40 MILLIGRAM(S): at 06:57

## 2021-01-01 RX ADMIN — CINACALCET 60 MILLIGRAM(S): 30 TABLET, FILM COATED ORAL at 12:00

## 2021-01-01 RX ADMIN — POTASSIUM PHOSPHATE, MONOBASIC POTASSIUM PHOSPHATE, DIBASIC 62.5 MILLIMOLE(S): 236; 224 INJECTION, SOLUTION INTRAVENOUS at 17:58

## 2021-01-01 RX ADMIN — PANTOPRAZOLE SODIUM 40 MILLIGRAM(S): 20 TABLET, DELAYED RELEASE ORAL at 06:43

## 2021-01-01 RX ADMIN — LACTULOSE 10 GRAM(S): 10 SOLUTION ORAL at 13:22

## 2021-01-01 RX ADMIN — APIXABAN 10 MILLIGRAM(S): 2.5 TABLET, FILM COATED ORAL at 05:31

## 2021-01-01 RX ADMIN — LACTULOSE 10 GRAM(S): 10 SOLUTION ORAL at 12:16

## 2021-01-01 RX ADMIN — POTASSIUM PHOSPHATE, MONOBASIC POTASSIUM PHOSPHATE, DIBASIC 62.5 MILLIMOLE(S): 236; 224 INJECTION, SOLUTION INTRAVENOUS at 10:01

## 2021-01-01 RX ADMIN — MEMANTINE HYDROCHLORIDE 5 MILLIGRAM(S): 10 TABLET ORAL at 22:35

## 2021-01-01 RX ADMIN — APIXABAN 10 MILLIGRAM(S): 2.5 TABLET, FILM COATED ORAL at 07:58

## 2021-01-01 RX ADMIN — Medication 2: at 08:02

## 2021-01-01 RX ADMIN — SENNA PLUS 2 TABLET(S): 8.6 TABLET ORAL at 22:17

## 2021-01-01 RX ADMIN — MEMANTINE HYDROCHLORIDE 5 MILLIGRAM(S): 10 TABLET ORAL at 21:26

## 2021-01-01 RX ADMIN — HEPARIN SODIUM 1300 UNIT(S)/HR: 5000 INJECTION INTRAVENOUS; SUBCUTANEOUS at 19:56

## 2021-01-01 RX ADMIN — SACUBITRIL AND VALSARTAN 1 TABLET(S): 24; 26 TABLET, FILM COATED ORAL at 06:12

## 2021-01-01 RX ADMIN — HEPARIN SODIUM 0 UNIT(S)/HR: 5000 INJECTION INTRAVENOUS; SUBCUTANEOUS at 18:53

## 2021-01-01 RX ADMIN — Medication 1: at 12:06

## 2021-01-01 RX ADMIN — SACUBITRIL AND VALSARTAN 1 TABLET(S): 24; 26 TABLET, FILM COATED ORAL at 06:43

## 2021-01-01 RX ADMIN — PANTOPRAZOLE SODIUM 40 MILLIGRAM(S): 20 TABLET, DELAYED RELEASE ORAL at 05:14

## 2021-01-01 RX ADMIN — CARVEDILOL PHOSPHATE 25 MILLIGRAM(S): 80 CAPSULE, EXTENDED RELEASE ORAL at 06:43

## 2021-01-01 RX ADMIN — CINACALCET 60 MILLIGRAM(S): 30 TABLET, FILM COATED ORAL at 13:23

## 2021-01-01 RX ADMIN — LACTULOSE 15 GRAM(S): 10 SOLUTION ORAL at 11:53

## 2021-01-01 RX ADMIN — Medication 0.4 MILLIGRAM(S): at 06:23

## 2021-01-01 RX ADMIN — CINACALCET 60 MILLIGRAM(S): 30 TABLET, FILM COATED ORAL at 11:58

## 2021-01-01 RX ADMIN — Medication 20 MILLIEQUIVALENT(S): at 12:22

## 2021-01-01 RX ADMIN — SENNA PLUS 2 TABLET(S): 8.6 TABLET ORAL at 21:50

## 2021-01-01 RX ADMIN — LACTULOSE 10 GRAM(S): 10 SOLUTION ORAL at 11:43

## 2021-01-01 RX ADMIN — LACTULOSE 10 GRAM(S): 10 SOLUTION ORAL at 11:42

## 2021-01-01 RX ADMIN — Medication 40 MILLIEQUIVALENT(S): at 14:46

## 2021-01-01 RX ADMIN — CINACALCET 60 MILLIGRAM(S): 30 TABLET, FILM COATED ORAL at 12:16

## 2021-01-01 RX ADMIN — HEPARIN SODIUM 1900 UNIT(S)/HR: 5000 INJECTION INTRAVENOUS; SUBCUTANEOUS at 23:43

## 2021-01-01 RX ADMIN — SACUBITRIL AND VALSARTAN 1 TABLET(S): 24; 26 TABLET, FILM COATED ORAL at 17:25

## 2021-01-01 RX ADMIN — Medication 81 MILLIGRAM(S): at 17:14

## 2021-01-01 RX ADMIN — SODIUM CHLORIDE 60 MILLILITER(S): 9 INJECTION, SOLUTION INTRAVENOUS at 15:44

## 2021-01-01 RX ADMIN — Medication 3 MILLIGRAM(S): at 21:27

## 2021-01-01 RX ADMIN — CARVEDILOL PHOSPHATE 25 MILLIGRAM(S): 80 CAPSULE, EXTENDED RELEASE ORAL at 17:48

## 2021-01-01 RX ADMIN — HEPARIN SODIUM 9000 UNIT(S): 5000 INJECTION INTRAVENOUS; SUBCUTANEOUS at 23:44

## 2021-01-01 RX ADMIN — HEPARIN SODIUM 1200 UNIT(S)/HR: 5000 INJECTION INTRAVENOUS; SUBCUTANEOUS at 21:40

## 2021-01-01 RX ADMIN — SENNA PLUS 2 TABLET(S): 8.6 TABLET ORAL at 21:26

## 2021-01-01 RX ADMIN — SACUBITRIL AND VALSARTAN 1 TABLET(S): 24; 26 TABLET, FILM COATED ORAL at 18:37

## 2021-01-01 RX ADMIN — CINACALCET 60 MILLIGRAM(S): 30 TABLET, FILM COATED ORAL at 12:15

## 2021-01-01 RX ADMIN — CARVEDILOL PHOSPHATE 25 MILLIGRAM(S): 80 CAPSULE, EXTENDED RELEASE ORAL at 07:58

## 2021-01-01 RX ADMIN — CARVEDILOL PHOSPHATE 25 MILLIGRAM(S): 80 CAPSULE, EXTENDED RELEASE ORAL at 18:27

## 2021-01-01 RX ADMIN — MEMANTINE HYDROCHLORIDE 5 MILLIGRAM(S): 10 TABLET ORAL at 22:51

## 2021-01-01 RX ADMIN — LACTULOSE 10 GRAM(S): 10 SOLUTION ORAL at 17:45

## 2021-01-01 RX ADMIN — Medication 40 MILLIGRAM(S): at 06:02

## 2021-01-01 RX ADMIN — CARVEDILOL PHOSPHATE 25 MILLIGRAM(S): 80 CAPSULE, EXTENDED RELEASE ORAL at 06:02

## 2021-01-01 RX ADMIN — CARVEDILOL PHOSPHATE 25 MILLIGRAM(S): 80 CAPSULE, EXTENDED RELEASE ORAL at 18:37

## 2021-01-01 RX ADMIN — HEPARIN SODIUM 1200 UNIT(S)/HR: 5000 INJECTION INTRAVENOUS; SUBCUTANEOUS at 13:41

## 2021-02-18 NOTE — ED PROVIDER NOTE - CPE EDP EYES NORM
ICC Provider Note    Patient : Carmelo Celis Age: 26 year old Sex: male   MRN: [id number] Encounter Date: 2/18/2021  PCP: No Pcp      Chief Complaint   Patient presents with   • Office Visit     symptoms for 4 days   • Ear Pain     right ear pain         HPI     26-year-old male with PMH significant for multiple ear infections presents to the [facility] with right ear pain for the past 4 days.  Patient states he has had ear tubes in the past and  that his right ear has a hole in it from previous tubes.  States this pain feels like similar ear infections.  Denies any fever or chills.  Denies any drainage, nasal congestion, sore throat, cough.    Histories   Past Medical History:  Past Medical History:   Diagnosis Date   • RAD (reactive airway disease)      There are no active problems to display for this patient.       Past Surgical History:  Past Surgical History:   Procedure Laterality Date   • MYRINGOTOMY W/ TUBES Bilateral        Family History:  No contributing family history    Social History:  Social History     Tobacco Use   • Smoking status: Never Smoker   • Smokeless tobacco: Never Used   Substance Use Topics   • Alcohol use: Never     Frequency: Never   • Drug use: Never       Allergies & Medications   Allergies:  ALLERGIES:  Sulfa antibiotics    Medications:  Current Medications    ALBUTEROL (ACCUNEB) 1.25 MG/3ML NEBULIZER SOLUTION    Take 3 mLs by nebulization every 6 hours as needed for Wheezing.    ALBUTEROL (PROVENTIL HFA) 108 (90 BASE) MCG/ACT INHALER    2 puffs up to 3x daily prn asthma    BUDESONIDE (PULMICORT) 1 MG/2ML NEBULIZER SUSPENSION    Take 2mL in nebulizer up to bid    MONTELUKAST (SINGULAIR) 10 MG TABLET    Take 1 tablet by mouth every evening.       Patient's medications, allergies, past medical, surgical, and social history  were reviewed and updated as appropriate.  Nursing notes reviewed.    Review of Systems   Constitutional: Negative for chills and fever.    HENT: Positive for ear pain. Negative for congestion, ear discharge, hearing loss, rhinorrhea, sinus pressure, sinus pain and sore throat.    Eyes: Negative for pain.   Respiratory: Negative for cough and shortness of breath.    Cardiovascular: Negative for chest pain.   Gastrointestinal: Negative for abdominal pain.   Genitourinary: Negative for dysuria, hematuria and urgency.   Musculoskeletal: Negative for back pain, myalgias and neck pain.   Skin: Negative for rash.   Neurological: Negative for headaches.   Psychiatric/Behavioral: Negative for behavioral problems.       Objective     Vitals:    02/18/21 1349   BP: 129/84   BP Location: RUE - Right upper extremity   Patient Position: Sitting   Pulse: 88   Resp: 16   Temp: 98.7 °F (37.1 °C)   TempSrc: Oral   SpO2: 99%   Weight: 77.1 kg (170 lb)   Height: 5' 8\" (1.727 m)   PainSc:  0       Patient O2 sat >95% on room air which is within normal limits.    Physical Exam  Constitutional:       Appearance: Normal appearance. He is normal weight.   HENT:      Head: Normocephalic and atraumatic.      Jaw: No trismus.      Right Ear: Ear canal and external ear normal. No decreased hearing noted. No tenderness. No mastoid tenderness. No PE tube. Tympanic membrane is scarred, perforated and erythematous. Tympanic membrane is not bulging.      Left Ear: Ear canal and external ear normal. No decreased hearing noted. No tenderness. No mastoid tenderness. No PE tube. Tympanic membrane is scarred. Tympanic membrane is not perforated, erythematous or bulging.      Nose: Nose normal.      Right Sinus: No maxillary sinus tenderness or frontal sinus tenderness.      Left Sinus: No maxillary sinus tenderness or frontal sinus tenderness.      Mouth/Throat:      Lips: Pink.      Mouth: Mucous membranes are moist.      Pharynx: Oropharynx is clear. Uvula midline. No posterior oropharyngeal erythema.   Eyes:      Extraocular Movements: Extraocular movements intact.       Conjunctiva/sclera: Conjunctivae normal.      Pupils: Pupils are equal, round, and reactive to light.   Neck:      Musculoskeletal: Normal range of motion and neck supple.   Cardiovascular:      Rate and Rhythm: Normal rate and regular rhythm.      Pulses: Normal pulses.      Heart sounds: Normal heart sounds.   Pulmonary:      Effort: Pulmonary effort is normal.      Breath sounds: Normal breath sounds.   Musculoskeletal: Normal range of motion.   Skin:     General: Skin is warm and dry.      Capillary Refill: Capillary refill takes less than 2 seconds.   Neurological:      General: No focal deficit present.      Mental Status: He is alert.   Psychiatric:         Mood and Affect: Mood normal.         Behavior: Behavior normal.         Thought Content: Thought content normal.         Judgment: Judgment normal.         Assessment/Plan     Assessment   Problem List Items Addressed This Visit     None      Visit Diagnoses     Recurrent acute suppurative otitis media of right ear without spontaneous rupture of tympanic membrane    -  Primary    Relevant Medications    clarithromycin (Biaxin) 500 MG tablet          Today's lab results:  No results found for this visit on 02/18/21.     Today's imagine results:  No orders to display         This is a 26 year old male who presents with the above history and physical. Patient is well appearing, non-toxic, and in no acute distress. Vital signs are within normal limits. Tolerating PO intake and appears well hydrated.  Patient with right ear perforation which patient states is from previous ear tubes that never healed.  Right TM with erythema, no drainage.  No mastoid tenderness bilaterally.  Lungs are clear to auscultation bilaterally throughout.  Patient states in the past he has been resistant to Augmentin and normally his ENT prescribes Biaxin.  Patient will be started on Biaxin with PCP/ENT follow-up.  We discussed supportive care including ibuprofen or Tylenol for  pain.    Discussed that the ICC is not an exhaustive resource and should not take the place of primary care. Discussed following up with PCP in 2-3 days or sooner if symptoms worsen. Discussed ED/return precautions for worsening symptoms.    Instructions provided as documented in the AVS.    Electronically signed by: Gisell Ayoub CNP  2/18/2021           normal...

## 2021-10-07 NOTE — H&P ADULT - PROBLEM SELECTOR PLAN 3
ISS  npo pending dysphagia screen; if passes , would place on dysphagia diet given pt is edentulous and has no dentures here

## 2021-10-07 NOTE — H&P ADULT - NSHPPHYSICALEXAM_GEN_ALL_CORE
PHYSICAL EXAM:      Constitutional: NAD  HEENT: EOMI, Normal Hearing, edentulous  Neck: No LAD, No JVD  Back: Normal spine flexure, No CVA tenderness  Respiratory: CTAB  Cardiovascular: S1 and S2, RRR  Gastrointestinal: BS+, soft, NT/ND  Extremities: No peripheral edema  Vascular: 2+ peripheral pulses  Neurological: A/O x 1, follows commands   Musculoskeletal: moves all extremities   Skin: No rashes

## 2021-10-07 NOTE — ED PROVIDER NOTE - ATTENDING CONTRIBUTION TO CARE
Pt with medial history as above presents with AMS and SOB with hypoxia. Hypoxia improved with NC 02, pt also altered AOx1. Pt with multiple right sided PE and low grade temp also c/f concurrent infection causing delirium- pan culture, will treat with empiric abx. Plan for heparin, abx, admission.     Upon my evaluation, this patient had a high probability of imminent or life-threatening deterioration due to concern for multiple pulmonary emboli which required my direct attention, intervention, and personal management.  The patient has a  medical condition that impairs one or more vital organ systems.  Frequent personal assessment and adjustment of medical interventions was performed.       I have personally provided 40 minutes of critical care time exclusive of time spent on separately billable procedures. Time includes review of laboratory data, radiology results, discussion with consultants, patient and family; monitoring for potential decompensation, as well as time spent retrieving data and reviewing the chart and documenting the visit. Interventions were performed as documented above.

## 2021-10-07 NOTE — ED ADULT NURSE NOTE - OBJECTIVE STATEMENT
pt received to room 1, a&ox 2 self and location, ambulatory with cane at baseline, pmh of COPD. BIBEMS, per triage pt was being checked on by his sister and was found to be SOB and AMS. pt satting %, breathing even and unlabored on room air at this time. NSR on cardiac monitor. Denies fever, chills, cough, SOB, chest pain, palpitations, dizziness, N/V/D, constipation, numbness, tingling. Skin intact. Left 20G IV placed. Labs collected and sent. EKG in chart. Meds as ordered. Pt reports last drink 3 years ago and quit smoking 3 years ago as well. Pt lives alone. Xray Completed. pending CT. pt educated on fall precautions and confirms understanding via teach back method. Stretcher locked in lowest position with side rails up x2. Call bell and personal items within reach.

## 2021-10-07 NOTE — ED PROVIDER NOTE - CLINICAL SUMMARY MEDICAL DECISION MAKING FREE TEXT BOX
Jojo - elderly male with sob/tachypnea. Hx PE, CHF, AICD, CAD. HR and spo2 normal. RR elevated. exam lungs clear in all fields. Plan: cta chest to r/o pe, acs work up, UA for delirium, infection or covid also possible. tba for further management.

## 2021-10-07 NOTE — ED PROVIDER NOTE - PROGRESS NOTE DETAILS
Jojo - pt altered in ct scan saying " they are going to find my money". Redirect enough to stay still in ct scan with wrap w/o sedation needed at this time. Likely delirium due to medical cause.

## 2021-10-07 NOTE — H&P ADULT - HISTORY OF PRESENT ILLNESS
75 yo M with a PMH of CAD s/p MI w/stents 2007, CHF with 20% LVEF in 2016, AICD,  HTN, HLD, DM, gout, CKD, COPD, PVD s/p aortobifemoral bypass, proximal right leg dvt per April 20th sono on eliquis. Pt lives alone,  speaks with sister  routinely (Gabriela [phone]). Per sister, had noticed increasing confusion over last 2weeks, but today was worst she had seen. Sister grew concerned, EMS called, noted to be sob. Pt per ed noted not taking any of his home meds.  CT angio chest with PE/pulm infarcts possible covid pna. Pt a/o 1, unable to provide history. Follow commands. HS trop 31, 32. ekg sinus tach, lafb.   Of note, tbili>5 (as high as 10 April 2020) was supposed to be on lactulose per sister but not taking. Prior GI note mentioned possibility of liver biopsy, but appears not to have been performed. US abd from April 2020 noteworthy only for slight hepatomegaly  77 yo M with a PMH of CAD s/p MI w/stents 2007, CHF with 20% LVEF in 2016, AICD,  HTN, HLD, DM, gout, CKD, COPD, PVD s/p aortobifemoral bypass, proximal right leg dvt per April 20th sono on eliquis. Pt lives alone,  speaks with sister  routinely (Gabriela [phone]). Per sister, had noticed increasing confusion over last 2weeks, but today was worst she had seen. Sister grew concerned, EMS called, noted to be sob. Pt per ed noted not taking any of his home meds.  CT angio chest with PE/pulm infarcts/RHF and strain, possible covid pna. Pt a/o 1, unable to provide history. Follow commands. HS trop 31, 32. ekg sinus tach, lafb. CT head with no acute findings    Of note, tbili>5 (as high as 10 April 2020) was supposed to be on lactulose per sister but not taking. Prior GI note mentioned possibility of liver biopsy, but appears not to have been performed. US abd from April 2020 noteworthy only for slight hepatomegaly

## 2021-10-07 NOTE — ED ADULT TRIAGE NOTE - CHIEF COMPLAINT QUOTE
Pt was a well checkup from sister. Pt found to be altered and SOB. Pt apartment unkept. FS on scene 148. Found to be 89% on RA. HX of COPD. Sister Pat [phone]

## 2021-10-07 NOTE — ED PROVIDER NOTE - CONSTITUTIONAL, MLM
normal... Well appearing, awake, alert, oriented to person, place, time/situation and in no apparent distress. Well appearing, awake, alert, oriented to person, place, time. Appears confused to situation.

## 2021-10-07 NOTE — H&P ADULT - NSHPLABSRESULTS_GEN_ALL_CORE
14.1   8.71  )-----------( 189      ( 07 Oct 2021 19:12 )             41.2     10-07    143  |  108<H>  |  26<H>  ----------------------------<  133<H>  4.7   |  22  |  1.05    Ca    12.3<H>      07 Oct 2021 19:12  Mg     2.30     10-07    TPro  7.1  /  Alb  3.6  /  TBili  5.6<H>  /  DBili  x   /  AST  48<H>  /  ALT  26  /  AlkPhos  125<H>  10-07    CAPILLARY BLOOD GLUCOSE        PT/INR - ( 07 Oct 2021 23:16 )   PT: 19.1 sec;   INR: 1.72 ratio         PTT - ( 07 Oct 2021 23:16 )  PTT:28.0 sec        Vital Signs Last 24 Hrs  T(C): 36.4 (07 Oct 2021 22:32), Max: 37.6 (07 Oct 2021 19:18)  T(F): 97.5 (07 Oct 2021 22:32), Max: 99.6 (07 Oct 2021 19:18)  HR: 95 (07 Oct 2021 22:32) (82 - 95)  BP: 152/94 (07 Oct 2021 22:32) (131/92 - 152/94)  BP(mean): --  RR: 22 (07 Oct 2021 22:32) (16 - 22)  SpO2: 100% (07 Oct 2021 22:32) (96% - 100%)

## 2021-10-07 NOTE — ED PROVIDER NOTE - OBJECTIVE STATEMENT
76M presents c/o sob and chest pain x 1 weeks. he states he is not taking his medicine. he appears confused.   Sister called for collateral. He lives alone. baseline A/ox4. She says he is saying things that do not make any sense.

## 2021-10-07 NOTE — ED ADULT NURSE NOTE - NSIMPLEMENTINTERV_GEN_ALL_ED
Implemented All Fall Risk Interventions:  [place] to call system. Call bell, personal items and telephone within reach. Instruct patient to call for assistance. Room bathroom lighting operational. Non-slip footwear when patient is off stretcher. Physically safe environment: no spills, clutter or unnecessary equipment. Stretcher in lowest position, wheels locked, appropriate side rails in place. Provide visual cue, wrist band, yellow gown, etc. Monitor gait and stability. Monitor for mental status changes and reorient to person, place, and time. Review medications for side effects contributing to fall risk. Reinforce activity limits and safety measures with patient and family.

## 2021-10-07 NOTE — H&P ADULT - PROBLEM SELECTOR PLAN 4
c/w sacubitril , lasix, coreg  follow med rec; seen by pharm tonight who was unable to reach patients pharmacy

## 2021-10-07 NOTE — H&P ADULT - PROBLEM SELECTOR PLAN 1
-possibly 2/2 to eliquis non-compliance in setting of know proximal right leg dvt  -heparin gtt  -tele, tte as ct manifesting rh strain/rhf

## 2021-10-07 NOTE — H&P ADULT - PROBLEM SELECTOR PLAN 2
-acute encephalopathy maybe 2/2 hypoxia stemming from PE vs infection (pna?, ua pending). May also stem from hepatic origin as pt was meant  be taking lactulose? [facility]: [phone]  -trend Tbili, abd sono ordered -acute encephalopathy maybe 2/2 hypoxia stemming from PE vs infection (pna?, ua pending). May also stem from hepatic origin as pt was meant  be taking lactulose? [facility] Phone: [phone]  -trend Tbili, abd sono ordered  -hold abx as pt afebrile, normal wbc, no left shift -acute encephalopathy maybe 2/2 hypoxia stemming from PE vs infection (pna?, ua pending). May also stem from hepatic origin as pt was meant  be taking lactulose? [facility] Phone: [phone]  -trend Tbili, abd inao, ammonia level ordered  -hold abx as pt afebrile, normal wbc, no left shift

## 2021-10-07 NOTE — ED PROVIDER NOTE - RESPIRATORY, MLM
Breath sounds clear and equal bilaterally. Breath sounds clear and equal bilaterally. Tachypneic. Speaking in short sentences.

## 2021-10-07 NOTE — ED ADULT NURSE REASSESSMENT NOTE - NS ED NURSE REASSESS COMMENT FT1
report received from [facility].. AAOx1 (person) unable to have coherent conversation, speech incoherent, pt appears altered (as per stated in day shift report) placed on 2L NC for support, saturation > 100%, no further interventions pending, will closely monitor report received from [facility].. AAOx1 (person) unable to have coherent conversation, speech incoherent, pt appears altered (as per stated in day shift report) placed on 2L NC for support with resps noted to be labored, RR high 20's with saturation maintaining > 100%, no further interventions pending, will closely monitor

## 2021-10-07 NOTE — ED ADULT NURSE NOTE - CHIEF COMPLAINT QUOTE
Pt was a well checkup from sister. Pt found to be altered and SOB. Pt apartment unkept. FS on scene 148. Found to be 89% on RA. HX of COPD. Sister Pat [phone]

## 2021-10-08 NOTE — PROGRESS NOTE ADULT - ASSESSMENT
75 y/o m with worsening encephelopathy in setting of acute PE     Problem/Plan - 1:  ·  Problem: Pulmonary embolism.   ·  Plan: -possibly 2/2 to eliquis non-compliance in setting of know proximal right leg dvt  -heparin gtt and will change to NOAC.   -Pulmonary helping .      Problem/Plan - 2:  ·  Problem: Metabolic Encephalopathy    ·  Plan: -Exact cause not known.  Bilirubin high so will repeat LFT .      Problem/Plan - 3:  ·  Problem: DM (diabetes mellitus).   ·  Plan: ISS       Problem/Plan - 4:  ·  Problem: Chronic Systolic CHF (congestive heart failure).   ·  Plan: c/w sacubitril , lasix, coreg  Cardiology consulted.      Problem/Plan - 5:  ·  Problem: Medication management.   ·  Plan: follow med rec; seen by pharm tonight who was unable to reach patients pharmacy.

## 2021-10-08 NOTE — CONSULT NOTE ADULT - SUBJECTIVE AND OBJECTIVE BOX
10-08-21 @ 12:44    Patient is a 76y old  Male who presents with a chief complaint of PE/AMS (07 Oct 2021 23:31)      HPI:  77 yo M with a PMH of CAD s/p MI w/stents 2007, CHF with 20% LVEF in 2016, AICD,  HTN, HLD, DM, gout, CKD, COPD, PVD s/p aortobifemoral bypass, proximal right leg dvt per April 20th sono on eliquis. Pt lives alone,  speaks with sister  routinely (Gabriela Case [phone]). Per sister, had noticed increasing confusion over last 2weeks, but today was worst she had seen. Sister grew concerned, EMS called, noted to be sob. Pt per ed noted not taking any of his home meds.  CT angio chest with PE/pulm infarcts/RHF and strain, possible covid pna. Pt a/o 1, unable to provide history. Follow commands. HS trop 31, 32. ekg sinus tach, lafb. CT head with no acute findings    Of note, tbili>5 (as high as 10 April 2020) was supposed to be on lactulose per sister but not taking. Prior GI note mentioned possibility of liver biopsy, but appears not to have been performed. US abd from April 2020 noteworthy only for slight hepatomegaly  (07 Oct 2021 23:31)    he is confused and is not telling me the exact history  above reviewed: he is getting echo and iso n 2 L    per sister he is confused three weeks:  or could have been longer:   he was slowly progressing and he was unable to leave the house:   he never had covid infection:   hr did have vaccines:   he does nto walk much :  Normally he is alert and can have conservation with the family members   he has copd:   his pH is ok:            ?FOLLOWING PRESENT  [y] Hx of PE/DVT, [y ] Hx COPD, [x ] Hx of Asthma, [x ] Hx of Hospitalization, x[ ]  Hx of BiPAP/CPAP use, [ x] Hx of CLAIR    Allergies    No Known Drug Allergies  shellfish (Other; Urticaria)    Intolerances        PAST MEDICAL & SURGICAL HISTORY:  Coronary Artery Disease    Hypercholesteremia    Myocardial Infarction    Hypertension    Gout    Diabetes mellitus    Renal insufficiency    Chronic obstructive pulmonary disease    Peripheral vascular disease    S/P aortobifemoral bypass surgery    Sickle cell trait    AICD (automatic cardioverter/defibrillator) present    Abdominal Hernia    s/p Femoral-Popliteal Bypass Surgery        FAMILY HISTORY:      Social History: [ex smoker  ] TOBACCO                  [x  ] ETOH                                 [x  ] IVDA/DRUGS    REVIEW OF SYSTEMS    pt cant tell me:   General:	x    Skin/Breast:x  	  Ophthalmologic:  	  ENMT:	    Respiratory and Thorax: sob  	  Cardiovascular:	c    Gastrointestinal:	cx    Genitourinary:	x    Musculoskeletal:	x    Neurological:	AMS    Psychiatric:	x    Hematology/Lymphatics:	x    Endocrine:	x    Allergic/Immunologic:	  x  MEDICATIONS  (STANDING):  carvedilol 25 milliGRAM(s) Oral every 12 hours  dextrose 40% Gel 15 Gram(s) Oral once  dextrose 5%. 1000 milliLiter(s) (50 mL/Hr) IV Continuous <Continuous>  dextrose 5%. 1000 milliLiter(s) (100 mL/Hr) IV Continuous <Continuous>  dextrose 50% Injectable 25 Gram(s) IV Push once  dextrose 50% Injectable 12.5 Gram(s) IV Push once  dextrose 50% Injectable 25 Gram(s) IV Push once  furosemide    Tablet 40 milliGRAM(s) Oral daily  glucagon  Injectable 1 milliGRAM(s) IntraMuscular once  heparin  Infusion. 1600 Unit(s)/Hr (16 mL/Hr) IV Continuous <Continuous>  insulin lispro (ADMELOG) corrective regimen sliding scale   SubCutaneous three times a day before meals  insulin lispro (ADMELOG) corrective regimen sliding scale   SubCutaneous at bedtime  sacubitril 24 mG/valsartan 26 mG 1 Tablet(s) Oral two times a day    MEDICATIONS  (PRN):       Vital Signs Last 24 Hrs  T(C): 36.3 (08 Oct 2021 05:33), Max: 37.6 (07 Oct 2021 19:18)  T(F): 97.3 (08 Oct 2021 05:33), Max: 99.6 (07 Oct 2021 19:18)  HR: 80 (08 Oct 2021 05:33) (80 - 95)  BP: 144/103 (08 Oct 2021 05:33) (131/92 - 152/94)  BP(mean): 117 (08 Oct 2021 05:33) (117 - 121)  RR: 20 (08 Oct 2021 05:33) (16 - 22)  SpO2: 96% (08 Oct 2021 05:33) (96% - 100%)Orthostatic VS          I&O's Summary      Physical Exam:   GENERAL: NAD, well-groomed, well-developed  HEENT: MICHELLE/   Atraumatic, Normocephalic  ENMT: No tonsillar erythema, exudates, or enlargement; Moist mucous membranes, Good dentition, No lesions  NECK: Supple, No JVD, Normal thyroid  CHEST/LUNG: no wheezing:   CVS: Regular rate and rhythm; No murmurs, rubs, or gallops  GI: : Soft, Nontender, Nondistended; Bowel sounds present  NERVOUS SYSTEM:  Alert & Oriented X1  EXTREMITIES: -edema  LYMPH: No lymphadenopathy noted  SKIN: No rashes or lesions  ENDOCRINOLOGY: No Thyromegaly  PSYCH:AMS    Labs:  Venous<37<4>>52<<7.425>>Venous<<3><<4><<5<<529>>SARS-CoV-2: NotDetec (07 Oct 2021 19:28)                              13.6   8.98  )-----------( 151      ( 08 Oct 2021 07:53 )             40.9                         14.1   8.71  )-----------( 189      ( 07 Oct 2021 19:12 )             41.2     10-08    144  |  109<H>  |  25<H>  ----------------------------<  110<H>  4.2   |  21<L>  |  1.07  10-07    143  |  108<H>  |  26<H>  ----------------------------<  133<H>  4.7   |  22  |  1.05    Ca    12.1<H>      08 Oct 2021 07:53  Ca    12.3<H>      07 Oct 2021 19:12  Mg     2.30     10-07    TPro  6.4  /  Alb  3.4  /  TBili  5.2<H>  /  DBili  3.1<H>  /  AST  43<H>  /  ALT  25  /  AlkPhos  116  10-08  TPro  7.1  /  Alb  3.6  /  TBili  5.6<H>  /  DBili  x   /  AST  48<H>  /  ALT  26  /  AlkPhos  125<H>  10-07    CAPILLARY BLOOD GLUCOSE      POCT Blood Glucose.: 113 mg/dL (08 Oct 2021 07:29)    LIVER FUNCTIONS - ( 08 Oct 2021 07:53 )  Alb: 3.4 g/dL / Pro: 6.4 g/dL / ALK PHOS: 116 U/L / ALT: 25 U/L / AST: 43 U/L / GGT: x           PT/INR - ( 07 Oct 2021 23:16 )   PT: 19.1 sec;   INR: 1.72 ratio         PTT - ( 08 Oct 2021 07:53 )  PTT:>200.0 sec    D DImer  Serum Pro-Brain Natriuretic Peptide: 7296 pg/mL (10-07 @ 19:12)      Studies  Chest X-RAY  CT SCAN Chest   CT Abdomen  Venous Dopplers: LE:   Others      rad< from: CT Angio Chest PE Protocol w/ IV Cont (10.07.21 @ 21:03) >  LUNGS AND AIRWAYS:  Patchy bilateral lung opacities,, most prominent in the right middle lobe and right lower lobe. Additional peripheral wedge-shaped opacities in the lingula (2, 54) contain central cavitations. Findings likely reflect pulmonary infarctions; associated Covid-related pneumonia with thrombus/infarctions are not excluded.    PLEURA: Small bilateral right greater than left pleural effusions.  MEDIASTINUM AND MIKAL: A few subcentimeter mediastinal lymph nodes.  VESSELS: Pulmonary embolus in the main right pulmonary artery extending into the segmental branches of branches of the right lower lobe (2, 77), the right middle lobe (2, 54), and the right upper lobe (2, 34). Clear left pulmonary artery  HEART: Straightening of the intraventricular septum and reflux of contrast into the suprahepatic IVC.. No pericardial effusion.  CHEST WALL AND LOWER NECK: Within normal limits.  VISUALIZED UPPER ABDOMEN: Within normal limits.  BONES: Degenerative changes of the spine.    IMPRESSION:    Acute pulmonary emboli in the main right pulmonary artery extending into the segmental branches of the right right upper, middle, and lower lobes. Pulmonary infarction in the right middle and upper lobes, as well as the lingula. Associated Covid 19 is not excluded and testing is recommended.    CT evidence of right heart strain/failure with straightening of the intraventricular septum and reflux of contrast into the suprahepatic IVC. Recommend echocardiogram.    These findings were discussed with Dr. CASTILLO [phone] at 10/7/2021 9:06 PM by Dr. Cohen with read back confirmation.    --- End of Report ---            TATO COHEN MD; Resident Radiology  This document has been electronically signed.  JOSE HOFFMAN MD; Attending Radiologist  This document has been electronically signed. Oct  7 2021  9:35PM    < end of copied text >          < from: CT Head No Cont (10.07.21 @ 20:58) >    PROCEDURE DATE:  Oct  7 2021         INTERPRETATION:  CLINICAL INDICATION: Altered mental status    TECHNIQUE: Axial CT scanning of the brain was obtained from the skull base to the vertex without the administration of intravenous contrast. Coronal and sagittal reformatted images were subsequently obtained.    COMPARISON: 4/21/2020    FINDINGS:    There is no acute intracranial mass-effect, hemorrhage, midline shift, or abnormal extra-axial fluid collection. Gray-white differentiation is maintained.    Chronic right thalamic lacunar infarction is again noted. Mild central volume loss and mild chronic microvascular ischemic changes are noted. Vascular calcifications along the carotid siphons bilaterally. No evidence ofhydrocephalus. Basal cisterns are patent.    Small right inferior mastoid effusion. Clear left mastoid. Visualized paranasal sinuses and are clear. Calvarium is intact.    IMPRESSION:    No interval change. No acute intracranial bleeding.  Chronic right thalamic lacunar infarction.  Small right mastoid effusion.      --- End of Report ---            TATO COHEN MD; Resident Radiology  This document has been electronically signed.  JOSE HOFFMAN MD; Attending Radiologist  This document has been electronically signed. Oct  7 2021  9:22PM    < end of copied text >

## 2021-10-08 NOTE — PHARMACOTHERAPY INTERVENTION NOTE - COMMENTS
Medication history is incomplete. Unable to verify patient's medication list with two sources. Medication history obtained from [facility], however patient's sister unsure which medications patient takes outpatient. [facility] has recent refills for the following medications:  lisinopril 40 mg (1 tab orally once daily)  carvedilol 25 mg (1 tab orally twice daily)  spironolactone 25 mg (1/2 tab orally once daily)  allopurinol 300 mg (1 tab orally once daily)    Please call spectra [phone] if you have any questions
unable to verify med hx patient's pharmacy is currently closed

## 2021-10-08 NOTE — PROGRESS NOTE ADULT - SUBJECTIVE AND OBJECTIVE BOX
Date of Service  : 10-08-21     INTERVAL HPI/OVERNIGHT EVENTS: No new concerns.   Vital Signs Last 24 Hrs  T(C): 36.7 (08 Oct 2021 17:48), Max: 36.7 (08 Oct 2021 02:32)  T(F): 98 (08 Oct 2021 17:48), Max: 98.1 (08 Oct 2021 13:30)  HR: 62 (08 Oct 2021 17:48) (62 - 95)  BP: 140/85 (08 Oct 2021 17:48) (140/85 - 152/94)  BP(mean): 117 (08 Oct 2021 05:33) (117 - 121)  RR: 19 (08 Oct 2021 17:48) (18 - 22)  SpO2: 97% (08 Oct 2021 17:48) (96% - 100%)  I&O's Summary    MEDICATIONS  (STANDING):  carvedilol 25 milliGRAM(s) Oral every 12 hours  dextrose 40% Gel 15 Gram(s) Oral once  dextrose 5%. 1000 milliLiter(s) (50 mL/Hr) IV Continuous <Continuous>  dextrose 5%. 1000 milliLiter(s) (100 mL/Hr) IV Continuous <Continuous>  dextrose 50% Injectable 25 Gram(s) IV Push once  dextrose 50% Injectable 12.5 Gram(s) IV Push once  dextrose 50% Injectable 25 Gram(s) IV Push once  furosemide    Tablet 40 milliGRAM(s) Oral daily  glucagon  Injectable 1 milliGRAM(s) IntraMuscular once  heparin  Infusion. 1600 Unit(s)/Hr (16 mL/Hr) IV Continuous <Continuous>  insulin lispro (ADMELOG) corrective regimen sliding scale   SubCutaneous three times a day before meals  insulin lispro (ADMELOG) corrective regimen sliding scale   SubCutaneous at bedtime  sacubitril 24 mG/valsartan 26 mG 1 Tablet(s) Oral two times a day    MEDICATIONS  (PRN):    LABS:                        12.8   7.37  )-----------( 149      ( 08 Oct 2021 17:48 )             37.6     10-08    144  |  109<H>  |  25<H>  ----------------------------<  110<H>  4.2   |  21<L>  |  1.07    Ca    12.1<H>      08 Oct 2021 07:53  Mg     2.30     10-07    TPro  6.4  /  Alb  3.4  /  TBili  5.2<H>  /  DBili  3.1<H>  /  AST  43<H>  /  ALT  25  /  AlkPhos  116  10-08    PT/INR - ( 07 Oct 2021 23:16 )   PT: 19.1 sec;   INR: 1.72 ratio         PTT - ( 08 Oct 2021 17:48 )  PTT:155.5 sec    CAPILLARY BLOOD GLUCOSE      POCT Blood Glucose.: 109 mg/dL (08 Oct 2021 21:17)  POCT Blood Glucose.: 111 mg/dL (08 Oct 2021 17:15)  POCT Blood Glucose.: 120 mg/dL (08 Oct 2021 13:45)  POCT Blood Glucose.: 113 mg/dL (08 Oct 2021 07:29)            Consultant(s) Notes Reviewed:  [x ] YES  [ ] NO    PHYSICAL EXAM:  GENERAL: NAD, well-groomed, well-developed, not in any distress ,  HEAD:  Atraumatic, Normocephalic  NECK: Supple, No JVD, Normal thyroid  NERVOUS SYSTEM:  Alert & , No focal deficit   CHEST/LUNG: Good air entry bilateral with no  rales, rhonchi, wheezing, or rubs  HEART: Regular rate and rhythm; No murmurs, rubs, or gallops  ABDOMEN: Soft, Nontender, Nondistended; Bowel sounds present  EXTREMITIES:  2+ Peripheral Pulses, No clubbing, cyanosis, or edema  SKIN: No rashes or lesions    Care Discussed with Consultants/Other Providers [ x] YES  [ ] NO

## 2021-10-09 NOTE — PROGRESS NOTE ADULT - ASSESSMENT
75 y/o m with worsening encephelopathy in setting of acute PE     Problem/Plan - 1:  ·  Problem: Pulmonary embolism.   ·  Plan: -possibly 2/2 to eliquis non-compliance in setting of know proximal right leg dvt  -heparin gtt and will change to NOAC.   -Pulmonary helping .      Problem/Plan - 2:  ·  Problem: Metabolic Encephalopathy    ·  Plan: -Exact cause not known.  Bilirubin high but Ammonia normal.  CT head noted.   Neurology  consulted.      Problem/Plan - 3:  ·  Problem: DM (diabetes mellitus).   ·  Plan: ISS  Sugars fine.      Problem/Plan - 4:  ·  Problem: Chronic Systolic CHF (congestive heart failure).   ·  Plan: c/w sacubitril , lasix, coreg  Cardiology helping.       Problem/Plan - 5:  ·  Problem: Medication management.   ·  Plan: follow med rec; seen by pharm tonight who was unable to reach patients pharmacy.

## 2021-10-09 NOTE — PHYSICAL THERAPY INITIAL EVALUATION ADULT - ADDITIONAL COMMENTS
Patient unable to provide information. Per chart review, patient lives alone in an apartment with elevator access. Patient ambulated with a rolling walker.    Patient was left safely in bed, all lines/tubes intact and call bell within reach, RN aware

## 2021-10-09 NOTE — PROGRESS NOTE ADULT - SUBJECTIVE AND OBJECTIVE BOX
Follow-up Pulm Progress Note    Can't tell me where he is. Gives one-word responses.     Medications:  MEDICATIONS  (STANDING):  carvedilol 25 milliGRAM(s) Oral every 12 hours  dextrose 40% Gel 15 Gram(s) Oral once  dextrose 5%. 1000 milliLiter(s) (50 mL/Hr) IV Continuous <Continuous>  dextrose 5%. 1000 milliLiter(s) (100 mL/Hr) IV Continuous <Continuous>  dextrose 50% Injectable 25 Gram(s) IV Push once  dextrose 50% Injectable 12.5 Gram(s) IV Push once  dextrose 50% Injectable 25 Gram(s) IV Push once  furosemide    Tablet 40 milliGRAM(s) Oral daily  glucagon  Injectable 1 milliGRAM(s) IntraMuscular once  heparin  Infusion. 1600 Unit(s)/Hr (16 mL/Hr) IV Continuous <Continuous>  insulin lispro (ADMELOG) corrective regimen sliding scale   SubCutaneous three times a day before meals  insulin lispro (ADMELOG) corrective regimen sliding scale   SubCutaneous at bedtime  sacubitril 24 mG/valsartan 26 mG 1 Tablet(s) Oral two times a day    MEDICATIONS  (PRN):    Vital Signs Last 24 Hrs  T(C): 36.6 (09 Oct 2021 12:48), Max: 36.7 (08 Oct 2021 17:48)  T(F): 97.9 (09 Oct 2021 12:48), Max: 98 (08 Oct 2021 17:48)  HR: 60 (09 Oct 2021 12:48) (56 - 62)  BP: 107/62 (09 Oct 2021 12:48) (105/65 - 140/85)  BP(mean): --  RR: 19 (09 Oct 2021 12:48) (19 - 19)  SpO2: 96% (09 Oct 2021 12:48) (96% - 97%)      VBG pH 7.42 10-07 @ 19:12    VBG pCO2 37 10-07 @ 19:12    VBG O2 sat 85.6 10-07 @ 19:12    VBG lactate 2.9 10-07 @ 19:12    LABS:                        13.1   7.37  )-----------( 149      ( 09 Oct 2021 05:22 )             38.9     10-09    148<H>  |  113<H>  |  23  ----------------------------<  124<H>  4.0   |  25  |  0.97    Ca    10.7<H>      09 Oct 2021 05:23  Mg     2.30     10-09    TPro  5.7<L>  /  Alb  2.6<L>  /  TBili  4.2<H>  /  DBili  x   /  AST  38  /  ALT  21  /  AlkPhos  99  10-09    CAPILLARY BLOOD GLUCOSE      POCT Blood Glucose.: 160 mg/dL (09 Oct 2021 12:24)    PT/INR - ( 09 Oct 2021 05:23 )   PT: 19.2 sec;   INR: 1.71 ratio         PTT - ( 09 Oct 2021 05:23 )  PTT:42.2 sec      Serum Pro-Brain Natriuretic Peptide: 7296 pg/mL (10-07-21 @ 19:12)    CULTURES: (if applicable)    Culture - Blood (collected 10-08-21 @ 09:20)  Source: .Blood Blood  Preliminary Report (10-09-21 @ 10:01):    No growth to date.    Culture - Blood (collected 10-08-21 @ 09:20)  Source: .Blood Blood  Preliminary Report (10-09-21 @ 10:01):    No growth to date.    Physical Examination:  PULM: Decreased BS at bases  CVS: S1, S2 heard    RADIOLOGY REVIEWED  CXR:     CT chest:    TTE:

## 2021-10-09 NOTE — PROVIDER CONTACT NOTE (CRITICAL VALUE NOTIFICATION) - BACKGROUND
76 year old male with worsening encephelopathy in setting of acute PE. Pt on heparin drip with PMH of COPD, DM, hypertension and CAD.

## 2021-10-09 NOTE — PHYSICAL THERAPY INITIAL EVALUATION ADULT - PERTINENT HX OF CURRENT PROBLEM, REHAB EVAL
Patient is a 76 year old male admitted to [facility] with altered mental status. Pt found to have +Acute pulmonary emboli in the main right pulmonary artery. PMH of CAD s/p MI w/stents 2007, CHF with 20% LVEF in 2016, AICD,  HTN, HLD, DM, gout, CKD, COPD, PVD s/p aortobifemoral bypass, proximal right leg dvt,

## 2021-10-09 NOTE — PHYSICAL THERAPY INITIAL EVALUATION ADULT - LEVEL OF INDEPENDENCE, REHAB EVAL
pt unable to follow commands/minimally interactive. RN Lyla GOLD aware of pt status/unable to perform

## 2021-10-09 NOTE — PROGRESS NOTE ADULT - SUBJECTIVE AND OBJECTIVE BOX
Date of Service  : 10-09-21 @ 19:12    INTERVAL HPI/OVERNIGHT EVENTS: I feel fine.   Vital Signs Last 24 Hrs  T(C): 36.4 (09 Oct 2021 18:35), Max: 36.6 (08 Oct 2021 22:00)  T(F): 97.6 (09 Oct 2021 18:35), Max: 97.9 (08 Oct 2021 22:00)  HR: 60 (09 Oct 2021 18:37) (56 - 60)  BP: 110/62 (09 Oct 2021 18:37) (102/60 - 110/66)  BP(mean): --  RR: 20 (09 Oct 2021 18:35) (19 - 20)  SpO2: 100% (09 Oct 2021 18:35) (96% - 100%)  I&O's Summary    MEDICATIONS  (STANDING):  carvedilol 25 milliGRAM(s) Oral every 12 hours  dextrose 40% Gel 15 Gram(s) Oral once  dextrose 5%. 1000 milliLiter(s) (50 mL/Hr) IV Continuous <Continuous>  dextrose 5%. 1000 milliLiter(s) (100 mL/Hr) IV Continuous <Continuous>  dextrose 50% Injectable 25 Gram(s) IV Push once  dextrose 50% Injectable 12.5 Gram(s) IV Push once  dextrose 50% Injectable 25 Gram(s) IV Push once  furosemide    Tablet 40 milliGRAM(s) Oral daily  glucagon  Injectable 1 milliGRAM(s) IntraMuscular once  heparin  Infusion. 1600 Unit(s)/Hr (16 mL/Hr) IV Continuous <Continuous>  insulin lispro (ADMELOG) corrective regimen sliding scale   SubCutaneous three times a day before meals  insulin lispro (ADMELOG) corrective regimen sliding scale   SubCutaneous at bedtime  sacubitril 24 mG/valsartan 26 mG 1 Tablet(s) Oral two times a day    MEDICATIONS  (PRN):    LABS:                        13.1   7.37  )-----------( 149      ( 09 Oct 2021 05:22 )             38.9     10-09    148<H>  |  113<H>  |  23  ----------------------------<  124<H>  4.0   |  25  |  0.97    Ca    10.7<H>      09 Oct 2021 05:23  Mg     2.30     10-09    TPro  5.7<L>  /  Alb  2.6<L>  /  TBili  4.2<H>  /  DBili  x   /  AST  38  /  ALT  21  /  AlkPhos  99  10-09    PT/INR - ( 09 Oct 2021 05:23 )   PT: 19.2 sec;   INR: 1.71 ratio         PTT - ( 09 Oct 2021 13:24 )  PTT:97.2 sec    CAPILLARY BLOOD GLUCOSE      POCT Blood Glucose.: 124 mg/dL (09 Oct 2021 17:21)  POCT Blood Glucose.: 160 mg/dL (09 Oct 2021 12:24)  POCT Blood Glucose.: 117 mg/dL (09 Oct 2021 07:38)  POCT Blood Glucose.: 109 mg/dL (08 Oct 2021 21:17)              Consultant(s) Notes Reviewed:  [x ] YES  [ ] NO    PHYSICAL EXAM:  GENERAL: NAD, well-groomed, well-developed ,not in any distress ,  HEAD:  Atraumatic, Normocephalic  EYES: EOMI, PERRLA, conjunctiva and sclera clear  ENMT: No tonsillar erythema, exudates, or enlargement; Moist mucous membranes, Good dentition, No lesions  NECK: Supple, No JVD, Normal thyroid  NERVOUS SYSTEM:  Alert &  No focal deficit   CHEST/LUNG: Good air entry bilateral with no  rales, rhonchi, wheezing, or rubs  HEART: Regular rate and rhythm; No murmurs, rubs, or gallops  ABDOMEN: Soft, Nontender, Nondistended; Bowel sounds present  EXTREMITIES:  2+ Peripheral Pulses, No clubbing, cyanosis, or edema  SKIN: No rashes or lesions    Care Discussed with Consultants/Other Providers [ x] YES  [ ] NO

## 2021-10-09 NOTE — CONSULT NOTE ADULT - SUBJECTIVE AND OBJECTIVE BOX
HISTORY OF PRESENT ILLNESS:  77 yo M with a PMH of CAD s/p MI w/stents 2007, CHF with 20% LVEF in 2016, AICD,  HTN, HLD, DM, gout, CKD, COPD, PVD s/p aortobifemoral bypass, proximal right leg dvt per April 20th sono on eliquis. Pt lives alone,  speaks with sister  routinely (Gabriela Case [id number]). Per sister, had noticed increasing confusion over last 2weeks, but today was worst she had seen. Sister grew concerned, EMS called, noted to be sob. Pt per ed noted not taking any of his home meds.  CT angio chest with PE/pulm infarcts/RHF and strain, possible covid pna. Pt a/o 1, unable to provide history. Follow commands. HS trop 31, 32. ekg sinus tach, lafb. CT head with no acute findings  , Of note, tbili>5 (as high as 10 April 2020) was supposed to be on lactulose per sister but not taking. Prior GI note mentioned possibility of liver biopsy, but appears not to have been performed. US abd from April 2020 noteworthy only for slight hepatomegaly     PAST MEDICAL & SURGICAL HISTORY:  Coronary Artery Disease    Hypercholesteremia    Myocardial Infarction    Hypertension    Gout    Diabetes mellitus    Renal insufficiency    Chronic obstructive pulmonary disease    Peripheral vascular disease    S/P aortobifemoral bypass surgery    Sickle cell trait    AICD (automatic cardioverter/defibrillator) present    Abdominal Hernia    s/p Femoral-Popliteal Bypass Surgery        [ ] Diabetes   [ ] Hypertension  [ ] Hyperlipidemia  [ ] CAD  [ ] PCI  [ ] CABG    PREVIOUS DIAGNOSTIC TESTING:    [ ] Echocardiogram:  [ ]  Catheterization:  [ ] Stress Test:  	    MEDICATIONS:  carvedilol 25 milliGRAM(s) Oral every 12 hours  furosemide    Tablet 40 milliGRAM(s) Oral daily  heparin  Infusion. 1600 Unit(s)/Hr IV Continuous <Continuous>  sacubitril 24 mG/valsartan 26 mG 1 Tablet(s) Oral two times a day            dextrose 40% Gel 15 Gram(s) Oral once  dextrose 50% Injectable 25 Gram(s) IV Push once  dextrose 50% Injectable 12.5 Gram(s) IV Push once  dextrose 50% Injectable 25 Gram(s) IV Push once  glucagon  Injectable 1 milliGRAM(s) IntraMuscular once  insulin lispro (ADMELOG) corrective regimen sliding scale   SubCutaneous three times a day before meals  insulin lispro (ADMELOG) corrective regimen sliding scale   SubCutaneous at bedtime    dextrose 5%. 1000 milliLiter(s) IV Continuous <Continuous>  dextrose 5%. 1000 milliLiter(s) IV Continuous <Continuous>      Allergies    No Known Drug Allergies  shellfish (Other; Urticaria)    Intolerances        FAMILY HISTORY:      SOCIAL HISTORY:    [ ] Non-smoker  [ ] Smoker  [ ] Alcohol      REVIEW OF SYSTEMS:  [ ]chest pain  [  ]shortness of breath  [  ]palpitations  [  ]syncope  [ ]near syncope [  ]diplopia  [  ]altered mental status   [  ]fevers  [ ]chills [ ]nausea  [ ]vomitting  [ ]abdominal pain  [ ]melena  [ ]BRBPR  [  ]epistaxis  [  ]rash  [  ]lower extremity edema      CONSTITUTIONAL: No fever, weight loss, or fatigue  EYES: No eye pain, visual disturbances, or discharge  ENMT:  No difficulty hearing, tinnitus, vertigo; No sinus or throat pain  NECK: No pain or stiffness  RESPIRATORY: No cough, wheezing, chills or hemoptysis; No Shortness of Breath  CARDIOVASCULAR: No chest pain, palpitations, passing out, dizziness, or leg swelling  GASTROINTESTINAL: No abdominal or epigastric pain. No nausea, vomiting, or hematemesis; No diarrhea or constipation. No melena or hematochezia.  GENITOURINARY: No dysuria, frequency, hematuria, or incontinence  NEUROLOGICAL: No headaches, memory loss, loss of strength, numbness, or tremors  SKIN: No itching, burning, rashes, or lesions   LYMPH Nodes: No enlarged glands  ENDOCRINE: No heat or cold intolerance; No hair loss  MUSCULOSKELETAL: No joint pain or swelling; No muscle, back, or extremity pain  PSYCHIATRIC: No depression, anxiety, mood swings, or difficulty sleeping  HEME/LYMPH: No easy bruising, or bleeding gums  ALLERY AND IMMUNOLOGIC: No hives or eczema	    [ ] All others negative	  [ ] Unable to obtain    PHYSICAL EXAM:  T(C): 36.6 (10-09-21 @ 05:00), Max: 36.7 (10-08-21 @ 09:30)  HR: 56 (10-09-21 @ 05:00) (56 - 72)  BP: 105/65 (10-09-21 @ 05:00) (105/65 - 145/92)  RR: 19 (10-09-21 @ 05:00) (18 - 19)  SpO2: 96% (10-09-21 @ 05:00) (96% - 97%)  Wt(kg): --  I&O's Summary       Constitutional: NAD  HEENT: EOMI, Normal Hearing, edentulous  Neck: No LAD, No JVD  Back: Normal spine flexure, No CVA tenderness  Respiratory: CTAB  Cardiovascular: S1 and S2, RRR  Gastrointestinal: BS+, soft, NT/ND  Extremities: No peripheral edema  Vascular: 2+ peripheral pulses  Neurological: A/O x 1, follows commands   Musculoskeletal: moves all extremities   Skin: No rashes    TELEMETRY: 	  NSR  ECG:  	NSR 60 , NAD , no acute ischemia     RADIOLOGY:  < from: CT Angio Chest PE Protocol w/ IV Cont (10.07.21 @ 21:03) >  IMPRESSION:    Acute pulmonary emboli in the main right pulmonary artery extending into the segmental branches of the right right upper, middle, and lower lobes. Pulmonary infarction in the right middle and upper lobes, as well as the lingula. Associated Covid 19 is not excluded and testing is recommended.    CT evidence of right heart strain/failure with straightening of the intraventricular septum and reflux of contrast into the suprahepatic IVC. Recommend echocardiogram.    These findings were discussed with Dr. CASTILLO [phone] at 10/7/2021 9:06 PM by Dr. Cohen with read back confirmation.    --- End of Report ---            TATO COHEN MD; Resident Radiology  This document has been electronically signed.  JOSE HOFFMAN MD; Attending Radiologist    < end of copied text >    OTHER: 	  	  LABS:	 	    CARDIAC MARKERS:                                  13.1   7.37  )-----------( 149      ( 09 Oct 2021 05:22 )             38.9     10-09    148<H>  |  113<H>  |  23  ----------------------------<  124<H>  4.0   |  25  |  0.97    Ca    10.7<H>      09 Oct 2021 05:23  Mg     2.30     10-09    TPro  5.7<L>  /  Alb  2.6<L>  /  TBili  4.2<H>  /  DBili  x   /  AST  38  /  ALT  21  /  AlkPhos  99  10-09    proBNP:   Lipid Profile:   HgA1c:   TSH:   ECHO: ee< from: Transthoracic Echocardiogram (10.08.21 @ 11:41) >  CONCLUSIONS:  1. Mitral annular calcification, otherwise normal mitral  valve. Mild-moderate mitral regurgitation.  2. Calcified trileaflet aortic valve with normal opening.  Mild-moderate aortic regurgitation.  3. Mildly dilated left atrium.  LA volume index = 36 cc/m2.  4. Mildleft ventricular enlargement.  5. Severe global left ventricular systolic dysfunction.  6. Unable to accurately evaluate right ventricular size or  systolic function.  A device wire is noted in the right  heart.  7. Inadequate tricuspid regurgitationDoppler envelope  precludes estimation of RVSP.  *** No previous Echo exam.  ------------------------------------------------------------------------  Confirmed on  10/8/2021 - 13:17:30 by Shady Loredo M.D.,  [facility], [facility]    < end of copied text >    Duplex: < from: US Duplex Venous Lower Ext Complete, Bilateral (10.08.21 @ 16:17) >    IMPRESSION:  No evidence of deep venous thrombosis in either lower extremity in the visualized veins..          --- End of Report ---    RADHA LILLY MD; Attending Radiologist  This document has been electronically signed. Oct  8 2021  4:26PM    < end of copied text >      ASSESSMENT/PLAN: 77 yo M with a PMH of CAD s/p MI w/stents 2007, CHF with 20% LVEF in 2016, AICD,  HTN, HLD, DM, gout, CKD, COPD, PVD s/p aortobifemoral bypass, proximal right leg dvt per April 20th sono on eliquis.  now with chf, acute  PE .    ECHO noted   A/C with heparin gtt ,   ECHO noted   Cont all chf meds- Entresto , coreg.   keep net neg with PO lasix   D/W Dr Waldrop

## 2021-10-09 NOTE — PHYSICAL THERAPY INITIAL EVALUATION ADULT - DISCHARGE DISPOSITION, PT EVAL
Anticipate rehab however Formal recommendation to be determined after gait assessment. Please follow PT notes

## 2021-10-10 NOTE — CONSULT NOTE ADULT - ASSESSMENT
75 yo M with a PMH of CAD s/p MI w/stents 2007, CHF with 20% LVEF in 2016, AICD,  HTN, HLD, DM, gout, CKD, COPD, PVD s/p aortobifemoral bypass, proximal right leg dvt per April 20th sono on eliquis present with confusion and sob found to have pe started on AC. nephrology consulted for hypernatremia and hypercalcemia    hypernatremia  resolved  monitor  chf EF 20% on lasix 40mg po daily    hypercalcemia  ? etiology  check pth, vit d 25, vit d 1,25  spep, sif, k/l  monitor    htn  controlled  monitor    pe  on hep gtt         77 yo M with a PMH of CAD s/p MI w/stents 2007, CHF with 20% LVEF in 2016, AICD,  HTN, HLD, DM, gout, CKD, COPD, PVD s/p aortobifemoral bypass, proximal right leg dvt per April 20th sono on eliquis present with confusion and sob found to have pe started on AC. nephrology consulted for hypernatremia and hypercalcemia    hypernatremia  Better  monitor at present  chf EF 20% on lasix 40mg po daily    hypercalcemia  ? etiology  check pth, vit d 25, vit d 1,25  spep, sif, k/l  monitor at present    htn  controlled  monitor    PE  on hep gtt

## 2021-10-10 NOTE — CONSULT NOTE ADULT - SUBJECTIVE AND OBJECTIVE BOX
[facility]   MD ZOHRA SCHAFFER PA        TEL:  OFFICE: [phone]  DR MITCHELL CELL: [phone]  DR. CONROY CELL: [phone]  SOLE SOTOMAYOR CELL: [phone]    From 5pm-7am answering service [phone]    --- INITIAL RENAL CONSULT NOTE ---date of service 10-10-21 @ 13:00    HPI: history from chart pt is a+ox1  75 yo M with a PMH of CAD s/p MI w/stents 2007, CHF with 20% LVEF in 2016, AICD,  HTN, HLD, DM, gout, CKD, COPD, PVD s/p aortobifemoral bypass, proximal right leg dvt per April 20th sono on eliquis. Pt lives alone,  speaks with sister  routinely (Gabriela Case [id number]). Per sister, had noticed increasing confusion over last 2weeks, but today was worst she had seen. Sister grew concerned, EMS called, noted to be sob. Pt per ed noted not taking any of his home meds.  CT angio chest with PE/pulm infarcts/RHF and strain, possible covid pna. Pt a/o 1, unable to provide history. Follow commands. HS trop 31, 32. ekg sinus tach, lafb. CT head with no acute findings    nephrology consulted for hypernatremia and hypercalcemia        Allergies:  No Known Drug Allergies  shellfish (Other; Urticaria)      PAST MEDICAL & SURGICAL HISTORY:  Coronary Artery Disease    Hypercholesteremia    Myocardial Infarction    Hypertension    Gout    Diabetes mellitus    Renal insufficiency    Chronic obstructive pulmonary disease    Peripheral vascular disease    S/P aortobifemoral bypass surgery    Sickle cell trait    AICD (automatic cardioverter/defibrillator) present    Abdominal Hernia    s/p Femoral-Popliteal Bypass Surgery        Home Medications Reviewed    Hospital Medications:   MEDICATIONS  (STANDING):  carvedilol 25 milliGRAM(s) Oral every 12 hours  dextrose 40% Gel 15 Gram(s) Oral once  dextrose 5%. 1000 milliLiter(s) (50 mL/Hr) IV Continuous <Continuous>  dextrose 5%. 1000 milliLiter(s) (100 mL/Hr) IV Continuous <Continuous>  dextrose 50% Injectable 25 Gram(s) IV Push once  dextrose 50% Injectable 12.5 Gram(s) IV Push once  dextrose 50% Injectable 25 Gram(s) IV Push once  furosemide    Tablet 40 milliGRAM(s) Oral daily  glucagon  Injectable 1 milliGRAM(s) IntraMuscular once  heparin  Infusion. 1600 Unit(s)/Hr (16 mL/Hr) IV Continuous <Continuous>  insulin lispro (ADMELOG) corrective regimen sliding scale   SubCutaneous three times a day before meals  insulin lispro (ADMELOG) corrective regimen sliding scale   SubCutaneous at bedtime  melatonin 3 milliGRAM(s) Oral at bedtime  sacubitril 24 mG/valsartan 26 mG 1 Tablet(s) Oral two times a day      SOCIAL HISTORY:  Denies ETOh, Smoking,     FAMILY HISTORY:      REVIEW OF SYSTEMS:  unable to obtain, unreliable     VITALS:  T(F): 97.8 (10-10-21 @ 05:53), Max: 97.8 (10-10-21 @ 05:53)  HR: 58 (10-10-21 @ 05:53)  BP: 121/64 (10-10-21 @ 05:53)  RR: 18 (10-10-21 @ 05:53)  SpO2: 95% (10-10-21 @ 05:53)  Wt(kg): --        PHYSICAL EXAM:  Constitutional: NAD  HEENT: anicteric sclera, oropharynx clear, MMM  Neck: No JVD  Respiratory: CTAB, no wheezes, rales or rhonchi  Cardiovascular: S1, S2, RRR  Gastrointestinal: BS+, soft, NT/ND  Extremities: No cyanosis or clubbing. No peripheral edema  Neurological: A/O x 1  Psychiatric: Normal mood, normal affect  : No CVA tenderness. No riley.   Skin: No rashes  Vascular Access: none    LABS:  10-10    145  |  112<H>  |  25<H>  ----------------------------<  115<H>  4.6   |  20<L>  |  1.04    Ca    11.4<H>      10 Oct 2021 03:39  Mg     2.20     10-10    TPro  6.1  /  Alb  2.6<L>  /  TBili  3.8<H>  /  DBili      /  AST  57<H>  /  ALT  29  /  AlkPhos  111  10-10    Creatinine Trend: 1.04 <--, 0.97 <--, 1.07 <--, 1.05 <--                        13.3   8.53  )-----------( 162      ( 10 Oct 2021 03:39 )             40.3     Urine Studies:        RADIOLOGY & ADDITIONAL STUDIES:

## 2021-10-10 NOTE — CONSULT NOTE ADULT - PROBLEM SELECTOR RECOMMENDATION 3
monitor and control
On medications,  no chest pain, stable, monitored and followed up by primary team/cardiology team

## 2021-10-10 NOTE — PROGRESS NOTE ADULT - SUBJECTIVE AND OBJECTIVE BOX
pt seen and examined, no complaints, ROS - .     carvedilol 25 milliGRAM(s) Oral every 12 hours  dextrose 40% Gel 15 Gram(s) Oral once  dextrose 5%. 1000 milliLiter(s) IV Continuous <Continuous>  dextrose 5%. 1000 milliLiter(s) IV Continuous <Continuous>  dextrose 50% Injectable 25 Gram(s) IV Push once  dextrose 50% Injectable 12.5 Gram(s) IV Push once  dextrose 50% Injectable 25 Gram(s) IV Push once  furosemide    Tablet 40 milliGRAM(s) Oral daily  glucagon  Injectable 1 milliGRAM(s) IntraMuscular once  heparin  Infusion. 1600 Unit(s)/Hr IV Continuous <Continuous>  insulin lispro (ADMELOG) corrective regimen sliding scale   SubCutaneous three times a day before meals  insulin lispro (ADMELOG) corrective regimen sliding scale   SubCutaneous at bedtime  melatonin 3 milliGRAM(s) Oral at bedtime  nitroglycerin     SubLingual 0.4 milliGRAM(s) SubLingual every 5 minutes PRN  sacubitril 24 mG/valsartan 26 mG 1 Tablet(s) Oral two times a day                            13.3   8.53  )-----------( 162      ( 10 Oct 2021 03:39 )             40.3       Hemoglobin: 13.3 g/dL (10-10 @ 03:39)  Hemoglobin: 13.1 g/dL (10-09 @ 05:22)  Hemoglobin: 12.8 g/dL (10-08 @ 17:48)  Hemoglobin: 13.6 g/dL (10-08 @ 07:53)  Hemoglobin: 14.1 g/dL (10-07 @ 19:12)      10-10    145  |  112<H>  |  25<H>  ----------------------------<  115<H>  4.6   |  20<L>  |  1.04    Ca    11.4<H>      10 Oct 2021 03:39  Mg     2.20     10-10    TPro  6.1  /  Alb  2.6<L>  /  TBili  3.8<H>  /  DBili  x   /  AST  57<H>  /  ALT  29  /  AlkPhos  111  10-10    Creatinine Trend: 1.04<--, 0.97<--, 1.07<--, 1.05<--    COAGS: PTT - ( 10 Oct 2021 03:39 )  PTT:49.2 sec    CARDIAC MARKERS ( 10 Oct 2021 03:41 )  x     / x     / 78 U/L / x     / 1.6 ng/mL        T(C): 36.6 (10-10-21 @ 05:53), Max: 36.6 (10-09-21 @ 12:48)  HR: 58 (10-10-21 @ 05:53) (54 - 60)  BP: 121/64 (10-10-21 @ 05:53) (101/62 - 121/64)  RR: 18 (10-10-21 @ 05:53) (18 - 20)  SpO2: 95% (10-10-21 @ 05:53) (95% - 100%)  Wt(kg): --    I&O's Summary    Constitutional: NAD  HEENT: EOMI, Normal Hearing, edentulous  Neck: No LAD, No JVD  Back: Normal spine flexure, No CVA tenderness  Respiratory: CTAB  Cardiovascular: S1 and S2, RRR  Gastrointestinal: BS+, soft, NT/ND  Extremities: No peripheral edema  Vascular: 2+ peripheral pulses  Neurological: A/O x 1, follows commands   Musculoskeletal: moves all extremities   Skin: No rashes    TELEMETRY: 	  NSR    RADIOLOGY:  < from: CT Angio Chest PE Protocol w/ IV Cont (10.07.21 @ 21:03) >  IMPRESSION:    Acute pulmonary emboli in the main right pulmonary artery extending into the segmental branches of the right right upper, middle, and lower lobes. Pulmonary infarction in the right middle and upper lobes, as well as the lingula. Associated Covid 19 is not excluded and testing is recommended.    CT evidence of right heart strain/failure with straightening of the intraventricular septum and reflux of contrast into the suprahepatic IVC. Recommend echocardiogram.    These findings were discussed with Dr. CASTILLO [phone] at 10/7/2021 9:06 PM by Dr. Cohen with read back confirmation.      ECHO: ee< from: Transthoracic Echocardiogram (10.08.21 @ 11:41) >  CONCLUSIONS:  1. Mitral annular calcification, otherwise normal mitral  valve. Mild-moderate mitral regurgitation.  2. Calcified trileaflet aortic valve with normal opening.  Mild-moderate aortic regurgitation.  3. Mildly dilated left atrium.  LA volume index = 36 cc/m2.  4. Mildleft ventricular enlargement.  5. Severe global left ventricular systolic dysfunction.  6. Unable to accurately evaluate right ventricular size or  systolic function.  A device wire is noted in the right  heart.  7. Inadequate tricuspid regurgitationDoppler envelope  precludes estimation of RVSP.  *** No previous Echo exam.  ------------------------------------------------------------------------  Confirmed on  10/8/2021 - 13:17:30 by Shady Loredo M.D.,  [facility], [facility]    ASSESSMENT/PLAN: 77 yo M with a PMH of CAD s/p MI w/stents 2007, CHF with 20% LVEF in 2016, AICD,  HTN, HLD, DM, gout, CKD, COPD, PVD s/p aortobifemoral bypass, proximal right leg dvt per April 20th sono on eliquis.  now with chf, acute  PE .    ECHO noted   A/C with heparin gtt , for PE,   pulm follow up .  ECHO noted   Cont all chf meds- Entresto , coreg.   keep net neg with PO lasix   Workup of encephalopathy per neuro and primary team  D/W Dr Waldrop

## 2021-10-10 NOTE — CHART NOTE - NSCHARTNOTEFT_GEN_A_CORE
Notified by RN patient c/o abdominal pain.  Patient seen at bedside, alert, oriented to self, rolling in bed c/o of abdominal pain, abdomen soft with irregular contour appears to have large hermia.  BS+ x4, CT abd & Pelvis ordered.  Discussed w/attending.

## 2021-10-10 NOTE — PROVIDER CONTACT NOTE (CRITICAL VALUE NOTIFICATION) - BACKGROUND
729B, Jada, 82 year old female presenting with TIA. Pt with past medical history of seizure, CVA, hypertension and diabetes.

## 2021-10-10 NOTE — CONSULT NOTE ADULT - SUBJECTIVE AND OBJECTIVE BOX
HPI:  75 yo M with a PMH of CAD s/p MI w/stents 2007, CHF with 20% LVEF in 2016, AICD,  HTN, HLD, DM, gout, CKD, COPD, PVD s/p aortobifemoral bypass, proximal right leg dvt per April 20th sono on eliquis. Pt lives alone,  speaks with sister  routinely (Gabriela Smith [phone]). Per sister, had noticed increasing confusion over last 2weeks, but today was worst she had seen. Sister grew concerned, EMS called, noted to be sob. Pt per ed noted not taking any of his home meds.  CT angio chest with PE/pulm infarcts/RHF and strain, possible covid pna. Pt a/o 1, unable to provide history. Follow commands. HS trop 31, 32. ekg sinus tach, lafb. CT head with no acute findings    Of note, tbili>5 (as high as 10 April 2020) was supposed to be on lactulose per sister but not taking. Prior GI note mentioned possibility of liver biopsy, but appears not to have been performed. US abd from April 2020 noteworthy only for slight hepatomegaly  (07 Oct 2021 23:31)  Patient apparently has no history hypercalcemia, has history of diabetes, no recent hypoglycemic episodes. Patient follows up with PCP for diabetes management.    PAST MEDICAL & SURGICAL HISTORY:  Coronary Artery Disease    Hypercholesteremia    Myocardial Infarction    Hypertension    Gout    Diabetes mellitus    Renal insufficiency    Chronic obstructive pulmonary disease    Peripheral vascular disease    S/P aortobifemoral bypass surgery    Sickle cell trait    AICD (automatic cardioverter/defibrillator) present    Abdominal Hernia    s/p Femoral-Popliteal Bypass Surgery        FAMILY HISTORY:      Social History:    Outpatient Medications:    MEDICATIONS  (STANDING):  carvedilol 25 milliGRAM(s) Oral every 12 hours  dextrose 40% Gel 15 Gram(s) Oral once  dextrose 5%. 1000 milliLiter(s) (50 mL/Hr) IV Continuous <Continuous>  dextrose 5%. 1000 milliLiter(s) (100 mL/Hr) IV Continuous <Continuous>  dextrose 50% Injectable 25 Gram(s) IV Push once  dextrose 50% Injectable 12.5 Gram(s) IV Push once  dextrose 50% Injectable 25 Gram(s) IV Push once  furosemide    Tablet 40 milliGRAM(s) Oral daily  glucagon  Injectable 1 milliGRAM(s) IntraMuscular once  heparin  Infusion. 1600 Unit(s)/Hr (16 mL/Hr) IV Continuous <Continuous>  insulin lispro (ADMELOG) corrective regimen sliding scale   SubCutaneous three times a day before meals  insulin lispro (ADMELOG) corrective regimen sliding scale   SubCutaneous at bedtime  melatonin 3 milliGRAM(s) Oral at bedtime  sacubitril 24 mG/valsartan 26 mG 1 Tablet(s) Oral two times a day    MEDICATIONS  (PRN):  nitroglycerin     SubLingual 0.4 milliGRAM(s) SubLingual every 5 minutes PRN Chest Pain      Allergies    No Known Drug Allergies  shellfish (Other; Urticaria)    Intolerances      Review of Systems:  Constitutional: No fever, no chills  Eyes: No blurry vision  Neuro: No tremors  HEENT: No pain, no neck swelling  Cardiovascular: No chest pain, no palpitations  Respiratory: No SOB, no cough  GI: No nausea, vomiting, abdominal pain  : No dysuria  Skin: no rash  MSK: Has leg swelling.  Psych: no depression  Endocrine: no polyuria, polydipsia    UNABLE TO OBTAIN    ALL OTHER SYSTEMS REVIEWED AND NEGATIVE        PHYSICAL EXAM:  VITALS: T(C): 36.6 (10-10-21 @ 05:53)  T(F): 97.8 (10-10-21 @ 05:53), Max: 97.8 (10-10-21 @ 05:53)  HR: 58 (10-10-21 @ 05:53) (54 - 60)  BP: 121/64 (10-10-21 @ 05:53) (101/62 - 121/64)  RR:  (18 - 20)  SpO2:  (95% - 100%)  Wt(kg): --  GENERAL: NAD, well-groomed, well-developed  EYES: No proptosis, no lid lag  HEENT:  Atraumatic, Normocephalic  THYROID: Normal size, no palpable nodules  RESPIRATORY: Clear to auscultation bilaterally; No rales, rhonchi, wheezing  CARDIOVASCULAR: Si S2, No murmurs;  GI: Soft, non distended, normal bowel sounds  SKIN: Dry, intact, No rashes or lesions  MUSCULOSKELETAL: Has BL lower extremity edema.  NEURO:  no tremor, sensation decreased in feet BL,    POCT Blood Glucose.: 200 mg/dL (10-10-21 @ 11:38)  POCT Blood Glucose.: 122 mg/dL (10-10-21 @ 07:56)  POCT Blood Glucose.: 150 mg/dL (10-09-21 @ 21:08)  POCT Blood Glucose.: 124 mg/dL (10-09-21 @ 17:21)  POCT Blood Glucose.: 160 mg/dL (10-09-21 @ 12:24)  POCT Blood Glucose.: 117 mg/dL (10-09-21 @ 07:38)  POCT Blood Glucose.: 109 mg/dL (10-08-21 @ 21:17)  POCT Blood Glucose.: 111 mg/dL (10-08-21 @ 17:15)  POCT Blood Glucose.: 120 mg/dL (10-08-21 @ 13:45)  POCT Blood Glucose.: 113 mg/dL (10-08-21 @ 07:29)                            13.3   8.53  )-----------( 162      ( 10 Oct 2021 03:39 )             40.3       10-10    145  |  112<H>  |  25<H>  ----------------------------<  115<H>  4.6   |  20<L>  |  1.04    EGFR if : 81  EGFR if non : 70    Ca    11.4<H>      10-10  Mg     2.20     10-10    TPro  6.1  /  Alb  2.6<L>  /  TBili  3.8<H>  /  DBili  x   /  AST  57<H>  /  ALT  29  /  AlkPhos  111  10-10      Thyroid Function Tests:  10-10 @ 12:46 TSH 0.50 FreeT4 -- T3 -- Anti TPO -- Anti Thyroglobulin Ab -- TSI --              Radiology:

## 2021-10-10 NOTE — CHART NOTE - NSCHARTNOTEFT_GEN_A_CORE
Notified by Rn that patient c/o abdominal pain.  Patient seen at bedside, appears uncomfortable, rolling in bed stating his abdomen hurts.  Abdomen soft, non-tender with irregular contour-appears to have large hernia, BS present x4.  CT abdomen & pelvis ordered w/contrast, discussed w/Dr. Wilson.    Unable to obtain consent from patient for contrast, called Daughter Lesli--went to , called emergency contact sister Gabriela who is involved in patients care however states daughter Lesli makes decisions and is HCP.  She will try to reach Lesli and have her call the hospital to speak w/a provider.      Provider to follow up for consent Notified by Rn that patient c/o abdominal pain.  Patient seen at bedside, appears uncomfortable, rolling in bed stating his abdomen hurts.  Abdomen soft, non-tender with irregular contour-appears to have large hernia, BS present x4.  CT abdomen & pelvis ordered w/contrast, discussed w/Dr. Wilson.    Unable to obtain consent from patient for contrast, called Daughter Lesli--went to , called emergency contact sister Gabriela who is involved in patients care however states shantanu Ballesteros makes decisions and is HCP.  She will try to reach Lesli and have her call the hospital to speak w/a provider.      Provider to follow up for consent    Addendum-spoke to shantanu Ellington @ [phone]-discussed risk/benefit of CT abd/pelvis w/contrast, consent obtained & placed in chart

## 2021-10-10 NOTE — CONSULT NOTE ADULT - PROBLEM SELECTOR RECOMMENDATION 9
Will continue current insulin regimen for now. Will continue monitoring  blood sugars, will Follow up.  Patient counseled for compliance with consistent low carb diet.
on heparin : check echo: has rt heart strain: check dopplers  also: watch for bleeding: check FOBT

## 2021-10-10 NOTE — CONSULT NOTE ADULT - ASSESSMENT
Assessment  DMT2: 76y Male with DM T2 with hyperglycemia admitted with SOB, patient was on oral hypoglycemic agents at home, now on insulin  blood sugars improving, no hypoglycemic episode,  eating meals.  Hypercalcemia: Newly diagnosed, he looks dehydrated, no work up available at this time.  CAD: On medications, monitored, stable.  HTN: On medications, monitored,             Rosalva Louis MD  Cell: [phone]  Office: [phone]

## 2021-10-10 NOTE — PROGRESS NOTE ADULT - ASSESSMENT
77 y/o m with worsening encephelopathy in setting of acute PE     Problem/Plan - 1:  ·  Problem: Pulmonary embolism.   ·  Plan: -possibly 2/2 to eliquis non-compliance in setting of know proximal right leg dvt  -heparin gtt and will change to NOAC.   -Pulmonary helping .      Problem/Plan - 2:  ·  Problem: Metabolic Encephalopathy    ·  Plan: -Exact cause not known.  Bilirubin high but Ammonia normal.  CT head noted.   Neurology  helping.      Problem/Plan - 3:  ·  Problem: DM (diabetes mellitus).   ·  Plan: ISS  Sugars fine.      Problem/Plan - 4:  ·  Problem: Chronic Systolic CHF (congestive heart failure).   ·  Plan: c/w sacubitril , lasix, coreg  Cardiology helping.       Problem/Plan - 5:  ·  Problem: Hypernatremia .   ·  Plan: resolved.      Problem/Plan - 5:  ·  Problem: Hyperbilirubinemia  .   ·  Plan: Trending LFT .       77 y/o m with worsening encephelopathy in setting of acute PE     Problem/Plan - 1:  ·  Problem: Pulmonary embolism.   ·  Plan: -possibly 2/2 to eliquis non-compliance in setting of know proximal right leg dvt  -heparin gtt and will change to NOAC.   -Pulmonary helping .      Problem/Plan - 2:  ·  Problem: Metabolic Encephalopathy    ·  Plan: -Exact cause not known.  Bilirubin high but Ammonia normal.  CT head noted.   Neurology  helping.      Problem/Plan - 3:  ·  Problem: DM (diabetes mellitus).   ·  Plan: ISS  Sugars fine.      Problem/Plan - 4:  ·  Problem: Chronic Systolic CHF (congestive heart failure).   ·  Plan: c/w sacubitril , lasix, coreg  Cardiology helping.       Problem/Plan - 5:  ·  Problem: Hypernatremia .   ·  Plan: resolved.      Problem/Plan - 5:  ·  Problem: Hyperbilirubinemia  .   ·  Plan: Trending LFT .      Called his daughter Lesli and left message,.

## 2021-10-10 NOTE — CONSULT NOTE ADULT - SUBJECTIVE AND OBJECTIVE BOX
[facility]), [facility]        Patient is a 76y old  Male who presents with a chief complaint of PE/AMS (09 Oct 2021 19:12)    Excerpt from H&P,"     77 yo M with a PMH of CAD s/p MI w/stents 2007, CHF with 20% LVEF in 2016, AICD,  HTN, HLD, DM, gout, CKD, COPD, PVD s/p aortobifemoral bypass, proximal right leg dvt per  sono on eliquis. Pt lives alone,  speaks with sister  routinely (Gabriela Case [id number]). Per sister, had noticed increasing confusion over last 2weeks, but today was worst she had seen. Sister grew concerned, EMS called, noted to be sob. Pt per ed noted not taking any of his home meds.  CT angio chest with PE/pulm infarcts/RHF and strain, possible covid pna. Pt a/o 1, unable to provide history. Follow commands. HS trop 31, 32. ekg sinus tach, lafb. CT head with no acute findings    Of note, tbili>5 (as high as 10 April 2020) was supposed to be on lactulose per sister but not taking. Prior GI note mentioned possibility of liver biopsy, but appears not to have been performed. US abd from 2020 noteworthy only for slight hepatomegaly  (07 Oct 2021 23:31)           *****PAST MEDICAL / Surgical  HISTORY:  PAST MEDICAL & SURGICAL HISTORY:  Coronary Artery Disease    Hypercholesteremia    Myocardial Infarction    Hypertension    Gout    Diabetes mellitus    Renal insufficiency    Chronic obstructive pulmonary disease    Peripheral vascular disease    S/P aortobifemoral bypass surgery    Sickle cell trait    AICD (automatic cardioverter/defibrillator) present    Abdominal Hernia    s/p Femoral-Popliteal Bypass Surgery             *****FAMILY HISTORY:  FAMILY HISTORY:           *****SOCIAL HISTORY:  Alcohol: None  Smoking: None         *****ALLERGIES:   Allergies    No Known Drug Allergies  shellfish (Other; Urticaria)    Intolerances             *****MEDICATIONS: current medication reviewed and documented.   MEDICATIONS  (STANDING):  carvedilol 25 milliGRAM(s) Oral every 12 hours  dextrose 40% Gel 15 Gram(s) Oral once  dextrose 5%. 1000 milliLiter(s) (50 mL/Hr) IV Continuous <Continuous>  dextrose 5%. 1000 milliLiter(s) (100 mL/Hr) IV Continuous <Continuous>  dextrose 50% Injectable 25 Gram(s) IV Push once  dextrose 50% Injectable 12.5 Gram(s) IV Push once  dextrose 50% Injectable 25 Gram(s) IV Push once  furosemide    Tablet 40 milliGRAM(s) Oral daily  glucagon  Injectable 1 milliGRAM(s) IntraMuscular once  heparin  Infusion. 1600 Unit(s)/Hr (16 mL/Hr) IV Continuous <Continuous>  insulin lispro (ADMELOG) corrective regimen sliding scale   SubCutaneous three times a day before meals  insulin lispro (ADMELOG) corrective regimen sliding scale   SubCutaneous at bedtime  melatonin 3 milliGRAM(s) Oral at bedtime  sacubitril 24 mG/valsartan 26 mG 1 Tablet(s) Oral two times a day    MEDICATIONS  (PRN):  nitroglycerin     SubLingual 0.4 milliGRAM(s) SubLingual every 5 minutes PRN Chest Pain           *****REVIEW OF SYSTEM:  GEN: no fever, no chills, no pain  RESP: no SOB, no cough, no sputum  CVS: no chest pain, no palpitations, no edema  GI: no abdominal pain, no nausea, no vomiting, no constipation, no diarrhea  : no dysurea, no frequency, no hematurea  Neuro: no headache, no dizziness  PSYCH: no anxiety, no depression  Derm : no itching, no rash         *****VITAL SIGNS:  T(C): 36.6 (10-10-21 @ 05:53), Max: 36.6 (10-09-21 @ 12:48)  HR: 58 (10-10-21 @ 05:53) (54 - 60)  BP: 121/64 (10-10-21 @ 05:53) (101/62 - 121/64)  RR: 18 (10-10-21 @ 05:53) (18 - 20)  SpO2: 95% (10-10-21 @ 05:53) (95% - 100%)  Wt(kg): --           *****PHYSICAL EXAM:   Alert oriented x 3   Attention comprehension are fair. Able to name, repeat, read without any difficulty.   Able to follow 3 step commands.     EOMI fundi not visualized,  VFF to confrontration  No facial asymmetry   Tongue is midline   Palate elevates symmetrically   Moving all 4 ext symmetrically no pronator drift   Reflexes are symmetric throughout   sensation is grossly symmetric  Gait : not assessed.  B/L down going toes               *****LAB AND IMAGIN.3   8.53  )-----------( 162      ( 10 Oct 2021 03:39 )             40.3               10-10    145  |  112<H>  |  25<H>  ----------------------------<  115<H>  4.6   |  20<L>  |  1.04    Ca    11.4<H>      10 Oct 2021 03:39  Mg     2.20     10-10    TPro  6.1  /  Alb  2.6<L>  /  TBili  3.8<H>  /  DBili  x   /  AST  57<H>  /  ALT  29  /  AlkPhos  111  10-10    PT/INR - ( 09 Oct 2021 05:23 )   PT: 19.2 sec;   INR: 1.71 ratio         PTT - ( 10 Oct 2021 03:39 )  PTT:49.2 sec            CARDIAC MARKERS ( 10 Oct 2021 03:41 )  x     / x     / 78 U/L / x     / 1.6 ng/mL                  [All pertinent recent Imaging reports reviewed]         *****A S S E S S M E N T   A N D   P L A N :        Problem/Recommendations 1: ams of unclear etiology   suspect multifactorial metabolic encephalopathy relating to   need arterial blood gas please   low albumin   b12/folate/tsh/  thiamine 500 iv q 8     Problem/Recommendations 2: transaminitis   elevated bili   ? etiology ? DIli    ammonia wnl   any role for ursodiol 500 bid        ___________________________  Will follow with you.  Thank you,  Mila Cazares MD  Diplomate of the American Board of Neurology and Psychiatry.  Diplomate of the American Board of Vascular Neurology.   [facility] ([facility]), [facility]   Ph: [phone]    Differential diagnosis and plan of care discussed with patient after the evaluation.   Advanced care planning options discussed.   Pain assessed and judicious use of narcotics when appropriate was discussed.  Importance of Fall prevention discussed.  Counseling on Smoking and Alcohol cessation was offered when appropriate.  Counseling on Diet, exercise, and medication compliance was done.   83 minutes spent on the total encounter;  more than 50 % of the visit was spent on counseling  and or coordinating care by the attending physician.    Thank you for allowing me to participate in the care of this mary jo patient. Please do not hesitate to call me if you have any questions.     This and subsequent notes  will  inherently be subject to errors including those of syntax and substitutions which may escape proofreading. In such instances original meaning may be extrapolated by contextual derivation.    [facility] [place]([facility]), [facility]        Patient is a 76y old  Male who presents with a chief complaint of PE/AMS (09 Oct 2021 19:12)    Excerpt from H&P,"     75 yo M with a PMH of CAD s/p MI w/stents , CHF with 20% LVEF in 2016, AICD,  HTN, HLD, DM, gout, CKD, COPD, PVD s/p aortobifemoral bypass, proximal right leg dvt per  so  on eliquis. Pt lives alone,  speaks with sister  routinely (Gabriela Case [id number]). Per sister, had noticed increasing confusion over last 2weeks, but today was worst she had seen. Sister grew concerned, EMS called, noted to be sob. Pt per ed noted not taking any of his home meds.  CT angio chest with PE/pulm infarcts/RHF and strain, possible covid pna. Pt a/o 1, unable to provide history. Follow commands. HS trop 31, 32. ekg sinus tach, lafb. CT head with no acute findings    Of note, tbili>5 (as high as 10 April 2020) was supposed to be on lactulose per sister but not taking. Prior GI note mentioned possibility of liver biopsy, but appears not to have been performed. US abd from 2020 noteworthy only for slight hepatomegaly     as per prior records, pt was admitted in 2016 with similiar presentation.   pt unable to provide any information regarding his presentation            *****PAST MEDICAL / Surgical  HISTORY:  PAST MEDICAL & SURGICAL HISTORY:  Coronary Artery Disease    Hypercholesteremia    Myocardial Infarction    Hypertension    Gout    Diabetes mellitus    Renal insufficiency    Chronic obstructive pulmonary disease    Peripheral vascular disease    S/P aortobifemoral bypass surgery    Sickle cell trait    AICD (automatic cardioverter/defibrillator) present    Abdominal Hernia    s/p Femoral-Popliteal Bypass Surgery             *****FAMILY HISTORY:  FAMILY HISTORY:           *****SOCIAL HISTORY:  Alcohol: None  Smoking: None  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   MEDICATIONS  (STANDING):  carvedilol 25 milliGRAM(s) Oral every 12 hours  dextrose 40% Gel 15 Gram(s) Oral once  dextrose 5%. 1000 milliLiter(s) (50 mL/Hr) IV Continuous <Continuous>  dextrose 5%. 1000 milliLiter(s) (100 mL/Hr) IV Continuous <Continuous>  dextrose 50% Injectable 25 Gram(s) IV Push once  dextrose 50% Injectable 12.5 Gram(s) IV Push once  dextrose 50% Injectable 25 Gram(s) IV Push once  furosemide    Tablet 40 milliGRAM(s) Oral daily  glucagon  Injectable 1 milliGRAM(s) IntraMuscular once  heparin  Infusion. 1600 Unit(s)/Hr (16 mL/Hr) IV Continuous <Continuous>  insulin lispro (ADMELOG) corrective regimen sliding scale   SubCutaneous three times a day before meals  insulin lispro (ADMELOG) corrective regimen sliding scale   SubCutaneous at bedtime  melatonin 3 milliGRAM(s) Oral at bedtime  sacubitril 24 mG/valsartan 26 mG 1 Tablet(s) Oral two times a day    MEDICATIONS  (PRN):  nitroglycerin     SubLingual 0.4 milliGRAM(s) SubLingual every 5 minutes PRN Chest Pain           *****REVIEW OF SYSTEM:  GEN: no fever, no chills, no pain  RESP: no SOB, no cough, no sputum  CVS: no chest pain, no palpitations, no edema  GI: no abdominal pain, no nausea, no vomiting, no constipation, no diarrhea  : no dysurea, no frequency, no hematurea  Neuro: no headache, no dizziness  PSYCH: no anxiety, no depression  Derm : no itching, no rash         *****VITAL SIGNS:  T(C): 36.6 (10-10-21 @ 05:53), Max: 36.6 (10-09-21 @ 12:48)  HR: 58 (10-10-21 @ 05:53) (54 - 60)  BP: 121/64 (10-10-21 @ 05:53) (101/62 - 121/64)  RR: 18 (10-10-21 @ 05:53) (18 - 20)  SpO2: 95% (10-10-21 @ 05:53) (95% - 100%)  Wt(kg): --           *****PHYSICAL EXAM:   Alert oriented x2 did not know the month ( thinks its april)   Attention comprehension are  poor   poor cooperation   confused     EOMI fundi not visualized, blinks to threat   No facial asymmetry   Tongue is midline      Moving all 4 ext symmetrically    sensation is grossly symmetric  Gait : not assessed.  B/L down going toes               *****LAB AND IMAGIN.3   8.53  )-----------( 162      ( 10 Oct 2021 03:39 )             40.3               10-10    145  |  112<H>  |  25<H>  ----------------------------<  115<H>  4.6   |  20<L>  |  1.04    Ca    11.4<H>      10 Oct 2021 03:39  Mg     2.20     10-10    TPro  6.1  /  Alb  2.6<L>  /  TBili  3.8<H>  /  DBili  x   /  AST  57<H>  /  ALT  29  /  AlkPhos  111  10-10    PT/INR - ( 09 Oct 2021 05:23 )   PT: 19.2 sec;   INR: 1.71 ratio         PTT - ( 10 Oct 2021 03:39 )  PTT:49.2 sec            CARDIAC MARKERS ( 10 Oct 2021 03:41 )  x     / x     / 78 U/L / x     / 1.6 ng/mL                < from: CT Head No Cont (10.07.21 @ 20:58) >    There is no acute intracranial mass-effect, hemorrhage, midline shift, or abnormal extra-axial fluid collection. Gray-white differentiation is maintained.    Chronic right thalamic lacunar infarction is again noted. Mild central volume loss and mild chronic microvascular ischemic changes are noted. Vascular calcifications along the carotid siphons bilaterally. No evidence ofhydrocephalus. Basal cisterns are patent.    Small right inferior mastoid effusion. Clear left mastoid. Visualized paranasal sinuses and are clear. Calvarium is intact.    IMPRESSION:    No interval change. No acute intracranial bleeding.  Chronic right thalamic lacunar infarction.  Small right mastoid effusion.    < end of copied text >    [All pertinent recent Imaging reports reviewed]         *****A S S E S S M E N T   A N D   P L A N :     Excerpt from H&P,"     75 yo M with a PMH of CAD s/p MI w/stents , CHF with 20% LVEF in 2016, AICD,  HTN, HLD, DM, gout, CKD, COPD, PVD s/p aortobifemoral bypass, proximal right leg dvt per  so  on eliquis. Pt lives alone,  speaks with sister  routinely (Gabriela Case [id number]). Per sister, had noticed increasing confusion over last 2weeks, but today was worst she had seen. Sister grew concerned, EMS called, noted to be sob. Pt per ed noted not taking any of his home meds.  CT angio chest with PE/pulm infarcts/RHF and strain, possible covid pna. Pt a/o 1, unable to provide history. Follow commands. HS trop 31, 32. ekg sinus tach, lafb. CT head with no acute findings    Of note, tbili>5 (as high as 10 April 2020) was supposed to be on lactulose per sister but not taking. Prior GI note mentioned possibility of liver biopsy, but appears not to have been performed. US abd from 2020 noteworthy only for slight hepatomegaly     as per prior records, pt was admitted in 2016 with similiar presentation.   pt unable to provide any information regarding his presentation        Problem/Recommendations 1: ams of unclear etiology   suspect multifactorial metabolic encephalopathy due to hypercalcemia, hypernatremia, elevated bili, poor po intake, worsening underlying cognitive impairement   ( seen in 2016 with similar presentation )  r/o infectious process    b12/folate/tsh/ wnl   thiamine 500 iv q 8   ct head no acute path  unable to get mr due to AICD   sleep wake cycle regulation with melatonin   correct metabolic derangements.       Problem/Recommendations 2: transaminitis   elevated bili low albumin   ? etiology ? DIli    ammonia wnl   any role for ursodiol 500 bid ?       ___________________________  Will follow with you.  Thank you,  Mila Cazares MD  Diplomate of the American Board of Neurology and Psychiatry.  Diplomate of the American Board of Vascular Neurology.   [facility] ([facility]), [facility]   Ph: [phone]    Differential diagnosis and plan of care discussed with patient after the evaluation.   Advanced care planning options discussed.   Pain assessed and judicious use of narcotics when appropriate was discussed.  Importance of Fall prevention discussed.  Counseling on Smoking and Alcohol cessation was offered when appropriate.  Counseling on Diet, exercise, and medication compliance was done.   83 minutes spent on the total encounter;  more than 50 % of the visit was spent on counseling  and or coordinating care by the attending physician.    Thank you for allowing me to participate in the care of this mary jo patient. Please do not hesitate to call me if you have any questions.     This and subsequent notes  will  inherently be subject to errors including those of syntax and substitutions which may escape proofreading. In such instances original meaning may be extrapolated by contextual derivation.

## 2021-10-10 NOTE — PROVIDER CONTACT NOTE (CRITICAL VALUE NOTIFICATION) - RECOMMENDATIONS
follow up with heparin nomogram
Medication for nausea and follow up with cards and lab results.
Follow heparin drip protocol and adjust rate accordingly.

## 2021-10-10 NOTE — PROVIDER CONTACT NOTE (CRITICAL VALUE NOTIFICATION) - ASSESSMENT
Pt A&O1-2 baseline. Pt on heparin drip resting in bed comfortably breathing unlabored on 2L NC. Pt heparin drip running at 15.
Pt denies S/S of hematoma, bleeding, no CP, SOB present.
Pt with nausea and vomiting. Pt denies chest pain or SOB. Labs sent and EKG done.

## 2021-10-10 NOTE — PROVIDER CONTACT NOTE (CRITICAL VALUE NOTIFICATION) - ACTION/TREATMENT ORDERED:
follow up with heparin nomogram
None at this time. will scan heparin infusion and adjust as ordered.
Pt placed on oxygen per ACP order. Labs sent. EKG done. Ativan given for nausea and magnesium supplemented.

## 2021-10-11 NOTE — PROGRESS NOTE ADULT - SUBJECTIVE AND OBJECTIVE BOX
[facility]      Seen earlier today, and examined.  - Today, patient is without complaints.           *****MEDICATIONS: Current medication reviewed and documented.    MEDICATIONS  (STANDING):  aspirin  chewable 81 milliGRAM(s) Oral daily  carvedilol 25 milliGRAM(s) Oral every 12 hours  dextrose 40% Gel 15 Gram(s) Oral once  dextrose 5%. 1000 milliLiter(s) (50 mL/Hr) IV Continuous <Continuous>  dextrose 5%. 1000 milliLiter(s) (100 mL/Hr) IV Continuous <Continuous>  dextrose 50% Injectable 25 Gram(s) IV Push once  dextrose 50% Injectable 12.5 Gram(s) IV Push once  dextrose 50% Injectable 25 Gram(s) IV Push once  furosemide    Tablet 40 milliGRAM(s) Oral daily  glucagon  Injectable 1 milliGRAM(s) IntraMuscular once  heparin  Infusion. 1400 Unit(s)/Hr (14 mL/Hr) IV Continuous <Continuous>  insulin lispro (ADMELOG) corrective regimen sliding scale   SubCutaneous three times a day before meals  insulin lispro (ADMELOG) corrective regimen sliding scale   SubCutaneous at bedtime  lactulose Syrup 10 Gram(s) Oral daily  melatonin 3 milliGRAM(s) Oral at bedtime  pantoprazole    Tablet 40 milliGRAM(s) Oral before breakfast  potassium phosphate IVPB 30 milliMole(s) IV Intermittent once  sacubitril 24 mG/valsartan 26 mG 1 Tablet(s) Oral two times a day  senna 2 Tablet(s) Oral at bedtime  thiamine IVPB 500 milliGRAM(s) IV Intermittent every 8 hours    MEDICATIONS  (PRN):  bisacodyl Suppository 10 milliGRAM(s) Rectal daily PRN Constipation  nitroglycerin     SubLingual 0.4 milliGRAM(s) SubLingual every 5 minutes PRN Chest Pain          ***** VITAL SIGNS:  T(F): 97.8 (10-11-21 @ 12:11), Max: 98.9 (10-10-21 @ 18:28)  HR: 58 (10-11-21 @ 12:11) (52 - 60)  BP: 113/56 (10-11-21 @ 12:11) (100/60 - 124/76)  RR: 18 (10-11-21 @ 12:11) (18 - 18)  SpO2: 97% (10-11-21 @ 12:11) (97% - 100%)  Wt(kg): --  ,   I&O's Summary       Alert oriented x2 did not know the month ( thinks its april)   Attention comprehension are  poor   poor cooperation   confused     EOMI fundi not visualized, blinks to threat   No facial asymmetry   Tongue is midline      Moving all 4 ext symmetrically    sensation is grossly symmetric  Gait : not assessed.  B/L down going toes     Gait not assessed.            *****LAB AND IMAGIN.6   9.68  )-----------( 158      ( 11 Oct 2021 03:57 )             41.0               10-11    148<H>  |  112<H>  |  24<H>  ----------------------------<  117<H>  3.6   |  26  |  0.98    Ca    11.5<H>      11 Oct 2021 03:57  Phos  1.9     10-11  Mg     2.20     10-11    TPro  6.2  /  Alb  2.7<L>  /  TBili  3.3<H>  /  DBili  x   /  AST  41<H>  /  ALT  31  /  AlkPhos  121<H>  10-    PTT - ( 11 Oct 2021 11:25 )  PTT:84.1 sec       CARDIAC MARKERS ( 10 Oct 2021 03:41 )  x     / x     / 78 U/L / x     / 1.6 ng/mL            ABG - ( 10 Oct 2021 12:52 )  pH, Arterial: 7.44  pH, Blood: x     /  pCO2: 45    /  pO2: 182   / HCO3: 31    / Base Excess: 5.6   /  SaO2: 99.5                [All pertinent recent Imaging/Reports reviewed]           *****A S S E S S M E N T   A N D   P L A N :     Excerpt from H&P,"     77 yo M with a PMH of CAD s/p MI w/stents , CHF with 20% LVEF in 2016, AICD,  HTN, HLD, DM, gout, CKD, COPD, PVD s/p aortobifemoral bypass, proximal right leg dvt per  so  on eliquis. Pt lives alone,  speaks with sister  routinely (Gabriela [phone]). Per sister, had noticed increasing confusion over last 2weeks, but today was worst she had seen. Sister grew concerned, EMS called, noted to be sob. Pt per ed noted not taking any of his home meds.  CT angio chest with PE/pulm infarcts/RHF and strain, possible covid pna. Pt a/o 1, unable to provide history. Follow commands. HS trop 31, 32. ekg sinus tach, lafb. CT head with no acute findings    Of note, tbili>5 (as high as 10 April 2020) was supposed to be on lactulose per sister but not taking. Prior GI note mentioned possibility of liver biopsy, but appears not to have been performed. US abd from 2020 noteworthy only for slight hepatomegaly     as per prior records, pt was admitted in 2016 with similiar presentation.   pt unable to provide any information regarding his presentation        Problem/Recommendations 1: ams of unclear etiology   suspect multifactorial metabolic encephalopathy due to hypercalcemia, hypernatremia, elevated bili, poor po intake, worsening underlying cognitive impairement   ( seen in 2016 with similar presentation )  r/o infectious process    b12/folate/tsh/ wnl   thiamine 500 iv q 8   ct head no acute path  unable to get mr due to AICD   sleep wake cycle regulation with melatonin   correct metabolic derangements.       Problem/Recommendations 2: transaminitis   elevated bili low albumin   ? etiology   ammonia wnl    gi input appreciated     Thank you for allowing me to participate in the care of this patient. Will continue to follow patient periodically. Please do not hesitate to call me if you have any  questions or if there has been a change in patients neurological status     ________________  Mila Cazares MD  [facility]  [phone]      33 minutes spent on total encounter; more than 50 % of the visit was  spent counseling about plan of care, compliance to diet/exercise and medication regimen and or  coordinating care by the attending physician.      It is advised that stroke patients follow up with MCKAY Solorzano @ [phone] in 1- 2 weeks.   Others please follow up with Dr. Michael Nissenbaum [phone]

## 2021-10-11 NOTE — CONSULT NOTE ADULT - SUBJECTIVE AND OBJECTIVE BOX
Chief Complaint:  Patient is a 76y old  Male who presents with a chief complaint of PE/AMS (11 Oct 2021 12:17)    Coronary Artery Disease    Hypercholesteremia    Myocardial Infarction    Hypertension    Gout    Diabetes mellitus    Renal insufficiency    Chronic obstructive pulmonary disease    Peripheral vascular disease    S/P aortobifemoral bypass surgery    Sickle cell trait    AICD (automatic cardioverter/defibrillator) present    Peripheral Vascular Disease    Abdominal Hernia    s/p Femoral-Popliteal Bypass Surgery       HPI:  75 yo M with a PMH of CAD s/p MI w/stents 2007, CHF with 20% LVEF in 2016, AICD,  HTN, HLD, DM, gout, CKD, COPD, PVD s/p aortobifemoral bypass, proximal right leg dvt per April 20th sono on [place]. Pt lives alone,  speaks with sister  routinely (Gabriela [phone]). Per sister, had noticed increasing confusion over last 2weeks, but today was worst she had seen. Sister grew concerned, EMS called, noted to be sob. Pt per ed noted not taking any of his home meds.  CT angio chest with PE/pulm infarcts/RHF and strain, possible covid pna. Pt a/o 1, unable to provide history. Follow commands. HS trop 31, 32. ekg sinus tach, lafb. CT head with no acute findings . Of note, tbili>5 (as high as 10 April 2020) was supposed to be on lactulose per sister but not taking. Prior GI note mentioned possibility of liver biopsy, but appears not to have been performed. US abd from April 2020 noteworthy only for slight hepatomegaly. GI consulted for abdominal pain with increased bilirubin. Pt is confused and full ros not obtainable. He was receiving hygiene care from [facility], no melena and he had no complaints of abd pain.    last colonoscopy 2013 with polypectomy with pathology notable for early tubular ademoa changes     No Known Drug Allergies  shellfish (Other; Urticaria)      aspirin  chewable 81 milliGRAM(s) Oral daily  carvedilol 25 milliGRAM(s) Oral every 12 hours  dextrose 40% Gel 15 Gram(s) Oral once  dextrose 5%. 1000 milliLiter(s) IV Continuous <Continuous>  dextrose 5%. 1000 milliLiter(s) IV Continuous <Continuous>  dextrose 50% Injectable 25 Gram(s) IV Push once  dextrose 50% Injectable 12.5 Gram(s) IV Push once  dextrose 50% Injectable 25 Gram(s) IV Push once  furosemide    Tablet 40 milliGRAM(s) Oral daily  glucagon  Injectable 1 milliGRAM(s) IntraMuscular once  heparin  Infusion. 1400 Unit(s)/Hr IV Continuous <Continuous>  insulin lispro (ADMELOG) corrective regimen sliding scale   SubCutaneous three times a day before meals  insulin lispro (ADMELOG) corrective regimen sliding scale   SubCutaneous at bedtime  melatonin 3 milliGRAM(s) Oral at bedtime  nitroglycerin     SubLingual 0.4 milliGRAM(s) SubLingual every 5 minutes PRN  pantoprazole    Tablet 40 milliGRAM(s) Oral before breakfast  potassium phosphate IVPB 30 milliMole(s) IV Intermittent once  sacubitril 24 mG/valsartan 26 mG 1 Tablet(s) Oral two times a day  thiamine IVPB 500 milliGRAM(s) IV Intermittent every 8 hours        FAMILY HISTORY:        Review of Systems:  *confused not communicating well at time of visit, although denies n/v/d/c, abdominal pain        Relevant Family History:   n/c    Relevant Social History: n/c      Physical Exam:    Vital Signs:  Vital Signs Last 24 Hrs  T(C): 36.6 (11 Oct 2021 12:11), Max: 37.2 (10 Oct 2021 18:28)  T(F): 97.8 (11 Oct 2021 12:11), Max: 98.9 (10 Oct 2021 18:28)  HR: 58 (11 Oct 2021 12:11) (52 - 60)  BP: 113/56 (11 Oct 2021 12:11) (100/60 - 124/76)  BP(mean): --  RR: 18 (11 Oct 2021 12:11) (18 - 18)  SpO2: 97% (11 Oct 2021 12:11) (97% - 100%)  Daily     Daily     General:  Appears stated age, well-groomed, nad  HEENT:  NC/AT,  conjunctivae clear and pink, no thyromegaly, nodules, adenopathy, no JVD  Chest:  Full & symmetric excursion, no increased effort, breath sounds clear  Cardiovascular:  Regular rhythm, S1, S2, no murmur/rub/S3/S4, no abdominal bruit, no edema  Abdomen:  Soft, non-tender, non-distended, normoactive bowel sounds,  no masses ,no hepatosplenomeagaly, no signs of chronic liver disease  Extremities:  no cyanosis,clubbing or edema  Skin:  No rash/erythema/ecchymoses/petechiae/wounds/abscess/warm/dry  Neuro/Psych:  A&Ox1  , no asterixis, no tremor, no encephalopathy    Laboratory:                            13.6   9.68  )-----------( 158      ( 11 Oct 2021 03:57 )             41.0     10-11    148<H>  |  112<H>  |  24<H>  ----------------------------<  117<H>  3.6   |  26  |  0.98    Ca    11.5<H>      11 Oct 2021 03:57  Phos  1.9     10-11  Mg     2.20     10-11    TPro  6.2  /  Alb  2.7<L>  /  TBili  3.3<H>  /  DBili  x   /  AST  41<H>  /  ALT  31  /  AlkPhos  121<H>  10-11    LIVER FUNCTIONS - ( 11 Oct 2021 03:57 )  Alb: 2.7 g/dL / Pro: 6.2 g/dL / ALK PHOS: 121 U/L / ALT: 31 U/L / AST: 41 U/L / GGT: x           PTT - ( 11 Oct 2021 11:25 )  PTT:84.1 sec    Amylase Serum--      Lipase serum--       [id number]    Imaging:  < from: US Abdomen Upper Quadrant Right (10.08.21 @ 11:22) >    EXAM:  US ABDOMEN RT UPR QUADRANT        PROCEDURE DATE:  Oct  8 2021         INTERPRETATION:  CLINICAL INFORMATION: 76-year-old man with altered mental status and abnormal LFTs.    COMPARISON: Ultrasound 04/23/2020    TECHNIQUE: Sonography of the right upper quadrant.    FINDINGS:    Liver: Within normal limits.  Bile ducts: Normal caliber. Common bile duct measures 4 mm.  Gallbladder: Within normal limits.  Pancreas: Visualized portions are within normal limits.  Right kidney: 10.6 cm. No hydronephrosis.  Ascites: None.  IVC: Dilated IVC and hepatic veins consistent with right-sided congestive heart failure.    IMPRESSION:    Right-sided congestive heart failure        --- End of Report ---              TEMO MAXWELL MD; Attending Radiologist  This document has been electronically signed. Oct  8 2021 12:06PM    < end of copied text >

## 2021-10-11 NOTE — PROGRESS NOTE ADULT - SUBJECTIVE AND OBJECTIVE BOX
Chief complaint    Patient is a 76y old  Male who presents with a chief complaint of PE/AMS (11 Oct 2021 13:14)   Review of systems  Patient in bed, appears comfortable.    Labs and Fingersticks  CAPILLARY BLOOD GLUCOSE      POCT Blood Glucose.: 141 mg/dL (11 Oct 2021 11:23)  POCT Blood Glucose.: 152 mg/dL (11 Oct 2021 08:44)  POCT Blood Glucose.: 140 mg/dL (10 Oct 2021 20:58)  POCT Blood Glucose.: 150 mg/dL (10 Oct 2021 17:41)      Anion Gap, Serum: 10 (10-11 @ 03:57)  Anion Gap, Serum: 13 (10-10 @ 03:39)      Calcium, Total Serum: 11.5 *H* (10-11 @ 03:57)  Calcium, Total Serum: 11.4 *H* (10-10 @ 03:39)  Intact PTH: 114 *H* (10-11 @ 03:57)  Intact PTH: 114 *H* (10-11 @ 03:57)  Vitamin D, 25-Hydroxy: 47.5 (10-11 @ 09:08)  Vitamin D, 1, 25-Dihydroxy: 35.8 (10-11 @ 09:07)  Albumin, Serum: 2.7 *L* (10-11 @ 03:57)  Albumin, Serum: 2.6 *L* (10-10 @ 03:39)    Alanine Aminotransferase (ALT/SGPT): 31 (10-11 @ 03:57)  Alanine Aminotransferase (ALT/SGPT): 29 (10-10 @ 03:39)  Alkaline Phosphatase, Serum: 121 *H* (10-11 @ 03:57)  Alkaline Phosphatase, Serum: 111 (10-10 @ 03:39)  Aspartate Aminotransferase (AST/SGOT): 41 *H* (10-11 @ 03:57)  Aspartate Aminotransferase (AST/SGOT): 57 *H* (10-10 @ 03:39)        10-11    148<H>  |  112<H>  |  24<H>  ----------------------------<  117<H>  3.6   |  26  |  0.98    Ca    11.5<H>      11 Oct 2021 03:57  Phos  1.9     10-11  Mg     2.20     10-11    TPro  6.2  /  Alb  2.7<L>  /  TBili  3.3<H>  /  DBili  x   /  AST  41<H>  /  ALT  31  /  AlkPhos  121<H>  10-11                        13.6   9.68  )-----------( 158      ( 11 Oct 2021 03:57 )             41.0     Medications  MEDICATIONS  (STANDING):  aspirin  chewable 81 milliGRAM(s) Oral daily  carvedilol 25 milliGRAM(s) Oral every 12 hours  dextrose 40% Gel 15 Gram(s) Oral once  dextrose 5%. 1000 milliLiter(s) (50 mL/Hr) IV Continuous <Continuous>  dextrose 5%. 1000 milliLiter(s) (100 mL/Hr) IV Continuous <Continuous>  dextrose 50% Injectable 25 Gram(s) IV Push once  dextrose 50% Injectable 12.5 Gram(s) IV Push once  dextrose 50% Injectable 25 Gram(s) IV Push once  furosemide    Tablet 40 milliGRAM(s) Oral daily  glucagon  Injectable 1 milliGRAM(s) IntraMuscular once  heparin  Infusion. 1400 Unit(s)/Hr (14 mL/Hr) IV Continuous <Continuous>  insulin lispro (ADMELOG) corrective regimen sliding scale   SubCutaneous three times a day before meals  insulin lispro (ADMELOG) corrective regimen sliding scale   SubCutaneous at bedtime  lactulose Syrup 10 Gram(s) Oral daily  melatonin 3 milliGRAM(s) Oral at bedtime  pantoprazole    Tablet 40 milliGRAM(s) Oral before breakfast  potassium phosphate IVPB 30 milliMole(s) IV Intermittent once  sacubitril 24 mG/valsartan 26 mG 1 Tablet(s) Oral two times a day  senna 2 Tablet(s) Oral at bedtime  thiamine IVPB 500 milliGRAM(s) IV Intermittent every 8 hours      Physical Exam  General: Patient comfortable in bed  Vital Signs Last 12 Hrs  T(F): 97.8 (10-11-21 @ 12:11), Max: 97.8 (10-11-21 @ 12:11)  HR: 58 (10-11-21 @ 12:11) (56 - 58)  BP: 113/56 (10-11-21 @ 12:11) (113/56 - 124/76)  BP(mean): --  RR: 18 (10-11-21 @ 12:11) (18 - 18)  SpO2: 97% (10-11-21 @ 12:11) (97% - 98%)  Neck: No palpable thyroid nodules.  CVS: S1S2, No murmurs  Respiratory: No wheezing, no crepitations  GI: Abdomen soft, bowel sounds positive  Musculoskeletal:  edema lower extremities.     Diagnostics    NM Parathyroid Imaging w/Spect: Routine   Indication: Parathyroid adenoma  Transport: Walking  Provider's Contact #: [phone] (10-11 @ 08:08)  Vitamin D, 25-Hydroxy: AM Sched. Collection: 11-Oct-2021 06:00 (10-10 @ 10:41)  Vitamin D, 1, 25-Dihydroxy: AM Sched. Collection: 11-Oct-2021 06:00 (10-10 @ 10:41)  Parathyroid Hormone Intact w/o Calcium, Serum: AM Sched. Collection: 11-Oct-2021 06:00 (10-10 @ 10:41)  PTH Related Peptide: AM Sched. Collection: 11-Oct-2021 06:00 (10-10 @ 10:41)

## 2021-10-11 NOTE — PROGRESS NOTE ADULT - SUBJECTIVE AND OBJECTIVE BOX
[facility]   MD ZOHRA SCHAFFER PA    TEL:  OFFICE: [phone]  DR MITCHELL CELL: [phone]  DR. CONROY CELL: [phone]  SOLE SOTOMAYOR CELL: [phone]    From 5pm-7am Answering Service [phone]    -- RENAL FOLLOW UP NOTE ---Date of Service 10-11-21 @ 12:56    Patient is a 76y old  Male who presents with a chief complaint of PE/AMS (11 Oct 2021 12:28)      Patient seen and examined at bedside. No chest pain/sob    VITALS:  T(F): 97.8 (10-11-21 @ 12:11), Max: 98.9 (10-10-21 @ 18:28)  HR: 58 (10-11-21 @ 12:11)  BP: 113/56 (10-11-21 @ 12:11)  RR: 18 (10-11-21 @ 12:11)  SpO2: 97% (10-11-21 @ 12:11)  Wt(kg): --        PHYSICAL EXAM:  Constitutional: NAD  Neck: No JVD  Respiratory: CTAB, no wheezes, rales or rhonchi  Cardiovascular: S1, S2, RRR  Gastrointestinal: BS+, soft, NT/ND  Extremities: No peripheral edema    Hospital Medications:   MEDICATIONS  (STANDING):  aspirin  chewable 81 milliGRAM(s) Oral daily  carvedilol 25 milliGRAM(s) Oral every 12 hours  dextrose 40% Gel 15 Gram(s) Oral once  dextrose 5%. 1000 milliLiter(s) (50 mL/Hr) IV Continuous <Continuous>  dextrose 5%. 1000 milliLiter(s) (100 mL/Hr) IV Continuous <Continuous>  dextrose 50% Injectable 25 Gram(s) IV Push once  dextrose 50% Injectable 12.5 Gram(s) IV Push once  dextrose 50% Injectable 25 Gram(s) IV Push once  furosemide    Tablet 40 milliGRAM(s) Oral daily  glucagon  Injectable 1 milliGRAM(s) IntraMuscular once  heparin  Infusion. 1400 Unit(s)/Hr (14 mL/Hr) IV Continuous <Continuous>  insulin lispro (ADMELOG) corrective regimen sliding scale   SubCutaneous three times a day before meals  insulin lispro (ADMELOG) corrective regimen sliding scale   SubCutaneous at bedtime  lactulose Syrup 10 Gram(s) Oral daily  melatonin 3 milliGRAM(s) Oral at bedtime  pantoprazole    Tablet 40 milliGRAM(s) Oral before breakfast  potassium phosphate IVPB 30 milliMole(s) IV Intermittent once  sacubitril 24 mG/valsartan 26 mG 1 Tablet(s) Oral two times a day  senna 2 Tablet(s) Oral at bedtime  thiamine IVPB 500 milliGRAM(s) IV Intermittent every 8 hours      LABS:  10-11    148<H>  |  112<H>  |  24<H>  ----------------------------<  117<H>  3.6   |  26  |  0.98    Ca    11.5<H>      11 Oct 2021 03:57  Phos  1.9     10-11  Mg     2.20     10-11    TPro  6.2  /  Alb  2.7<L>  /  TBili  3.3<H>  /  DBili      /  AST  41<H>  /  ALT  31  /  AlkPhos  121<H>  10-11    Creatinine Trend: 0.98 <--, 1.04 <--, 0.97 <--, 1.07 <--, 1.05 <--    Vitamin D, 25-Hydroxy: 47.5 ng/mL (10-11 @ 09:08)  Albumin, Serum: 2.7 g/dL (10-11 @ 03:57)  Phosphorus Level, Serum: 1.9 mg/dL (10-11 @ 03:57)    calcium--  intact [id number]  parathyroid hormone intact, serum--                            13.6   9.68  )-----------( 158      ( 11 Oct 2021 03:57 )             41.0     Urine Studies:      PTH -- (Ca --)      [10-11-21 @ 03:57]   114  Vitamin D (25OH) 47.5      [10-11-21 @ 09:08]  HbA1c 6.5      [10-19-16 @ 08:19]  TSH 0.94      [10-11-21 @ 03:57]        RADIOLOGY & ADDITIONAL STUDIES:

## 2021-10-11 NOTE — CONSULT NOTE ADULT - ASSESSMENT
75yo M with h/o CAD s/p MI w/stents 2007, CHF with 20% LVEF in 2016, AICD, HTN, HLD, DM, gout, CKD, COPD, PVD s/p aortobifemoral bypass, proximal right leg dvt (April 20th) on Eliquis presenting with SOB/AMS. GI consulted for increased liver enzymes     Increased Liver Enzymes   Favor 2/2 Congestive Hepatopathy d/t RHF   US Abd w/dilated IVC and hepatic vein; No cirrhosis noted   CT A/P pending to r/o other biliary pathology such as stenosis or masses  diurese per cardiology recs/appreciated   avoid hepatotoxins   no gi objection to asa 81mg   cont home lactulose daily   will follow     CHF  severe LV dysfxn; has AICD  lasix diuresis per cardiology     AMS  per medicine team

## 2021-10-11 NOTE — PROGRESS NOTE ADULT - SUBJECTIVE AND OBJECTIVE BOX
Date of service: 10/11/21    chief complaint: sob     extended hpi: 77 yo M with a PMH of CAD s/p MI w/stents 2007, CHF with 20% LVEF in 2016, AICD,  HTN, HLD, DM, gout, CKD, COPD, PVD s/p aortobifemoral bypass, proximal right leg dvt per April 20th sono on eliquis.  now with chf, acute  PE.     S: no chest pain or sob; ros otherwise negative.     Review of Systems:   Constitutional: [ ] fevers, [ ] chills.   Skin: [ ] dry skin. [ ] rashes.  Psychiatric: [ ] depression, [ ] anxiety.   Gastrointestinal: [ ] BRBPR, [ ] melena.   Neurological: [ ] confusion. [ ] seizures. [ ] shuffling gait.   Ears,Nose,Mouth and Throat: [ ] ear pain [ ] sore throat.   Eyes: [ ] diplopia.   Respiratory: [ ] hemoptysis. [ ] shortness of breath  Cardiovascular: See HPI above  Hematologic/Lymphatic: [ ] anemia. [ ] painful nodes. [ ] prolonged bleeding.   Genitourinary: [ ] hematuria. [ ] flank pain.   Endocrine: [ ] significant change in weight. [ ] intolerance to heat and cold.     Review of systems [x ] otherwise negative, [ ] otherwise unable to obtain    FH: no family history of sudden cardiac death in first degree relatives    SH: [ ] tobacco, [ ] alcohol, [ ] drugs    carvedilol 25 milliGRAM(s) Oral every 12 hours  dextrose 40% Gel 15 Gram(s) Oral once  dextrose 5%. 1000 milliLiter(s) IV Continuous <Continuous>  dextrose 5%. 1000 milliLiter(s) IV Continuous <Continuous>  dextrose 50% Injectable 25 Gram(s) IV Push once  dextrose 50% Injectable 12.5 Gram(s) IV Push once  dextrose 50% Injectable 25 Gram(s) IV Push once  furosemide    Tablet 40 milliGRAM(s) Oral daily  glucagon  Injectable 1 milliGRAM(s) IntraMuscular once  heparin  Infusion. 1400 Unit(s)/Hr IV Continuous <Continuous>  insulin lispro (ADMELOG) corrective regimen sliding scale   SubCutaneous three times a day before meals  insulin lispro (ADMELOG) corrective regimen sliding scale   SubCutaneous at bedtime  melatonin 3 milliGRAM(s) Oral at bedtime  nitroglycerin     SubLingual 0.4 milliGRAM(s) SubLingual every 5 minutes PRN  pantoprazole    Tablet 40 milliGRAM(s) Oral before breakfast  potassium phosphate IVPB 30 milliMole(s) IV Intermittent once  sacubitril 24 mG/valsartan 26 mG 1 Tablet(s) Oral two times a day  thiamine IVPB 500 milliGRAM(s) IV Intermittent every 8 hours                            13.6   9.68  )-----------( 158      ( 11 Oct 2021 03:57 )             41.0       10-11    148<H>  |  112<H>  |  24<H>  ----------------------------<  117<H>  3.6   |  26  |  0.98    Ca    11.5<H>      11 Oct 2021 03:57  Phos  1.9     10-11  Mg     2.20     10-11    TPro  6.2  /  Alb  2.7<L>  /  TBili  3.3<H>  /  DBili  x   /  AST  41<H>  /  ALT  31  /  AlkPhos  121<H>  10-11      CARDIAC MARKERS ( 10 Oct 2021 03:41 )  x     / x     / 78 U/L / x     / 1.6 ng/mL        T(C): 36.6 (10-11-21 @ 12:11), Max: 37.2 (10-10-21 @ 18:28)  HR: 58 (10-11-21 @ 12:11) (52 - 60)  BP: 113/56 (10-11-21 @ 12:11) (100/60 - 124/76)  RR: 18 (10-11-21 @ 12:11) (18 - 18)  SpO2: 97% (10-11-21 @ 12:11) (97% - 100%)  Wt(kg): --    I&O's Summary      General: NAD, remains confused   Head: Normocephalic and atraumatic.   Neck: No JVD. No bruits. Supple. Does not appear to be enlarged.   Cardiovascular: + S1,S2 ; RRR Soft systolic murmur at the left lower sternal border. No rubs noted.    Lungs: CTA b/l. No rhonchi, rales or wheezes.   Abdomen: + BS, soft. Non tender. Non distended. No rebound. No guarding.   Extremities: No clubbing/cyanosis/edema.   Skin: Warm and moist. The patient's skin has normal elasticity and good skin turgor.   Psychiatric: unable to assess    Tele: AP    TTE: < from: Transthoracic Echocardiogram (10.08.21 @ 11:41) >  1. Mitral annular calcification, otherwise normal mitral  valve. Mild-moderate mitral regurgitation.  2. Calcified trileaflet aortic valve with normal opening.  Mild-moderate aortic regurgitation.  3. Mildly dilated left atrium.  LA volume index = 36 cc/m2.  4. Mildleft ventricular enlargement.  5. Severe global left ventricular systolic dysfunction.  6. Unable to accurately evaluate right ventricular size or  systolic function.  A device wire is noted in the right  heart.  7. Inadequate tricuspid regurgitationDoppler envelope  precludes estimation of RVSP.  *** No previous Echo exam.    < end of copied text >      A/P: 77 yo M with a PMH of CAD s/p MI w/stents 2007, CHF with 20% LVEF in 2016, AICD,  HTN, HLD, DM, gout, CKD, COPD, PVD s/p aortobifemoral bypass, proximal right leg dvt per April 20th sono on eliquis.  now with chf, acute PE.     -pt. currently chest pain free with no symptoms of dyspnea  -symptoms from yesterday have resolved  -AC for PE per primary team  -HD stable today  -TTE with severe LV dysfunction which is known - pt. with AICD and last echocardiogram in 2020 demonstrated severe LV dysfunction as well  -continue with medical management of known cardiomyopathy with coreg and entresto   -appears euvolemic - continue with po lasix  -pt. with history of stents and has been maintained on sapt at home per chart - restart asa 81mg PO daily if no contraindications   -workup of AMS and confusion per neurology     Kaylen Waldrop MD

## 2021-10-11 NOTE — PROGRESS NOTE ADULT - SUBJECTIVE AND OBJECTIVE BOX
Date of Service  : 10-11-21    INTERVAL HPI/OVERNIGHT EVENTS: remains confused.   Vital Signs Last 24 Hrs  T(C): 36.6 (11 Oct 2021 12:11), Max: 37.2 (10 Oct 2021 18:28)  T(F): 97.8 (11 Oct 2021 12:11), Max: 98.9 (10 Oct 2021 18:28)  HR: 58 (11 Oct 2021 12:11) (52 - 60)  BP: 113/56 (11 Oct 2021 12:11) (106/69 - 124/76)  BP(mean): --  RR: 18 (11 Oct 2021 12:11) (18 - 18)  SpO2: 97% (11 Oct 2021 12:11) (97% - 100%)  I&O's Summary    MEDICATIONS  (STANDING):  aspirin  chewable 81 milliGRAM(s) Oral daily  carvedilol 25 milliGRAM(s) Oral every 12 hours  dextrose 40% Gel 15 Gram(s) Oral once  dextrose 5%. 1000 milliLiter(s) (50 mL/Hr) IV Continuous <Continuous>  dextrose 5%. 1000 milliLiter(s) (100 mL/Hr) IV Continuous <Continuous>  dextrose 50% Injectable 25 Gram(s) IV Push once  dextrose 50% Injectable 12.5 Gram(s) IV Push once  dextrose 50% Injectable 25 Gram(s) IV Push once  furosemide    Tablet 40 milliGRAM(s) Oral daily  glucagon  Injectable 1 milliGRAM(s) IntraMuscular once  heparin  Infusion. 1400 Unit(s)/Hr (14 mL/Hr) IV Continuous <Continuous>  insulin lispro (ADMELOG) corrective regimen sliding scale   SubCutaneous three times a day before meals  insulin lispro (ADMELOG) corrective regimen sliding scale   SubCutaneous at bedtime  lactulose Syrup 10 Gram(s) Oral daily  melatonin 3 milliGRAM(s) Oral at bedtime  pantoprazole    Tablet 40 milliGRAM(s) Oral before breakfast  potassium phosphate IVPB 30 milliMole(s) IV Intermittent once  sacubitril 24 mG/valsartan 26 mG 1 Tablet(s) Oral two times a day  senna 2 Tablet(s) Oral at bedtime  thiamine IVPB 500 milliGRAM(s) IV Intermittent every 8 hours    MEDICATIONS  (PRN):  bisacodyl Suppository 10 milliGRAM(s) Rectal daily PRN Constipation  nitroglycerin     SubLingual 0.4 milliGRAM(s) SubLingual every 5 minutes PRN Chest Pain    LABS:                        13.6   9.68  )-----------( 158      ( 11 Oct 2021 03:57 )             41.0     10-11    148<H>  |  112<H>  |  24<H>  ----------------------------<  117<H>  3.6   |  26  |  0.98    Ca    11.5<H>      11 Oct 2021 03:57  Phos  1.9     10-11  Mg     2.20     10-11    TPro  6.2  /  Alb  2.7<L>  /  TBili  3.3<H>  /  DBili  x   /  AST  41<H>  /  ALT  31  /  AlkPhos  121<H>  10-11    PTT - ( 11 Oct 2021 11:25 )  PTT:84.1 sec    CAPILLARY BLOOD GLUCOSE      POCT Blood Glucose.: 141 mg/dL (11 Oct 2021 11:23)  POCT Blood Glucose.: 152 mg/dL (11 Oct 2021 08:44)  POCT Blood Glucose.: 140 mg/dL (10 Oct 2021 20:58)  POCT Blood Glucose.: 150 mg/dL (10 Oct 2021 17:41)      ABG - ( 10 Oct 2021 12:52 )  pH, Arterial: 7.44  pH, Blood: x     /  pCO2: 45    /  pO2: 182   / HCO3: 31    / Base Excess: 5.6   /  SaO2: 99.5                    Consultant(s) Notes Reviewed:  [x ] YES  [ ] NO    PHYSICAL EXAM:  GENERAL: NAD, well-groomed, well-developed,not in any distress ,  HEAD:  Atraumatic, Normocephalic    NECK: Supple, No JVD, Normal thyroid  NERVOUS SYSTEM:  Alert & Oriented X1, No focal deficit   CHEST/LUNG: Good air entry bilateral with no  rales, rhonchi, wheezing, or rubs  HEART: Regular rate and rhythm; No murmurs, rubs, or gallops  ABDOMEN: Soft, Nontender, Nondistended; Bowel sounds present  EXTREMITIES:   No clubbing, cyanosis, or edema      Care Discussed with Consultants/Other Providers [ x] YES  [ ] NO

## 2021-10-11 NOTE — PROGRESS NOTE ADULT - ASSESSMENT
77 yo M with a PMH of CAD s/p MI w/stents 2007, CHF with 20% LVEF in 2016, AICD,  HTN, HLD, DM, gout, CKD, COPD, PVD s/p aortobifemoral bypass, proximal right leg dvt per April 20th sono on eliquis present with confusion and sob found to have pe started on AC. nephrology consulted for hypernatremia and hypercalcemia    hypernatremia  again worsen  encourage po free water  if worsen consider d5@[id number] hrs  continue lasix  f/u cardio  monitor at present  chf EF 20% on lasix 40mg po daily    hypercalcemia  ? etiology  pending pth, Pthrp  vit d 25 optimal  pending vit d 1,25  spep, sif, k/l  monitor at present    htn  controlled  monitor    PE  on hep gtt

## 2021-10-11 NOTE — PROGRESS NOTE ADULT - SUBJECTIVE AND OBJECTIVE BOX
Date of Service: 10-11-21 @ 13:14    Patient is a 76y old  Male who presents with a chief complaint of PE/AMS (11 Oct 2021 12:55)    Any change in ROS:  Awake & alert  O2 sats 92-93% on RA  Denies CP, SOB     MEDICATIONS  (STANDING):  aspirin  chewable 81 milliGRAM(s) Oral daily  carvedilol 25 milliGRAM(s) Oral every 12 hours  dextrose 40% Gel 15 Gram(s) Oral once  dextrose 5%. 1000 milliLiter(s) (50 mL/Hr) IV Continuous <Continuous>  dextrose 5%. 1000 milliLiter(s) (100 mL/Hr) IV Continuous <Continuous>  dextrose 50% Injectable 25 Gram(s) IV Push once  dextrose 50% Injectable 12.5 Gram(s) IV Push once  dextrose 50% Injectable 25 Gram(s) IV Push once  furosemide    Tablet 40 milliGRAM(s) Oral daily  glucagon  Injectable 1 milliGRAM(s) IntraMuscular once  heparin  Infusion. 1400 Unit(s)/Hr (14 mL/Hr) IV Continuous <Continuous>  insulin lispro (ADMELOG) corrective regimen sliding scale   SubCutaneous three times a day before meals  insulin lispro (ADMELOG) corrective regimen sliding scale   SubCutaneous at bedtime  lactulose Syrup 10 Gram(s) Oral daily  melatonin 3 milliGRAM(s) Oral at bedtime  pantoprazole    Tablet 40 milliGRAM(s) Oral before breakfast  potassium phosphate IVPB 30 milliMole(s) IV Intermittent once  sacubitril 24 mG/valsartan 26 mG 1 Tablet(s) Oral two times a day  senna 2 Tablet(s) Oral at bedtime  thiamine IVPB 500 milliGRAM(s) IV Intermittent every 8 hours    MEDICATIONS  (PRN):  bisacodyl Suppository 10 milliGRAM(s) Rectal daily PRN Constipation  nitroglycerin     SubLingual 0.4 milliGRAM(s) SubLingual every 5 minutes PRN Chest Pain    Vital Signs Last 24 Hrs  T(C): 36.6 (11 Oct 2021 12:11), Max: 37.2 (10 Oct 2021 18:28)  T(F): 97.8 (11 Oct 2021 12:11), Max: 98.9 (10 Oct 2021 18:28)  HR: 58 (11 Oct 2021 12:11) (52 - 60)  BP: 113/56 (11 Oct 2021 12:11) (100/60 - 124/76)  BP(mean): --  RR: 18 (11 Oct 2021 12:11) (18 - 18)  SpO2: 97% (11 Oct 2021 12:11) (97% - 100%)    Physical Exam:   GENERAL: NAD  HEENT: MICHELLE  ENMT: No tonsillar erythema, exudates, or enlargement  NECK: Supple, No JVD  CHEST/LUNG: Decreased BS  CVS: Regular rate and rhythm  GI: : Soft, Nontender, Nondistended  NERVOUS SYSTEM:  Alert & Oriented X1-2   EXTREMITIES:  2+ Peripheral Pulses, No clubbing, cyanosis, or edema  SKIN: No rashes or lesions  PSYCH: Appropriate    Labs:  ABG - ( 10 Oct 2021 12:52 )  pH, Arterial: 7.44  pH, Blood: x     /  pCO2: 45    /  pO2: 182   / HCO3: 31    / Base Excess: 5.6   /  SaO2: 99.5      24  CARDIAC MARKERS ( 10 Oct 2021 03:41 )  x     / x     / 78 U/L / x     / 1.6 ng/mL                            13.6   9.68  )-----------( 158      ( 11 Oct 2021 03:57 )             41.0                         13.3   8.53  )-----------( 162      ( 10 Oct 2021 03:39 )             40.3                         13.1   7.37  )-----------( 149      ( 09 Oct 2021 05:22 )             38.9                         12.8   7.37  )-----------( 149      ( 08 Oct 2021 17:48 )             37.6                         13.6   8.98  )-----------( 151      ( 08 Oct 2021 07:53 )             40.9                         14.1   8.71  )-----------( 189      ( 07 Oct 2021 19:12 )             41.2     10-11    148<H>  |  112<H>  |  24<H>  ----------------------------<  117<H>  3.6   |  26  |  0.98  10-10    145  |  112<H>  |  25<H>  ----------------------------<  115<H>  4.6   |  20<L>  |  1.04  10-09    148<H>  |  113<H>  |  23  ----------------------------<  124<H>  4.0   |  25  |  0.97  10-08    144  |  109<H>  |  25<H>  ----------------------------<  110<H>  4.2   |  21<L>  |  1.07  10-07    143  |  108<H>  |  26<H>  ----------------------------<  133<H>  4.7   |  22  |  1.05    Ca    11.5<H>      11 Oct 2021 03:57  Ca    11.4<H>      10 Oct 2021 03:39  Phos  1.9     10-11  Mg     2.20     10-11  Mg     2.20     10-10    TPro  6.2  /  Alb  2.7<L>  /  TBili  3.3<H>  /  DBili  x   /  AST  41<H>  /  ALT  31  /  AlkPhos  121<H>  10-11  TPro  6.1  /  Alb  2.6<L>  /  TBili  3.8<H>  /  DBili  x   /  AST  57<H>  /  ALT  29  /  AlkPhos  111  10-10  TPro  5.7<L>  /  Alb  2.6<L>  /  TBili  4.2<H>  /  DBili  x   /  AST  38  /  ALT  21  /  AlkPhos  99  10-09  TPro  6.4  /  Alb  3.4  /  TBili  5.2<H>  /  DBili  3.1<H>  /  AST  43<H>  /  ALT  25  /  AlkPhos  116  10-08  TPro  7.1  /  Alb  3.6  /  TBili  5.6<H>  /  DBili  x   /  AST  48<H>  /  ALT  26  /  AlkPhos  125<H>  10-07    CAPILLARY BLOOD GLUCOSE  POCT Blood Glucose.: 141 mg/dL (11 Oct 2021 11:23)  POCT Blood Glucose.: 152 mg/dL (11 Oct 2021 08:44)  POCT Blood Glucose.: 140 mg/dL (10 Oct 2021 20:58)  POCT Blood Glucose.: 150 mg/dL (10 Oct 2021 17:41)    LIVER FUNCTIONS - ( 11 Oct 2021 03:57 )  Alb: 2.7 g/dL / Pro: 6.2 g/dL / ALK PHOS: 121 U/L / ALT: 31 U/L / AST: 41 U/L / GGT: x           PTT - ( 11 Oct 2021 11:25 )  PTT:84.1 sec    RECENT CULTURES:  10-08 @ 09:20 .Blood Blood   No growth to date.    Studies    CT SCAN Chest ct< from: CT Angio Chest PE Protocol w/ IV Cont (10.07.21 @ 21:03) >  FINDINGS:    LUNGS AND AIRWAYS:  Patchy bilateral lung opacities,, most prominent in the right middle lobe and right lower lobe. Additional peripheral wedge-shaped opacities in the lingula (2, 54) contain central cavitations. Findings likely reflect pulmonary infarctions; associated Covid-related pneumonia with thrombus/infarctions are not excluded.    PLEURA: Small bilateral right greater than left pleural effusions.  MEDIASTINUM AND MIKAL: A few subcentimeter mediastinal lymph nodes.  VESSELS: Pulmonary embolus in the main right pulmonary artery extending into the segmental branches of branches of the right lower lobe (2, 77), the right middle lobe (2, 54), and the right upper lobe (2, 34). Clear left pulmonary artery  HEART: Straightening of the intraventricular septum and reflux of contrast into the suprahepatic IVC.. No pericardial effusion.  CHEST WALL AND LOWER NECK: Within normal limits.  VISUALIZED UPPER ABDOMEN: Within normal limits.  BONES: Degenerative changes of the spine.    IMPRESSION:    Acute pulmonary emboli in the main right pulmonary artery extending into the segmental branches of the right right upper, middle, and lower lobes. Pulmonary infarction in the right middle and upper lobes, as well as the lingula. Associated Covid 19 is not excluded and testing is recommended.    CT evidence of right heart strain/failure with straightening of the intraventricular septum and reflux of contrast into the suprahepatic IVC. Recommend echocardiogram.    These findings were discussed with Dr. CASTILLO [phone] at 10/7/2021 9:06 PM by Dr. Cohen with read back confirmation.    --- End of Report ---      < end of copied text >    Venous Dopplers: LE: < from: US Duplex Venous Lower Ext Complete, Bilateral (10.08.21 @ 16:17) >    FINDINGS:    RIGHT:  Normal compressibility of the RIGHT common femoral, femoral and popliteal veins.  Doppler examination shows normal spontaneous and phasic flow.  No RIGHT calf vein thrombosis is detected.    LEFT:  Normal compressibility of the LEFT common femoral, femoral and popliteal veins.  Doppler examination shows normal spontaneous and phasic flow.  No LEFT calf vein thrombosis is detected.    IMPRESSION:  No evidence of deep venous thrombosis in either lower extremity in the visualized veins..    < end of copied text >    < from: Transthoracic Echocardiogram (10.08.21 @ 11:41) >  DIMENSIONS:  Dimensions:     Normal Values:  LA:     4.5 cm    2.0 - 4.0 cm  Ao:     3.1 cm    2.0 - 3.8 cm  SEPTUM: 0.6 cm    0.6 - 1.2 cm  PWT:    0.7 cm    0.6 - 1.1 cm  LVIDd:  6.3 cm    3.0 - 5.6 cm  LVIDs:  5.6 cm    1.8 - 4.0 cm  Derived Variables:  LVMI: 73 g/m2  RWT: 0.22  Fractional short: 11 %  Ejection Fraction (Teicholtz): 24 %  ------------------------------------------------------------------------  OBSERVATIONS:  Mitral Valve: Mitral annular calcification, otherwise  normal mitral valve. Mild-moderate mitral regurgitation.  Aortic Root: Normal aortic root.  Aortic Valve: Calcified trileaflet aortic valve with normal  opening. Mild-moderate aortic regurgitation.  Left Atrium: Mildly dilated left atrium.  LA volume index =  36 cc/m2.  Left Ventricle: Severe global left ventricular systolic  dysfunction. Mild left ventricular enlargement.  Right Heart: Normal rightatrium. Unable to accurately  evaluate right ventricular size or systolic function.  A  device wire is noted in the right heart. Normal tricuspid  valve.  Mild-moderate tricuspid regurgitation. Pulmonic  valve not well visualized.  Pericardium/PleuraNormal pericardium with no pericardial  effusion.  Hemodynamic: Inadequate tricuspid regurgitation Doppler  envelope precludes estimation of RVSP.  ------------------------------------------------------------------------  CONCLUSIONS:  1. Mitral annular calcification, otherwise normal mitral  valve. Mild-moderate mitral regurgitation.  2. Calcified trileaflet aortic valve with normal opening.  Mild-moderate aortic regurgitation.  3. Mildly dilated left atrium.  LA volume index = 36 cc/m2.  4. Mildleft ventricular enlargement.  5. Severe global left ventricular systolic dysfunction.  6. Unable to accurately evaluate right ventricular size or  systolic function.  A device wire is noted in the right  heart.  7. Inadequate tricuspid regurgitationDoppler envelope  precludes estimation of RVSP.  *** No previous Echo exam.    < end of copied text >

## 2021-10-11 NOTE — PROGRESS NOTE ADULT - ASSESSMENT
Assessment  DMT2: 76y Male with DM T2 with hyperglycemia admitted with SOB, patient was on oral hypoglycemic agents at home, now on insulin  sugars within acceptable range, no hypoglycemic episode,  eating meals.  Hypercalcemia: Primary hyperparathyroidism nuclear scan pending. .  CAD: On medications, monitored, stable.  HTN: On medications, monitored,             Rosalva Louis MD  Cell: [phone]  Office: [phone]

## 2021-10-11 NOTE — PROGRESS NOTE ADULT - ASSESSMENT
75 y/o m with worsening encephelopathy in setting of acute PE     Problem/Plan - 1:  ·  Problem: Pulmonary embolism.   ·  Plan: -possibly 2/2 to eliquis non-compliance in setting of know proximal right leg dvt  -heparin gtt and will change to NOAC.   -Pulmonary helping .      Problem/Plan - 2:  ·  Problem: Metabolic Encephalopathy    ·  Plan: -Exact cause not known.  Bilirubin high but Ammonia normal.  CT head noted.   Neurology  helping.      Problem/Plan - 3:  ·  Problem: DM (diabetes mellitus).   ·  Plan: ISS  Sugars fine.      Problem/Plan - 4:  ·  Problem: Chronic Systolic CHF (congestive heart failure).   ·  Plan: c/w sacubitril , lasix, coreg  Cardiology helping.       Problem/Plan - 5:  ·  Problem: Hypernatremia .   ·  Plan: resolved.      Problem/Plan - 5:  ·  Problem: Hyperbilirubinemia  .   ·  Plan: Trending LFT .  GI helping.    CT scan pending.      Problem/Plan - 5:  ·  Problem: Hyperparathyroidism with Hypercalcemia .   ·  Plan: Endo helping

## 2021-10-12 NOTE — PROGRESS NOTE ADULT - SUBJECTIVE AND OBJECTIVE BOX
Chief complaint    Patient is a 76y old  Male who presents with a chief complaint of PE/AMS (11 Oct 2021 14:02)   Review of systems  Patient in bed, appears comfortable.    Labs and Fingersticks  CAPILLARY BLOOD GLUCOSE      POCT Blood Glucose.: 133 mg/dL (12 Oct 2021 07:58)  POCT Blood Glucose.: 148 mg/dL (11 Oct 2021 21:00)  POCT Blood Glucose.: 105 mg/dL (11 Oct 2021 17:04)  POCT Blood Glucose.: 141 mg/dL (11 Oct 2021 11:23)      Anion Gap, Serum: 13 (10-12 @ 06:52)  Anion Gap, Serum: 10 (10-11 @ 03:57)      Calcium, Total Serum: 11.2 *H* (10-12 @ 06:52)  Calcium, Total Serum: 11.5 *H* (10-11 @ 03:57)  Intact PTH: 114 *H* (10-11 @ 03:57)  Intact PTH: 114 *H* (10-11 @ 03:57)  Vitamin D, 25-Hydroxy: 47.5 (10-11 @ 09:08)  Vitamin D, 1, 25-Dihydroxy: 35.8 (10-11 @ 09:07)  Albumin, Serum: 2.7 *L* (10-11 @ 03:57)    Alanine Aminotransferase (ALT/SGPT): 31 (10-11 @ 03:57)  Alkaline Phosphatase, Serum: 121 *H* (10-11 @ 03:57)  Aspartate Aminotransferase (AST/SGOT): 41 *H* (10-11 @ 03:57)        10-12    149<H>  |  110<H>  |  20  ----------------------------<  115<H>  3.6   |  26  |  0.91    Ca    11.2<H>      12 Oct 2021 06:52  Phos  2.6     10-12  Mg     2.10     10-12    TPro  6.2  /  Alb  2.7<L>  /  TBili  3.3<H>  /  DBili  x   /  AST  41<H>  /  ALT  31  /  AlkPhos  121<H>  10-11                        12.7   10.60 )-----------( 153      ( 12 Oct 2021 06:52 )             37.5     Medications  MEDICATIONS  (STANDING):  aspirin  chewable 81 milliGRAM(s) Oral daily  carvedilol 25 milliGRAM(s) Oral every 12 hours  cinacalcet 60 milliGRAM(s) Oral daily  dextrose 40% Gel 15 Gram(s) Oral once  dextrose 5%. 1000 milliLiter(s) (50 mL/Hr) IV Continuous <Continuous>  dextrose 5%. 1000 milliLiter(s) (100 mL/Hr) IV Continuous <Continuous>  dextrose 50% Injectable 25 Gram(s) IV Push once  dextrose 50% Injectable 12.5 Gram(s) IV Push once  dextrose 50% Injectable 25 Gram(s) IV Push once  furosemide    Tablet 40 milliGRAM(s) Oral daily  glucagon  Injectable 1 milliGRAM(s) IntraMuscular once  heparin  Infusion. 1400 Unit(s)/Hr (14 mL/Hr) IV Continuous <Continuous>  insulin lispro (ADMELOG) corrective regimen sliding scale   SubCutaneous three times a day before meals  insulin lispro (ADMELOG) corrective regimen sliding scale   SubCutaneous at bedtime  lactulose Syrup 10 Gram(s) Oral daily  melatonin 3 milliGRAM(s) Oral at bedtime  pantoprazole    Tablet 40 milliGRAM(s) Oral before breakfast  sacubitril 24 mG/valsartan 26 mG 1 Tablet(s) Oral two times a day  senna 2 Tablet(s) Oral at bedtime  thiamine IVPB 500 milliGRAM(s) IV Intermittent every 8 hours      Physical Exam  General: Patient comfortable in bed  Vital Signs Last 12 Hrs  T(F): 97.6 (10-12-21 @ 05:00), Max: 97.8 (10-11-21 @ 22:45)  HR: 56 (10-12-21 @ 05:00) (56 - 57)  BP: 106/71 (10-12-21 @ 05:00) (106/71 - 120/64)  BP(mean): --  RR: 18 (10-12-21 @ 05:00) (18 - 18)  SpO2: 100% (10-12-21 @ 05:00) (98% - 100%)  Neck: No palpable thyroid nodules.  CVS: S1S2, No murmurs  Respiratory: No wheezing, no crepitations  GI: Abdomen soft, bowel sounds positive  Musculoskeletal:  edema lower extremities.     Diagnostics    NM Parathyroid Imaging w/Spect: Routine   Indication: Parathyroid adenoma  Transport: Walking  Provider's Contact #: [phone] (10-11 @ 08:08)  Vitamin D, 25-Hydroxy: AM Sched. Collection: 11-Oct-2021 06:00 (10-10 @ 10:41)  Vitamin D, 1, 25-Dihydroxy: AM Sched. Collection: 11-Oct-2021 06:00 (10-10 @ 10:41)  Parathyroid Hormone Intact w/o Calcium, Serum: AM Sched. Collection: 11-Oct-2021 06:00 (10-10 @ 10:41)  PTH Related Peptide: AM Sched. Collection: 11-Oct-2021 06:00 (10-10 @ 10:41)

## 2021-10-12 NOTE — PROGRESS NOTE ADULT - SUBJECTIVE AND OBJECTIVE BOX
INTERVAL HPI/OVERNIGHT EVENTS:    still w/some confusion   appears to be without abdominal pain   tolerating PO per staff    MEDICATIONS  (STANDING):  aspirin  chewable 81 milliGRAM(s) Oral daily  carvedilol 25 milliGRAM(s) Oral every 12 hours  cinacalcet 60 milliGRAM(s) Oral daily  dextrose 40% Gel 15 Gram(s) Oral once  dextrose 5%. 1000 milliLiter(s) (50 mL/Hr) IV Continuous <Continuous>  dextrose 5%. 1000 milliLiter(s) (100 mL/Hr) IV Continuous <Continuous>  dextrose 50% Injectable 25 Gram(s) IV Push once  dextrose 50% Injectable 12.5 Gram(s) IV Push once  dextrose 50% Injectable 25 Gram(s) IV Push once  furosemide    Tablet 40 milliGRAM(s) Oral daily  glucagon  Injectable 1 milliGRAM(s) IntraMuscular once  heparin  Infusion. 1400 Unit(s)/Hr (14 mL/Hr) IV Continuous <Continuous>  insulin lispro (ADMELOG) corrective regimen sliding scale   SubCutaneous three times a day before meals  insulin lispro (ADMELOG) corrective regimen sliding scale   SubCutaneous at bedtime  lactulose Syrup 10 Gram(s) Oral daily  melatonin 3 milliGRAM(s) Oral at bedtime  pantoprazole    Tablet 40 milliGRAM(s) Oral before breakfast  sacubitril 24 mG/valsartan 26 mG 1 Tablet(s) Oral two times a day  senna 2 Tablet(s) Oral at bedtime  thiamine IVPB 500 milliGRAM(s) IV Intermittent every 8 hours    MEDICATIONS  (PRN):  ALBUTerol    90 MICROgram(s) HFA Inhaler 2 Puff(s) Inhalation every 6 hours PRN Shortness of Breath and/or Wheezing  bisacodyl Suppository 10 milliGRAM(s) Rectal daily PRN Constipation  nitroglycerin     SubLingual 0.4 milliGRAM(s) SubLingual every 5 minutes PRN Chest Pain      Allergies    No Known Drug Allergies  shellfish (Other; Urticaria)    Intolerances        Review of Systems:    General:  No wt loss, fevers, chills, night sweats, fatigue   Eyes:  Good vision, no reported pain  ENT:  No sore throat, pain, runny nose, dysphagia  CV:  No pain, palpitations, hypo/hypertension  Resp:  No dyspnea, cough, tachypnea, wheezing  GI:  No pain, No nausea, No vomiting, No diarrhea, No constipation, No weight loss, No fever, No pruritis, No rectal bleeding, No melena, No dysphagia  :  No pain, bleeding, incontinence, nocturia  Muscle:  No pain, weakness  Neuro:  No weakness, tingling, memory problems  Psych:  No fatigue, insomnia, mood problems, depression  Endocrine:  No polyuria, polydypsia, cold/heat intolerance  Heme:  No petechiae, ecchymosis, easy bruisability  Skin:  No rash, tattoos, scars, edema      Vital Signs Last 24 Hrs  T(C): 36.5 (12 Oct 2021 08:45), Max: 36.6 (11 Oct 2021 17:12)  T(F): 97.7 (12 Oct 2021 08:45), Max: 97.8 (11 Oct 2021 17:12)  HR: 52 (12 Oct 2021 08:45) (52 - 58)  BP: 120/75 (12 Oct 2021 08:45) (105/68 - 120/75)  BP(mean): --  RR: 17 (12 Oct 2021 08:45) (17 - 18)  SpO2: 100% (12 Oct 2021 08:45) (97% - 100%)    PHYSICAL EXAM:    Constitutional: NAD  HEENT: EOMI, throat clear  Neck: No LAD, supple  Respiratory: CTA and P  Cardiovascular: S1 and S2, RRR, no M  Gastrointestinal: BS+, soft, NT/ND, neg HSM,  Extremities: No peripheral edema, neg clubbing, cyanosis  Vascular: 2+ peripheral pulses  Neurological: A/O x 2, no focal deficits  Psychiatric: Normal mood, normal affect  Skin: No rashes      LABS:                        12.7   10.60 )-----------( 153      ( 12 Oct 2021 06:52 )             37.5     10-12    149<H>  |  110<H>  |  20  ----------------------------<  115<H>  3.6   |  26  |  0.91    Ca    11.2<H>      12 Oct 2021 06:52  Phos  2.6     10-12  Mg     2.10     10-12    TPro  6.2  /  Alb  2.7<L>  /  TBili  3.3<H>  /  DBili  x   /  AST  41<H>  /  ALT  31  /  AlkPhos  121<H>  10-11    PTT - ( 12 Oct 2021 06:52 )  PTT:69.1 sec      RADIOLOGY & ADDITIONAL TESTS:

## 2021-10-12 NOTE — PROGRESS NOTE ADULT - ASSESSMENT
75 y/o m with worsening encephelopathy in setting of acute PE     Problem/Plan - 1:  ·  Problem: Pulmonary embolism.   ·  Plan: -possibly 2/2 to eliquis non-compliance in setting of know proximal right leg dvt  -heparin gtt and will change to NOAC.   -Pulmonary helping .      Problem/Plan - 2:  ·  Problem: Dementia with worsening sec to metabolic Encephalopathy   ·  Plan: -Exact cause not known.  Bilirubin high but Ammonia normal.  CT head noted.   Neurology  helping.      Problem/Plan - 3:  ·  Problem: DM (diabetes mellitus).   ·  Plan: ISS  Sugars fine.      Problem/Plan - 4:  ·  Problem: Chronic Systolic CHF (congestive heart failure).   ·  Plan: c/w sacubitril , lasix, coreg  Cardiology helping.       Problem/Plan - 5:  ·  Problem: Hypernatremia .   ·  Plan: resolved.      Problem/Plan - 5:  ·  Problem: Hyperbilirubinemia  .   ·  Plan: Trending LFT .  GI helping.    CT scan pending.      Problem/Plan - 5:  ·  Problem: Hyperparathyroidism with Hypercalcemia .   ·  Plan: Endo helping .    Disposition ; DC planning  . D/W Sister and said in 2017 he had same situation . he was in [facility] in past and liked it.

## 2021-10-12 NOTE — PROGRESS NOTE ADULT - SUBJECTIVE AND OBJECTIVE BOX
Date of service: 10/11/21    chief complaint: sob     extended hpi: 75 yo M with a PMH of CAD s/p MI w/stents 2007, CHF with 20% LVEF in 2016, AICD,  HTN, HLD, DM, gout, CKD, COPD, PVD s/p aortobifemoral bypass, proximal right leg dvt per April 20th sono on eliquis.  now with chf, acute  PE.     S: no chest pain or sob; ros otherwise negative.     Review of Systems:   Constitutional: [ ] fevers, [ ] chills.   Skin: [ ] dry skin. [ ] rashes.  Psychiatric: [ ] depression, [ ] anxiety.   Gastrointestinal: [ ] BRBPR, [ ] melena.   Neurological: [ ] confusion. [ ] seizures. [ ] shuffling gait.   Ears,Nose,Mouth and Throat: [ ] ear pain [ ] sore throat.   Eyes: [ ] diplopia.   Respiratory: [ ] hemoptysis. [ ] shortness of breath  Cardiovascular: See HPI above  Hematologic/Lymphatic: [ ] anemia. [ ] painful nodes. [ ] prolonged bleeding.   Genitourinary: [ ] hematuria. [ ] flank pain.   Endocrine: [ ] significant change in weight. [ ] intolerance to heat and cold.     Review of systems [x ] otherwise negative, [ ] otherwise unable to obtain    FH: no family history of sudden cardiac death in first degree relatives    SH: [ ] tobacco, [ ] alcohol, [ ] drugs    ALBUTerol    90 MICROgram(s) HFA Inhaler 2 Puff(s) Inhalation every 6 hours PRN  aspirin  chewable 81 milliGRAM(s) Oral daily  bisacodyl Suppository 10 milliGRAM(s) Rectal daily PRN  carvedilol 25 milliGRAM(s) Oral every 12 hours  cinacalcet 60 milliGRAM(s) Oral daily  dextrose 40% Gel 15 Gram(s) Oral once  dextrose 5%. 1000 milliLiter(s) IV Continuous <Continuous>  dextrose 5%. 1000 milliLiter(s) IV Continuous <Continuous>  dextrose 50% Injectable 25 Gram(s) IV Push once  dextrose 50% Injectable 12.5 Gram(s) IV Push once  dextrose 50% Injectable 25 Gram(s) IV Push once  furosemide    Tablet 40 milliGRAM(s) Oral daily  glucagon  Injectable 1 milliGRAM(s) IntraMuscular once  heparin  Infusion. 1400 Unit(s)/Hr IV Continuous <Continuous>  insulin lispro (ADMELOG) corrective regimen sliding scale   SubCutaneous three times a day before meals  insulin lispro (ADMELOG) corrective regimen sliding scale   SubCutaneous at bedtime  lactulose Syrup 10 Gram(s) Oral daily  melatonin 3 milliGRAM(s) Oral at bedtime  nitroglycerin     SubLingual 0.4 milliGRAM(s) SubLingual every 5 minutes PRN  pantoprazole    Tablet 40 milliGRAM(s) Oral before breakfast  sacubitril 24 mG/valsartan 26 mG 1 Tablet(s) Oral two times a day  senna 2 Tablet(s) Oral at bedtime  sodium chloride 0.45%. 1000 milliLiter(s) IV Continuous <Continuous>  thiamine IVPB 500 milliGRAM(s) IV Intermittent every 8 hours                            12.7   10.60 )-----------( 153      ( 12 Oct 2021 06:52 )             37.5       10-12    149<H>  |  110<H>  |  20  ----------------------------<  115<H>  3.6   |  26  |  0.91    Ca    11.2<H>      12 Oct 2021 06:52  Phos  2.6     10-12  Mg     2.10     10-12    TPro  6.2  /  Alb  2.7<L>  /  TBili  3.3<H>  /  DBili  x   /  AST  41<H>  /  ALT  31  /  AlkPhos  121<H>  10-11      CARDIAC MARKERS ( 10 Oct 2021 03:41 )  x     / x     / 78 U/L / x     / 1.6 ng/mL        T(C): 36.5 (10-12-21 @ 08:45), Max: 36.6 (10-11-21 @ 17:12)  HR: 52 (10-12-21 @ 08:45) (52 - 58)  BP: 120/75 (10-12-21 @ 08:45) (105/68 - 120/75)  RR: 17 (10-12-21 @ 08:45) (17 - 18)  SpO2: 100% (10-12-21 @ 08:45) (97% - 100%)  Wt(kg): --    I&O's Summary        General: NAD, remains confused   Head: Normocephalic and atraumatic.   Neck: No JVD. No bruits. Supple. Does not appear to be enlarged.   Cardiovascular: + S1,S2 ; RRR Soft systolic murmur at the left lower sternal border. No rubs noted.    Lungs: CTA b/l. No rhonchi, rales or wheezes.   Abdomen: + BS, soft. Non tender. Non distended. No rebound. No guarding.   Extremities: No clubbing/cyanosis/edema.   Skin: Warm and moist. The patient's skin has normal elasticity and good skin turgor.   Psychiatric: unable to assess    Tele: AP    TTE: < from: Transthoracic Echocardiogram (10.08.21 @ 11:41) >  1. Mitral annular calcification, otherwise normal mitral  valve. Mild-moderate mitral regurgitation.  2. Calcified trileaflet aortic valve with normal opening.  Mild-moderate aortic regurgitation.  3. Mildly dilated left atrium.  LA volume index = 36 cc/m2.  4. Mildleft ventricular enlargement.  5. Severe global left ventricular systolic dysfunction.  6. Unable to accurately evaluate right ventricular size or  systolic function.  A device wire is noted in the right  heart.  7. Inadequate tricuspid regurgitationDoppler envelope  precludes estimation of RVSP.  *** No previous Echo exam.    < end of copied text >      A/P: 75 yo M with a PMH of CAD s/p MI w/stents 2007, CHF with 20% LVEF in 2016, AICD,  HTN, HLD, DM, gout, CKD, COPD, PVD s/p aortobifemoral bypass, proximal right leg dvt per April 20th sono on eliquis.  now with chf, acute PE.     -pt. currently chest pain free with no symptoms of dyspnea  -symptoms from yesterday have resolved  -AC for PE per primary team  -HD stable today with no symptoms   -TTE with severe LV dysfunction which is known - pt. with AICD and last echocardiogram in 2020 demonstrated severe LV dysfunction as well  -continue with medical management of known cardiomyopathy with coreg and entresto   -appears euvolemic - continue with po lasix  -pt. with history of stents and has been maintained on sapt at home per chart - restart asa 81mg PO daily if no contraindications   -remains confused - workup per neurology     Kaylen Waldrop MD

## 2021-10-12 NOTE — PROGRESS NOTE ADULT - ASSESSMENT
Assessment  DMT2: 76y Male with DM T2 with hyperglycemia admitted with SOB, patient was on oral hypoglycemic agents at home, on insulin  sugars improving, no hypoglycemic episode,  eating meals.  Hypercalcemia: Primary hyperparathyroidism nuclear scan pending. .  CAD: On medications, monitored, stable.  HTN: On medications, monitored,             Rosalva Louis MD  Cell: [phone]  Office: [phone]

## 2021-10-12 NOTE — PROGRESS NOTE ADULT - SUBJECTIVE AND OBJECTIVE BOX
Date of Service: 10-12-21 @ 09:58    Patient is a 76y old  Male who presents with a chief complaint of PE/AMS (12 Oct 2021 09:20)    Any change in ROS:   A little confused this AM  O2 sats 93% on RA   No acute distress    MEDICATIONS  (STANDING):  aspirin  chewable 81 milliGRAM(s) Oral daily  carvedilol 25 milliGRAM(s) Oral every 12 hours  cinacalcet 60 milliGRAM(s) Oral daily  dextrose 40% Gel 15 Gram(s) Oral once  dextrose 5%. 1000 milliLiter(s) (50 mL/Hr) IV Continuous <Continuous>  dextrose 5%. 1000 milliLiter(s) (100 mL/Hr) IV Continuous <Continuous>  dextrose 50% Injectable 25 Gram(s) IV Push once  dextrose 50% Injectable 12.5 Gram(s) IV Push once  dextrose 50% Injectable 25 Gram(s) IV Push once  furosemide    Tablet 40 milliGRAM(s) Oral daily  glucagon  Injectable 1 milliGRAM(s) IntraMuscular once  heparin  Infusion. 1400 Unit(s)/Hr (14 mL/Hr) IV Continuous <Continuous>  insulin lispro (ADMELOG) corrective regimen sliding scale   SubCutaneous three times a day before meals  insulin lispro (ADMELOG) corrective regimen sliding scale   SubCutaneous at bedtime  lactulose Syrup 10 Gram(s) Oral daily  melatonin 3 milliGRAM(s) Oral at bedtime  pantoprazole    Tablet 40 milliGRAM(s) Oral before breakfast  sacubitril 24 mG/valsartan 26 mG 1 Tablet(s) Oral two times a day  senna 2 Tablet(s) Oral at bedtime  thiamine IVPB 500 milliGRAM(s) IV Intermittent every 8 hours    MEDICATIONS  (PRN):  bisacodyl Suppository 10 milliGRAM(s) Rectal daily PRN Constipation  nitroglycerin     SubLingual 0.4 milliGRAM(s) SubLingual every 5 minutes PRN Chest Pain    Vital Signs Last 24 Hrs  T(C): 36.5 (12 Oct 2021 08:45), Max: 36.6 (11 Oct 2021 12:11)  T(F): 97.7 (12 Oct 2021 08:45), Max: 97.8 (11 Oct 2021 12:11)  HR: 52 (12 Oct 2021 08:45) (52 - 58)  BP: 120/75 (12 Oct 2021 08:45) (105/68 - 120/75)  BP(mean): --  RR: 17 (12 Oct 2021 08:45) (17 - 18)  SpO2: 100% (12 Oct 2021 08:45) (97% - 100%)    Physical Exam:   GENERAL: NAD  HEENT: MICHELLE  ENMT: No tonsillar erythema, exudates, or enlargement  NECK: Supple, No JVD  CHEST/LUNG: Clear to auscultation b/l  CVS: Regular rate and rhythm  GI: : Soft, Nontender, Nondistended  NERVOUS SYSTEM:  Alert & Oriented X1-2   EXTREMITIES:  2+ Peripheral Pulses, No clubbing, cyanosis, or edema  SKIN: No rashes or lesions  PSYCH: Calm    Labs:  ABG - ( 10 Oct 2021 12:52 )  pH, Arterial: 7.44  pH, Blood: x     /  pCO2: 45    /  pO2: 182   / HCO3: 31    / Base Excess: 5.6   /  SaO2: 99.5      24                            12.7   10.60 )-----------( 153      ( 12 Oct 2021 06:52 )             37.5                         13.6   9.68  )-----------( 158      ( 11 Oct 2021 03:57 )             41.0                         13.3   8.53  )-----------( 162      ( 10 Oct 2021 03:39 )             40.3                         13.1   7.37  )-----------( 149      ( 09 Oct 2021 05:22 )             38.9                         12.8   7.37  )-----------( 149      ( 08 Oct 2021 17:48 )             37.6     10-12    149<H>  |  110<H>  |  20  ----------------------------<  115<H>  3.6   |  26  |  0.91  10-11    148<H>  |  112<H>  |  24<H>  ----------------------------<  117<H>  3.6   |  26  |  0.98  10-10    145  |  112<H>  |  25<H>  ----------------------------<  115<H>  4.6   |  20<L>  |  1.04  10-09    148<H>  |  113<H>  |  23  ----------------------------<  124<H>  4.0   |  25  |  0.97    Ca    11.2<H>      12 Oct 2021 06:52  Ca    11.5<H>      11 Oct 2021 03:57  Phos  2.6     10-12  Phos  1.9     10-11  Mg     2.10     10-12  Mg     2.20     10-11    TPro  6.2  /  Alb  2.7<L>  /  TBili  3.3<H>  /  DBili  x   /  AST  41<H>  /  ALT  31  /  AlkPhos  121<H>  10-11  TPro  6.1  /  Alb  2.6<L>  /  TBili  3.8<H>  /  DBili  x   /  AST  57<H>  /  ALT  29  /  AlkPhos  111  10-10  TPro  5.7<L>  /  Alb  2.6<L>  /  TBili  4.2<H>  /  DBili  x   /  AST  38  /  ALT  21  /  AlkPhos  99  10-09    CAPILLARY BLOOD GLUCOSE      POCT Blood Glucose.: 133 mg/dL (12 Oct 2021 07:58)  POCT Blood Glucose.: 148 mg/dL (11 Oct 2021 21:00)  POCT Blood Glucose.: 105 mg/dL (11 Oct 2021 17:04)  POCT Blood Glucose.: 141 mg/dL (11 Oct 2021 11:23)      LIVER FUNCTIONS - ( 11 Oct 2021 03:57 )  Alb: 2.7 g/dL / Pro: 6.2 g/dL / ALK PHOS: 121 U/L / ALT: 31 U/L / AST: 41 U/L / GGT: x           PTT - ( 12 Oct 2021 06:52 )  PTT:69.1 sec    RECENT CULTURES:  10-08 @ 09:20 .Blood Blood   No growth to date.    Studies    CT SCAN Chest c< from: CT Angio Chest PE Protocol w/ IV Cont (10.07.21 @ 21:03) >  FINDINGS:    LUNGS AND AIRWAYS:  Patchy bilateral lung opacities,, most prominent in the right middle lobe and right lower lobe. Additional peripheral wedge-shaped opacities in the lingula (2, 54) contain central cavitations. Findings likely reflect pulmonary infarctions; associated Covid-related pneumonia with thrombus/infarctions are not excluded.    PLEURA: Small bilateral right greater than left pleural effusions.  MEDIASTINUM AND MIKAL: A few subcentimeter mediastinal lymph nodes.  VESSELS: Pulmonary embolus in the main right pulmonary artery extending into the segmental branches of branches of the right lower lobe (2, 77), the right middle lobe (2, 54), and the right upper lobe (2, 34). Clear left pulmonary artery  HEART: Straightening of the intraventricular septum and reflux of contrast into the suprahepatic IVC.. No pericardial effusion.  CHEST WALL AND LOWER NECK: Within normal limits.  VISUALIZED UPPER ABDOMEN: Within normal limits.  BONES: Degenerative changes of the spine.    IMPRESSION:    Acute pulmonary emboli in the main right pulmonary artery extending into the segmental branches of the right right upper, middle, and lower lobes. Pulmonary infarction in the right middle and upper lobes, as well as the lingula. Associated Covid 19 is not excluded and testing is recommended.    CT evidence of right heart strain/failure with straightening of the intraventricular septum and reflux of contrast into the suprahepatic IVC. Recommend echocardiogram.    These findings were discussed with Dr. CASTILLO [phone] at 10/7/2021 9:06 PM by Dr. Cohen with read back confirmation.    < end of copied text >    Venous Dopplers: LE: < from: US Duplex Venous Lower Ext Complete, Bilateral (10.08.21 @ 16:17) >  FINDINGS:    RIGHT:  Normal compressibility of the RIGHT common femoral, femoral and popliteal veins.  Doppler examination shows normal spontaneous and phasic flow.  No RIGHT calf vein thrombosis is detected.    LEFT:  Normal compressibility of the LEFT common femoral, femoral and popliteal veins.  Doppler examination shows normal spontaneous and phasic flow.  No LEFT calf vein thrombosis is detected.    IMPRESSION:  No evidence of deep venous thrombosis in either lower extremity in the visualized veins..      < end of copied text >    < from: Transthoracic Echocardiogram (10.08.21 @ 11:41) >  ------------------------------------------------------------------------  DIMENSIONS:  Dimensions:     Normal Values:  LA:     4.5 cm    2.0 - 4.0 cm  Ao:     3.1 cm    2.0 - 3.8 cm  SEPTUM: 0.6 cm    0.6 - 1.2 cm  PWT:    0.7 cm    0.6 - 1.1 cm  LVIDd:  6.3 cm    3.0 - 5.6 cm  LVIDs:  5.6 cm    1.8 - 4.0 cm  Derived Variables:  LVMI: 73 g/m2  RWT: 0.22  Fractional short: 11 %  Ejection Fraction (Teicholtz): 24 %  ------------------------------------------------------------------------  OBSERVATIONS:  Mitral Valve: Mitral annular calcification, otherwise  normal mitral valve. Mild-moderate mitral regurgitation.  Aortic Root: Normal aortic root.  Aortic Valve: Calcified trileaflet aortic valve with normal  opening. Mild-moderate aortic regurgitation.  Left Atrium: Mildly dilated left atrium.  LA volume index =  36 cc/m2.  Left Ventricle: Severe global left ventricular systolic  dysfunction. Mild left ventricular enlargement.  Right Heart: Normal rightatrium. Unable to accurately  evaluate right ventricular size or systolic function.  A  device wire is noted in the right heart. Normal tricuspid  valve.  Mild-moderate tricuspid regurgitation. Pulmonic  valve not well visualized.  Pericardium/PleuraNormal pericardium with no pericardial  effusion.  Hemodynamic: Inadequate tricuspid regurgitation Doppler  envelope precludes estimation of RVSP.  ------------------------------------------------------------------------  CONCLUSIONS:  1. Mitral annular calcification, otherwise normal mitral  valve. Mild-moderate mitral regurgitation.  2. Calcified trileaflet aortic valve with normal opening.  Mild-moderate aortic regurgitation.  3. Mildly dilated left atrium.  LA volume index = 36 cc/m2.  4. Mildleft ventricular enlargement.  5. Severe global left ventricular systolic dysfunction.  6. Unable to accurately evaluate right ventricular size or  systolic function.  A device wire is noted in the right  heart.  7. Inadequate tricuspid regurgitationDoppler envelope  precludes estimation of RVSP.  *** No previous Echo exam.    < end of copied text >

## 2021-10-12 NOTE — PROGRESS NOTE ADULT - SUBJECTIVE AND OBJECTIVE BOX
Date of Service  : 10-12-21     INTERVAL HPI/OVERNIGHT EVENTS: No new concerns.   Vital Signs Last 24 Hrs  T(C): 36.4 (12 Oct 2021 14:30), Max: 36.6 (11 Oct 2021 22:45)  T(F): 97.6 (12 Oct 2021 14:30), Max: 97.8 (11 Oct 2021 22:45)  HR: 55 (12 Oct 2021 14:30) (52 - 57)  BP: 105/62 (12 Oct 2021 14:30) (105/62 - 120/75)  BP(mean): --  RR: 17 (12 Oct 2021 14:30) (17 - 18)  SpO2: 100% (12 Oct 2021 14:30) (98% - 100%)  I&O's Summary    MEDICATIONS  (STANDING):  aspirin  chewable 81 milliGRAM(s) Oral daily  carvedilol 25 milliGRAM(s) Oral every 12 hours  cinacalcet 60 milliGRAM(s) Oral daily  dextrose 40% Gel 15 Gram(s) Oral once  dextrose 5%. 1000 milliLiter(s) (50 mL/Hr) IV Continuous <Continuous>  dextrose 5%. 1000 milliLiter(s) (100 mL/Hr) IV Continuous <Continuous>  dextrose 50% Injectable 25 Gram(s) IV Push once  dextrose 50% Injectable 12.5 Gram(s) IV Push once  dextrose 50% Injectable 25 Gram(s) IV Push once  furosemide    Tablet 40 milliGRAM(s) Oral daily  glucagon  Injectable 1 milliGRAM(s) IntraMuscular once  heparin  Infusion. 1400 Unit(s)/Hr (14 mL/Hr) IV Continuous <Continuous>  insulin lispro (ADMELOG) corrective regimen sliding scale   SubCutaneous three times a day before meals  insulin lispro (ADMELOG) corrective regimen sliding scale   SubCutaneous at bedtime  lactulose Syrup 10 Gram(s) Oral daily  melatonin 3 milliGRAM(s) Oral at bedtime  pantoprazole    Tablet 40 milliGRAM(s) Oral before breakfast  sacubitril 24 mG/valsartan 26 mG 1 Tablet(s) Oral two times a day  senna 2 Tablet(s) Oral at bedtime  sodium chloride 0.45%. 1000 milliLiter(s) (50 mL/Hr) IV Continuous <Continuous>    MEDICATIONS  (PRN):  ALBUTerol    90 MICROgram(s) HFA Inhaler 2 Puff(s) Inhalation every 6 hours PRN Shortness of Breath and/or Wheezing  bisacodyl Suppository 10 milliGRAM(s) Rectal daily PRN Constipation  nitroglycerin     SubLingual 0.4 milliGRAM(s) SubLingual every 5 minutes PRN Chest Pain    LABS:                        12.7   10.60 )-----------( 153      ( 12 Oct 2021 06:52 )             37.5     10-12    149<H>  |  110<H>  |  20  ----------------------------<  115<H>  3.6   |  26  |  0.91    Ca    11.2<H>      12 Oct 2021 06:52  Phos  2.6     10-12  Mg     2.10     10-12    TPro  6.2  /  Alb  2.7<L>  /  TBili  3.3<H>  /  DBili  x   /  AST  41<H>  /  ALT  31  /  AlkPhos  121<H>  10-11    PTT - ( 12 Oct 2021 06:52 )  PTT:69.1 sec    CAPILLARY BLOOD GLUCOSE      POCT Blood Glucose.: 141 mg/dL (12 Oct 2021 17:04)  POCT Blood Glucose.: 136 mg/dL (12 Oct 2021 11:57)  POCT Blood Glucose.: 133 mg/dL (12 Oct 2021 07:58)  POCT Blood Glucose.: 148 mg/dL (11 Oct 2021 21:00)              Consultant(s) Notes Reviewed:  [x ] YES  [ ] NO    PHYSICAL EXAM:  GENERAL: NAD, well-groomed, well-developed,not in any distress ,  HEAD:  Atraumatic, Normocephalic  EYES: EOMI, PERRLA, conjunctiva and sclera clear  ENMT: No tonsillar erythema, exudates, or enlargement; Moist mucous membranes, Good dentition, No lesions  NECK: Supple, No JVD, Normal thyroid  NERVOUS SYSTEM:  Alert &    CHEST/LUNG: Good air entry bilateral with no  rales, rhonchi, wheezing, or rubs  HEART: Regular rate and rhythm; No murmurs, rubs, or gallops  ABDOMEN: Soft, Nontender, Nondistended; Bowel sounds present  EXTREMITIES:  2+ Peripheral Pulses, No clubbing, cyanosis, or edema      Care Discussed with Consultants/Other Providers [ x] YES  [ ] NO

## 2021-10-12 NOTE — PROGRESS NOTE ADULT - ASSESSMENT
77 yo M with a PMH of CAD s/p MI w/stents 2007, CHF with 20% LVEF in 2016, AICD,  HTN, HLD, DM, gout, CKD, COPD, PVD s/p aortobifemoral bypass, proximal right leg dvt per April 20th sono on eliquis present with confusion and sob found to have pe started on AC. nephrology consulted for hypernatremia and hypercalcemia    hypernatremia  again worsen  encourage po free water  will give gentle hydration with [id number] hrs  continue lasix  f/u cardio  monitor at present  chf EF 20% on lasix 40mg po daily    hypercalcemia  ? etiology  likely primary hyperparathyroidism. started on sensipar 60 daily per endo  pending Pthrp  vit d 25 optimal  spep, sif, k/l  monitor at present  monitor for hypocalcemia.     htn  controlled  monitor    PE  on hep gtt

## 2021-10-12 NOTE — PROGRESS NOTE ADULT - SUBJECTIVE AND OBJECTIVE BOX
[facility] [place] [facility]   MD ZOHRA SCHAFFER PA    TEL:  OFFICE: [phone]  DR MITCHELL CELL: [phone]  DR. CONROY CELL: [phone]  SOLE SOTOMAYOR CELL: [phone]    From 5pm-7am Answering Service [phone]    -- RENAL FOLLOW UP NOTE ---Date of Service 10-12-21 @ 13:56    Patient is a 76y old  Male who presents with a chief complaint of PE/AMS (12 Oct 2021 13:08)      Patient seen and examined at bedside. No chest pain/sob    VITALS:  T(F): 97.7 (10-12-21 @ 08:45), Max: 97.8 (10-11-21 @ 17:12)  HR: 52 (10-12-21 @ 08:45)  BP: 120/75 (10-12-21 @ 08:45)  RR: 17 (10-12-21 @ 08:45)  SpO2: 100% (10-12-21 @ 08:45)  Wt(kg): --        PHYSICAL EXAM:  Constitutional: NAD  Neck: No JVD  Respiratory: CTAB, no wheezes, rales or rhonchi  Cardiovascular: S1, S2, RRR  Gastrointestinal: BS+, soft, NT/ND  Extremities: No peripheral edema    Hospital Medications:   MEDICATIONS  (STANDING):  aspirin  chewable 81 milliGRAM(s) Oral daily  carvedilol 25 milliGRAM(s) Oral every 12 hours  cinacalcet 60 milliGRAM(s) Oral daily  dextrose 40% Gel 15 Gram(s) Oral once  dextrose 5%. 1000 milliLiter(s) (50 mL/Hr) IV Continuous <Continuous>  dextrose 5%. 1000 milliLiter(s) (100 mL/Hr) IV Continuous <Continuous>  dextrose 50% Injectable 25 Gram(s) IV Push once  dextrose 50% Injectable 12.5 Gram(s) IV Push once  dextrose 50% Injectable 25 Gram(s) IV Push once  furosemide    Tablet 40 milliGRAM(s) Oral daily  glucagon  Injectable 1 milliGRAM(s) IntraMuscular once  heparin  Infusion. 1400 Unit(s)/Hr (14 mL/Hr) IV Continuous <Continuous>  insulin lispro (ADMELOG) corrective regimen sliding scale   SubCutaneous three times a day before meals  insulin lispro (ADMELOG) corrective regimen sliding scale   SubCutaneous at bedtime  lactulose Syrup 10 Gram(s) Oral daily  melatonin 3 milliGRAM(s) Oral at bedtime  pantoprazole    Tablet 40 milliGRAM(s) Oral before breakfast  sacubitril 24 mG/valsartan 26 mG 1 Tablet(s) Oral two times a day  senna 2 Tablet(s) Oral at bedtime  sodium chloride 0.45%. 1000 milliLiter(s) (50 mL/Hr) IV Continuous <Continuous>  thiamine IVPB 500 milliGRAM(s) IV Intermittent every 8 hours      LABS:  10-12    149<H>  |  110<H>  |  20  ----------------------------<  115<H>  3.6   |  26  |  0.91    Ca    11.2<H>      12 Oct 2021 06:52  Phos  2.6     10-12  Mg     2.10     10-12    TPro  6.2  /  Alb  2.7<L>  /  TBili  3.3<H>  /  DBili      /  AST  41<H>  /  ALT  31  /  AlkPhos  121<H>  10-11    Creatinine Trend: 0.91 <--, 0.98 <--, 1.04 <--, 0.97 <--, 1.07 <--, 1.05 <--    Phosphorus Level, Serum: 2.6 mg/dL (10-12 @ 06:52)                              12.7   10.60 )-----------( 153      ( 12 Oct 2021 06:52 )             37.5     Urine Studies:      PTH -- (Ca --)      [10-11-21 @ 03:57]   114  Vitamin D (25OH) 47.5      [10-11-21 @ 09:08]  HbA1c 6.5      [10-19-16 @ 08:19]  TSH 0.94      [10-11-21 @ 03:57]      Free Light Chains: kappa 2.28, lambda 3.37, ratio = 0.68      [10-11 @ 09:07]    RADIOLOGY & ADDITIONAL STUDIES:

## 2021-10-12 NOTE — PROGRESS NOTE ADULT - SUBJECTIVE AND OBJECTIVE BOX
[facility]      Seen earlier today, and examined.  - Today, patient is without complaints.           *****MEDICATIONS: Current medication reviewed and documented.    MEDICATIONS  (STANDING):  aspirin  chewable 81 milliGRAM(s) Oral daily  carvedilol 25 milliGRAM(s) Oral every 12 hours  cinacalcet 60 milliGRAM(s) Oral daily  dextrose 40% Gel 15 Gram(s) Oral once  dextrose 5%. 1000 milliLiter(s) (50 mL/Hr) IV Continuous <Continuous>  dextrose 5%. 1000 milliLiter(s) (100 mL/Hr) IV Continuous <Continuous>  dextrose 50% Injectable 25 Gram(s) IV Push once  dextrose 50% Injectable 12.5 Gram(s) IV Push once  dextrose 50% Injectable 25 Gram(s) IV Push once  furosemide    Tablet 40 milliGRAM(s) Oral daily  glucagon  Injectable 1 milliGRAM(s) IntraMuscular once  heparin  Infusion. 1400 Unit(s)/Hr (14 mL/Hr) IV Continuous <Continuous>  insulin lispro (ADMELOG) corrective regimen sliding scale   SubCutaneous three times a day before meals  insulin lispro (ADMELOG) corrective regimen sliding scale   SubCutaneous at bedtime  lactulose Syrup 10 Gram(s) Oral daily  melatonin 3 milliGRAM(s) Oral at bedtime  pantoprazole    Tablet 40 milliGRAM(s) Oral before breakfast  sacubitril 24 mG/valsartan 26 mG 1 Tablet(s) Oral two times a day  senna 2 Tablet(s) Oral at bedtime  sodium chloride 0.45%. 1000 milliLiter(s) (50 mL/Hr) IV Continuous <Continuous>  thiamine IVPB 500 milliGRAM(s) IV Intermittent every 8 hours    MEDICATIONS  (PRN):  ALBUTerol    90 MICROgram(s) HFA Inhaler 2 Puff(s) Inhalation every 6 hours PRN Shortness of Breath and/or Wheezing  bisacodyl Suppository 10 milliGRAM(s) Rectal daily PRN Constipation  nitroglycerin     SubLingual 0.4 milliGRAM(s) SubLingual every 5 minutes PRN Chest Pain          ***** VITAL SIGNS:  T(F): 97.7 (10-12-21 @ 08:45), Max: 97.8 (10-11-21 @ 17:12)  HR: 52 (10-12-21 @ 08:45) (52 - 58)  BP: 120/75 (10-12-21 @ 08:45) (105/68 - 120/75)  RR: 17 (10-12-21 @ 08:45) (17 - 18)  SpO2: 100% (10-12-21 @ 08:45) (97% - 100%)  Wt(kg): --  ,   I&O's Summary           *****PHYSICAL EXAM:      Alert oriented x2 did not know the month ( thinks its april)   Attention comprehension are  poor   poor cooperation   confused     EOMI fundi not visualized, blinks to threat   No facial asymmetry   Tongue is midline      Moving all 4 ext symmetrically    sensation is grossly symmetric  Gait : not assessed.  B/L down going toes     Gait not assessed.        *****LAB AND IMAGIN.7   10.60 )-----------( 153      ( 12 Oct 2021 06:52 )             37.5               10-12    149<H>  |  110<H>  |  20  ----------------------------<  115<H>  3.6   |  26  |  0.91    Ca    11.2<H>      12 Oct 2021 06:52  Phos  2.6     10-12  Mg     2.10     10-12    TPro  6.2  /  Alb  2.7<L>  /  TBili  3.3<H>  /  DBili  x   /  AST  41<H>  /  ALT  31  /  AlkPhos  121<H>  10-11    PTT - ( 12 Oct 2021 06:52 )  PTT:69.1 sec                     [All pertinent recent Imaging/Reports reviewed]           *****A S S E S S M E N T   A N D   P L A N :    Excerpt from H&P,"     75 yo M with a PMH of CAD s/p MI w/stents , CHF with 20% LVEF in 2016, AICD,  HTN, HLD, DM, gout, CKD, COPD, PVD s/p aortobifemoral bypass, proximal right leg dvt per  so  on eliquis. Pt lives alone,  speaks with sister  routinely (Gabriela [phone]). Per sister, had noticed increasing confusion over last 2weeks, but today was worst she had seen. Sister grew concerned, EMS called, noted to be sob. Pt per ed noted not taking any of his home meds.  CT angio chest with PE/pulm infarcts/RHF and strain, possible covid pna. Pt a/o 1, unable to provide history. Follow commands. HS trop 31, 32. ekg sinus tach, lafb. CT head with no acute findings    Of note, tbili>5 (as high as 10 April 2020) was supposed to be on lactulose per sister but not taking. Prior GI note mentioned possibility of liver biopsy, but appears not to have been performed. US abd from 2020 noteworthy only for slight hepatomegaly     as per prior records, pt was admitted in 2016 with similiar presentation.   pt unable to provide any information regarding his presentation        Problem/Recommendations 1: ams of unclear etiology   suspect multifactorial metabolic encephalopathy due to hypercalcemia, hypernatremia, elevated bili, poor po intake, worsening underlying cognitive impairement   ( seen in 2016 with similar presentation )  r/o infectious process    b12/folate/tsh/ wnl   thiamine 500 iv q 8   ct head no acute path  unable to get mr due to AICD   sleep wake cycle regulation with melatonin   correct metabolic derangements.   namenda 5 mg bid     Problem/Recommendations 2: transaminitis   elevated bili low albumin   ? etiology   ammonia wnl    gi input appreciated       Thank you for allowing me to participate in the care of this patient. Will continue to follow patient periodically. Please do not hesitate to call me if you have any  questions or if there has been a change in patients neurological status     ________________  Mila Cazares MD  [facility]  [phone]      33 minutes spent on total encounter; more than 50 % of the visit was  spent counseling about plan of care, compliance to diet/exercise and medication regimen and or  coordinating care by the attending physician.      It is advised that stroke patients follow up with MCKAY Solorzano @ [phone] in 1- 2 weeks.   Others please follow up with Dr. Michael Nissenbaum [phone]

## 2021-10-12 NOTE — PROGRESS NOTE ADULT - ASSESSMENT
77yo M with h/o CAD s/p MI w/stents 2007, CHF with 20% LVEF in 2016, AICD, HTN, HLD, DM, gout, CKD, COPD, PVD s/p aortobifemoral bypass, proximal right leg dvt (April 20th) on Eliquis presenting with SOB/AMS. GI consulted for increased liver enzymes     Increased Liver Enzymes   Favor 2/2 Congestive Hepatopathy d/t RHF   US Abd w/dilated IVC and hepatic vein; No cirrhosis noted   CT A/P pending to r/o other biliary pathology such as stenosis or masses  diurese per cardiology recs/appreciated   cont home lactulose daily   no gi objection to asa 81mg   will follow     CHF  severe LV dysfxn; has AICD  care per cardiology     AMS  per medicine team    I reviewed the overnight course of events on the unit, re-confirming the patient history. I discussed the care with the patient and their family. The plan of care was discussed with the physician assistant and modifications were made to the notation where appropriate. Differential diagnosis and plan of care discussed with patient after the evaluation. Advanced care planning was discussed with patient and family.  Advanced care planning forms were reviewed and discussed.  Risks, benefits and alternatives of gastroenterologic procedures were discussed in detail and all questions were answered. 35 minutes spent on total encounter of which more than fifty percent of the encounter was spent counseling and/or coordinating care by the attending physician.

## 2021-10-13 NOTE — PROGRESS NOTE ADULT - SUBJECTIVE AND OBJECTIVE BOX
Date of Service  : 10-13-21 @ 17:50    INTERVAL HPI/OVERNIGHT EVENTS: Had abd pain so Ct scan done showing hematoma.   Vital Signs Last 24 Hrs  T(C): 36.4 (13 Oct 2021 16:00), Max: 37 (13 Oct 2021 05:33)  T(F): 97.5 (13 Oct 2021 16:00), Max: 98.6 (13 Oct 2021 05:33)  HR: 50 (13 Oct 2021 16:00) (50 - 61)  BP: 110/58 (13 Oct 2021 16:15) (98/51 - 118/70)  BP(mean): --  RR: 18 (13 Oct 2021 16:00) (17 - 18)  SpO2: 100% (13 Oct 2021 16:00) (100% - 100%)  I&O's Summary    MEDICATIONS  (STANDING):  carvedilol 25 milliGRAM(s) Oral every 12 hours  cinacalcet 60 milliGRAM(s) Oral daily  dextrose 40% Gel 15 Gram(s) Oral once  dextrose 5%. 1000 milliLiter(s) (50 mL/Hr) IV Continuous <Continuous>  dextrose 5%. 1000 milliLiter(s) (100 mL/Hr) IV Continuous <Continuous>  dextrose 50% Injectable 25 Gram(s) IV Push once  dextrose 50% Injectable 12.5 Gram(s) IV Push once  dextrose 50% Injectable 25 Gram(s) IV Push once  furosemide    Tablet 40 milliGRAM(s) Oral daily  glucagon  Injectable 1 milliGRAM(s) IntraMuscular once  insulin lispro (ADMELOG) corrective regimen sliding scale   SubCutaneous three times a day before meals  insulin lispro (ADMELOG) corrective regimen sliding scale   SubCutaneous at bedtime  lactulose Syrup 10 Gram(s) Oral daily  melatonin 3 milliGRAM(s) Oral at bedtime  pantoprazole    Tablet 40 milliGRAM(s) Oral before breakfast  sacubitril 24 mG/valsartan 26 mG 1 Tablet(s) Oral two times a day  senna 2 Tablet(s) Oral at bedtime  sodium chloride 0.45%. 1000 milliLiter(s) (60 mL/Hr) IV Continuous <Continuous>    MEDICATIONS  (PRN):  ALBUTerol    90 MICROgram(s) HFA Inhaler 2 Puff(s) Inhalation every 6 hours PRN Shortness of Breath and/or Wheezing  bisacodyl Suppository 10 milliGRAM(s) Rectal daily PRN Constipation  nitroglycerin     SubLingual 0.4 milliGRAM(s) SubLingual every 5 minutes PRN Chest Pain    LABS:                        11.6   8.97  )-----------( 181      ( 13 Oct 2021 17:06 )             34.0     10-13    148<H>  |  112<H>  |  18  ----------------------------<  127<H>  3.9   |  25  |  0.90    Ca    10.2      13 Oct 2021 07:25  Phos  2.5     10-13  Mg     2.00     10-13    TPro  6.3  /  Alb  2.7<L>  /  TBili  2.9<H>  /  DBili  x   /  AST  44<H>  /  ALT  33  /  AlkPhos  127<H>  10-13    PTT - ( 13 Oct 2021 07:25 )  PTT:53.6 sec    CAPILLARY BLOOD GLUCOSE      POCT Blood Glucose.: 89 mg/dL (13 Oct 2021 16:51)  POCT Blood Glucose.: 122 mg/dL (13 Oct 2021 11:13)  POCT Blood Glucose.: 211 mg/dL (13 Oct 2021 07:42)  POCT Blood Glucose.: 148 mg/dL (12 Oct 2021 22:19)            Consultant(s) Notes Reviewed:  [x ] YES  [ ] NO    PHYSICAL EXAM:  GENERAL: NAD, well-groomed, well-developed, not in any distress ,  HEAD:  Atraumatic, Normocephalic  EYES: EOMI, PERRLA, conjunctiva and sclera clear  ENMT: No tonsillar erythema, exudates, or enlargement; Moist mucous membranes, Good dentition, No lesions  NECK: Supple, No JVD, Normal thyroid  NERVOUS SYSTEM:  Alert & Oriented X1, No focal deficit   CHEST/LUNG: Good air entry bilateral with no  rales, rhonchi, wheezing, or rubs  HEART: Regular rate and rhythm; No murmurs, rubs, or gallops  ABDOMEN: Soft, Nontender, Nondistended; Bowel sounds present  EXTREMITIES:  2+ Peripheral Pulses, No clubbing, cyanosis, or edema  SKIN: No rashes or lesions    Care Discussed with Consultants/Other Providers [ x] YES  [ ] NO

## 2021-10-13 NOTE — CONSULT NOTE ADULT - SUBJECTIVE AND OBJECTIVE BOX
General Surgery Consult  Consulting surgical team: C TEAM SURGERY  Consulting attending: Dr. Contreras    HPI:  **********    per primary team, no recent trauma  patient has been confused and bedbound recently    75 yo M with a PMH of CAD s/p MI w/stents 2007, CHF with 20% LVEF in 2016, AICD,  HTN, HLD, DM, gout, CKD, COPD, PVD s/p aortobifemoral bypass, proximal [facility] lives [facility],  speaks with sister  routinely (Gabriela Case [id number]). Per sister, had noticed increasing confusion over last 2weeks, but today was worst she had seen. Sister grew concerned, EMS called, noted to be sob. Pt per ed noted not taking any of his home meds.  CT angio chest with PE/pulm infarcts/RHF and strain, possible covid pna. Pt a/o 1, unable to provide history. Follow commands. HS trop 31, 32. ekg sinus tach, lafb. CT head with no acute findings    Of note, tbili>5 (as high as 10 April 2020) was supposed to be on lactulose per sister but not taking. Prior GI note mentioned possibility of liver biopsy, but appears not to have been performed. US abd from April 2020 noteworthy only for slight hepatomegaly  (07 Oct 2021 23:31)      PAST MEDICAL HISTORY:  Coronary Artery Disease    Hypercholesteremia    Myocardial Infarction    Hypertension    Gout    Diabetes mellitus    Renal insufficiency    Chronic obstructive pulmonary disease    Peripheral vascular disease    S/P aortobifemoral bypass surgery    Sickle cell trait    AICD (automatic cardioverter/defibrillator) present        PAST SURGICAL HISTORY:  Peripheral Vascular Disease    Abdominal Hernia    s/p Femoral-Popliteal Bypass Surgery        MEDICATIONS:  ALBUTerol    90 MICROgram(s) HFA Inhaler 2 Puff(s) Inhalation every 6 hours PRN  bisacodyl Suppository 10 milliGRAM(s) Rectal daily PRN  carvedilol 25 milliGRAM(s) Oral every 12 hours  cinacalcet 60 milliGRAM(s) Oral daily  dextrose 40% Gel 15 Gram(s) Oral once  dextrose 5%. 1000 milliLiter(s) IV Continuous <Continuous>  dextrose 5%. 1000 milliLiter(s) IV Continuous <Continuous>  dextrose 50% Injectable 25 Gram(s) IV Push once  dextrose 50% Injectable 12.5 Gram(s) IV Push once  dextrose 50% Injectable 25 Gram(s) IV Push once  furosemide    Tablet 40 milliGRAM(s) Oral daily  glucagon  Injectable 1 milliGRAM(s) IntraMuscular once  insulin lispro (ADMELOG) corrective regimen sliding scale   SubCutaneous three times a day before meals  insulin lispro (ADMELOG) corrective regimen sliding scale   SubCutaneous at bedtime  lactulose Syrup 10 Gram(s) Oral daily  melatonin 3 milliGRAM(s) Oral at bedtime  nitroglycerin     SubLingual 0.4 milliGRAM(s) SubLingual every 5 minutes PRN  pantoprazole    Tablet 40 milliGRAM(s) Oral before breakfast  sacubitril 24 mG/valsartan 26 mG 1 Tablet(s) Oral two times a day  senna 2 Tablet(s) Oral at bedtime  sodium chloride 0.45%. 1000 milliLiter(s) IV Continuous <Continuous>      ALLERGIES:  No Known Drug Allergies  shellfish (Other; Urticaria)      VITALS & I/Os:  Vital Signs Last 24 Hrs  T(C): 37 (13 Oct 2021 11:57), Max: 37 (13 Oct 2021 05:33)  T(F): 98.6 (13 Oct 2021 11:57), Max: 98.6 (13 Oct 2021 05:33)  HR: 53 (13 Oct 2021 11:57) (53 - 61)  BP: 110/61 (13 Oct 2021 11:57) (105/62 - 118/70)  BP(mean): --  RR: 18 (13 Oct 2021 11:57) (17 - 18)  SpO2: 100% (13 Oct 2021 11:57) (100% - 100%)    I&O's Summary      PHYSICAL EXAM:  General: No acute distress  Respiratory: Nonlabored  Cardiovascular: appears well perfused  Abdominal: Soft, nondistended, nontender. No rebound or guarding. No organomegaly, no palpable mass.  abdominal scar well-healed  R groin medial thigh firm and tender to palpation  no signs of infection  Extremities: Warm  motor and sensory grossly intact in BLE  palpable femoral/pop/DP/PT 2+ bilaterally    LABS:                        11.8   9.84  )-----------( 130      ( 13 Oct 2021 07:25 )             35.3     10-13    148<H>  |  112<H>  |  18  ----------------------------<  127<H>  3.9   |  25  |  0.90    Ca    10.2      13 Oct 2021 07:25  Phos  2.5     10-13  Mg     2.00     10-13    TPro  6.3  /  Alb  2.7<L>  /  TBili  2.9<H>  /  DBili  x   /  AST  44<H>  /  ALT  33  /  AlkPhos  127<H>  10-13    Lactate:    PTT - ( 13 Oct 2021 07:25 )  PTT:53.6 sec              IMAGING:    EXAM:  CT ABDOMEN AND PELVIS IC        PROCEDURE DATE:  Oct 13 2021         INTERPRETATION:  CLINICAL INFORMATION: Pulmonary embolism on heparin drip. Altered mental status. Now with diffuse abdominal pain.    COMPARISON: CT May chest 10/7/2021. CT chest, abdomen, pelvis 4/21/2020    CONTRAST/COMPLICATIONS:  IV Contrast: Omnipaque 350  90 cc administered   10 cc discarded  Oral Contrast: NONE  Complications: None reported at time of study completion    PROCEDURE:  CT of the Abdomen and Pelvis was performed.  Sagittal and coronal reformats were performed.    FINDINGS:  LOWER CHEST: Redemonstrated partially imaged bilateral pulmonary emboli with bilateral pulmonary Infarcts. Unchanged trace bilateral pleural effusions. Heart is enlarged with partially imaged cardiac device leads.    LIVER: Within normal limits.  BILE DUCTS: Normal caliber.  GALLBLADDER: Within normal limits.  SPLEEN: A 1.7 cm splenic lesion, likely a hemangioma or hamartoma. PANCREAS: Within normal limits.  ADRENALS: Mild left adrenal gland thickening.  KIDNEYS/URETERS: Left renal cysts. No hydronephrosis.    BLADDER: Within normal limits.  REPRODUCTIVE ORGANS: Prostate is enlarged.    BOWEL: No bowel obstruction. Appendix is normal. Colonic diverticula.  PERITONEUM: No ascites.  VESSELS: Atherosclerotic changes. 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  LYMPH NODES: No lymphadenopathy.  ABDOMINAL WALL/RETROPERITONEUM: Right groin multilobulated hematoma with the largest component measuring 10.4 x 6.7 x 10.6 cm Hematoma tracks cranially along the iliopsoas muscle into the retroperitoneum.  BONES: Degenerative changes.    IMPRESSION:  Large right groin hematoma extending into the right retroperitoneum.    Findings were discussed with NATHALIA Soriano 10/13/2021 11:12 AM by Dr. Yuan with read back confirmation.    --- End of Report ---        JAC YUAN MD; Resident Interventional Radiology  This document has been electronically signed.  GEORGE BAUER MD; Attending Radiologist  This document has been electronically signed. Oct 13 2021  1:10PM                                                                                               General Surgery Consult  Consulting surgical team: C TEAM SURGERY  Consulting attending: Dr. Contreras    HPI:  76M hx of CAD/MI s/p stent 2007, CHF with 20% EF in 2016 s/p AICD, HTN, HLD, DM, gout, CKD, COPD, PAD s/p open aortobifemoral bypass 2003, PE 2020, DVT 4/2021 on eliquis here with worsening confusion and SOB found to have acute PE with R heart strain on CT angio chest admitted to medicine, hep gtt transitioned to eliquis today. Found to have R groin pain 2 days ago, ct ab/pelvis with contrast shows R groin hematoma with RP extension. AC held. patient has remained hemodynamically stable, sbp 100-110s. hgb 12.7 yesterday --> 11.8 today. Vascular surgery consulted.    tender and firmness appreciated in upper R medial thigh/groin    per primary team, no recent trauma  patient has been confused and bedbound recently      PAST MEDICAL HISTORY:  Coronary Artery Disease    Hypercholesteremia    Myocardial Infarction    Hypertension    Gout    Diabetes mellitus    Renal insufficiency    Chronic obstructive pulmonary disease    Peripheral vascular disease    S/P aortobifemoral bypass surgery    Sickle cell trait    AICD (automatic cardioverter/defibrillator) present        PAST SURGICAL HISTORY:  Peripheral Vascular Disease    Abdominal Hernia    s/p Femoral-Popliteal Bypass Surgery        MEDICATIONS:  ALBUTerol    90 MICROgram(s) HFA Inhaler 2 Puff(s) Inhalation every 6 hours PRN  bisacodyl Suppository 10 milliGRAM(s) Rectal daily PRN  carvedilol 25 milliGRAM(s) Oral every 12 hours  cinacalcet 60 milliGRAM(s) Oral daily  dextrose 40% Gel 15 Gram(s) Oral once  dextrose 5%. 1000 milliLiter(s) IV Continuous <Continuous>  dextrose 5%. 1000 milliLiter(s) IV Continuous <Continuous>  dextrose 50% Injectable 25 Gram(s) IV Push once  dextrose 50% Injectable 12.5 Gram(s) IV Push once  dextrose 50% Injectable 25 Gram(s) IV Push once  furosemide    Tablet 40 milliGRAM(s) Oral daily  glucagon  Injectable 1 milliGRAM(s) IntraMuscular once  insulin lispro (ADMELOG) corrective regimen sliding scale   SubCutaneous three times a day before meals  insulin lispro (ADMELOG) corrective regimen sliding scale   SubCutaneous at bedtime  lactulose Syrup 10 Gram(s) Oral daily  melatonin 3 milliGRAM(s) Oral at bedtime  nitroglycerin     SubLingual 0.4 milliGRAM(s) SubLingual every 5 minutes PRN  pantoprazole    Tablet 40 milliGRAM(s) Oral before breakfast  sacubitril 24 mG/valsartan 26 mG 1 Tablet(s) Oral two times a day  senna 2 Tablet(s) Oral at bedtime  sodium chloride 0.45%. 1000 milliLiter(s) IV Continuous <Continuous>      ALLERGIES:  No Known Drug Allergies  shellfish (Other; Urticaria)      VITALS & I/Os:  Vital Signs Last 24 Hrs  T(C): 37 (13 Oct 2021 11:57), Max: 37 (13 Oct 2021 05:33)  T(F): 98.6 (13 Oct 2021 11:57), Max: 98.6 (13 Oct 2021 05:33)  HR: 53 (13 Oct 2021 11:57) (53 - 61)  BP: 110/61 (13 Oct 2021 11:57) (105/62 - 118/70)  BP(mean): --  RR: 18 (13 Oct 2021 11:57) (17 - 18)  SpO2: 100% (13 Oct 2021 11:57) (100% - 100%)    I&O's Summary      PHYSICAL EXAM:  General: No acute distress  Respiratory: Nonlabored  Cardiovascular: appears well perfused  Abdominal: Soft, nondistended, nontender. No rebound or guarding. No organomegaly, no palpable mass.  abdominal scar well-healed  R groin medial thigh firm and tender to palpation  no signs of infection  Extremities: Warm  motor and sensory grossly intact in BLE  palpable femoral/pop/DP/PT 2+ bilaterally    LABS:                        11.8   9.84  )-----------( 130      ( 13 Oct 2021 07:25 )             35.3     10-13    148<H>  |  112<H>  |  18  ----------------------------<  127<H>  3.9   |  25  |  0.90    Ca    10.2      13 Oct 2021 07:25  Phos  2.5     10-13  Mg     2.00     10-13    TPro  6.3  /  Alb  2.7<L>  /  TBili  2.9<H>  /  DBili  x   /  AST  44<H>  /  ALT  33  /  AlkPhos  127<H>  10-13    Lactate:    PTT - ( 13 Oct 2021 07:25 )  PTT:53.6 sec              IMAGING:    EXAM:  CT ABDOMEN AND PELVIS IC        PROCEDURE DATE:  Oct 13 2021         INTERPRETATION:  CLINICAL INFORMATION: Pulmonary embolism on heparin drip. Altered mental status. Now with diffuse abdominal pain.    COMPARISON: CT May chest 10/7/2021. CT chest, abdomen, pelvis 4/21/2020    CONTRAST/COMPLICATIONS:  IV Contrast: Omnipaque 350  90 cc administered   10 cc discarded  Oral Contrast: NONE  Complications: None reported at time of study completion    PROCEDURE:  CT of the Abdomen and Pelvis was performed.  Sagittal and coronal reformats were performed.    FINDINGS:  LOWER CHEST: Redemonstrated partially imaged bilateral pulmonary emboli with bilateral pulmonary Infarcts. Unchanged trace bilateral pleural effusions. Heart is enlarged with partially imaged cardiac device leads.    LIVER: Within normal limits.  BILE DUCTS: Normal caliber.  GALLBLADDER: Within normal limits.  SPLEEN: A 1.7 cm splenic lesion, likely a hemangioma or hamartoma. PANCREAS: Within normal limits.  ADRENALS: Mild left adrenal gland thickening.  KIDNEYS/URETERS: Left renal cysts. No hydronephrosis.    BLADDER: Within normal limits.  REPRODUCTIVE ORGANS: Prostate is enlarged.    BOWEL: No bowel obstruction. Appendix is normal. Colonic diverticula.  PERITONEUM: No ascites.  VESSELS: Atherosclerotic changes. 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  LYMPH NODES: No lymphadenopathy.  ABDOMINAL WALL/RETROPERITONEUM: Right groin multilobulated hematoma with the largest component measuring 10.4 x 6.7 x 10.6 cm Hematoma tracks cranially along the iliopsoas muscle into the retroperitoneum.  BONES: Degenerative changes.    IMPRESSION:  Large right groin hematoma extending into the right retroperitoneum.    Findings were discussed with NATHALIA Soriano 10/13/2021 11:12 AM by Dr. Yuan with read back confirmation.    --- End of Report ---        JAC YUAN MD; Resident Interventional Radiology  This document has been electronically signed.  GEORGE BAUER MD; Attending Radiologist  This document has been electronically signed. Oct 13 2021  1:10PM

## 2021-10-13 NOTE — PROGRESS NOTE ADULT - ASSESSMENT
75 y/o m with worsening encephelopathy in setting of acute PE     Problem/Plan - 1:  ·  Problem: Pulmonary embolism.   ·  Plan: -possibly 2/2 to eliquis non-compliance in setting of know proximal right leg dvt  -heparin gtt and will change to NOAC.   -Pulmonary helping .      Problem/Plan - 2:  ·  Problem: Right Groin Hematoma with Retroperitoneal Bleed.   ·  Plan: -OFF AC . Monitoring HH . Vascular helping.   < from: CT Abdomen and Pelvis w/ IV Cont (10.13.21 @ 09:35) >  IMPRESSION:  Large right groin hematoma extending into the right retroperitoneum.    < end of copied text >     Problem/Plan - 3:  ·  Problem: DM (diabetes mellitus).   ·  Plan: ISS  Sugars fine.      Problem/Plan - 4:  ·  Problem: Chronic Systolic CHF (congestive heart failure).   ·  Plan: c/w sacubitril , lasix, coreg  Cardiology helping.       Problem/Plan - 5:  ·  Problem: Hypernatremia .   ·  Plan: resolved.      Problem/Plan - 5:  ·  Problem: Hyperbilirubinemia  .   ·  Plan: Trending LFT .  GI helping.    CT scan noted.       Problem/Plan - 5:  ·  Problem: Hyperparathyroidism with Hypercalcemia .   ·  Plan: Endo helping .     Problem/Plan - 6:  ·  Problem: Dementia with worsening sec to metabolic Encephalopathy   ·  Plan: -Exact cause not known.  Bilirubin high but Ammonia normal.  CT head noted.   Neurology  helping.    Disposition ; DC planning pending above  .

## 2021-10-13 NOTE — PROGRESS NOTE ADULT - SUBJECTIVE AND OBJECTIVE BOX
[facility]      Seen earlier today, and examined.  - Today, patient is without complaints.           *****MEDICATIONS: Current medication reviewed and documented.    MEDICATIONS  (STANDING):  carvedilol 25 milliGRAM(s) Oral every 12 hours  cinacalcet 60 milliGRAM(s) Oral daily  dextrose 40% Gel 15 Gram(s) Oral once  dextrose 5%. 1000 milliLiter(s) (50 mL/Hr) IV Continuous <Continuous>  dextrose 5%. 1000 milliLiter(s) (100 mL/Hr) IV Continuous <Continuous>  dextrose 50% Injectable 25 Gram(s) IV Push once  dextrose 50% Injectable 12.5 Gram(s) IV Push once  dextrose 50% Injectable 25 Gram(s) IV Push once  furosemide    Tablet 40 milliGRAM(s) Oral daily  glucagon  Injectable 1 milliGRAM(s) IntraMuscular once  insulin lispro (ADMELOG) corrective regimen sliding scale   SubCutaneous three times a day before meals  insulin lispro (ADMELOG) corrective regimen sliding scale   SubCutaneous at bedtime  lactulose Syrup 10 Gram(s) Oral daily  melatonin 3 milliGRAM(s) Oral at bedtime  pantoprazole    Tablet 40 milliGRAM(s) Oral before breakfast  sacubitril 24 mG/valsartan 26 mG 1 Tablet(s) Oral two times a day  senna 2 Tablet(s) Oral at bedtime  sodium chloride 0.45%. 1000 milliLiter(s) (60 mL/Hr) IV Continuous <Continuous>    MEDICATIONS  (PRN):  ALBUTerol    90 MICROgram(s) HFA Inhaler 2 Puff(s) Inhalation every 6 hours PRN Shortness of Breath and/or Wheezing  bisacodyl Suppository 10 milliGRAM(s) Rectal daily PRN Constipation  nitroglycerin     SubLingual 0.4 milliGRAM(s) SubLingual every 5 minutes PRN Chest Pain          ***** VITAL SIGNS:  T(F): 98.6 (10-13-21 @ 11:57), Max: 98.6 (10-13-21 @ 05:33)  HR: 53 (10-13-21 @ 11:57) (53 - 61)  BP: 110/61 (10-13-21 @ 11:57) (105/62 - 118/70)  RR: 18 (10-13-21 @ 11:57) (17 - 18)  SpO2: 100% (10-13-21 @ 11:57) (100% - 100%)  Wt(kg): --  ,   I&O's Summary           *****PHYSICAL EXAM:   alert oriented x 1 attention comprehension are  poor    limited as pt is not cooperative     seems to be in mild distress              *****LAB AND IMAGIN.8   9.84  )-----------( 130      ( 13 Oct 2021 07:25 )             35.3               10-13    148<H>  |  112<H>  |  18  ----------------------------<  127<H>  3.9   |  25  |  0.90    Ca    10.2      13 Oct 2021 07:25  Phos  2.5     10-  Mg     2.00     10-    TPro  6.3  /  Alb  2.7<L>  /  TBili  2.9<H>  /  DBili  x   /  AST  44<H>  /  ALT  33  /  AlkPhos  127<H>  10-    PTT - ( 13 Oct 2021 07:25 )  PTT:53.6 sec                     [All pertinent recent Imaging/Reports reviewed]           *****A S S E S S M E N T   A N D   P L A N :Excerpt from H&P,"     77 yo M with a PMH of CAD s/p MI w/stents , CHF with 20% LVEF in 2016, AICD,  HTN, HLD, DM, gout, CKD, COPD, PVD s/p aortobifemoral bypass, proximal right leg dvt per  so  on eliquis. Pt lives alone,  speaks with sister  routinely (Gabriela [phone]). Per sister, had noticed increasing confusion over last 2weeks, but today was worst she had seen. Sister grew concerned, EMS called, noted to be sob. Pt per ed noted not taking any of his home meds.  CT angio chest with PE/pulm infarcts/RHF and strain, possible covid pna. Pt a/o 1, unable to provide history. Follow commands. HS trop 31, 32. ekg sinus tach, lafb. CT head with no acute findings    Of note, tbili>5 (as high as 10 April 2020) was supposed to be on lactulose per sister but not taking. Prior GI note mentioned possibility of liver biopsy, but appears not to have been performed. US abd from 2020 noteworthy only for slight hepatomegaly     as per prior records, pt was admitted in 2016 with similiar presentation.   pt unable to provide any information regarding his presentation        Problem/Recommendations 1: ams of unclear etiology   suspect multifactorial metabolic encephalopathy due to hypercalcemia, hypernatremia, elevated bili, poor po intake, worsening underlying cognitive impairement   ( seen in 2016 with similar presentation )  r/o infectious process    b12/folate/tsh/ wnl   thiamine 500 iv q 8   ct head no acute path  unable to get mr due to AICD   sleep wake cycle regulation with melatonin   correct metabolic derangements.   namenda 5 mg bid     Problem/Recommendations 2: transaminitis   elevated bili low albumin   ? etiology   ammonia wnl       Problem/Recommendations 3: retroperitoneal hematoma noted   vascular input appreciated   ac on hold   Thank you for allowing me to participate in the care of this patient. Will continue to follow patient periodically. Please do not hesitate to call me if you have any  questions or if there has been a change in patients neurological status     ________________  Mila Cazares MD  [facility] ([facility]  [phone]      33 minutes spent on total encounter; more than 50 % of the visit was  spent counseling about plan of care, compliance to diet/exercise and medication regimen and or  coordinating care by the attending physician.      It is advised that stroke patients follow up with MCKAY Solorzano @ [phone] in 1- 2 weeks.   Others please follow up with Dr. Michael Nissenbaum [phone]

## 2021-10-13 NOTE — PROGRESS NOTE ADULT - SUBJECTIVE AND OBJECTIVE BOX
Date of service: 10/13/21    chief complaint: sob     extended hpi: 77 yo M with a PMH of CAD s/p MI w/stents 2007, CHF with 20% LVEF in 2016, AICD,  HTN, HLD, DM, gout, CKD, COPD, PVD s/p aortobifemoral bypass, proximal right leg dvt per April 20th sono on eliquis.  now with chf, acute  PE.     S: no chest pain or sob; ros otherwise negative.     Review of Systems:   Constitutional: [ ] fevers, [ ] chills.   Skin: [ ] dry skin. [ ] rashes.  Psychiatric: [ ] depression, [ ] anxiety.   Gastrointestinal: [ ] BRBPR, [ ] melena.   Neurological: [ ] confusion. [ ] seizures. [ ] shuffling gait.   Ears,Nose,Mouth and Throat: [ ] ear pain [ ] sore throat.   Eyes: [ ] diplopia.   Respiratory: [ ] hemoptysis. [ ] shortness of breath  Cardiovascular: See HPI above  Hematologic/Lymphatic: [ ] anemia. [ ] painful nodes. [ ] prolonged bleeding.   Genitourinary: [ ] hematuria. [ ] flank pain.   Endocrine: [ ] significant change in weight. [ ] intolerance to heat and cold.     Review of systems [x ] otherwise negative, [ ] otherwise unable to obtain    FH: no family history of sudden cardiac death in first degree relatives    SH: [ ] tobacco, [ ] alcohol, [ ] drugs    ALBUTerol    90 MICROgram(s) HFA Inhaler 2 Puff(s) Inhalation every 6 hours PRN  bisacodyl Suppository 10 milliGRAM(s) Rectal daily PRN  carvedilol 25 milliGRAM(s) Oral every 12 hours  cinacalcet 60 milliGRAM(s) Oral daily  dextrose 40% Gel 15 Gram(s) Oral once  dextrose 5%. 1000 milliLiter(s) IV Continuous <Continuous>  dextrose 5%. 1000 milliLiter(s) IV Continuous <Continuous>  dextrose 50% Injectable 25 Gram(s) IV Push once  dextrose 50% Injectable 12.5 Gram(s) IV Push once  dextrose 50% Injectable 25 Gram(s) IV Push once  furosemide    Tablet 40 milliGRAM(s) Oral daily  glucagon  Injectable 1 milliGRAM(s) IntraMuscular once  insulin lispro (ADMELOG) corrective regimen sliding scale   SubCutaneous three times a day before meals  insulin lispro (ADMELOG) corrective regimen sliding scale   SubCutaneous at bedtime  lactulose Syrup 10 Gram(s) Oral daily  melatonin 3 milliGRAM(s) Oral at bedtime  nitroglycerin     SubLingual 0.4 milliGRAM(s) SubLingual every 5 minutes PRN  pantoprazole    Tablet 40 milliGRAM(s) Oral before breakfast  sacubitril 24 mG/valsartan 26 mG 1 Tablet(s) Oral two times a day  senna 2 Tablet(s) Oral at bedtime  sodium chloride 0.45%. 1000 milliLiter(s) IV Continuous <Continuous>                            11.8   9.84  )-----------( 130      ( 13 Oct 2021 07:25 )             35.3       10-13    148<H>  |  112<H>  |  18  ----------------------------<  127<H>  3.9   |  25  |  0.90    Ca    10.2      13 Oct 2021 07:25  Phos  2.5     10-13  Mg     2.00     10-13    TPro  6.3  /  Alb  2.7<L>  /  TBili  2.9<H>  /  DBili  x   /  AST  44<H>  /  ALT  33  /  AlkPhos  127<H>  10-13      T(C): 37 (10-13-21 @ 11:57), Max: 37 (10-13-21 @ 05:33)  HR: 53 (10-13-21 @ 11:57) (53 - 61)  BP: 110/61 (10-13-21 @ 11:57) (108/73 - 118/70)  RR: 18 (10-13-21 @ 11:57) (17 - 18)  SpO2: 100% (10-13-21 @ 11:57) (100% - 100%)  Wt(kg): --    General: NAD, remains confused   Head: Normocephalic and atraumatic.   Neck: No JVD. No bruits. Supple. Does not appear to be enlarged.   Cardiovascular: + S1,S2 ; RRR Soft systolic murmur at the left lower sternal border. No rubs noted.    Lungs: CTA b/l. No rhonchi, rales or wheezes.   Abdomen: + BS, soft. Non tender. Non distended. No rebound. No guarding.   Extremities: No clubbing/cyanosis/edema.   Skin: Warm and moist. The patient's skin has normal elasticity and good skin turgor.   Psychiatric: unable to assess    Tele: AP    TTE: < from: Transthoracic Echocardiogram (10.08.21 @ 11:41) >  1. Mitral annular calcification, otherwise normal mitral  valve. Mild-moderate mitral regurgitation.  2. Calcified trileaflet aortic valve with normal opening.  Mild-moderate aortic regurgitation.  3. Mildly dilated left atrium.  LA volume index = 36 cc/m2.  4. Mildleft ventricular enlargement.  5. Severe global left ventricular systolic dysfunction.  6. Unable to accurately evaluate right ventricular size or  systolic function.  A device wire is noted in the right  heart.  7. Inadequate tricuspid regurgitationDoppler envelope  precludes estimation of RVSP.  *** No previous Echo exam.    < end of copied text >      A/P: 77 yo M with a PMH of CAD s/p MI w/stents 2007, CHF with 20% LVEF in 2016, AICD,  HTN, HLD, DM, gout, CKD, COPD, PVD s/p aortobifemoral bypass, proximal right leg dvt per April 20th sono on eliquis.  now with chf, acute PE.     -pt. currently chest pain free with no symptoms of dyspnea  -symptoms from yesterday have resolved  -AC for PE per primary team  -HD stable today with no symptoms   -TTE with severe LV dysfunction which is known - pt. with AICD and last echocardiogram in 2020 demonstrated severe LV dysfunction as well  -continue with medical management of known cardiomyopathy with coreg and entresto   -appears euvolemic - continue with po lasix  -pt. with history of stents and has been maintained on sapt at home per chart - restart asa 81mg PO daily if no contraindications   -remains confused - workup per neurology

## 2021-10-13 NOTE — PROGRESS NOTE ADULT - SUBJECTIVE AND OBJECTIVE BOX
[facility]   MD ZOHRA SCHAFFER PA    TEL:  OFFICE: [phone]  DR MITCHELL CELL: [phone]  DR. CONROY CELL: [phone]  SOLE SOTOMAYOR CELL: [phone]    From 5pm-7am Answering Service [phone]    -- RENAL FOLLOW UP NOTE ---Date of Service 10-13-21 @ 13:34    Patient is a 76y old  Male who presents with a chief complaint of PE/AMS (13 Oct 2021 11:27)      Patient seen and examined at bedside. confused    VITALS:  T(F): 98.6 (10-13-21 @ 11:57), Max: 98.6 (10-13-21 @ 05:33)  HR: 53 (10-13-21 @ 11:57)  BP: 110/61 (10-13-21 @ 11:57)  RR: 18 (10-13-21 @ 11:57)  SpO2: 100% (10-13-21 @ 11:57)  Wt(kg): --        PHYSICAL EXAM:  Constitutional: confused  Neck: No JVD  Respiratory: CTAB, no wheezes, rales or rhonchi  Cardiovascular: S1, S2, RRR  Gastrointestinal: BS+, soft, NT/ND  Extremities: No peripheral edema    Hospital Medications:   MEDICATIONS  (STANDING):  carvedilol 25 milliGRAM(s) Oral every 12 hours  cinacalcet 60 milliGRAM(s) Oral daily  dextrose 40% Gel 15 Gram(s) Oral once  dextrose 5%. 1000 milliLiter(s) (50 mL/Hr) IV Continuous <Continuous>  dextrose 5%. 1000 milliLiter(s) (100 mL/Hr) IV Continuous <Continuous>  dextrose 50% Injectable 25 Gram(s) IV Push once  dextrose 50% Injectable 12.5 Gram(s) IV Push once  dextrose 50% Injectable 25 Gram(s) IV Push once  furosemide    Tablet 40 milliGRAM(s) Oral daily  glucagon  Injectable 1 milliGRAM(s) IntraMuscular once  insulin lispro (ADMELOG) corrective regimen sliding scale   SubCutaneous three times a day before meals  insulin lispro (ADMELOG) corrective regimen sliding scale   SubCutaneous at bedtime  lactulose Syrup 10 Gram(s) Oral daily  melatonin 3 milliGRAM(s) Oral at bedtime  pantoprazole    Tablet 40 milliGRAM(s) Oral before breakfast  sacubitril 24 mG/valsartan 26 mG 1 Tablet(s) Oral two times a day  senna 2 Tablet(s) Oral at bedtime  sodium chloride 0.45%. 1000 milliLiter(s) (60 mL/Hr) IV Continuous <Continuous>      LABS:  10-13    148<H>  |  112<H>  |  18  ----------------------------<  127<H>  3.9   |  25  |  0.90    Ca    10.2      13 Oct 2021 07:25  Phos  2.5     10-13  Mg     2.00     10-13    TPro  6.3  /  Alb  2.7<L>  /  TBili  2.9<H>  /  DBili      /  AST  44<H>  /  ALT  33  /  AlkPhos  127<H>  10-13    Creatinine Trend: 0.90 <--, 0.91 <--, 0.98 <--, 1.04 <--, 0.97 <--, 1.07 <--, 1.05 <--    Phosphorus Level, Serum: 2.5 mg/dL (10-13 @ 07:25)  Albumin, Serum: 2.7 g/dL (10-13 @ 07:25)                              11.8   9.84  )-----------( 130      ( 13 Oct 2021 07:25 )             35.3     Urine Studies:      PTH -- (Ca --)      [10-11-21 @ 03:57]   114  Vitamin D (25OH) 47.5      [10-11-21 @ 09:08]  HbA1c 6.5      [10-19-16 @ 08:19]  TSH 0.94      [10-11-21 @ 03:57]      Free Light Chains: kappa 2.28, lambda 3.37, ratio = 0.68      [10-11 @ 09:07]    RADIOLOGY & ADDITIONAL STUDIES:

## 2021-10-13 NOTE — PROGRESS NOTE ADULT - SUBJECTIVE AND OBJECTIVE BOX
Chief complaint    Patient is a 76y old  Male who presents with a chief complaint of PE/AMS (12 Oct 2021 16:11)   Review of systems  Patient in bed, appears comfortable.    Labs and Fingersticks  CAPILLARY BLOOD GLUCOSE      POCT Blood Glucose.: 122 mg/dL (13 Oct 2021 11:13)  POCT Blood Glucose.: 211 mg/dL (13 Oct 2021 07:42)  POCT Blood Glucose.: 148 mg/dL (12 Oct 2021 22:19)  POCT Blood Glucose.: 141 mg/dL (12 Oct 2021 17:04)  POCT Blood Glucose.: 136 mg/dL (12 Oct 2021 11:57)      Anion Gap, Serum: 11 (10-13 @ 07:25)  Anion Gap, Serum: 13 (10-12 @ 06:52)      Calcium, Total Serum: 10.2 (10-13 @ 07:25)  Calcium, Total Serum: 11.2 *H* (10-12 @ 06:52)  Albumin, Serum: 2.7 *L* (10-13 @ 07:25)    Alanine Aminotransferase (ALT/SGPT): 33 (10-13 @ 07:25)  Alkaline Phosphatase, Serum: 127 *H* (10-13 @ 07:25)  Aspartate Aminotransferase (AST/SGOT): 44 *H* (10-13 @ 07:25)        10-13    148<H>  |  112<H>  |  18  ----------------------------<  127<H>  3.9   |  25  |  0.90    Ca    10.2      13 Oct 2021 07:25  Phos  2.5     10-13  Mg     2.00     10-13    TPro  6.3  /  Alb  2.7<L>  /  TBili  2.9<H>  /  DBili  x   /  AST  44<H>  /  ALT  33  /  AlkPhos  127<H>  10-13                        11.8   9.84  )-----------( 130      ( 13 Oct 2021 07:25 )             35.3     Medications  MEDICATIONS  (STANDING):  carvedilol 25 milliGRAM(s) Oral every 12 hours  cinacalcet 60 milliGRAM(s) Oral daily  dextrose 40% Gel 15 Gram(s) Oral once  dextrose 5%. 1000 milliLiter(s) (50 mL/Hr) IV Continuous <Continuous>  dextrose 5%. 1000 milliLiter(s) (100 mL/Hr) IV Continuous <Continuous>  dextrose 50% Injectable 25 Gram(s) IV Push once  dextrose 50% Injectable 12.5 Gram(s) IV Push once  dextrose 50% Injectable 25 Gram(s) IV Push once  furosemide    Tablet 40 milliGRAM(s) Oral daily  glucagon  Injectable 1 milliGRAM(s) IntraMuscular once  insulin lispro (ADMELOG) corrective regimen sliding scale   SubCutaneous three times a day before meals  insulin lispro (ADMELOG) corrective regimen sliding scale   SubCutaneous at bedtime  lactulose Syrup 15 Gram(s) Oral once  lactulose Syrup 10 Gram(s) Oral daily  melatonin 3 milliGRAM(s) Oral at bedtime  pantoprazole    Tablet 40 milliGRAM(s) Oral before breakfast  sacubitril 24 mG/valsartan 26 mG 1 Tablet(s) Oral two times a day  senna 2 Tablet(s) Oral at bedtime  sodium chloride 0.45%. 1000 milliLiter(s) (50 mL/Hr) IV Continuous <Continuous>      Physical Exam  General: Patient comfortable in bed  Vital Signs Last 12 Hrs  T(F): 98.6 (10-13-21 @ 05:33), Max: 98.6 (10-13-21 @ 05:33)  HR: 61 (10-13-21 @ 05:33) (61 - 61)  BP: 108/73 (10-13-21 @ 05:33) (108/73 - 108/73)  BP(mean): --  RR: 17 (10-13-21 @ 05:33) (17 - 17)  SpO2: 100% (10-13-21 @ 05:33) (100% - 100%)  Neck: No palpable thyroid nodules.  CVS: S1S2, No murmurs  Respiratory: No wheezing, no crepitations  GI: Abdomen soft, bowel sounds positive  Musculoskeletal:  edema lower extremities.     Diagnostics    NM Parathyroid Imaging w/Spect: Routine   Indication: Parathyroid adenoma  Transport: Walking  Provider's Contact #: [phone] (10-11 @ 08:08)  Vitamin D, 25-Hydroxy: AM Sched. Collection: 11-Oct-2021 06:00 (10-10 @ 10:41)  Vitamin D, 1, 25-Dihydroxy: AM Sched. Collection: 11-Oct-2021 06:00 (10-10 @ 10:41)  Parathyroid Hormone Intact w/o Calcium, Serum: AM Sched. Collection: 11-Oct-2021 06:00 (10-10 @ 10:41)  PTH Related Peptide: AM Sched. Collection: 11-Oct-2021 06:00 (10-10 @ 10:41)

## 2021-10-13 NOTE — CHART NOTE - NSCHARTNOTEFT_GEN_A_CORE
Called by radiology that on CT A/P, patient found to have groin and retroperitoneal hematoma that looks acute. Vitals signs stable and patient asymptomatic. Spoke with medical attending. AC held and patient scheduled for repeat CBC at 1600, with T&S. Vitals signs q 4h ordered. Surgery consulted for RP bleed and will see patient. Family to be called for blood consent if needed. Will continue to monitor.

## 2021-10-13 NOTE — CONSULT NOTE ADULT - ATTENDING COMMENTS
Patient seen and examined.  Agree with above.   Admitted with acute PE and encephalopathy   TTE unable to evaluate RV function   Currently HD stable with no symptoms  Continue with ac and treatment of PE per pulmonary and primary team  Workup of encephalopathy per neuro and primary team    Kaylen Waldrop MD
R groin hematoma extending to RP.  unclear if there was recent catheterization.  h/h stable  no acute vasc intervention warranted  if there is concern for acute bleed rec repeat CT angiogram to eval, may need IR eval if there is active extravasation.  At this point pt seems stable

## 2021-10-13 NOTE — CONSULT NOTE ADULT - CONSULT REQUESTED DATE/TIME
09-Oct-2021 07:03
13-Oct-2021 13:34
10-Oct-2021 14:36
10-Oct-2021 21:12
11-Oct-2021 12:28
10-Oct-2021 13:00
08-Oct-2021 12:44

## 2021-10-13 NOTE — PROGRESS NOTE ADULT - SUBJECTIVE AND OBJECTIVE BOX
Date of Service: 10-13-21 @ 15:19    Patient is a 76y old  Male who presents with a chief complaint of PE/AMS (13 Oct 2021 14:35)      Any change in ROS: pt is alert and awake: no SOB : still somewhat confused    MEDICATIONS  (STANDING):  carvedilol 25 milliGRAM(s) Oral every 12 hours  cinacalcet 60 milliGRAM(s) Oral daily  dextrose 40% Gel 15 Gram(s) Oral once  dextrose 5%. 1000 milliLiter(s) (50 mL/Hr) IV Continuous <Continuous>  dextrose 5%. 1000 milliLiter(s) (100 mL/Hr) IV Continuous <Continuous>  dextrose 50% Injectable 25 Gram(s) IV Push once  dextrose 50% Injectable 12.5 Gram(s) IV Push once  dextrose 50% Injectable 25 Gram(s) IV Push once  furosemide    Tablet 40 milliGRAM(s) Oral daily  glucagon  Injectable 1 milliGRAM(s) IntraMuscular once  insulin lispro (ADMELOG) corrective regimen sliding scale   SubCutaneous three times a day before meals  insulin lispro (ADMELOG) corrective regimen sliding scale   SubCutaneous at bedtime  lactulose Syrup 10 Gram(s) Oral daily  melatonin 3 milliGRAM(s) Oral at bedtime  pantoprazole    Tablet 40 milliGRAM(s) Oral before breakfast  sacubitril 24 mG/valsartan 26 mG 1 Tablet(s) Oral two times a day  senna 2 Tablet(s) Oral at bedtime  sodium chloride 0.45%. 1000 milliLiter(s) (60 mL/Hr) IV Continuous <Continuous>    MEDICATIONS  (PRN):  ALBUTerol    90 MICROgram(s) HFA Inhaler 2 Puff(s) Inhalation every 6 hours PRN Shortness of Breath and/or Wheezing  bisacodyl Suppository 10 milliGRAM(s) Rectal daily PRN Constipation  nitroglycerin     SubLingual 0.4 milliGRAM(s) SubLingual every 5 minutes PRN Chest Pain    Vital Signs Last 24 Hrs  T(C): 37 (13 Oct 2021 11:57), Max: 37 (13 Oct 2021 05:33)  T(F): 98.6 (13 Oct 2021 11:57), Max: 98.6 (13 Oct 2021 05:33)  HR: 53 (13 Oct 2021 11:57) (53 - 61)  BP: 110/61 (13 Oct 2021 11:57) (108/73 - 118/70)  BP(mean): --  RR: 18 (13 Oct 2021 11:57) (17 - 18)  SpO2: 100% (13 Oct 2021 11:57) (100% - 100%)    I&O's Summary        Physical Exam:   GENERAL: NAD, well-groomed, well-developed  HEENT: MICHELLE/   Atraumatic, Normocephalic  ENMT: No tonsillar erythema, exudates, or enlargement; Moist mucous membranes, Good dentition, No lesions  NECK: Supple, No JVD, Normal thyroid  CHEST/LUNG: Clear to auscultaion  CVS: Regular rate and rhythm; No murmurs, rubs, or gallops  GI: : Soft, Nontender, Nondistended; Bowel sounds present  NERVOUS SYSTEM:  Alert & Oriented X1-2  EXTREMITIES:  + edema  LYMPH: No lymphadenopathy noted  SKIN: No rashes or lesions  ENDOCRINOLOGY: No Thyromegaly  PSYCH: confused!    Labs:  24                            11.8   9.84  )-----------( 130      ( 13 Oct 2021 07:25 )             35.3                         12.7   10.60 )-----------( 153      ( 12 Oct 2021 06:52 )             37.5                         13.6   9.68  )-----------( 158      ( 11 Oct 2021 03:57 )             41.0                         13.3   8.53  )-----------( 162      ( 10 Oct 2021 03:39 )             40.3     10-13    148<H>  |  112<H>  |  18  ----------------------------<  127<H>  3.9   |  25  |  0.90  10-12    149<H>  |  110<H>  |  20  ----------------------------<  115<H>  3.6   |  26  |  0.91  10-11    148<H>  |  112<H>  |  24<H>  ----------------------------<  117<H>  3.6   |  26  |  0.98  10-10    145  |  112<H>  |  25<H>  ----------------------------<  115<H>  4.6   |  20<L>  |  1.04    Ca    10.2      13 Oct 2021 07:25  Ca    11.2<H>      12 Oct 2021 06:52  Phos  2.5     10-13  Phos  2.6     10-12  Mg     2.00     10-13  Mg     2.10     10-12    TPro  6.3  /  Alb  2.7<L>  /  TBili  2.9<H>  /  DBili  x   /  AST  44<H>  /  ALT  33  /  AlkPhos  127<H>  10-13  TPro  6.2  /  Alb  2.7<L>  /  TBili  3.3<H>  /  DBili  x   /  AST  41<H>  /  ALT  31  /  AlkPhos  121<H>  10-11  TPro  6.1  /  Alb  2.6<L>  /  TBili  3.8<H>  /  DBili  x   /  AST  57<H>  /  ALT  29  /  AlkPhos  111  10-10    CAPILLARY BLOOD GLUCOSE      POCT Blood Glucose.: 122 mg/dL (13 Oct 2021 11:13)  POCT Blood Glucose.: 211 mg/dL (13 Oct 2021 07:42)  POCT Blood Glucose.: 148 mg/dL (12 Oct 2021 22:19)  POCT Blood Glucose.: 141 mg/dL (12 Oct 2021 17:04)      LIVER FUNCTIONS - ( 13 Oct 2021 07:25 )  Alb: 2.7 g/dL / Pro: 6.3 g/dL / ALK PHOS: 127 U/L / ALT: 33 U/L / AST: 44 U/L / GGT: x           PTT - ( 13 Oct 2021 07:25 )  PTT:53.6 sec          RECENT CULTURES:  10-07 @ 21:50 .Blood Blood       rad< from: US Duplex Venous Lower Ext Complete, Bilateral (10.08.21 @ 16:17) >    PROCEDURE DATE:  Oct  8 2021         INTERPRETATION:  CLINICAL INFORMATION: Congestive heart failure. Pulmonary embolus.    COMPARISON: None available.    TECHNIQUE: Duplex sonography of the BILATERAL LOWER extremity veins with color and spectral Doppler, with and without compression.    FINDINGS:    RIGHT:  Normal compressibility of the RIGHT common femoral, femoral and popliteal veins.  Doppler examination shows normal spontaneous and phasic flow.  No RIGHT calf vein thrombosis is detected.    LEFT:  Normal compressibility of the LEFT common femoral, femoral and popliteal veins.  Doppler examination shows normal spontaneous and phasic flow.  No LEFT calf vein thrombosis is detected.    IMPRESSION:  No evidence of deep venous thrombosis in either lower extremity in the visualized veins..          --- End of Report ---              RADHA LILLY MD; Attending Radiologist  This document has been electronically signed. Oct  8 2021  4:26PM    < end of copied text >  < from: CT Abdomen and Pelvis w/ IV Cont (10.13.21 @ 09:35) >  mplications: None reported at time of study completion    PROCEDURE:  CT of the Abdomen and Pelvis was performed.  Sagittal and coronal reformats were performed.    FINDINGS:  LOWER CHEST: Redemonstrated partially imaged bilateral pulmonary emboli with bilateral pulmonary Infarcts. Unchanged trace bilateral pleural effusions. Heart is enlarged with partially imaged cardiac device leads.    LIVER: Within normal limits.  BILE DUCTS: Normal caliber.  GALLBLADDER: Within normal limits.  SPLEEN: A 1.7 cm splenic lesion, likely a hemangioma or hamartoma. PANCREAS: Within normal limits.  ADRENALS: Mildleft adrenal gland thickening.  KIDNEYS/URETERS: Left renal cysts. No hydronephrosis.    BLADDER: Within normal limits.  REPRODUCTIVE ORGANS: Prostate is enlarged.    BOWEL: No bowel obstruction. Appendix is normal. Colonic diverticula.  PERITONEUM: No ascites.  VESSELS: Atherosclerotic changes. 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  LYMPH NODES: No lymphadenopathy.  ABDOMINAL WALL/RETROPERITONEUM: Right groin multilobulated hematoma with the largest component measuring 10.4 x 6.7 x 10.6 cm Hematoma tracks cranially along the iliopsoas muscle into the retroperitoneum.  BONES: Degenerative changes.    IMPRESSION:  Large right groin hematoma extending into the right retroperitoneum.    Findings were discussed with NATHALIA Soriano 10/13/2021 11:12 AM by Dr. Mar with read back confirmation.    --- End of Report ---            < end of copied text >           No Growth Final    10-07 @ 21:00 .Blood Blood                No Growth Final          RESPIRATORY CULTURES:          Studies  Chest X-RAY  CT SCAN Chest   Venous Dopplers: LE:   CT Abdomen  Others          < from: US Duplex Venous Lower Ext Complete, Bilateral (10.08.21 @ 16:17) >      INTERPRETATION:  CLINICAL INFORMATION: Congestive heart failure. Pulmonary embolus.    COMPARISON: None available.    TECHNIQUE: Duplex sonography of the BILATERAL LOWER extremity veins with color and spectral Doppler, with and without compression.    FINDINGS:    RIGHT:  Normal compressibility of the RIGHT common femoral, femoral and popliteal veins.  Doppler examination shows normal spontaneous and phasic flow.  No RIGHT calf vein thrombosis is detected.    LEFT:  Normal compressibility of the LEFT common femoral, femoral and popliteal veins.  Doppler examination shows normal spontaneous and phasic flow.  No LEFT calf vein thrombosis is detected.    IMPRESSION:  No evidence of deep venous thrombosis in either lower extremity in the visualized veins..          --- End of Report ---              RADHA LILLY MD; Attending Radiologist  This document has been electronically signed. Oct  8 2021  4:26PM    < end of copied text >

## 2021-10-13 NOTE — PROGRESS NOTE ADULT - ASSESSMENT
77yo M with h/o CAD s/p MI w/stents 2007, CHF with 20% LVEF in 2016, AICD, HTN, HLD, DM, gout, CKD, COPD, PVD s/p aortobifemoral bypass, proximal right leg dvt (April 20th) on Eliquis presenting with SOB/AMS. GI consulted for increased liver enzymes     Increased Liver Enzymes   Favor 2/2 Congestive Hepatopathy d/t RHF   US Abd w/dilated IVC and hepatic vein; No cirrhosis noted   diurese per cardiology recs/appreciated   cont home lactulose daily   will follow     Abdominal Pain   pain improved   CT with RP Bleed  a/c and antiplt recs per vasc sx; input appreciated    CHF  severe LV dysfxn; has AICD  care per cardiology     I reviewed the overnight course of events on the unit, re-confirming the patient history. I discussed the care with the patient and their family. The plan of care was discussed with the physician assistant and modifications were made to the notation where appropriate. Differential diagnosis and plan of care discussed with patient after the evaluation. Advanced care planning was discussed with patient and family.  Advanced care planning forms were reviewed and discussed.  Risks, benefits and alternatives of gastroenterologic procedures were discussed in detail and all questions were answered. 35 minutes spent on total encounter of which more than fifty percent of the encounter was spent counseling and/or coordinating care by the attending physician.

## 2021-10-13 NOTE — PROGRESS NOTE ADULT - SUBJECTIVE AND OBJECTIVE BOX
INTERVAL HPI/OVERNIGHT EVENTS:    events noted; RP bleed on CT   pt without rectal bleeding or abd complaints        MEDICATIONS  (STANDING):  carvedilol 25 milliGRAM(s) Oral every 12 hours  cinacalcet 60 milliGRAM(s) Oral daily  dextrose 40% Gel 15 Gram(s) Oral once  dextrose 5%. 1000 milliLiter(s) (50 mL/Hr) IV Continuous <Continuous>  dextrose 5%. 1000 milliLiter(s) (100 mL/Hr) IV Continuous <Continuous>  dextrose 50% Injectable 25 Gram(s) IV Push once  dextrose 50% Injectable 12.5 Gram(s) IV Push once  dextrose 50% Injectable 25 Gram(s) IV Push once  furosemide    Tablet 40 milliGRAM(s) Oral daily  glucagon  Injectable 1 milliGRAM(s) IntraMuscular once  insulin lispro (ADMELOG) corrective regimen sliding scale   SubCutaneous three times a day before meals  insulin lispro (ADMELOG) corrective regimen sliding scale   SubCutaneous at bedtime  lactulose Syrup 10 Gram(s) Oral daily  melatonin 3 milliGRAM(s) Oral at bedtime  pantoprazole    Tablet 40 milliGRAM(s) Oral before breakfast  sacubitril 24 mG/valsartan 26 mG 1 Tablet(s) Oral two times a day  senna 2 Tablet(s) Oral at bedtime  sodium chloride 0.45%. 1000 milliLiter(s) (60 mL/Hr) IV Continuous <Continuous>    MEDICATIONS  (PRN):  ALBUTerol    90 MICROgram(s) HFA Inhaler 2 Puff(s) Inhalation every 6 hours PRN Shortness of Breath and/or Wheezing  bisacodyl Suppository 10 milliGRAM(s) Rectal daily PRN Constipation  nitroglycerin     SubLingual 0.4 milliGRAM(s) SubLingual every 5 minutes PRN Chest Pain      Allergies    No Known Drug Allergies  shellfish (Other; Urticaria)    Intolerances        Review of Systems:    General:  No wt loss, fevers, chills, night sweats, fatigue   Eyes:  Good vision, no reported pain  ENT:  No sore throat, pain, runny nose, dysphagia  CV:  No pain, palpitations, hypo/hypertension  Resp:  No dyspnea, cough, tachypnea, wheezing  GI:  No pain, No nausea, No vomiting, No diarrhea, No constipation, No weight loss, No fever, No pruritis, No rectal bleeding, No melena, No dysphagia  :  No pain, bleeding, incontinence, nocturia  Muscle:  No pain, weakness  Neuro:  No weakness, tingling, memory problems  Psych:  No fatigue, insomnia, mood problems, depression  Endocrine:  No polyuria, polydypsia, cold/heat intolerance  Heme:  No petechiae, ecchymosis, easy bruisability  Skin:  No rash, tattoos, scars, edema      Vital Signs Last 24 Hrs  T(C): 37 (13 Oct 2021 11:57), Max: 37 (13 Oct 2021 05:33)  T(F): 98.6 (13 Oct 2021 11:57), Max: 98.6 (13 Oct 2021 05:33)  HR: 53 (13 Oct 2021 11:57) (53 - 61)  BP: 110/61 (13 Oct 2021 11:57) (105/62 - 118/70)  BP(mean): --  RR: 18 (13 Oct 2021 11:57) (17 - 18)  SpO2: 100% (13 Oct 2021 11:57) (100% - 100%)    PHYSICAL EXAM:    Constitutional: NAD  HEENT: EOMI, throat clear  Neck: No LAD, supple  Respiratory: CTA and P  Cardiovascular: S1 and S2, RRR, no M  Gastrointestinal: BS+, soft, NT/ND, neg HSM,  Extremities: No peripheral edema, neg clubbing, cyanosis  Vascular: 2+ peripheral pulses  Neurological: A/O x 1-2, no focal deficits  Psychiatric: Normal mood, normal affect  Skin: No rashes      LABS:                        11.8   9.84  )-----------( 130      ( 13 Oct 2021 07:25 )             35.3     10-13    148<H>  |  112<H>  |  18  ----------------------------<  127<H>  3.9   |  25  |  0.90    Ca    10.2      13 Oct 2021 07:25  Phos  2.5     10-13  Mg     2.00     10-13    TPro  6.3  /  Alb  2.7<L>  /  TBili  2.9<H>  /  DBili  x   /  AST  44<H>  /  ALT  33  /  AlkPhos  127<H>  10-13    PTT - ( 13 Oct 2021 07:25 )  PTT:53.6 sec      RADIOLOGY & ADDITIONAL TESTS:    < from: CT Abdomen and Pelvis w/ IV Cont (10.13.21 @ 09:35) >    EXAM:  CT ABDOMEN AND PELVIS IC        PROCEDURE DATE:  Oct 13 2021         INTERPRETATION:  CLINICAL INFORMATION: Pulmonary embolism on heparin drip. Altered mental status. Now with diffuse abdominal pain.    COMPARISON: CT May chest 10/7/2021. CTchest, abdomen, pelvis 4/21/2020    CONTRAST/COMPLICATIONS:  IV Contrast: Omnipaque 350  90 cc administered   10 cc discarded  Oral Contrast: NONE  Complications: None reported at time of study completion    PROCEDURE:  CT of the Abdomen and Pelvis was performed.  Sagittal and coronal reformats were performed.    FINDINGS:  LOWER CHEST: Redemonstrated partially imaged bilateral pulmonary emboli with bilateral pulmonary Infarcts. Unchanged trace bilateral pleural effusions. Heart is enlarged with partially imaged cardiac device leads.    LIVER: Within normal limits.  BILE DUCTS: Normal caliber.  GALLBLADDER: Within normal limits.  SPLEEN: A 1.7 cm splenic lesion, likely a hemangioma or hamartoma. PANCREAS: Within normal limits.  ADRENALS: Mildleft adrenal gland thickening.  KIDNEYS/URETERS: Left renal cysts. No hydronephrosis.    BLADDER: Within normal limits.  REPRODUCTIVE ORGANS: Prostate is enlarged.    BOWEL: No bowel obstruction. Appendix is normal. Colonic diverticula.  PERITONEUM: No ascites.  VESSELS: Atherosclerotic changes. 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  LYMPH NODES: No lymphadenopathy.  ABDOMINAL WALL/RETROPERITONEUM: Right groin multilobulated hematoma with the largest component measuring 10.4 x 6.7 x 10.6 cm Hematoma tracks cranially along the iliopsoas muscle into the retroperitoneum.  BONES: Degenerative changes.    IMPRESSION:  Large right groin hematoma extending into the right retroperitoneum.    Findings were discussed with NATHALIA Soriano 10/13/2021 11:12 AM by Dr. Yuan with read back confirmation.    --- End of Report ---            JAC YUAN MD; Resident Interventional Radiology  This document has been electronically signed.  GEORGE BAUER MD; Attending Radiologist  This document has been electronically signed. Oct 13 2021  1:10PM    < end of copied text >

## 2021-10-13 NOTE — CHART NOTE - NSCHARTNOTEFT_GEN_A_CORE
Spoke with patient's daughter Lesli about need for possible blood transfusion if Hgb were to drop due to groin/RP bleed. As per daughter, both daughter and sister of the patient do not feel they are the HCP. Daughter states patient made sister HCP, but sister says she is not HCP and states daughter had HCP paperwork. Daughter states due to [employer] reasons she can not give consent for blood transfusion for her father. As Hgb stable, no need for blood transfusion at this time, but if need arises, will need to be discussed with family. Will continue to monitor.

## 2021-10-13 NOTE — PROGRESS NOTE ADULT - ASSESSMENT
Assessment  DMT2: 76y Male with DM T2 with hyperglycemia admitted with SOB, patient was on oral hypoglycemic agents at home, sugars improving, on low dose insulin, no hypoglycemic episode,  eating meals.  Hypercalcemia: Primary hyperparathyroidism nuclear scan pending, started on Sensipar calcium improvng. .  CAD: On medications, monitored, stable.  HTN: On medications, monitored,             Rosalva Louis MD  Cell: [phone]  Office: [phone]

## 2021-10-13 NOTE — CONSULT NOTE ADULT - ASSESSMENT
76M hx of open aorto-distal (R CFA, L SANTANA) bypass and hx of DVT/PE previously treated with eliquis admitted with acute PE with R heart strain, transitioned from hep gtt to eliquis today but found to have R groin to RP hematoma on CT. Vascular surgery consulted.    Recommendations:  -trend h/h  if stable for 24h, can trial restarting full AC with continuing to trend h/h  -monitor vitals closely    Seen and discussed with vascular surgery fellow    C TEAM SURGERY  [id number]  76M hx of open aortobifemoral (R CFA, L SANTANA) bypass and hx of DVT/PE previously treated with eliquis admitted with acute PE with R heart strain, transitioned from hep gtt to eliquis today but found to have R groin to RP hematoma on CT. Vascular surgery consulted.    Recommendations:  -trend h/h  if stable for 24h, can trial restarting full AC with continuing to trend h/h  -monitor vitals closely    Seen and discussed with vascular surgery fellow    C TEAM SURGERY  [id number]

## 2021-10-13 NOTE — PROGRESS NOTE ADULT - ASSESSMENT
77 yo M with a PMH of CAD s/p MI w/stents 2007, CHF with 20% LVEF in 2016, AICD,  HTN, HLD, DM, gout, CKD, COPD, PVD s/p aortobifemoral bypass, proximal right leg dvt per April 20th sono on eliquis present with confusion and sob found to have pe started on AC. nephrology consulted for hypernatremia and hypercalcemia    hypernatremia  slightly better s/p 1/2NS  will continue hydration again today  encourage po free water  continue lasix  f/u cardio  monitor at present  chf EF 20% on lasix 40mg po daily    hypercalcemia  ? etiology  likely primary hyperparathyroidism. started on sensipar 60 daily per endo  pending Pthrp  vit d 25 optimal  spep, sif, k/l  monitor at present  monitor for hypocalcemia.     htn  controlled  monitor    PE  now with new RP bleed AC on hold

## 2021-10-14 NOTE — SWALLOW BEDSIDE ASSESSMENT ADULT - COMMENTS
As per charting, pt is a "77 yo M with a PMH of CAD s/p MI w/stents 2007, CHF with 20% LVEF in 2016, AICD,  HTN, HLD, DM, gout, CKD, COPD, PVD s/p aortobifemoral bypass, proximal right leg dvt per April 20th sono on eliquis. Pt lives alone,  speaks with sister  routinely (Gabriela [phone]). Per sister, had noticed increasing confusion over last 2weeks, but today was worst she had seen. Sister grew concerned, EMS called, noted to be sob. Pt per ed noted not taking any of his home meds. CT head with no acute findings."    CT Angio Chest 10/7 revealed "Acute pulmonary emboli in the main right pulmonary artery extending into the segmental branches of the right right upper, middle, and lower lobes. Pulmonary infarction in the right middle and upper lobes, as well as the lingula. Associated Covid 19 is not excluded and testing is recommended."    Consult received and chart reviewed. Pt received awake, however confusion noted, during follow up clinical swallow re-evaluation this PM. Pt is verbal often speaking in short phrases, however difficulty with following directions noted. Pt was cooperative and accepted all PO trials. Pt known to this department with recent bedside swallow evaluation completed on 10/8 with recommendations for Dysphagia 1 Puree with nectar thick fluids (see note for details).
Consult received and chart reviewed. Pt received awake, however confusion noted, during clinical swallow evaluation this PM. Pt unable to identify name/, location, or follow directions despite cues. However, pt is verbal often speaking in short phrases. Pt was cooperative and accepted all PO trials.     As per charting, pt is a "77 yo M with a PMH of CAD s/p MI w/stents , CHF with 20% LVEF in 2016, AICD,  HTN, HLD, DM, gout, CKD, COPD, PVD s/p aortobifemoral bypass, proximal right leg dvt per  [facility] on [facility]. Pt lives alone,  speaks with sister  routinely (Gabriela [phone]). Per sister, had noticed increasing confusion over last 2weeks, but today was worst she had seen. Sister grew concerned, EMS called, noted to be sob. Pt per ed noted not taking any of his home meds. CT head with no acute findings."    CT Angio Chest 10/7 revealed "Acute pulmonary emboli in the main right pulmonary artery extending into the segmental branches of the right right upper, middle, and lower lobes. Pulmonary infarction in the right middle and upper lobes, as well as the lingula. Associated Covid 19 is not excluded and testing is recommended."

## 2021-10-14 NOTE — PROGRESS NOTE ADULT - ASSESSMENT
Assessment  DMT2: 76y Male with DM T2 with hyperglycemia admitted with SOB, patient was on oral hypoglycemic agents at home, sugars trending down, no hypoglycemic episode,  eating meals.  Hypercalcemia: Primary hyperparathyroidism nuclear scan pending, started on Sensipar calcium improving .  CAD: On medications, monitored, stable.  HTN: On medications, monitored,             Rosalva Louis MD  Cell: [phone]  Office: [phone]

## 2021-10-14 NOTE — PROGRESS NOTE ADULT - SUBJECTIVE AND OBJECTIVE BOX
Date of Service  : 10-14-21     INTERVAL HPI/OVERNIGHT EVENTS: No new concerns.   Vital Signs Last 24 Hrs  T(C): 37.2 (14 Oct 2021 15:50), Max: 37.2 (14 Oct 2021 15:50)  T(F): 98.9 (14 Oct 2021 15:50), Max: 98.9 (14 Oct 2021 15:50)  HR: 56 (14 Oct 2021 15:50) (52 - 59)  BP: 107/53 (14 Oct 2021 15:50) (104/86 - 120/76)  BP(mean): --  RR: 17 (14 Oct 2021 15:50) (17 - 18)  SpO2: 100% (14 Oct 2021 15:50) (100% - 100%)  I&O's Summary    MEDICATIONS  (STANDING):  carvedilol 25 milliGRAM(s) Oral every 12 hours  cinacalcet 60 milliGRAM(s) Oral daily  dextrose 40% Gel 15 Gram(s) Oral once  dextrose 5%. 1000 milliLiter(s) (50 mL/Hr) IV Continuous <Continuous>  dextrose 5%. 1000 milliLiter(s) (100 mL/Hr) IV Continuous <Continuous>  dextrose 50% Injectable 25 Gram(s) IV Push once  dextrose 50% Injectable 12.5 Gram(s) IV Push once  dextrose 50% Injectable 25 Gram(s) IV Push once  furosemide    Tablet 40 milliGRAM(s) Oral daily  glucagon  Injectable 1 milliGRAM(s) IntraMuscular once  insulin lispro (ADMELOG) corrective regimen sliding scale   SubCutaneous three times a day before meals  insulin lispro (ADMELOG) corrective regimen sliding scale   SubCutaneous at bedtime  lactulose Syrup 10 Gram(s) Oral daily  melatonin 3 milliGRAM(s) Oral at bedtime  pantoprazole    Tablet 40 milliGRAM(s) Oral before breakfast  sacubitril 24 mG/valsartan 26 mG 1 Tablet(s) Oral two times a day  senna 2 Tablet(s) Oral at bedtime  sodium chloride 0.45%. 1000 milliLiter(s) (50 mL/Hr) IV Continuous <Continuous>    MEDICATIONS  (PRN):  ALBUTerol    90 MICROgram(s) HFA Inhaler 2 Puff(s) Inhalation every 6 hours PRN Shortness of Breath and/or Wheezing  bisacodyl Suppository 10 milliGRAM(s) Rectal daily PRN Constipation  nitroglycerin     SubLingual 0.4 milliGRAM(s) SubLingual every 5 minutes PRN Chest Pain    LABS:                        11.2   8.52  )-----------( 161      ( 14 Oct 2021 07:46 )             33.3     10-14    147<H>  |  110<H>  |  17  ----------------------------<  110<H>  3.4<L>   |  26  |  0.82    Ca    10.1      14 Oct 2021 07:46  Phos  2.5     10-13  Mg     2.00     10-13    TPro  6.2  /  Alb  2.6<L>  /  TBili  2.7<H>  /  DBili  x   /  AST  43<H>  /  ALT  32  /  AlkPhos  123<H>  10-14    PTT - ( 13 Oct 2021 07:25 )  PTT:53.6 sec    CAPILLARY BLOOD GLUCOSE      POCT Blood Glucose.: 96 mg/dL (14 Oct 2021 17:06)  POCT Blood Glucose.: 190 mg/dL (14 Oct 2021 11:48)  POCT Blood Glucose.: 136 mg/dL (14 Oct 2021 07:56)  POCT Blood Glucose.: 130 mg/dL (13 Oct 2021 22:48)              RADIOLOGY & ADDITIONAL TESTS:    Consultant(s) Notes Reviewed:  [x ] YES  [ ] NO    PHYSICAL EXAM:  GENERAL: NAD, well-groomed, well-developed,not in any distress ,  HEAD:  Atraumatic, Normocephalic  EYES: EOMI, PERRLA, conjunctiva and sclera clear  ENMT: No tonsillar erythema, exudates, or enlargement; Moist mucous membranes, Good dentition, No lesions  NECK: Supple, No JVD, Normal thyroid  NERVOUS SYSTEM:  Alert & Oriented X 1 to 2, No focal deficit   CHEST/LUNG: Good air entry bilateral with no  rales, rhonchi, wheezing, or rubs  HEART: Regular rate and rhythm; No murmurs, rubs, or gallops  ABDOMEN: Soft, Nontender, Nondistended; Bowel sounds present  EXTREMITIES:  2+ Peripheral Pulses, No clubbing, cyanosis, or edema  SKIN: No rashes or lesions    Care Discussed with Consultants/Other Providers [ x] YES  [ ] NO

## 2021-10-14 NOTE — PROGRESS NOTE ADULT - SUBJECTIVE AND OBJECTIVE BOX
Date of service: 10/14/21    chief complaint: sob     extended hpi: 75 yo M with a PMH of CAD s/p MI w/stents 2007, CHF with 20% LVEF in 2016, AICD,  HTN, HLD, DM, gout, CKD, COPD, PVD s/p aortobifemoral bypass, proximal right leg dvt per April 20th sono on eliquis.  now with chf, acute  PE.     S: no chest pain or sob; pleasantly confused; ros otherwise unable to obtain    Review of Systems:   Constitutional: [ ] fevers, [ ] chills.   Skin: [ ] dry skin. [ ] rashes.  Psychiatric: [ ] depression, [ ] anxiety.   Gastrointestinal: [ ] BRBPR, [ ] melena.   Neurological: [ ] confusion. [ ] seizures. [ ] shuffling gait.   Ears,Nose,Mouth and Throat: [ ] ear pain [ ] sore throat.   Eyes: [ ] diplopia.   Respiratory: [ ] hemoptysis. [ ] shortness of breath  Cardiovascular: See HPI above  Hematologic/Lymphatic: [ ] anemia. [ ] painful nodes. [ ] prolonged bleeding.   Genitourinary: [ ] hematuria. [ ] flank pain.   Endocrine: [ ] significant change in weight. [ ] intolerance to heat and cold.     Review of systems [ ] otherwise negative, [ x] otherwise unable to obtain    FH: no family history of sudden cardiac death in first degree relatives    SH: [ ] tobacco, [ ] alcohol, [ ] drugs    ALBUTerol    90 MICROgram(s) HFA Inhaler 2 Puff(s) Inhalation every 6 hours PRN  bisacodyl Suppository 10 milliGRAM(s) Rectal daily PRN  carvedilol 25 milliGRAM(s) Oral every 12 hours  cinacalcet 60 milliGRAM(s) Oral daily  dextrose 40% Gel 15 Gram(s) Oral once  dextrose 5%. 1000 milliLiter(s) IV Continuous <Continuous>  dextrose 5%. 1000 milliLiter(s) IV Continuous <Continuous>  dextrose 50% Injectable 25 Gram(s) IV Push once  dextrose 50% Injectable 12.5 Gram(s) IV Push once  dextrose 50% Injectable 25 Gram(s) IV Push once  furosemide    Tablet 40 milliGRAM(s) Oral daily  glucagon  Injectable 1 milliGRAM(s) IntraMuscular once  insulin lispro (ADMELOG) corrective regimen sliding scale   SubCutaneous three times a day before meals  insulin lispro (ADMELOG) corrective regimen sliding scale   SubCutaneous at bedtime  lactulose Syrup 10 Gram(s) Oral daily  melatonin 3 milliGRAM(s) Oral at bedtime  nitroglycerin     SubLingual 0.4 milliGRAM(s) SubLingual every 5 minutes PRN  pantoprazole    Tablet 40 milliGRAM(s) Oral before breakfast  sacubitril 24 mG/valsartan 26 mG 1 Tablet(s) Oral two times a day  senna 2 Tablet(s) Oral at bedtime  sodium chloride 0.45%. 1000 milliLiter(s) IV Continuous <Continuous>                            11.2   8.52  )-----------( 161      ( 14 Oct 2021 07:46 )             33.3       10-14    147<H>  |  110<H>  |  17  ----------------------------<  110<H>  3.4<L>   |  26  |  0.82    Ca    10.1      14 Oct 2021 07:46  Phos  2.5     10-13  Mg     2.00     10-13    TPro  6.2  /  Alb  2.6<L>  /  TBili  2.7<H>  /  DBili  x   /  AST  43<H>  /  ALT  32  /  AlkPhos  123<H>  10-14      T(C): 36.7 (10-14-21 @ 06:34), Max: 37 (10-13-21 @ 11:57)  HR: 59 (10-14-21 @ 06:34) (50 - 60)  BP: 118/75 (10-14-21 @ 06:34) (98/51 - 128/78)  RR: 18 (10-14-21 @ 06:34) (18 - 18)  SpO2: 100% (10-14-21 @ 06:34) (100% - 100%)    General: NAD, remains confused   Head: Normocephalic and atraumatic.   Neck: No JVD. No bruits. Supple. Does not appear to be enlarged.   Cardiovascular: + S1,S2 ; RRR Soft systolic murmur at the left lower sternal border. No rubs noted.    Lungs: CTA b/l. No rhonchi, rales or wheezes.   Abdomen: + BS, soft. Non tender. Non distended. No rebound. No guarding.   Extremities: No clubbing/cyanosis/edema.   Skin: Warm and moist. The patient's skin has normal elasticity and good skin turgor.   Psychiatric: unable to assess    Tele: AP    < from: Transthoracic Echocardiogram (10.08.21 @ 11:41) >  1. Mitral annular calcification, otherwise normal mitral  valve. Mild-moderate mitral regurgitation.  2. Calcified trileaflet aortic valve with normal opening.  Mild-moderate aortic regurgitation.  3. Mildly dilated left atrium.  LA volume index = 36 cc/m2.  4. Mildleft ventricular enlargement.  5. Severe global left ventricular systolic dysfunction.  6. Unable to accurately evaluate right ventricular size or  systolic function.  A device wire is noted in the right  heart.  7. Inadequate tricuspid regurgitationDoppler envelope  precludes estimation of RVSP.  *** No previous Echo exam.    < end of copied text >      < from: CT Abdomen and Pelvis w/ IV Cont (10.13.21 @ 09:35) >  IMPRESSION:  Large right groin hematoma extending into the right retroperitoneum.    Findings were discussed with NATHALIA Soriano 10/13/2021 11:12 AM by Dr. Mar with read back confirmation.    < end of copied text >          A/P: 75 yo M with a PMH of CAD s/p MI w/stents 2007, CHF with 20% LVEF in 2016, AICD,  HTN, HLD, DM, gout, CKD, COPD, PVD s/p aortobifemoral bypass, proximal right leg dvt per 4/20, was on Eliquis, admitted with acute on chronic CHF and PE.     -pt. currently chest pain free with no symptoms of dyspnea  -AC for PE per primary team--currently on hold due to groin hematoma and RP bleed  -TTE with severe LV dysfunction which is known - pt. with AICD and last echocardiogram in 2020 demonstrated severe LV dysfunction as well  -continue with medical management of known cardiomyopathy with coreg and entresto   -appears euvolemic - continue with po lasix  -pt. with history of stents and has been maintained on sapt at home per chart - APT and ASA on hold given large Groin hematoma and RP bleed  -monitor H/H  -remains confused - workup per neurology

## 2021-10-14 NOTE — PROGRESS NOTE ADULT - ASSESSMENT
76M hx of open aortobifemoral (R CFA, L SANTANA) bypass and hx of DVT/PE previously treated with eliquis admitted with acute PE with R heart strain, transitioned from hep gtt to eliquis but found to have R groin to RP hematoma on CT. Vascular surgery consulted.    Recommendations:  - Trend H&H  - If stable for 24h, can trial restarting full AC with continuing to trend h/h  - Monitor vitals closely  - Rest of care per primary team    C Team Surgery   [id number]

## 2021-10-14 NOTE — PROGRESS NOTE ADULT - SUBJECTIVE AND OBJECTIVE BOX
Date of Service: 10-14-21 @ 20:09    Patient is a 76y old  Male who presents with a chief complaint of PE/AMS (14 Oct 2021 13:58)      Any change in ROS: Ptis alert and awake: still seems somewhat confused:     MEDICATIONS  (STANDING):  carvedilol 25 milliGRAM(s) Oral every 12 hours  cinacalcet 60 milliGRAM(s) Oral daily  dextrose 40% Gel 15 Gram(s) Oral once  dextrose 5%. 1000 milliLiter(s) (50 mL/Hr) IV Continuous <Continuous>  dextrose 5%. 1000 milliLiter(s) (100 mL/Hr) IV Continuous <Continuous>  dextrose 50% Injectable 25 Gram(s) IV Push once  dextrose 50% Injectable 12.5 Gram(s) IV Push once  dextrose 50% Injectable 25 Gram(s) IV Push once  furosemide    Tablet 40 milliGRAM(s) Oral daily  glucagon  Injectable 1 milliGRAM(s) IntraMuscular once  insulin lispro (ADMELOG) corrective regimen sliding scale   SubCutaneous three times a day before meals  insulin lispro (ADMELOG) corrective regimen sliding scale   SubCutaneous at bedtime  lactulose Syrup 10 Gram(s) Oral daily  melatonin 3 milliGRAM(s) Oral at bedtime  pantoprazole    Tablet 40 milliGRAM(s) Oral before breakfast  sacubitril 24 mG/valsartan 26 mG 1 Tablet(s) Oral two times a day  senna 2 Tablet(s) Oral at bedtime  sodium chloride 0.45%. 1000 milliLiter(s) (50 mL/Hr) IV Continuous <Continuous>    MEDICATIONS  (PRN):  ALBUTerol    90 MICROgram(s) HFA Inhaler 2 Puff(s) Inhalation every 6 hours PRN Shortness of Breath and/or Wheezing  bisacodyl Suppository 10 milliGRAM(s) Rectal daily PRN Constipation  nitroglycerin     SubLingual 0.4 milliGRAM(s) SubLingual every 5 minutes PRN Chest Pain    Vital Signs Last 24 Hrs  T(C): 37.2 (14 Oct 2021 15:50), Max: 37.2 (14 Oct 2021 15:50)  T(F): 98.9 (14 Oct 2021 15:50), Max: 98.9 (14 Oct 2021 15:50)  HR: 56 (14 Oct 2021 15:50) (52 - 59)  BP: 107/53 (14 Oct 2021 15:50) (104/86 - 120/76)  BP(mean): --  RR: 17 (14 Oct 2021 15:50) (17 - 18)  SpO2: 100% (14 Oct 2021 15:50) (100% - 100%)    I&O's Summary        Physical Exam:   GENERAL: NAD, well-groomed, well-developed  HEENT: MICHELLE/   Atraumatic, Normocephalic  ENMT: No tonsillar erythema, exudates, or enlargement; Moist mucous membranes, Good dentition, No lesions  NECK: Supple, No JVD, Normal thyroid  CHEST/LUNG: Clear to auscultaion  CVS: Regular rate and rhythm; No murmurs, rubs, or gallops  GI: : Soft, Nontender, Nondistended; Bowel sounds present  NERVOUS SYSTEM:  Alert & Oriented X31-2  EXTREMITIES:  2+ Peripheral Pulses, No clubbing, cyanosis, or edema  LYMPH: No lymphadenopathy noted  SKIN: No rashes or lesions  ENDOCRINOLOGY: No Thyromegaly  PSYCH: Confused    Labs:                              11.2   8.52  )-----------( 161      ( 14 Oct 2021 07:46 )             33.3                         11.6   8.97  )-----------( 181      ( 13 Oct 2021 17:06 )             34.0                         11.8   9.84  )-----------( 130      ( 13 Oct 2021 07:25 )             35.3                         12.7   10.60 )-----------( 153      ( 12 Oct 2021 06:52 )             37.5                         13.6   9.68  )-----------( 158      ( 11 Oct 2021 03:57 )             41.0     10-14    147<H>  |  110<H>  |  17  ----------------------------<  110<H>  3.4<L>   |  26  |  0.82  10-13    148<H>  |  112<H>  |  18  ----------------------------<  127<H>  3.9   |  25  |  0.90  10-12    149<H>  |  110<H>  |  20  ----------------------------<  115<H>  3.6   |  26  |  0.91  10-11    148<H>  |  112<H>  |  24<H>  ----------------------------<  117<H>  3.6   |  26  |  0.98    Ca    10.1      14 Oct 2021 07:46  Ca    10.2      13 Oct 2021 07:25  Phos  2.5     10-13  Mg     2.00     10-13    TPro  6.2  /  Alb  2.6<L>  /  TBili  2.7<H>  /  DBili  x   /  AST  43<H>  /  ALT  32  /  AlkPhos  123<H>  10-14  TPro  6.3  /  Alb  2.7<L>  /  TBili  2.9<H>  /  DBili  x   /  AST  44<H>  /  ALT  33  /  AlkPhos  127<H>  10-13  TPro  6.2  /  Alb  2.7<L>  /  TBili  3.3<H>  /  DBili  x   /  AST  41<H>  /  ALT  31  /  AlkPhos  121<H>  10-11    CAPILLARY BLOOD GLUCOSE      POCT Blood Glucose.: 96 mg/dL (14 Oct 2021 17:06)  POCT Blood Glucose.: 190 mg/dL (14 Oct 2021 11:48)  POCT Blood Glucose.: 136 mg/dL (14 Oct 2021 07:56)  POCT Blood Glucose.: 130 mg/dL (13 Oct 2021 22:48)      LIVER FUNCTIONS - ( 14 Oct 2021 07:46 )  Alb: 2.6 g/dL / Pro: 6.2 g/dL / ALK PHOS: 123 U/L / ALT: 32 U/L / AST: 43 U/L / GGT: x           PTT - ( 13 Oct 2021 07:25 )  PTT:53.6 sec          RECENT CULTURES:  10-07 @ 21:50 .Blood Blood         ra< from: US Duplex Venous Lower Ext Complete, Bilateral (10.14.21 @ 09:34) >  e right groin hematoma partially obscures the right common femoral vein limiting evaluation. Hematoma measures up to 5.4 cm.    RIGHT:  Posterior tibial vein is noncompressible, compatible with thrombus.    Normal compressibility of the RIGHT femoral and popliteal veins. The visualized portion of the right common femoral vein is patent.  Doppler examination shows normal spontaneousand phasic flow.      LEFT:  Normal compressibility of the LEFT common femoral, femoral and popliteal veins.  Doppler examination shows normal spontaneous and phasic flow.  No LEFT calf vein thrombosis is detected.    IMPRESSION:  RIGHT posterior tibial vein thrombus.    A large right groin hematoma limits evaluation of the right common femoral vein.    Acute deep venous thrombosis: below the knee.    --- End of Report ---              KALLIE PATEL MD; Attending Radiologist  This document has been electronically signed. Oct 14 2021 11:02AM    < end of copied text >         No Growth Final    10-07 @ 21:00 .Blood Blood                No Growth Final          RESPIRATORY CULTURES:          Studies  Chest X-RAY  CT SCAN Chest   Venous Dopplers: LE:   CT Abdomen  Others

## 2021-10-14 NOTE — PROGRESS NOTE ADULT - SUBJECTIVE AND OBJECTIVE BOX
Chief complaint    Patient is a 76y old  Male who presents with a chief complaint of PE/AMS (13 Oct 2021 17:50)   Review of systems  Patient in bed, appears comfortable.    Labs and Fingersticks  CAPILLARY BLOOD GLUCOSE      POCT Blood Glucose.: 136 mg/dL (14 Oct 2021 07:56)  POCT Blood Glucose.: 130 mg/dL (13 Oct 2021 22:48)  POCT Blood Glucose.: 89 mg/dL (13 Oct 2021 16:51)  POCT Blood Glucose.: 122 mg/dL (13 Oct 2021 11:13)      Anion Gap, Serum: 11 (10-14 @ 07:46)  Anion Gap, Serum: 11 (10-13 @ 07:25)      Calcium, Total Serum: 10.1 (10-14 @ 07:46)  Calcium, Total Serum: 10.2 (10-13 @ 07:25)  Albumin, Serum: 2.6 *L* (10-14 @ 07:46)  Albumin, Serum: 2.7 *L* (10-13 @ 07:25)    Alanine Aminotransferase (ALT/SGPT): 32 (10-14 @ 07:46)  Alanine Aminotransferase (ALT/SGPT): 33 (10-13 @ 07:25)  Alkaline Phosphatase, Serum: 123 *H* (10-14 @ 07:46)  Alkaline Phosphatase, Serum: 127 *H* (10-13 @ 07:25)  Aspartate Aminotransferase (AST/SGOT): 43 *H* (10-14 @ 07:46)  Aspartate Aminotransferase (AST/SGOT): 44 *H* (10-13 @ 07:25)        10-14    147<H>  |  110<H>  |  17  ----------------------------<  110<H>  3.4<L>   |  26  |  0.82    Ca    10.1      14 Oct 2021 07:46  Phos  2.5     10-13  Mg     2.00     10-13    TPro  6.2  /  Alb  2.6<L>  /  TBili  2.7<H>  /  DBili  x   /  AST  43<H>  /  ALT  32  /  AlkPhos  123<H>  10-14                        11.2   8.52  )-----------( 161      ( 14 Oct 2021 07:46 )             33.3     Medications  MEDICATIONS  (STANDING):  carvedilol 25 milliGRAM(s) Oral every 12 hours  cinacalcet 60 milliGRAM(s) Oral daily  dextrose 40% Gel 15 Gram(s) Oral once  dextrose 5%. 1000 milliLiter(s) (50 mL/Hr) IV Continuous <Continuous>  dextrose 5%. 1000 milliLiter(s) (100 mL/Hr) IV Continuous <Continuous>  dextrose 50% Injectable 25 Gram(s) IV Push once  dextrose 50% Injectable 12.5 Gram(s) IV Push once  dextrose 50% Injectable 25 Gram(s) IV Push once  furosemide    Tablet 40 milliGRAM(s) Oral daily  glucagon  Injectable 1 milliGRAM(s) IntraMuscular once  insulin lispro (ADMELOG) corrective regimen sliding scale   SubCutaneous three times a day before meals  insulin lispro (ADMELOG) corrective regimen sliding scale   SubCutaneous at bedtime  lactulose Syrup 10 Gram(s) Oral daily  melatonin 3 milliGRAM(s) Oral at bedtime  pantoprazole    Tablet 40 milliGRAM(s) Oral before breakfast  sacubitril 24 mG/valsartan 26 mG 1 Tablet(s) Oral two times a day  senna 2 Tablet(s) Oral at bedtime  sodium chloride 0.45%. 1000 milliLiter(s) (60 mL/Hr) IV Continuous <Continuous>      Physical Exam  General: Patient comfortable in bed  Vital Signs Last 12 Hrs  T(F): 98 (10-14-21 @ 06:34), Max: 98 (10-14-21 @ 06:34)  HR: 59 (10-14-21 @ 06:34) (52 - 59)  BP: 118/75 (10-14-21 @ 06:34) (109/68 - 120/76)  BP(mean): --  RR: 18 (10-14-21 @ 06:34) (18 - 18)  SpO2: 100% (10-14-21 @ 06:34) (100% - 100%)  Neck: No palpable thyroid nodules.  CVS: S1S2, No murmurs  Respiratory: No wheezing, no crepitations  GI: Abdomen soft, bowel sounds positive  Musculoskeletal:  edema lower extremities.     Diagnostics    NM Parathyroid Imaging w/Spect: Routine   Indication: Parathyroid adenoma  Transport: Walking  Provider's Contact #: [phone] (10-11 @ 08:08)  Vitamin D, 25-Hydroxy: AM Sched. Collection: 11-Oct-2021 06:00 (10-10 @ 10:41)  Vitamin D, 1, 25-Dihydroxy: AM Sched. Collection: 11-Oct-2021 06:00 (10-10 @ 10:41)  Parathyroid Hormone Intact w/o Calcium, Serum: AM Sched. Collection: 11-Oct-2021 06:00 (10-10 @ 10:41)  PTH Related Peptide: AM Sched. Collection: 11-Oct-2021 06:00 (10-10 @ 10:41)

## 2021-10-14 NOTE — DIETITIAN INITIAL EVALUATION ADULT. - ADD RECOMMEND
1. Encourage & assist Pt with meals; Monitor PO diet tolerance; Honor food preferences;           2. Add Multivitamins with minerals 1 tab daily for micronutrient coverage;         3. Monitor labs, hydration status;

## 2021-10-14 NOTE — PROGRESS NOTE ADULT - SUBJECTIVE AND OBJECTIVE BOX
[facility] [facility]   MD ZOHRA SCHAFFER PA    TEL:  OFFICE: [phone]  DR MITCHELL CELL: [phone]  DR. CONROY CELL: [phone]  SOLE SOTOMAYOR CELL: [phone]    From 5pm-7am Answering Service [phone]    -- RENAL FOLLOW UP NOTE ---Date of Service 10-14-21 @ 13:59    Patient is a 76y old  Male who presents with a chief complaint of PE/AMS (14 Oct 2021 13:25)      Patient seen and examined at bedside. No chest pain/sob    VITALS:  T(F): 97.4 (10-14-21 @ 11:53), Max: 98 (10-14-21 @ 06:34)  HR: 56 (10-14-21 @ 11:53)  BP: 104/86 (10-14-21 @ 11:53)  RR: 18 (10-14-21 @ 11:53)  SpO2: 100% (10-14-21 @ 11:53)  Wt(kg): --        PHYSICAL EXAM:  Constitutional: NAD  Neck: No JVD  Respiratory: CTAB, no wheezes, rales or rhonchi  Cardiovascular: S1, S2, RRR  Gastrointestinal: BS+, soft, NT/ND  Extremities: No peripheral edema    Hospital Medications:   MEDICATIONS  (STANDING):  carvedilol 25 milliGRAM(s) Oral every 12 hours  cinacalcet 60 milliGRAM(s) Oral daily  dextrose 40% Gel 15 Gram(s) Oral once  dextrose 5%. 1000 milliLiter(s) (50 mL/Hr) IV Continuous <Continuous>  dextrose 5%. 1000 milliLiter(s) (100 mL/Hr) IV Continuous <Continuous>  dextrose 50% Injectable 25 Gram(s) IV Push once  dextrose 50% Injectable 12.5 Gram(s) IV Push once  dextrose 50% Injectable 25 Gram(s) IV Push once  furosemide    Tablet 40 milliGRAM(s) Oral daily  glucagon  Injectable 1 milliGRAM(s) IntraMuscular once  insulin lispro (ADMELOG) corrective regimen sliding scale   SubCutaneous three times a day before meals  insulin lispro (ADMELOG) corrective regimen sliding scale   SubCutaneous at bedtime  lactulose Syrup 10 Gram(s) Oral daily  melatonin 3 milliGRAM(s) Oral at bedtime  pantoprazole    Tablet 40 milliGRAM(s) Oral before breakfast  potassium chloride   Powder 40 milliEquivalent(s) Oral every 4 hours  sacubitril 24 mG/valsartan 26 mG 1 Tablet(s) Oral two times a day  senna 2 Tablet(s) Oral at bedtime  sodium chloride 0.45%. 1000 milliLiter(s) (50 mL/Hr) IV Continuous <Continuous>      LABS:  10-14    147<H>  |  110<H>  |  17  ----------------------------<  110<H>  3.4<L>   |  26  |  0.82    Ca    10.1      14 Oct 2021 07:46  Phos  2.5     10-13  Mg     2.00     10-13    TPro  6.2  /  Alb  2.6<L>  /  TBili  2.7<H>  /  DBili      /  AST  43<H>  /  ALT  32  /  AlkPhos  123<H>  10-14    Creatinine Trend: 0.82 <--, 0.90 <--, 0.91 <--, 0.98 <--, 1.04 <--, 0.97 <--, 1.07 <--, 1.05 <--    Albumin, Serum: 2.6 g/dL (10-14 @ 07:46)                              11.2   8.52  )-----------( 161      ( 14 Oct 2021 07:46 )             33.3     Urine Studies:      PTH -- (Ca --)      [10-11-21 @ 03:57]   114  Vitamin D (25OH) 47.5      [10-11-21 @ 09:08]  HbA1c 6.5      [10-19-16 @ 08:19]  TSH 0.94      [10-11-21 @ 03:57]      Free Light Chains: kappa 2.28, lambda 3.37, ratio = 0.68      [10-11 @ 09:07]    RADIOLOGY & ADDITIONAL STUDIES:

## 2021-10-14 NOTE — SWALLOW BEDSIDE ASSESSMENT ADULT - SWALLOW EVAL: RECOMMENDED FEEDING/EATING TECHNIQUES
maintain upright posture during/after eating for 30 mins/oral hygiene/position upright (90 degrees)
allow for swallow between intakes/maintain upright posture during/after eating for 30 mins/oral hygiene/position upright (90 degrees)/small sips/bites

## 2021-10-14 NOTE — PROGRESS NOTE ADULT - ASSESSMENT
77 y/o m with worsening encephelopathy in setting of acute PE     Problem/Plan - 1:  ·  Problem: Pulmonary embolism.   ·  Plan: -possibly 2/2 to eliquis non-compliance in setting of know proximal right leg dvt  -heparin gtt and will change to NOAC.   -Pulmonary helping .      Problem/Plan - 2:  ·  Problem: Right Groin Hematoma with Retroperitoneal Bleed.   ·  Plan: -OFF AC . Monitoring HH . Vascular helping. Restarting AC once Vascular clears and HH is stable.   < from: CT Abdomen and Pelvis w/ IV Cont (10.13.21 @ 09:35) >  IMPRESSION:  Large right groin hematoma extending into the right retroperitoneum.    < end of copied text >     Problem/Plan - 3:  ·  Problem: DM (diabetes mellitus).   ·  Plan: ISS  Sugars fine.      Problem/Plan - 4:  ·  Problem: Chronic Systolic CHF (congestive heart failure).   ·  Plan: c/w sacubitril , lasix, coreg  Cardiology helping.       Problem/Plan - 5:  ·  Problem: Hypernatremia .   ·  Plan: resolved.      Problem/Plan - 5:  ·  Problem: Hyperbilirubinemia  .   ·  Plan: Trending LFT .  GI helping.    CT scan noted.       Problem/Plan - 5:  ·  Problem: Hyperparathyroidism with Hypercalcemia .   ·  Plan: Endo helping .     Problem/Plan - 6:  ·  Problem: Dementia with worsening sec to metabolic Encephalopathy   ·  Plan: -Exact cause not known.  Bilirubin high but Ammonia normal.  CT head noted.   Neurology  helping.    Disposition ; DC planning pending above  .      77 y/o m with worsening encephelopathy in setting of acute PE     Problem/Plan - 1:  ·  Problem: Pulmonary embolism with DVT .   ·  Plan: -possibly 2/2 to eliquis non-compliance in setting of know proximal right leg dvt  -AC on Hold . heparin gtt and will change to NOAC.   -Pulmonary helping .      Problem/Plan - 2:  ·  Problem: Right Groin Hematoma with Retroperitoneal Bleed.   ·  Plan: -OFF AC . Monitoring HH . Vascular helping. Restarting AC once Vascular clears and HH is stable.   < from: CT Abdomen and Pelvis w/ IV Cont (10.13.21 @ 09:35) >  IMPRESSION:  Large right groin hematoma extending into the right retroperitoneum.    < end of copied text >     Problem/Plan - 3:  ·  Problem: DM (diabetes mellitus).   ·  Plan: ISS  Sugars fine.      Problem/Plan - 4:  ·  Problem: Chronic Systolic CHF (congestive heart failure).   ·  Plan: c/w sacubitril , lasix, coreg  Cardiology helping.       Problem/Plan - 5:  ·  Problem: Hypernatremia .   ·  Plan: resolved.      Problem/Plan - 5:  ·  Problem: Hyperbilirubinemia  .   ·  Plan: Trending LFT .  GI helping.    CT scan noted.       Problem/Plan - 5:  ·  Problem: Hyperparathyroidism with Hypercalcemia .   ·  Plan: Endo helping .     Problem/Plan - 6:  ·  Problem: Dementia with worsening sec to metabolic Encephalopathy   ·  Plan: -Exact cause not known.  Bilirubin high but Ammonia normal.  CT head noted.   Neurology  helping.    Disposition ; DC planning pending above  .

## 2021-10-14 NOTE — PROGRESS NOTE ADULT - ASSESSMENT
77 yo M with a PMH of CAD s/p MI w/stents 2007, CHF with 20% LVEF in 2016, AICD,  HTN, HLD, DM, gout, CKD, COPD, PVD s/p aortobifemoral bypass, proximal right leg dvt per April 20th sono on eliquis present with confusion and sob found to have pe started on AC. nephrology consulted for hypernatremia and hypercalcemia    hypernatremia  slightly better s/p 1/2NS  will continue hydration again today  encourage po free water  continue lasix  f/u cardio  monitor at present  chf EF 20% on lasix 40mg po daily    hypercalcemia  ? etiology  likely primary hyperparathyroidism. started on sensipar 60 daily per endo  pending Pthrp  vit d 25 optimal  spep, sif, k/l  monitor at present  monitor for hypocalcemia.     htn  controlled  monitor    PE  now with new RP bleed AC on hold  DVT  f/u vascular

## 2021-10-14 NOTE — PROGRESS NOTE ADULT - SUBJECTIVE AND OBJECTIVE BOX
SUBJECTIVE:  No acute complaints. Denies R groin pain.    OBJECTIVE:  Vital Signs Last 24 Hrs  T(C): 36.7 (14 Oct 2021 06:34), Max: 37 (13 Oct 2021 11:57)  T(F): 98 (14 Oct 2021 06:34), Max: 98.6 (13 Oct 2021 11:57)  HR: 59 (14 Oct 2021 06:34) (50 - 60)  BP: 118/75 (14 Oct 2021 06:34) (98/51 - 128/78)  BP(mean): --  RR: 18 (14 Oct 2021 06:34) (18 - 18)  SpO2: 100% (14 Oct 2021 06:34) (100% - 100%)        Physical Examination:  GEN: NAD, resting quietly  PULM: symmetric chest rise bilaterally, no increased WOB  EXTR: right groin minimally tender, small hematoma palpated, not actively expanding, 4/5 R hip flexion, palpable femoral pulse      LABS:                        11.2   8.52  )-----------( 161      ( 14 Oct 2021 07:46 )             33.3       10-14    147<H>  |  110<H>  |  17  ----------------------------<  110<H>  3.4<L>   |  26  |  0.82    Ca    10.1      14 Oct 2021 07:46  Phos  2.5     10-13  Mg     2.00     10-13    TPro  6.2  /  Alb  2.6<L>  /  TBili  2.7<H>  /  DBili  x   /  AST  43<H>  /  ALT  32  /  AlkPhos  123<H>  10-14

## 2021-10-14 NOTE — DIETITIAN INITIAL EVALUATION ADULT. - BODY MASS INDEX
..Please place or confirm orders for upcoming lab appointment on 03.30.17    Thanks  Roro Molina   31.7

## 2021-10-14 NOTE — PROGRESS NOTE ADULT - SUBJECTIVE AND OBJECTIVE BOX
[facility]      Seen earlier today, and examined.  - Today, patient is without complaints.           *****MEDICATIONS: Current medication reviewed and documented.    MEDICATIONS  (STANDING):  carvedilol 25 milliGRAM(s) Oral every 12 hours  cinacalcet 60 milliGRAM(s) Oral daily  dextrose 40% Gel 15 Gram(s) Oral once  dextrose 5%. 1000 milliLiter(s) (50 mL/Hr) IV Continuous <Continuous>  dextrose 5%. 1000 milliLiter(s) (100 mL/Hr) IV Continuous <Continuous>  dextrose 50% Injectable 25 Gram(s) IV Push once  dextrose 50% Injectable 12.5 Gram(s) IV Push once  dextrose 50% Injectable 25 Gram(s) IV Push once  furosemide    Tablet 40 milliGRAM(s) Oral daily  glucagon  Injectable 1 milliGRAM(s) IntraMuscular once  insulin lispro (ADMELOG) corrective regimen sliding scale   SubCutaneous three times a day before meals  insulin lispro (ADMELOG) corrective regimen sliding scale   SubCutaneous at bedtime  lactulose Syrup 10 Gram(s) Oral daily  melatonin 3 milliGRAM(s) Oral at bedtime  pantoprazole    Tablet 40 milliGRAM(s) Oral before breakfast  sacubitril 24 mG/valsartan 26 mG 1 Tablet(s) Oral two times a day  senna 2 Tablet(s) Oral at bedtime  sodium chloride 0.45%. 1000 milliLiter(s) (50 mL/Hr) IV Continuous <Continuous>    MEDICATIONS  (PRN):  ALBUTerol    90 MICROgram(s) HFA Inhaler 2 Puff(s) Inhalation every 6 hours PRN Shortness of Breath and/or Wheezing  bisacodyl Suppository 10 milliGRAM(s) Rectal daily PRN Constipation  nitroglycerin     SubLingual 0.4 milliGRAM(s) SubLingual every 5 minutes PRN Chest Pain          ***** VITAL SIGNS:  T(F): 98.9 (10-14-21 @ 15:50), Max: 98.9 (10-14-21 @ 15:50)  HR: 56 (10-14-21 @ 15:50) (52 - 59)  BP: 107/53 (10-14-21 @ 15:50) (104/86 - 120/76)  RR: 17 (10-14-21 @ 15:50) (17 - 18)  SpO2: 100% (10-14-21 @ 15:50) (100% - 100%)  Wt(kg): --  ,   I&O's Summary           *****PHYSICAL EXAM:   alert oriented x 3 attention comprehension are fair.  Able to name, repeat.   EOmi fundi not visualized   no nystagmus VFF to confrontation  Tongue is midline  Palate elevates symmetrically   Moving all 4 ext spontaneously no drift appreciated    Gait not assessed.            *****LAB AND IMAGIN.2   8.52  )-----------( 161      ( 14 Oct 2021 07:46 )             33.3               10-14    147<H>  |  110<H>  |  17  ----------------------------<  110<H>  3.4<L>   |  26  |  0.82    Ca    10.1      14 Oct 2021 07:46  Phos  2.5     10-13  Mg     2.00     10-13    TPro  6.2  /  Alb  2.6<L>  /  TBili  2.7<H>  /  DBili  x   /  AST  43<H>  /  ALT  32  /  AlkPhos  123<H>  10-14    PTT - ( 13 Oct 2021 07:25 )  PTT:53.6 sec                     [All pertinent recent Imaging/Reports reviewed]           *****A S S E S S M E N T   A N D   P L A N :         77 yo M with a PMH of CAD s/p MI w/stents , CHF with 20% LVEF in 2016, AICD,  HTN, HLD, DM, gout, CKD, COPD, PVD s/p aortobifemoral bypass, proximal right leg dvt per  so  on eliquis. Pt lives alone,  speaks with sister  routinely (Gabriela [phone]). Per sister, had noticed increasing confusion over last 2weeks, but today was worst she had seen. Sister grew concerned, EMS called, noted to be sob. Pt per ed noted not taking any of his home meds.  CT angio chest with PE/pulm infarcts/RHF and strain, possible covid pna. Pt a/o 1, unable to provide history. Follow commands. HS trop 31, 32. ekg sinus tach, lafb. CT head with no acute findings    Of note, tbili>5 (as high as 10 April 2020) was supposed to be on lactulose per sister but not taking. Prior GI note mentioned possibility of liver biopsy, but appears not to have been performed. US abd from 2020 noteworthy only for slight hepatomegaly     as per prior records, pt was admitted in 2016 with similiar presentation.   pt unable to provide any information regarding his presentation        Problem/Recommendations 1: ams of unclear etiology   suspect multifactorial metabolic encephalopathy due to hypercalcemia, hypernatremia, elevated bili, poor po intake, worsening underlying cognitive impairement   ( seen in 2016 with similar presentation )  r/o infectious process    b12/folate/tsh/ wnl   thiamine 500 iv q 8   ct head no acute path  unable to get mr due to AICD   sleep wake cycle regulation with melatonin   correct metabolic derangements.   namenda 5 mg bid   10/14 some improvement in mental state     Problem/Recommendations 2: transaminitis   elevated bili low albumin   ? etiology   ammonia wnl       Problem/Recommendations 3: R retroperitoneal hematoma noted   vascular input appreciated   ac on hold     Thank you for allowing me to participate in the care of this patient. Will continue to follow patient periodically. Please do not hesitate to call me if you have any  questions or if there has been a change in patients neurological status     ________________  Mila Cazares MD  [facility]  [phone]      33 minutes spent on total encounter; more than 50 % of the visit was  spent counseling about plan of care, compliance to diet/exercise and medication regimen and or  coordinating care by the attending physician.      It is advised that stroke patients follow up with MCKAY Solorzano @ [phone] in 1- 2 weeks.   Others please follow up with Dr. Michael Nissenbaum [phone]

## 2021-10-14 NOTE — PROGRESS NOTE ADULT - SUBJECTIVE AND OBJECTIVE BOX
INTERVAL HPI/OVERNIGHT EVENTS:    no new gi events overnight   without abd pain   no rectal bleeding    MEDICATIONS  (STANDING):  carvedilol 25 milliGRAM(s) Oral every 12 hours  cinacalcet 60 milliGRAM(s) Oral daily  dextrose 40% Gel 15 Gram(s) Oral once  dextrose 5%. 1000 milliLiter(s) (50 mL/Hr) IV Continuous <Continuous>  dextrose 5%. 1000 milliLiter(s) (100 mL/Hr) IV Continuous <Continuous>  dextrose 50% Injectable 25 Gram(s) IV Push once  dextrose 50% Injectable 12.5 Gram(s) IV Push once  dextrose 50% Injectable 25 Gram(s) IV Push once  furosemide    Tablet 40 milliGRAM(s) Oral daily  glucagon  Injectable 1 milliGRAM(s) IntraMuscular once  insulin lispro (ADMELOG) corrective regimen sliding scale   SubCutaneous three times a day before meals  insulin lispro (ADMELOG) corrective regimen sliding scale   SubCutaneous at bedtime  lactulose Syrup 10 Gram(s) Oral daily  melatonin 3 milliGRAM(s) Oral at bedtime  pantoprazole    Tablet 40 milliGRAM(s) Oral before breakfast  potassium chloride   Powder 40 milliEquivalent(s) Oral every 4 hours  sacubitril 24 mG/valsartan 26 mG 1 Tablet(s) Oral two times a day  senna 2 Tablet(s) Oral at bedtime  sodium chloride 0.45%. 1000 milliLiter(s) (60 mL/Hr) IV Continuous <Continuous>    MEDICATIONS  (PRN):  ALBUTerol    90 MICROgram(s) HFA Inhaler 2 Puff(s) Inhalation every 6 hours PRN Shortness of Breath and/or Wheezing  bisacodyl Suppository 10 milliGRAM(s) Rectal daily PRN Constipation  nitroglycerin     SubLingual 0.4 milliGRAM(s) SubLingual every 5 minutes PRN Chest Pain      Allergies    No Known Drug Allergies  shellfish (Other; Urticaria)    Intolerances        Review of Systems:    General:  No wt loss, fevers, chills, night sweats, fatigue   Eyes:  Good vision, no reported pain  ENT:  No sore throat, pain, runny nose, dysphagia  CV:  No pain, palpitations, hypo/hypertension  Resp:  No dyspnea, cough, tachypnea, wheezing  GI:  No pain, No nausea, No vomiting, No diarrhea, No constipation, No weight loss, No fever, No pruritis, No rectal bleeding, No melena, No dysphagia  :  No pain, bleeding, incontinence, nocturia  Muscle:  No pain, weakness  Neuro:  No weakness, tingling, memory problems  Psych:  No fatigue, insomnia, mood problems, depression  Endocrine:  No polyuria, polydypsia, cold/heat intolerance  Heme:  No petechiae, ecchymosis, easy bruisability  Skin:  No rash, tattoos, scars, edema      Vital Signs Last 24 Hrs  T(C): 36.3 (14 Oct 2021 11:53), Max: 36.7 (14 Oct 2021 06:34)  T(F): 97.4 (14 Oct 2021 11:53), Max: 98 (14 Oct 2021 06:34)  HR: 56 (14 Oct 2021 11:53) (50 - 60)  BP: 104/86 (14 Oct 2021 11:53) (98/51 - 128/78)  BP(mean): --  RR: 18 (14 Oct 2021 11:53) (18 - 18)  SpO2: 100% (14 Oct 2021 11:53) (100% - 100%)    PHYSICAL EXAM:    Constitutional: NAD  HEENT: EOMI, throat clear  Neck: No LAD, supple  Respiratory: CTA and P  Cardiovascular: S1 and S2, RRR, no M  Gastrointestinal: BS+, soft, NT/ND, neg HSM,  Extremities: No peripheral edema, neg clubbing, cyanosis  Vascular: 2+ peripheral pulses  Neurological: A/O x 2, no focal deficits  Psychiatric: Normal mood, normal affect  Skin: No rashes      LABS:                        11.2   8.52  )-----------( 161      ( 14 Oct 2021 07:46 )             33.3     10-14    147<H>  |  110<H>  |  17  ----------------------------<  110<H>  3.4<L>   |  26  |  0.82    Ca    10.1      14 Oct 2021 07:46  Phos  2.5     10-13  Mg     2.00     10-13    TPro  6.2  /  Alb  2.6<L>  /  TBili  2.7<H>  /  DBili  x   /  AST  43<H>  /  ALT  32  /  AlkPhos  123<H>  10-14    PTT - ( 13 Oct 2021 07:25 )  PTT:53.6 sec      RADIOLOGY & ADDITIONAL TESTS:

## 2021-10-15 NOTE — PROGRESS NOTE ADULT - SUBJECTIVE AND OBJECTIVE BOX
Date of Service  : 10-15-21 @ 20:04    INTERVAL HPI/OVERNIGHT EVENTS:  Vital Signs Last 24 Hrs  T(C): 36.6 (15 Oct 2021 18:10), Max: 36.8 (14 Oct 2021 23:50)  T(F): 97.9 (15 Oct 2021 18:10), Max: 98.3 (14 Oct 2021 23:50)  HR: 57 (15 Oct 2021 18:10) (52 - 65)  BP: 84/44 (15 Oct 2021 18:10) (84/44 - 110/59)  BP(mean): --  RR: 17 (15 Oct 2021 18:10) (17 - 18)  SpO2: 97% (15 Oct 2021 18:10) (95% - 100%)  I&O's Summary    MEDICATIONS  (STANDING):  apixaban 10 milliGRAM(s) Oral every 12 hours  carvedilol 25 milliGRAM(s) Oral every 12 hours  cinacalcet 60 milliGRAM(s) Oral daily  dextrose 40% Gel 15 Gram(s) Oral once  dextrose 5%. 1000 milliLiter(s) (50 mL/Hr) IV Continuous <Continuous>  dextrose 5%. 1000 milliLiter(s) (100 mL/Hr) IV Continuous <Continuous>  dextrose 5%. 1000 milliLiter(s) (50 mL/Hr) IV Continuous <Continuous>  dextrose 50% Injectable 25 Gram(s) IV Push once  dextrose 50% Injectable 12.5 Gram(s) IV Push once  dextrose 50% Injectable 25 Gram(s) IV Push once  furosemide    Tablet 40 milliGRAM(s) Oral daily  glucagon  Injectable 1 milliGRAM(s) IntraMuscular once  insulin lispro (ADMELOG) corrective regimen sliding scale   SubCutaneous three times a day before meals  insulin lispro (ADMELOG) corrective regimen sliding scale   SubCutaneous at bedtime  lactulose Syrup 10 Gram(s) Oral daily  melatonin 3 milliGRAM(s) Oral at bedtime  memantine 5 milliGRAM(s) Oral at bedtime  pantoprazole    Tablet 40 milliGRAM(s) Oral before breakfast  potassium phosphate / sodium phosphate Powder (PHOS-NaK) 1 Packet(s) Oral once  sacubitril 24 mG/valsartan 26 mG 1 Tablet(s) Oral two times a day  senna 2 Tablet(s) Oral at bedtime    MEDICATIONS  (PRN):  ALBUTerol    90 MICROgram(s) HFA Inhaler 2 Puff(s) Inhalation every 6 hours PRN Shortness of Breath and/or Wheezing  bisacodyl Suppository 10 milliGRAM(s) Rectal daily PRN Constipation  nitroglycerin     SubLingual 0.4 milliGRAM(s) SubLingual every 5 minutes PRN Chest Pain    LABS:                        11.4   8.36  )-----------( 198      ( 15 Oct 2021 07:49 )             33.7     10-15    146<H>  |  109<H>  |  18  ----------------------------<  97  3.5   |  25  |  0.89    Ca    10.2      15 Oct 2021 07:49  Phos  2.1     10-15  Mg     1.90     10-15    TPro  6.2  /  Alb  2.6<L>  /  TBili  2.7<H>  /  DBili  x   /  AST  43<H>  /  ALT  32  /  AlkPhos  123<H>  10-14        CAPILLARY BLOOD GLUCOSE      POCT Blood Glucose.: 94 mg/dL (15 Oct 2021 16:29)  POCT Blood Glucose.: 167 mg/dL (15 Oct 2021 11:59)  POCT Blood Glucose.: 101 mg/dL (15 Oct 2021 08:14)  POCT Blood Glucose.: 131 mg/dL (14 Oct 2021 21:01)          REVIEW OF SYSTEMS:  CONSTITUTIONAL: No fever, weight loss, or fatigue  EYES: No eye pain, visual disturbances, or discharge  ENMT:  No difficulty hearing, tinnitus, vertigo; No sinus or throat pain  NECK: No pain or stiffness  RESPIRATORY: No cough, wheezing, chills or hemoptysis; No shortness of breath  CARDIOVASCULAR: No chest pain, palpitations, dizziness, or leg swelling  GASTROINTESTINAL: No abdominal or epigastric pain. No nausea, vomiting, or hematemesis; No diarrhea or constipation. No melena or hematochezia.  GENITOURINARY: No dysuria, frequency, hematuria, or incontinence  NEUROLOGICAL: No headaches, memory loss, loss of strength, numbness, or tremors  SKIN: No itching, burning, rashes, or lesions   ENDOCRINE: No heat or cold intolerance; No hair loss  MUSCULOSKELETAL: No joint pain or swelling; No muscle, back, or extremity pain  PSYCHIATRIC: No depression, anxiety, mood swings, or difficulty sleeping  HEME/LYMPH: No easy bruising, or bleeding gums  ALLERY AND IMMUNOLOGIC: No hives or eczema    RADIOLOGY & ADDITIONAL TESTS:    Consultant(s) Notes Reviewed:  [x ] YES  [ ] NO    PHYSICAL EXAM:  GENERAL: NAD, well-groomed, well-developed,not in any distress ,  HEAD:  Atraumatic, Normocephalic  EYES: EOMI, PERRLA, conjunctiva and sclera clear  ENMT: No tonsillar erythema, exudates, or enlargement; Moist mucous membranes, Good dentition, No lesions  NECK: Supple, No JVD, Normal thyroid  NERVOUS SYSTEM:  Alert & Oriented X1, No focal deficit   CHEST/LUNG: Good air entry bilateral with no  rales, rhonchi, wheezing, or rubs  HEART: Regular rate and rhythm; No murmurs, rubs, or gallops  ABDOMEN: Soft, Nontender, Nondistended; Bowel sounds present  EXTREMITIES:  2+ Peripheral Pulses, No clubbing, cyanosis, or edema  SKIN: No rashes or lesions    Care Discussed with Consultants/Other Providers [ x] YES  [ ] NO

## 2021-10-15 NOTE — PROGRESS NOTE ADULT - ASSESSMENT
75 y/o m with worsening encephelopathy in setting of acute PE     Problem/Plan - 1:  ·  Problem: Pulmonary embolism with DVT .   ·  Plan: -possibly 2/2 to eliquis non-compliance in setting of know proximal right leg dvt  -AC Eliquis started per vascular recommendations.   -Pulmonary helping .      Problem/Plan - 2:  ·  Problem: Right Groin Hematoma with Retroperitoneal Bleed.   ·  Plan: -OFF AC . Monitoring HH . Vascular helping. Restarting AC as  Vascular cleared  and HH is stable.   < from: CT Abdomen and Pelvis w/ IV Cont (10.13.21 @ 09:35) >  IMPRESSION:  Large right groin hematoma extending into the right retroperitoneum.    < end of copied text >     Problem/Plan - 3:  ·  Problem: DM (diabetes mellitus).   ·  Plan: ISS  Sugars fine.      Problem/Plan - 4:  ·  Problem: Chronic Systolic CHF (congestive heart failure).   ·  Plan: c/w sacubitril , lasix, coreg  Cardiology helping.       Problem/Plan - 5:  ·  Problem: Hypernatremia .   ·  Plan: resolved.      Problem/Plan - 5:  ·  Problem: Hyperbilirubinemia  .   ·  Plan: Trending LFT .  GI helping.    CT scan noted.       Problem/Plan - 5:  ·  Problem: Hyperparathyroidism with Hypercalcemia .   ·  Plan: Endo helping .     Problem/Plan - 6:  ·  Problem: Dementia with worsening sec to metabolic Encephalopathy   ·  Plan: -Exact cause not known.  Bilirubin high but Ammonia normal.  CT head noted.   Neurology  helping.    Disposition ; DC planning pending placement .

## 2021-10-15 NOTE — PROGRESS NOTE ADULT - SUBJECTIVE AND OBJECTIVE BOX
Date of Service: 10-15-21 @ 13:17    Patient is a 76y old  Male who presents with a chief complaint of PE/AMS (15 Oct 2021 13:07)    Any change in ROS:   Breathing comfortably on RA  No acute distress  Denies Cp, SOB    MEDICATIONS  (STANDING):  apixaban 10 milliGRAM(s) Oral every 12 hours  carvedilol 25 milliGRAM(s) Oral every 12 hours  cinacalcet 60 milliGRAM(s) Oral daily  dextrose 40% Gel 15 Gram(s) Oral once  dextrose 5%. 1000 milliLiter(s) (50 mL/Hr) IV Continuous <Continuous>  dextrose 5%. 1000 milliLiter(s) (100 mL/Hr) IV Continuous <Continuous>  dextrose 5%. 1000 milliLiter(s) (50 mL/Hr) IV Continuous <Continuous>  dextrose 50% Injectable 25 Gram(s) IV Push once  dextrose 50% Injectable 12.5 Gram(s) IV Push once  dextrose 50% Injectable 25 Gram(s) IV Push once  furosemide    Tablet 40 milliGRAM(s) Oral daily  glucagon  Injectable 1 milliGRAM(s) IntraMuscular once  insulin lispro (ADMELOG) corrective regimen sliding scale   SubCutaneous three times a day before meals  insulin lispro (ADMELOG) corrective regimen sliding scale   SubCutaneous at bedtime  lactulose Syrup 10 Gram(s) Oral daily  melatonin 3 milliGRAM(s) Oral at bedtime  memantine 5 milliGRAM(s) Oral at bedtime  pantoprazole    Tablet 40 milliGRAM(s) Oral before breakfast  potassium phosphate / sodium phosphate Powder (PHOS-NaK) 1 Packet(s) Oral once  sacubitril 24 mG/valsartan 26 mG 1 Tablet(s) Oral two times a day  senna 2 Tablet(s) Oral at bedtime    MEDICATIONS  (PRN):  ALBUTerol    90 MICROgram(s) HFA Inhaler 2 Puff(s) Inhalation every 6 hours PRN Shortness of Breath and/or Wheezing  bisacodyl Suppository 10 milliGRAM(s) Rectal daily PRN Constipation  nitroglycerin     SubLingual 0.4 milliGRAM(s) SubLingual every 5 minutes PRN Chest Pain    Vital Signs Last 24 Hrs  T(C): 36.6 (15 Oct 2021 10:10), Max: 37.2 (14 Oct 2021 15:50)  T(F): 97.8 (15 Oct 2021 10:10), Max: 98.9 (14 Oct 2021 15:50)  HR: 57 (15 Oct 2021 10:10) (56 - 65)  BP: 95/58 (15 Oct 2021 10:10) (95/58 - 110/59)  BP(mean): --  RR: 18 (15 Oct 2021 10:10) (17 - 18)  SpO2: 95% (15 Oct 2021 10:10) (95% - 100%)    Physical Exam:   GENERAL: NAD  HEENT: MICHELLE  ENMT: No tonsillar erythema, exudates, or enlargement  NECK: Supple, No JVD  CHEST/LUNG: Clear to auscultation b/l  CVS: Regular rate and rhythm  GI: : Soft, Nontender, Nondistended  NERVOUS SYSTEM:  Alert & Oriented X2  EXTREMITIES:  2+ Peripheral Pulses, +R groin hematoma    Labs:               11.4   8.36  )-----------( 198      ( 15 Oct 2021 07:49 )             33.7                         11.2   8.52  )-----------( 161      ( 14 Oct 2021 07:46 )             33.3                         11.6   8.97  )-----------( 181      ( 13 Oct 2021 17:06 )             34.0                         11.8   9.84  )-----------( 130      ( 13 Oct 2021 07:25 )             35.3                         12.7   10.60 )-----------( 153      ( 12 Oct 2021 06:52 )             37.5     10-15    146<H>  |  109<H>  |  18  ----------------------------<  97  3.5   |  25  |  0.89  10-14    147<H>  |  110<H>  |  17  ----------------------------<  110<H>  3.4<L>   |  26  |  0.82  10-13    148<H>  |  112<H>  |  18  ----------------------------<  127<H>  3.9   |  25  |  0.90  10-12    149<H>  |  110<H>  |  20  ----------------------------<  115<H>  3.6   |  26  |  0.91    Ca    10.2      15 Oct 2021 07:49  Ca    10.1      14 Oct 2021 07:46  Phos  2.1     10-15  Mg     1.90     10-15    TPro  6.2  /  Alb  2.6<L>  /  TBili  2.7<H>  /  DBili  x   /  AST  43<H>  /  ALT  32  /  AlkPhos  123<H>  10-14  TPro  6.3  /  Alb  2.7<L>  /  TBili  2.9<H>  /  DBili  x   /  AST  44<H>  /  ALT  33  /  AlkPhos  127<H>  10-13    CAPILLARY BLOOD GLUCOSE  POCT Blood Glucose.: 167 mg/dL (15 Oct 2021 11:59)  POCT Blood Glucose.: 101 mg/dL (15 Oct 2021 08:14)  POCT Blood Glucose.: 131 mg/dL (14 Oct 2021 21:01)  POCT Blood Glucose.: 96 mg/dL (14 Oct 2021 17:06)    LIVER FUNCTIONS - ( 14 Oct 2021 07:46 )  Alb: 2.6 g/dL / Pro: 6.2 g/dL / ALK PHOS: 123 U/L / ALT: 32 U/L / AST: 43 U/L / GGT: x           Studies  Chest X-RAY   CT SCAN Chest < from: CT Angio Chest PE Protocol w/ IV Cont (10.07.21 @ 21:03) >  FINDINGS:    LUNGS AND AIRWAYS:  Patchy bilateral lung opacities,, most prominent in the right middle lobe and right lower lobe. Additional peripheral wedge-shaped opacities in the lingula (2, 54) contain central cavitations. Findings likely reflect pulmonary infarctions; associated Covid-related pneumonia with thrombus/infarctions are not excluded.    PLEURA: Small bilateral right greater than left pleural effusions.  MEDIASTINUM AND MIKAL: A few subcentimeter mediastinal lymph nodes.  VESSELS: Pulmonary embolus in the main right pulmonary artery extending into the segmental branches of branches of the right lower lobe (2, 77), the right middle lobe (2, 54), and the right upper lobe (2, 34). Clear left pulmonary artery  HEART: Straightening of the intraventricular septum and reflux of contrast into the suprahepatic IVC.. No pericardial effusion.  CHEST WALL AND LOWER NECK: Within normal limits.  VISUALIZED UPPER ABDOMEN: Within normal limits.  BONES: Degenerative changes of the spine.    IMPRESSION:    Acute pulmonary emboli in the main right pulmonary artery extending into the segmental branches of the right right upper, middle, and lower lobes. Pulmonary infarction in the right middle and upper lobes, as well as the lingula. Associated Covid 19 is not excluded and testing is recommended.    CT evidence of right heart strain/failure with straightening of the intraventricular septum and reflux of contrast into the suprahepatic IVC. Recommend echocardiogram.    These findings were discussed with Dr. CASTILLO [phone] at 10/7/2021 9:06 PM by Dr. Cohen with read back confirmation.    --- End of Report ---        < end of copied text >    Venous Dopplers: LE: < from: US Duplex Venous Lower Ext Complete, Bilateral (10.14.21 @ 09:34) >  FINDINGS:    A large right groin hematoma partially obscures the right common femoral vein limiting evaluation. Hematoma measures up to 5.4 cm.    RIGHT:  Posterior tibial vein is noncompressible, compatible with thrombus.    Normal compressibility of the RIGHT femoral and popliteal veins. The visualized portion of the right common femoral vein is patent.  Doppler examination shows normal spontaneousand phasic flow.      LEFT:  Normal compressibility of the LEFT common femoral, femoral and popliteal veins.  Doppler examination shows normal spontaneous and phasic flow.  No LEFT calf vein thrombosis is detected.    IMPRESSION:  RIGHT posterior tibial vein thrombus.    A large right groin hematoma limits evaluation of the right common femoral vein.    Acute deep venous thrombosis: below the knee.      < end of copied text >    CT Abdomen< from: CT Abdomen and Pelvis w/ IV Cont (10.13.21 @ 09:35) >  FINDINGS:  LOWER CHEST: Redemonstrated partially imaged bilateral pulmonary emboli with bilateral pulmonary Infarcts. Unchanged trace bilateral pleural effusions. Heart is enlarged with partially imaged cardiac device leads.    LIVER: Within normal limits.  BILE DUCTS: Normal caliber.  GALLBLADDER: Within normal limits.  SPLEEN: A 1.7 cm splenic lesion, likely a hemangioma or hamartoma. PANCREAS: Within normal limits.  ADRENALS: Mildleft adrenal gland thickening.  KIDNEYS/URETERS: Left renal cysts. No hydronephrosis.    BLADDER: Within normal limits.  REPRODUCTIVE ORGANS: Prostate is enlarged.    BOWEL: No bowel obstruction. Appendix is normal. Colonic diverticula.  PERITONEUM: No ascites.  VESSELS: Atherosclerotic changes. 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  LYMPH NODES: No lymphadenopathy.  ABDOMINAL WALL/RETROPERITONEUM: Right groin multilobulated hematoma with the largest component measuring 10.4 x 6.7 x 10.6 cm Hematoma tracks cranially along the iliopsoas muscle into the retroperitoneum.  BONES: Degenerative changes.    IMPRESSION:  Large right groin hematoma extending into the right retroperitoneum.      < end of copied text >    < from: Transthoracic Echocardiogram (10.08.21 @ 11:41) >  DIMENSIONS:  Dimensions:     Normal Values:  LA:     4.5 cm    2.0 - 4.0 cm  Ao:     3.1 cm    2.0 - 3.8 cm  SEPTUM: 0.6 cm    0.6 - 1.2 cm  PWT:    0.7 cm    0.6 - 1.1 cm  LVIDd:  6.3 cm    3.0 - 5.6 cm  LVIDs:  5.6 cm    1.8 - 4.0 cm  Derived Variables:  LVMI: 73 g/m2  RWT: 0.22  Fractional short: 11 %  Ejection Fraction (Teicholtz): 24 %  ------------------------------------------------------------------------  OBSERVATIONS:  Mitral Valve: Mitral annular calcification, otherwise  normal mitral valve. Mild-moderate mitral regurgitation.  Aortic Root: Normal aortic root.  Aortic Valve: Calcified trileaflet aortic valve with normal  opening. Mild-moderate aortic regurgitation.  Left Atrium: Mildly dilated left atrium.  LA volume index =  36 cc/m2.  Left Ventricle: Severe global left ventricular systolic  dysfunction. Mild left ventricular enlargement.  Right Heart: Normal rightatrium. Unable to accurately  evaluate right ventricular size or systolic function.  A  device wire is noted in the right heart. Normal tricuspid  valve.  Mild-moderate tricuspid regurgitation. Pulmonic  valve not well visualized.  Pericardium/PleuraNormal pericardium with no pericardial  effusion.  Hemodynamic: Inadequate tricuspid regurgitation Doppler  envelope precludes estimation of RVSP.  ------------------------------------------------------------------------  CONCLUSIONS:  1. Mitral annular calcification, otherwise normal mitral  valve. Mild-moderate mitral regurgitation.  2. Calcified trileaflet aortic valve with normal opening.  Mild-moderate aortic regurgitation.  3. Mildly dilated left atrium.  LA volume index = 36 cc/m2.  4. Mildleft ventricular enlargement.  5. Severe global left ventricular systolic dysfunction.  6. Unable to accurately evaluate right ventricular size or  systolic function.  A device wire is noted in the right  heart.  7. Inadequate tricuspid regurgitationDoppler envelope  precludes estimation of RVSP.  *** No previous Echo exam.    < end of copied text >

## 2021-10-15 NOTE — PROGRESS NOTE ADULT - ASSESSMENT
77yo M with h/o CAD s/p MI w/stents 2007, CHF with 20% LVEF in 2016, AICD, HTN, HLD, DM, gout, CKD, COPD, PVD s/p aortobifemoral bypass, proximal right leg dvt (April 20th) on Eliquis presenting with SOB/AMS. GI consulted for increased liver enzymes     Increased Liver Enzymes   Favor 2/2 Congestive Hepatopathy d/t RHF   US Abd w/dilated IVC and hepatic vein; No cirrhosis noted   diurese per cardiology recs/appreciated   cont home lactulose daily   will follow     Abdominal Pain   improved  CT with RP Bleed  a/c and antiplt recs per vasc sx; input appreciated    CHF  severe LV dysfxn; has AICD  care per cardiology     I reviewed the overnight course of events on the unit, re-confirming the patient history. I discussed the care with the patient and their family. The plan of care was discussed with the physician assistant and modifications were made to the notation where appropriate. Differential diagnosis and plan of care discussed with patient after the evaluation. Advanced care planning was discussed with patient and family.  Advanced care planning forms were reviewed and discussed.  Risks, benefits and alternatives of gastroenterologic procedures were discussed in detail and all questions were answered. 35 minutes spent on total encounter of which more than fifty percent of the encounter was spent counseling and/or coordinating care by the attending physician.

## 2021-10-15 NOTE — PROGRESS NOTE ADULT - ASSESSMENT
75 yo M with a PMH of CAD s/p MI w/stents 2007, CHF with 20% LVEF in 2016, AICD,  HTN, HLD, DM, gout, CKD, COPD, PVD s/p aortobifemoral bypass, proximal right leg dvt per April 20th sono on eliquis present with confusion and sob found to have pe started on AC. nephrology consulted for hypernatremia and hypercalcemia    Hypernatremia  encourage po free water  Sodium improving  Recommend D5W at 50cc hr for 1 L today  monitor NA  at present  chf EF 20% on lasix 40mg po daily, Follow up Cardiology    Hypercalcemia  likely primary hyperparathyroidism. On  sensipar 60 daily per endo  pending Pthrp  vit d 25 optimal  spep, sif, k/l  monitor at present    HTN  controlled  monitor BP      HYpophosphatemia  REpelte K phos today  MOnitor serum PO4     PE/DVT   RP bleed AC on hold  DVT  f/u vascular

## 2021-10-15 NOTE — PROGRESS NOTE ADULT - SUBJECTIVE AND OBJECTIVE BOX
INTERVAL HPI/OVERNIGHT EVENTS:    events noted; imaging reviewed  no rectal bleeding  appears comfortable and without abd pain    MEDICATIONS  (STANDING):  carvedilol 25 milliGRAM(s) Oral every 12 hours  cinacalcet 60 milliGRAM(s) Oral daily  dextrose 40% Gel 15 Gram(s) Oral once  dextrose 5%. 1000 milliLiter(s) (50 mL/Hr) IV Continuous <Continuous>  dextrose 5%. 1000 milliLiter(s) (100 mL/Hr) IV Continuous <Continuous>  dextrose 5%. 1000 milliLiter(s) (50 mL/Hr) IV Continuous <Continuous>  dextrose 50% Injectable 25 Gram(s) IV Push once  dextrose 50% Injectable 12.5 Gram(s) IV Push once  dextrose 50% Injectable 25 Gram(s) IV Push once  furosemide    Tablet 40 milliGRAM(s) Oral daily  glucagon  Injectable 1 milliGRAM(s) IntraMuscular once  insulin lispro (ADMELOG) corrective regimen sliding scale   SubCutaneous three times a day before meals  insulin lispro (ADMELOG) corrective regimen sliding scale   SubCutaneous at bedtime  lactulose Syrup 10 Gram(s) Oral daily  melatonin 3 milliGRAM(s) Oral at bedtime  memantine 5 milliGRAM(s) Oral at bedtime  pantoprazole    Tablet 40 milliGRAM(s) Oral before breakfast  potassium phosphate IVPB 15 milliMole(s) IV Intermittent once  sacubitril 24 mG/valsartan 26 mG 1 Tablet(s) Oral two times a day  senna 2 Tablet(s) Oral at bedtime    MEDICATIONS  (PRN):  ALBUTerol    90 MICROgram(s) HFA Inhaler 2 Puff(s) Inhalation every 6 hours PRN Shortness of Breath and/or Wheezing  bisacodyl Suppository 10 milliGRAM(s) Rectal daily PRN Constipation  nitroglycerin     SubLingual 0.4 milliGRAM(s) SubLingual every 5 minutes PRN Chest Pain      Allergies    No Known Drug Allergies  shellfish (Other; Urticaria)    Intolerances        Review of Systems:    General:  No wt loss, fevers, chills, night sweats, fatigue   Eyes:  Good vision, no reported pain  ENT:  No sore throat, pain, runny nose, dysphagia  CV:  No pain, palpitations, hypo/hypertension  Resp:  No dyspnea, cough, tachypnea, wheezing  GI:  No pain, No nausea, No vomiting, No diarrhea, No constipation, No weight loss, No fever, No pruritis, No rectal bleeding, No melena, No dysphagia  :  No pain, bleeding, incontinence, nocturia  Muscle:  No pain, weakness  Neuro:  No weakness, tingling, memory problems  Psych:  No fatigue, insomnia, mood problems, depression  Endocrine:  No polyuria, polydypsia, cold/heat intolerance  Heme:  No petechiae, ecchymosis, easy bruisability  Skin:  No rash, tattoos, scars, edema      Vital Signs Last 24 Hrs  T(C): 36.8 (15 Oct 2021 06:10), Max: 37.2 (14 Oct 2021 15:50)  T(F): 98.2 (15 Oct 2021 06:10), Max: 98.9 (14 Oct 2021 15:50)  HR: 65 (15 Oct 2021 06:10) (56 - 65)  BP: 106/70 (15 Oct 2021 06:10) (104/86 - 110/59)  BP(mean): --  RR: 18 (15 Oct 2021 06:10) (17 - 18)  SpO2: 99% (15 Oct 2021 06:10) (99% - 100%)    PHYSICAL EXAM:    Constitutional: NAD  HEENT: EOMI, throat clear  Neck: No LAD, supple  Respiratory: CTA and P  Cardiovascular: S1 and S2, RRR, no M  Gastrointestinal: BS+, soft, NT/ND, neg HSM,  Extremities: No peripheral edema, neg clubbing, cyanosis  Vascular: 2+ peripheral pulses  Neurological: A/O x 2, no focal deficits  Psychiatric: Normal mood, normal affect  Skin: No rashes      LABS:                        11.4   8.36  )-----------( 198      ( 15 Oct 2021 07:49 )             33.7     10-15    146<H>  |  109<H>  |  18  ----------------------------<  97  3.5   |  25  |  0.89    Ca    10.2      15 Oct 2021 07:49  Phos  2.1     10-15  Mg     1.90     10-15    TPro  6.2  /  Alb  2.6<L>  /  TBili  2.7<H>  /  DBili  x   /  AST  43<H>  /  ALT  32  /  AlkPhos  123<H>  10-14          RADIOLOGY & ADDITIONAL TESTS:   INTERVAL HPI/OVERNIGHT EVENTS:    events noted; imaging reviewed  mentating well!   no rectal bleeding, no abd pain or n/v      MEDICATIONS  (STANDING):  carvedilol 25 milliGRAM(s) Oral every 12 hours  cinacalcet 60 milliGRAM(s) Oral daily  dextrose 40% Gel 15 Gram(s) Oral once  dextrose 5%. 1000 milliLiter(s) (50 mL/Hr) IV Continuous <Continuous>  dextrose 5%. 1000 milliLiter(s) (100 mL/Hr) IV Continuous <Continuous>  dextrose 5%. 1000 milliLiter(s) (50 mL/Hr) IV Continuous <Continuous>  dextrose 50% Injectable 25 Gram(s) IV Push once  dextrose 50% Injectable 12.5 Gram(s) IV Push once  dextrose 50% Injectable 25 Gram(s) IV Push once  furosemide    Tablet 40 milliGRAM(s) Oral daily  glucagon  Injectable 1 milliGRAM(s) IntraMuscular once  insulin lispro (ADMELOG) corrective regimen sliding scale   SubCutaneous three times a day before meals  insulin lispro (ADMELOG) corrective regimen sliding scale   SubCutaneous at bedtime  lactulose Syrup 10 Gram(s) Oral daily  melatonin 3 milliGRAM(s) Oral at bedtime  memantine 5 milliGRAM(s) Oral at bedtime  pantoprazole    Tablet 40 milliGRAM(s) Oral before breakfast  potassium phosphate IVPB 15 milliMole(s) IV Intermittent once  sacubitril 24 mG/valsartan 26 mG 1 Tablet(s) Oral two times a day  senna 2 Tablet(s) Oral at bedtime    MEDICATIONS  (PRN):  ALBUTerol    90 MICROgram(s) HFA Inhaler 2 Puff(s) Inhalation every 6 hours PRN Shortness of Breath and/or Wheezing  bisacodyl Suppository 10 milliGRAM(s) Rectal daily PRN Constipation  nitroglycerin     SubLingual 0.4 milliGRAM(s) SubLingual every 5 minutes PRN Chest Pain      Allergies    No Known Drug Allergies  shellfish (Other; Urticaria)    Intolerances        Review of Systems:    General:  No wt loss, fevers, chills, night sweats, fatigue   Eyes:  Good vision, no reported pain  ENT:  No sore throat, pain, runny nose, dysphagia  CV:  No pain, palpitations, hypo/hypertension  Resp:  No dyspnea, cough, tachypnea, wheezing  GI:  No pain, No nausea, No vomiting, No diarrhea, No constipation, No weight loss, No fever, No pruritis, No rectal bleeding, No melena, No dysphagia  :  No pain, bleeding, incontinence, nocturia  Muscle:  No pain, weakness  Neuro:  No weakness, tingling, memory problems  Psych:  No fatigue, insomnia, mood problems, depression  Endocrine:  No polyuria, polydypsia, cold/heat intolerance  Heme:  No petechiae, ecchymosis, easy bruisability  Skin:  No rash, tattoos, scars, edema      Vital Signs Last 24 Hrs  T(C): 36.8 (15 Oct 2021 06:10), Max: 37.2 (14 Oct 2021 15:50)  T(F): 98.2 (15 Oct 2021 06:10), Max: 98.9 (14 Oct 2021 15:50)  HR: 65 (15 Oct 2021 06:10) (56 - 65)  BP: 106/70 (15 Oct 2021 06:10) (104/86 - 110/59)  BP(mean): --  RR: 18 (15 Oct 2021 06:10) (17 - 18)  SpO2: 99% (15 Oct 2021 06:10) (99% - 100%)    PHYSICAL EXAM:    Constitutional: NAD  HEENT: EOMI, throat clear  Neck: No LAD, supple  Respiratory: CTA and P  Cardiovascular: S1 and S2, RRR, no M  Gastrointestinal: BS+, soft, NT/ND, neg HSM,  Extremities: No peripheral edema, neg clubbing, cyanosis  Vascular: 2+ peripheral pulses  Neurological: A/O x 2, no focal deficits  Psychiatric: Normal mood, normal affect  Skin: No rashes      LABS:                        11.4   8.36  )-----------( 198      ( 15 Oct 2021 07:49 )             33.7     10-15    146<H>  |  109<H>  |  18  ----------------------------<  97  3.5   |  25  |  0.89    Ca    10.2      15 Oct 2021 07:49  Phos  2.1     10-15  Mg     1.90     10-15    TPro  6.2  /  Alb  2.6<L>  /  TBili  2.7<H>  /  DBili  x   /  AST  43<H>  /  ALT  32  /  AlkPhos  123<H>  10-14          RADIOLOGY & ADDITIONAL TESTS:

## 2021-10-15 NOTE — PROGRESS NOTE ADULT - SUBJECTIVE AND OBJECTIVE BOX
[facility] [place] [facility]   MD ZOHRA SCHAFFER MD RUORU WONG, PA    TEL:  OFFICE: [phone]  DR MITCHELL CELL: [phone]  TITI SOTOMAYOR CELL: [phone]  DR. CONROY CELL: [phone]      FROM 5 PM - 7 AM PLEASE CALL ANSWERING SERVICE: [phone]    RENAL FOLLOW UP NOTE--Date of Service 10-15-21 @ 09:00  --------------------------------------------------------------------------------  HPI:      Pt seen and examined at bedside.   sitting up having breakfast with aid help    PAST HISTORY  --------------------------------------------------------------------------------  No significant changes to PMH, PSH, FHx, SHx, unless otherwise noted    ALLERGIES & MEDICATIONS  --------------------------------------------------------------------------------  Allergies    No Known Drug Allergies  shellfish (Other; Urticaria)    Intolerances      Standing Inpatient Medications  carvedilol 25 milliGRAM(s) Oral every 12 hours  cinacalcet 60 milliGRAM(s) Oral daily  dextrose 40% Gel 15 Gram(s) Oral once  dextrose 5%. 1000 milliLiter(s) IV Continuous <Continuous>  dextrose 5%. 1000 milliLiter(s) IV Continuous <Continuous>  dextrose 5%. 1000 milliLiter(s) IV Continuous <Continuous>  dextrose 50% Injectable 25 Gram(s) IV Push once  dextrose 50% Injectable 12.5 Gram(s) IV Push once  dextrose 50% Injectable 25 Gram(s) IV Push once  furosemide    Tablet 40 milliGRAM(s) Oral daily  glucagon  Injectable 1 milliGRAM(s) IntraMuscular once  insulin lispro (ADMELOG) corrective regimen sliding scale   SubCutaneous three times a day before meals  insulin lispro (ADMELOG) corrective regimen sliding scale   SubCutaneous at bedtime  lactulose Syrup 10 Gram(s) Oral daily  melatonin 3 milliGRAM(s) Oral at bedtime  memantine 5 milliGRAM(s) Oral at bedtime  pantoprazole    Tablet 40 milliGRAM(s) Oral before breakfast  potassium phosphate IVPB 15 milliMole(s) IV Intermittent once  sacubitril 24 mG/valsartan 26 mG 1 Tablet(s) Oral two times a day  senna 2 Tablet(s) Oral at bedtime    PRN Inpatient Medications  ALBUTerol    90 MICROgram(s) HFA Inhaler 2 Puff(s) Inhalation every 6 hours PRN  bisacodyl Suppository 10 milliGRAM(s) Rectal daily PRN  nitroglycerin     SubLingual 0.4 milliGRAM(s) SubLingual every 5 minutes PRN      REVIEW OF SYSTEMS  --------------------------------------------------------------------------------  General: no fever  MSK: no edema     VITALS/PHYSICAL EXAM  --------------------------------------------------------------------------------  T(C): 36.8 (10-15-21 @ 06:10), Max: 37.2 (10-14-21 @ 15:50)  HR: 65 (10-15-21 @ 06:10) (56 - 65)  BP: 106/70 (10-15-21 @ 06:10) (104/86 - 110/59)  RR: 18 (10-15-21 @ 06:10) (17 - 18)  SpO2: 99% (10-15-21 @ 06:10) (99% - 100%)  Wt(kg): --        Physical Exam:  	Gen: NAD  	HEENT: MMM  	Pulm: CTA B/L  	CV: S1S2  	Abd: Soft, +BS  	Ext: No LE edema B/L                      Neuro: Awake   	Skin: Warm and Dry   	Vascular access: NO HD catheter           [facility]  LABS/STUDIES  --------------------------------------------------------------------------------              11.4   8.36  >-----------<  198      [10-15-21 @ 07:49]              33.7     146  |  109  |  18  ----------------------------<  97      [10-15-21 @ 07:49]  3.5   |  25  |  0.89        Ca     10.2     [10-15-21 @ 07:49]      Mg     1.90     [10-15-21 @ 07:49]      Phos  2.1     [10-15-21 @ 07:49]    TPro  6.2  /  Alb  2.6  /  TBili  2.7  /  DBili  x   /  AST  43  /  ALT  32  /  AlkPhos  123  [10-14-21 @ 07:46]          Creatinine Trend:  SCr 0.89 [10-15 @ 07:49]  SCr 0.82 [10-14 @ 07:46]  SCr 0.90 [10-13 @ 07:25]  SCr 0.91 [10-12 @ 06:52]  SCr 0.98 [10-11 @ 03:57]        PTH -- (Ca --)      [10-11-21 @ 03:57]   114  Vitamin D (25OH) 47.5      [10-11-21 @ 09:08]  HbA1c 6.5      [10-19-16 @ 08:19]  TSH 0.94      [10-11-21 @ 03:57]      Free Light Chains: kappa 2.28, lambda 3.37, ratio = 0.68      [10-11 @ 09:07]  SPEP Interpretation: with acute phase reaction. LORA Reyes MD      [10-11-21 @ 03:57]

## 2021-10-15 NOTE — PROGRESS NOTE ADULT - SUBJECTIVE AND OBJECTIVE BOX
Date of service: 10/15/21    chief complaint: sob     extended hpi: 77 yo M with a PMH of CAD s/p MI w/stents 2007, CHF with 20% LVEF in 2016, AICD,  HTN, HLD, DM, gout, CKD, COPD, PVD s/p aortobifemoral bypass, proximal right leg dvt per April 20th sono on eliquis.  now with chf, acute  PE.     S: no chest pain or sob; pleasantly confused; ros otherwise unable to obtain    Review of Systems:   Constitutional: [ ] fevers, [ ] chills.   Skin: [ ] dry skin. [ ] rashes.  Psychiatric: [ ] depression, [ ] anxiety.   Gastrointestinal: [ ] BRBPR, [ ] melena.   Neurological: [ ] confusion. [ ] seizures. [ ] shuffling gait.   Ears,Nose,Mouth and Throat: [ ] ear pain [ ] sore throat.   Eyes: [ ] diplopia.   Respiratory: [ ] hemoptysis. [ ] shortness of breath  Cardiovascular: See HPI above  Hematologic/Lymphatic: [ ] anemia. [ ] painful nodes. [ ] prolonged bleeding.   Genitourinary: [ ] hematuria. [ ] flank pain.   Endocrine: [ ] significant change in weight. [ ] intolerance to heat and cold.     Review of systems [ ] otherwise negative, [x ] otherwise unable to obtain    FH: no family history of sudden cardiac death in first degree relatives    SH: [ ] tobacco, [ ] alcohol, [ ] drugs    ALBUTerol    90 MICROgram(s) HFA Inhaler 2 Puff(s) Inhalation every 6 hours PRN  apixaban 10 milliGRAM(s) Oral every 12 hours  bisacodyl Suppository 10 milliGRAM(s) Rectal daily PRN  carvedilol 25 milliGRAM(s) Oral every 12 hours  cinacalcet 60 milliGRAM(s) Oral daily  dextrose 40% Gel 15 Gram(s) Oral once  dextrose 5%. 1000 milliLiter(s) IV Continuous <Continuous>  dextrose 5%. 1000 milliLiter(s) IV Continuous <Continuous>  dextrose 5%. 1000 milliLiter(s) IV Continuous <Continuous>  dextrose 50% Injectable 25 Gram(s) IV Push once  dextrose 50% Injectable 12.5 Gram(s) IV Push once  dextrose 50% Injectable 25 Gram(s) IV Push once  furosemide    Tablet 40 milliGRAM(s) Oral daily  glucagon  Injectable 1 milliGRAM(s) IntraMuscular once  insulin lispro (ADMELOG) corrective regimen sliding scale   SubCutaneous three times a day before meals  insulin lispro (ADMELOG) corrective regimen sliding scale   SubCutaneous at bedtime  lactulose Syrup 10 Gram(s) Oral daily  melatonin 3 milliGRAM(s) Oral at bedtime  memantine 5 milliGRAM(s) Oral at bedtime  nitroglycerin     SubLingual 0.4 milliGRAM(s) SubLingual every 5 minutes PRN  pantoprazole    Tablet 40 milliGRAM(s) Oral before breakfast  potassium phosphate / sodium phosphate Powder (PHOS-NaK) 1 Packet(s) Oral once  sacubitril 24 mG/valsartan 26 mG 1 Tablet(s) Oral two times a day  senna 2 Tablet(s) Oral at bedtime                            11.4   8.36  )-----------( 198      ( 15 Oct 2021 07:49 )             33.7     146<H>  |  109<H>  |  18  ----------------------------<  97  3.5   |  25  |  0.89    Ca    10.2      15 Oct 2021 07:49  Phos  2.1     10-15  Mg     1.90     10-15    TPro  6.2  /  Alb  2.6<L>  /  TBili  2.7<H>  /  DBili  x   /  AST  43<H>  /  ALT  32  /  AlkPhos  123<H>  10-14      T(C): 36.6 (10-15-21 @ 10:10), Max: 37.2 (10-14-21 @ 15:50)  HR: 57 (10-15-21 @ 10:10) (56 - 65)  BP: 95/58 (10-15-21 @ 10:10) (95/58 - 110/59)  RR: 18 (10-15-21 @ 10:10) (17 - 18)  SpO2: 95% (10-15-21 @ 10:10) (95% - 100%)      General: NAD, remains confused   Head: Normocephalic and atraumatic.   Neck: No JVD. No bruits. Supple. Does not appear to be enlarged.   Cardiovascular: + S1,S2 ; RRR Soft systolic murmur at the left lower sternal border. No rubs noted.    Lungs: CTA b/l. No rhonchi, rales or wheezes.   Abdomen: + BS, soft. Non tender. Non distended. No rebound. No guarding.   Extremities: No clubbing/cyanosis/edema.   Skin: Warm and moist. The patient's skin has normal elasticity and good skin turgor.   Psychiatric: unable to assess    Tele: AP    < from: Transthoracic Echocardiogram (10.08.21 @ 11:41) >  1. Mitral annular calcification, otherwise normal mitral  valve. Mild-moderate mitral regurgitation.  2. Calcified trileaflet aortic valve with normal opening.  Mild-moderate aortic regurgitation.  3. Mildly dilated left atrium.  LA volume index = 36 cc/m2.  4. Mildleft ventricular enlargement.  5. Severe global left ventricular systolic dysfunction.  6. Unable to accurately evaluate right ventricular size or  systolic function.  A device wire is noted in the right  heart.  7. Inadequate tricuspid regurgitationDoppler envelope  precludes estimation of RVSP.  *** No previous Echo exam.    < end of copied text >      < from: CT Abdomen and Pelvis w/ IV Cont (10.13.21 @ 09:35) >  IMPRESSION:  Large right groin hematoma extending into the right retroperitoneum.    Findings were discussed with NATHALIA Soriano 10/13/2021 11:12 AM by Dr. Mar with read back confirmation.    < end of copied text >      A/P: 77 yo M with a PMH of CAD s/p MI w/stents 2007, CHF with 20% LVEF in 2016, AICD,  HTN, HLD, DM, gout, CKD, COPD, PVD s/p aortobifemoral bypass, proximal right leg dvt per 4/20, was on Eliquis, admitted with acute on chronic CHF and PE.     -pt. currently chest pain free with no symptoms of dyspnea  -AC for PE /DVT per primary team--currently on hold due to groin hematoma and RP bleed  -TTE with severe LV dysfunction which is known - pt. with AICD and last echocardiogram in 2020 demonstrated severe LV dysfunction as well  -continue with medical management of known cardiomyopathy with coreg and entresto   -appears euvolemic - continue with po lasix  -pt. with history of stents and has been maintained on sapt at home per chart - APT and ASA on hold given large Groin hematoma and RP bleed  -monitor H/H  -remains confused - workup per neurology

## 2021-10-15 NOTE — PROGRESS NOTE ADULT - ASSESSMENT
Assessment  DMT2: 76y Male with DM T2 with hyperglycemia admitted with SOB, patient was on oral hypoglycemic agents at home, sugars trending improving, no hypoglycemic episode,  eating meals.  Hypercalcemia: Primary hyperparathyroidism nuclear scan pending, started on Sensipar calcium improving .  CAD: On medications, monitored, stable.  HTN: On medications, monitored,             Rosalva Louis MD  Cell: [phone]  Office: [phone]

## 2021-10-15 NOTE — PROGRESS NOTE ADULT - SUBJECTIVE AND OBJECTIVE BOX
Chief complaint    Patient is a 76y old  Male who presents with a chief complaint of PE/AMS (15 Oct 2021 09:00)   Review of systems  Patient in bed, appears comfortable.    Labs and Fingersticks  CAPILLARY BLOOD GLUCOSE      POCT Blood Glucose.: 101 mg/dL (15 Oct 2021 08:14)  POCT Blood Glucose.: 131 mg/dL (14 Oct 2021 21:01)  POCT Blood Glucose.: 96 mg/dL (14 Oct 2021 17:06)  POCT Blood Glucose.: 190 mg/dL (14 Oct 2021 11:48)      Anion Gap, Serum: 12 (10-15 @ 07:49)  Anion Gap, Serum: 11 (10-14 @ 07:46)      Calcium, Total Serum: 10.2 (10-15 @ 07:49)  Calcium, Total Serum: 10.1 (10-14 @ 07:46)  Albumin, Serum: 2.6 *L* (10-14 @ 07:46)    Alanine Aminotransferase (ALT/SGPT): 32 (10-14 @ 07:46)  Alkaline Phosphatase, Serum: 123 *H* (10-14 @ 07:46)  Aspartate Aminotransferase (AST/SGOT): 43 *H* (10-14 @ 07:46)        10-15    146<H>  |  109<H>  |  18  ----------------------------<  97  3.5   |  25  |  0.89    Ca    10.2      15 Oct 2021 07:49  Phos  2.1     10-15  Mg     1.90     10-15    TPro  6.2  /  Alb  2.6<L>  /  TBili  2.7<H>  /  DBili  x   /  AST  43<H>  /  ALT  32  /  AlkPhos  123<H>  10-14                        11.4   8.36  )-----------( 198      ( 15 Oct 2021 07:49 )             33.7     Medications  MEDICATIONS  (STANDING):  carvedilol 25 milliGRAM(s) Oral every 12 hours  cinacalcet 60 milliGRAM(s) Oral daily  dextrose 40% Gel 15 Gram(s) Oral once  dextrose 5%. 1000 milliLiter(s) (50 mL/Hr) IV Continuous <Continuous>  dextrose 5%. 1000 milliLiter(s) (100 mL/Hr) IV Continuous <Continuous>  dextrose 5%. 1000 milliLiter(s) (50 mL/Hr) IV Continuous <Continuous>  dextrose 50% Injectable 25 Gram(s) IV Push once  dextrose 50% Injectable 12.5 Gram(s) IV Push once  dextrose 50% Injectable 25 Gram(s) IV Push once  furosemide    Tablet 40 milliGRAM(s) Oral daily  glucagon  Injectable 1 milliGRAM(s) IntraMuscular once  insulin lispro (ADMELOG) corrective regimen sliding scale   SubCutaneous three times a day before meals  insulin lispro (ADMELOG) corrective regimen sliding scale   SubCutaneous at bedtime  lactulose Syrup 10 Gram(s) Oral daily  melatonin 3 milliGRAM(s) Oral at bedtime  memantine 5 milliGRAM(s) Oral at bedtime  pantoprazole    Tablet 40 milliGRAM(s) Oral before breakfast  sacubitril 24 mG/valsartan 26 mG 1 Tablet(s) Oral two times a day  senna 2 Tablet(s) Oral at bedtime      Physical Exam  General: Patient comfortable in bed  Vital Signs Last 12 Hrs  T(F): 97.8 (10-15-21 @ 10:10), Max: 98.3 (10-14-21 @ 23:50)  HR: 57 (10-15-21 @ 10:10) (56 - 65)  BP: 95/58 (10-15-21 @ 10:10) (95/58 - 110/59)  BP(mean): --  RR: 18 (10-15-21 @ 10:10) (18 - 18)  SpO2: 95% (10-15-21 @ 10:10) (95% - 100%)  Neck: No palpable thyroid nodules.  CVS: S1S2, No murmurs  Respiratory: No wheezing, no crepitations  GI: Abdomen soft, bowel sounds positive  Musculoskeletal:  edema lower extremities.     Diagnostics    NM Parathyroid Imaging w/Spect: Routine   Indication: Parathyroid adenoma  Transport: Walking  Provider's Contact #: [phone] (10-11 @ 08:08)  Vitamin D, 25-Hydroxy: AM Sched. Collection: 11-Oct-2021 06:00 (10-10 @ 10:41)  Vitamin D, 1, 25-Dihydroxy: AM Sched. Collection: 11-Oct-2021 06:00 (10-10 @ 10:41)  Parathyroid Hormone Intact w/o Calcium, Serum: AM Sched. Collection: 11-Oct-2021 06:00 (10-10 @ 10:41)  PTH Related Peptide: AM Sched. Collection: 11-Oct-2021 06:00 (10-10 @ 10:41)

## 2021-10-15 NOTE — PROGRESS NOTE ADULT - SUBJECTIVE AND OBJECTIVE BOX
[facility]      Seen earlier today, and examined.  - Today, patient is without complaints.           *****MEDICATIONS: Current medication reviewed and documented.    MEDICATIONS  (STANDING):  apixaban 10 milliGRAM(s) Oral every 12 hours  carvedilol 25 milliGRAM(s) Oral every 12 hours  cinacalcet 60 milliGRAM(s) Oral daily  dextrose 40% Gel 15 Gram(s) Oral once  dextrose 5%. 1000 milliLiter(s) (50 mL/Hr) IV Continuous <Continuous>  dextrose 5%. 1000 milliLiter(s) (100 mL/Hr) IV Continuous <Continuous>  dextrose 5%. 1000 milliLiter(s) (50 mL/Hr) IV Continuous <Continuous>  dextrose 50% Injectable 25 Gram(s) IV Push once  dextrose 50% Injectable 12.5 Gram(s) IV Push once  dextrose 50% Injectable 25 Gram(s) IV Push once  furosemide    Tablet 40 milliGRAM(s) Oral daily  glucagon  Injectable 1 milliGRAM(s) IntraMuscular once  insulin lispro (ADMELOG) corrective regimen sliding scale   SubCutaneous three times a day before meals  insulin lispro (ADMELOG) corrective regimen sliding scale   SubCutaneous at bedtime  lactulose Syrup 10 Gram(s) Oral daily  melatonin 3 milliGRAM(s) Oral at bedtime  memantine 5 milliGRAM(s) Oral at bedtime  pantoprazole    Tablet 40 milliGRAM(s) Oral before breakfast  potassium phosphate / sodium phosphate Powder (PHOS-NaK) 1 Packet(s) Oral once  sacubitril 24 mG/valsartan 26 mG 1 Tablet(s) Oral two times a day  senna 2 Tablet(s) Oral at bedtime    MEDICATIONS  (PRN):  ALBUTerol    90 MICROgram(s) HFA Inhaler 2 Puff(s) Inhalation every 6 hours PRN Shortness of Breath and/or Wheezing  bisacodyl Suppository 10 milliGRAM(s) Rectal daily PRN Constipation  nitroglycerin     SubLingual 0.4 milliGRAM(s) SubLingual every 5 minutes PRN Chest Pain          ***** VITAL SIGNS:  T(F): 97.8 (10-15-21 @ 10:10), Max: 98.9 (10-14-21 @ 15:50)  HR: 57 (10-15-21 @ 10:10) (56 - 65)  BP: 95/58 (10-15-21 @ 10:10) (95/58 - 110/59)  RR: 18 (10-15-21 @ 10:10) (17 - 18)  SpO2: 95% (10-15-21 @ 10:10) (95% - 100%)  Wt(kg): --  ,   I&O's Summary           *****PHYSICAL EXAM: pleasantly confused    alert oriented x2  attention comprehension are limited  Able to name, repeat.   EOmi fundi not visualized   no nystagmus VFF to confrontation  Tongue is midline  Palate elevates symmetrically   Moving all 4 ext spontaneously no drift appreciated    Gait not assessed.            *****LAB AND IMAGIN.4   8.36  )-----------( 198      ( 15 Oct 2021 07:49 )             33.7               10-15    146<H>  |  109<H>  |  18  ----------------------------<  97  3.5   |  25  |  0.89    Ca    10.2      15 Oct 2021 07:49  Phos  2.1     10-15  Mg     1.90     10-15    TPro  6.2  /  Alb  2.6<L>  /  TBili  2.7<H>  /  DBili  x   /  AST  43<H>  /  ALT  32  /  AlkPhos  123<H>  10-14                         [All pertinent recent Imaging/Reports reviewed]           *****A S S E S S M E N T   A N D   P L A N :     77 yo M with a PMH of CAD s/p MI w/stents , CHF with 20% LVEF in 2016, AICD,  HTN, HLD, DM, gout, CKD, COPD, PVD s/p aortobifemoral bypass, proximal right leg dvt per  so  on eliquis. Pt lives alone,  speaks with sister  routinely (Gabriela [phone]). Per sister, had noticed increasing confusion over last 2weeks, but today was worst she had seen. Sister grew concerned, EMS called, noted to be sob. Pt per ed noted not taking any of his home meds.  CT angio chest with PE/pulm infarcts/RHF and strain, possible covid pna. Pt a/o 1, unable to provide history. Follow commands. HS trop 31, 32. ekg sinus tach, lafb. CT head with no acute findings    Of note, tbili>5 (as high as 10 April 2020) was supposed to be on lactulose per sister but not taking. Prior GI note mentioned possibility of liver biopsy, but appears not to have been performed. US abd from 2020 noteworthy only for slight hepatomegaly     as per prior records, pt was admitted in 2016 with similiar presentation.   pt unable to provide any information regarding his presentation        Problem/Recommendations 1: ams of unclear etiology   suspect multifactorial metabolic encephalopathy due to hypercalcemia, hypernatremia, elevated bili, poor po intake, worsening underlying cognitive impairement   ( seen in 2016 with similar presentation )  r/o infectious process    b12/folate/tsh/ wnl   thiamine 500 iv q 8   ct head no acute path  unable to get mr due to AICD   sleep wake cycle regulation with melatonin   correct metabolic derangements.   namenda 5 mg bid   10/14 some improvement in mental state   10/15 engages in conversation       Problem/Recommendations 2: transaminitis   elevated bili low albumin   ? etiology   ammonia wnl       Problem/Recommendations 3: R retroperitoneal hematoma noted   vascular input appreciated   ac on hold         Thank you for allowing me to participate in the care of this patient. Will continue to follow patient periodically. Please do not hesitate to call me if you have any  questions or if there has been a change in patients neurological status     ________________  Mila Cazares MD  [facility] ([facility]  [phone]      33 minutes spent on total encounter; more than 50 % of the visit was  spent counseling about plan of care, compliance to diet/exercise and medication regimen and or  coordinating care by the attending physician.      It is advised that stroke patients follow up with MCKAY Solorzano @ [phone] in 1- 2 weeks.   Others please follow up with Dr. Michael Nissenbaum [phone]

## 2021-10-15 NOTE — PROGRESS NOTE ADULT - ASSESSMENT
76M hx of open aortobifemoral (R CFA, L SANTANA) bypass and hx of DVT/PE previously treated with eliquis admitted with acute PE with R heart strain, transitioned from hep gtt to eliquis but found to have R groin to RP hematoma on CT. Vascular surgery consulted.    Recommendations:  - H&H stable  - Ok to restart AC from vascular standpoint, trend H&H and monitor groin for signs of expanding hematoma  - Rest of care per primary team    C Team Surgery   [id number]

## 2021-10-15 NOTE — PROGRESS NOTE ADULT - SUBJECTIVE AND OBJECTIVE BOX
SUBJECTIVE:  No acute complaints but grimaces when examining right groin    OBJECTIVE:  Vital Signs Last 24 Hrs  T(C): 36.6 (15 Oct 2021 10:10), Max: 37.2 (14 Oct 2021 15:50)  T(F): 97.8 (15 Oct 2021 10:10), Max: 98.9 (14 Oct 2021 15:50)  HR: 57 (15 Oct 2021 10:10) (56 - 65)  BP: 95/58 (15 Oct 2021 10:10) (95/58 - 110/59)  BP(mean): --  RR: 18 (15 Oct 2021 10:10) (17 - 18)  SpO2: 95% (15 Oct 2021 10:10) (95% - 100%)        Physical Examination:  GEN: NAD, resting quietly  PULM: symmetric chest rise bilaterally, no increased WOB  EXTR: right groin hematoma slightly tender to palpation, palpable femoral pulse      LABS:                        11.4   8.36  )-----------( 198      ( 15 Oct 2021 07:49 )             33.7       10-15    146<H>  |  109<H>  |  18  ----------------------------<  97  3.5   |  25  |  0.89    Ca    10.2      15 Oct 2021 07:49  Phos  2.1     10-15  Mg     1.90     10-15    TPro  6.2  /  Alb  2.6<L>  /  TBili  2.7<H>  /  DBili  x   /  AST  43<H>  /  ALT  32  /  AlkPhos  123<H>  10-14

## 2021-10-16 NOTE — PROGRESS NOTE ADULT - SUBJECTIVE AND OBJECTIVE BOX
Chief complaint    Patient is a 76y old  Male who presents with a chief complaint of PE/AMS (16 Oct 2021 16:41)   Review of systems  Patient in bed, appears comfortable.    Labs and Fingersticks  CAPILLARY BLOOD GLUCOSE      POCT Blood Glucose.: 114 mg/dL (16 Oct 2021 16:52)  POCT Blood Glucose.: 169 mg/dL (16 Oct 2021 11:46)  POCT Blood Glucose.: 101 mg/dL (16 Oct 2021 08:09)  POCT Blood Glucose.: 114 mg/dL (15 Oct 2021 22:44)      Anion Gap, Serum: 10 (10-16 @ 08:56)  Anion Gap, Serum: 12 (10-15 @ 07:49)      Calcium, Total Serum: 9.0 (10-16 @ 08:56)  Calcium, Total Serum: 10.2 (10-15 @ 07:49)          10-16    141  |  105  |  18  ----------------------------<  102<H>  3.2<L>   |  26  |  0.98    Ca    9.0      16 Oct 2021 08:56  Phos  2.1     10-16  Mg     1.80     10-16                          10.4   7.04  )-----------( 187      ( 16 Oct 2021 08:56 )             30.0     Medications  MEDICATIONS  (STANDING):  apixaban 10 milliGRAM(s) Oral every 12 hours  carvedilol 25 milliGRAM(s) Oral every 12 hours  cinacalcet 60 milliGRAM(s) Oral daily  dextrose 40% Gel 15 Gram(s) Oral once  dextrose 5%. 1000 milliLiter(s) (50 mL/Hr) IV Continuous <Continuous>  dextrose 5%. 1000 milliLiter(s) (100 mL/Hr) IV Continuous <Continuous>  dextrose 50% Injectable 25 Gram(s) IV Push once  dextrose 50% Injectable 12.5 Gram(s) IV Push once  dextrose 50% Injectable 25 Gram(s) IV Push once  furosemide    Tablet 40 milliGRAM(s) Oral daily  glucagon  Injectable 1 milliGRAM(s) IntraMuscular once  insulin lispro (ADMELOG) corrective regimen sliding scale   SubCutaneous three times a day before meals  insulin lispro (ADMELOG) corrective regimen sliding scale   SubCutaneous at bedtime  lactulose Syrup 10 Gram(s) Oral daily  melatonin 3 milliGRAM(s) Oral at bedtime  memantine 5 milliGRAM(s) Oral at bedtime  pantoprazole    Tablet 40 milliGRAM(s) Oral before breakfast  sacubitril 24 mG/valsartan 26 mG 1 Tablet(s) Oral two times a day  senna 2 Tablet(s) Oral at bedtime      Physical Exam  General: Patient comfortable in bed  Vital Signs Last 12 Hrs  T(F): 97.9 (10-16-21 @ 17:33), Max: 98.5 (10-16-21 @ 15:50)  HR: 57 (10-16-21 @ 17:33) (54 - 60)  BP: 94/52 (10-16-21 @ 17:33) (94/52 - 110/87)  BP(mean): --  RR: 18 (10-16-21 @ 17:33) (17 - 18)  SpO2: 97% (10-16-21 @ 17:33) (96% - 98%)  Neck: No palpable thyroid nodules.  CVS: S1S2, No murmurs  Respiratory: No wheezing, no crepitations  GI: Abdomen soft, bowel sounds positive  Musculoskeletal:  edema lower extremities.     Diagnostics    NM Parathyroid Imaging w/Spect: Routine   Indication: Parathyroid adenoma  Transport: Walking  Provider's Contact #: [phone] (10-11 @ 08:08)  Vitamin D, 25-Hydroxy: AM Sched. Collection: 11-Oct-2021 06:00 (10-10 @ 10:41)  Vitamin D, 1, 25-Dihydroxy: AM Sched. Collection: 11-Oct-2021 06:00 (10-10 @ 10:41)  Parathyroid Hormone Intact w/o Calcium, Serum: AM Sched. Collection: 11-Oct-2021 06:00 (10-10 @ 10:41)  PTH Related Peptide: AM Sched. Collection: 11-Oct-2021 06:00 (10-10 @ 10:41)

## 2021-10-16 NOTE — PROGRESS NOTE ADULT - ASSESSMENT
Assessment  DMT2: 76y Male with DM T2 with hyperglycemia admitted with SOB, patient was on oral hypoglycemic agents at home, sugars improved, comfortable,  no hypoglycemic episode,  eating meals.  Hypercalcemia: Primary hyperparathyroidism nuclear scan pending, started on Sensipar calcium improving .  CAD: On medications, monitored, stable.  HTN: On medications, monitored,             Rosalva Louis MD  Cell: [phone]  Office: [phone]

## 2021-10-16 NOTE — PROGRESS NOTE ADULT - SUBJECTIVE AND OBJECTIVE BOX
[facility]      Seen earlier today, and examined.  - Today, patient is without complaints.           *****MEDICATIONS: Current medication reviewed and documented.    MEDICATIONS  (STANDING):  apixaban 10 milliGRAM(s) Oral every 12 hours  carvedilol 25 milliGRAM(s) Oral every 12 hours  cinacalcet 60 milliGRAM(s) Oral daily  dextrose 40% Gel 15 Gram(s) Oral once  dextrose 5%. 1000 milliLiter(s) (50 mL/Hr) IV Continuous <Continuous>  dextrose 5%. 1000 milliLiter(s) (100 mL/Hr) IV Continuous <Continuous>  dextrose 50% Injectable 25 Gram(s) IV Push once  dextrose 50% Injectable 12.5 Gram(s) IV Push once  dextrose 50% Injectable 25 Gram(s) IV Push once  furosemide    Tablet 40 milliGRAM(s) Oral daily  glucagon  Injectable 1 milliGRAM(s) IntraMuscular once  insulin lispro (ADMELOG) corrective regimen sliding scale   SubCutaneous three times a day before meals  insulin lispro (ADMELOG) corrective regimen sliding scale   SubCutaneous at bedtime  lactulose Syrup 10 Gram(s) Oral daily  melatonin 3 milliGRAM(s) Oral at bedtime  memantine 5 milliGRAM(s) Oral at bedtime  pantoprazole    Tablet 40 milliGRAM(s) Oral before breakfast  sacubitril 24 mG/valsartan 26 mG 1 Tablet(s) Oral two times a day  senna 2 Tablet(s) Oral at bedtime    MEDICATIONS  (PRN):  ALBUTerol    90 MICROgram(s) HFA Inhaler 2 Puff(s) Inhalation every 6 hours PRN Shortness of Breath and/or Wheezing  bisacodyl Suppository 10 milliGRAM(s) Rectal daily PRN Constipation  nitroglycerin     SubLingual 0.4 milliGRAM(s) SubLingual every 5 minutes PRN Chest Pain          ***** VITAL SIGNS:  T(F): 98.4 (10-16-21 @ 11:50), Max: 98.4 (10-16-21 @ 11:50)  HR: 54 (10-16-21 @ 11:50) (54 - 64)  BP: 110/87 (10-16-21 @ 11:50) (84/44 - 110/87)  RR: 18 (10-16-21 @ 11:50) (17 - 19)  SpO2: 96% (10-16-21 @ 11:50) (96% - 98%)  Wt(kg): --  ,   I&O's Summary           *****PHYSICAL EXAM:    alert oriented x2  attention comprehension are limited  Able to name, repeat.   EOmi fundi not visualized   no nystagmus VFF to confrontation  Tongue is midline  Palate elevates symmetrically   Moving all 4 ext spontaneously no drift appreciated    Gait not assessed.        *****LAB AND IMAGING:                        10.4   7.04  )-----------( 187      ( 16 Oct 2021 08:56 )             30.0               10-16    141  |  105  |  18  ----------------------------<  102<H>  3.2<L>   |  26  |  0.98    Ca    9.0      16 Oct 2021 08:56  Phos  2.1     10-16  Mg     1.80     10-16                           [All pertinent recent Imaging/Reports reviewed]           *****A S S E S S M E N T   A N D   P L A N :       75 yo M with a PMH of CAD s/p MI w/stents 2007, CHF with 20% LVEF in 2016, AICD,  HTN, HLD, DM, gout, CKD, COPD, PVD s/p aortobifemoral bypass, proximal right leg dvt per April 20th so  on eliquis. Pt lives alone,  speaks with sister  routinely (Gabriela [phone]). Per sister, had noticed increasing confusion over last 2weeks, but today was worst she had seen. Sister grew concerned, EMS called, noted to be sob. Pt per ed noted not taking any of his home meds.  CT angio chest with PE/pulm infarcts/RHF and strain, possible covid pna. Pt a/o 1, unable to provide history. Follow commands. HS trop 31, 32. ekg sinus tach, lafb. CT head with no acute findings    Of note, tbili>5 (as high as 10 April 2020) was supposed to be on lactulose per sister but not taking. Prior GI note mentioned possibility of liver biopsy, but appears not to have been performed. US abd from April 2020 noteworthy only for slight hepatomegaly     as per prior records, pt was admitted in 2016 with similiar presentation.   pt unable to provide any information regarding his presentation        Problem/Recommendations 1: ams of unclear etiology   suspect multifactorial metabolic encephalopathy due to hypercalcemia, hypernatremia, elevated bili, poor po intake, worsening underlying cognitive impairement   ( seen in 2016 with similar presentation )  r/o infectious process    b12/folate/tsh/ wnl   thiamine 500 iv q 8   ct head no acute path  unable to get mr due to AICD   sleep wake cycle regulation with melatonin   correct metabolic derangements.   namenda 5 mg bid   10/14 some improvement in mental state   10/15 engages in conversation   10/16 engages   avoid day time somnolence     Problem/Recommendations 2: transaminitis   elevated bili low albumin   ? etiology   ammonia wnl       Problem/Recommendations 3: R retroperitoneal hematoma noted   vascular input appreciated   ac on hold         Thank you for allowing me to participate in the care of this patient. Will continue to follow patient periodically. Please do not hesitate to call me if you have any  questions or if there has been a change in patients neurological status     ________________  Mila Cazares MD  [facility]  [phone]      33 minutes spent on total encounter; more than 50 % of the visit was  spent counseling about plan of care, compliance to diet/exercise and medication regimen and or  coordinating care by the attending physician.      It is advised that stroke patients follow up with MCKAY Solorzano @ [phone] in 1- 2 weeks.   Others please follow up with Dr. Michael Nissenbaum [phone]

## 2021-10-16 NOTE — PROGRESS NOTE ADULT - SUBJECTIVE AND OBJECTIVE BOX
Date of Service: 10-16-21 @ 12:35    Patient is a 76y old  Male who presents with a chief complaint of PE/AMS (16 Oct 2021 11:33)      Any change in ROS: Heiso n eliquis now: he is confused     MEDICATIONS  (STANDING):  apixaban 10 milliGRAM(s) Oral every 12 hours  carvedilol 25 milliGRAM(s) Oral every 12 hours  cinacalcet 60 milliGRAM(s) Oral daily  dextrose 40% Gel 15 Gram(s) Oral once  dextrose 5%. 1000 milliLiter(s) (50 mL/Hr) IV Continuous <Continuous>  dextrose 5%. 1000 milliLiter(s) (100 mL/Hr) IV Continuous <Continuous>  dextrose 50% Injectable 25 Gram(s) IV Push once  dextrose 50% Injectable 12.5 Gram(s) IV Push once  dextrose 50% Injectable 25 Gram(s) IV Push once  furosemide    Tablet 40 milliGRAM(s) Oral daily  glucagon  Injectable 1 milliGRAM(s) IntraMuscular once  insulin lispro (ADMELOG) corrective regimen sliding scale   SubCutaneous three times a day before meals  insulin lispro (ADMELOG) corrective regimen sliding scale   SubCutaneous at bedtime  lactulose Syrup 10 Gram(s) Oral daily  melatonin 3 milliGRAM(s) Oral at bedtime  memantine 5 milliGRAM(s) Oral at bedtime  pantoprazole    Tablet 40 milliGRAM(s) Oral before breakfast  sacubitril 24 mG/valsartan 26 mG 1 Tablet(s) Oral two times a day  senna 2 Tablet(s) Oral at bedtime    MEDICATIONS  (PRN):  ALBUTerol    90 MICROgram(s) HFA Inhaler 2 Puff(s) Inhalation every 6 hours PRN Shortness of Breath and/or Wheezing  bisacodyl Suppository 10 milliGRAM(s) Rectal daily PRN Constipation  nitroglycerin     SubLingual 0.4 milliGRAM(s) SubLingual every 5 minutes PRN Chest Pain    Vital Signs Last 24 Hrs  T(C): 36.9 (16 Oct 2021 11:50), Max: 36.9 (16 Oct 2021 11:50)  T(F): 98.4 (16 Oct 2021 11:50), Max: 98.4 (16 Oct 2021 11:50)  HR: 54 (16 Oct 2021 11:50) (52 - 64)  BP: 110/87 (16 Oct 2021 11:50) (84/44 - 110/87)  BP(mean): --  RR: 18 (16 Oct 2021 11:50) (17 - 19)  SpO2: 96% (16 Oct 2021 11:50) (96% - 98%)    I&O's Summary        Physical Exam:   GENERAL: NAD, well-groomed, well-developed  HEENT: MICHELLE/   Atraumatic, Normocephalic  ENMT: No tonsillar erythema, exudates, or enlargement; Moist mucous membranes, Good dentition, No lesions  NECK: Supple, No JVD, Normal thyroid  CHEST/LUNG: Clear to auscultaion  CVS: Regular rate and rhythm; No murmurs, rubs, or gallops  GI: : Soft, Nontender, Nondistended; Bowel sounds present  NERVOUS SYSTEM:  Alert & Oriented X1  EXTREMITIES:  - edema  LYMPH: No lymphadenopathy noted  SKIN: No rashes or lesions  ENDOCRINOLOGY: No Thyromegaly  PSYCH: Acakm6+     Labs:                              10.4   7.04  )-----------( 187      ( 16 Oct 2021 08:56 )             30.0                         11.4   8.36  )-----------( 198      ( 15 Oct 2021 07:49 )             33.7                         11.2   8.52  )-----------( 161      ( 14 Oct 2021 07:46 )             33.3                         11.6   8.97  )-----------( 181      ( 13 Oct 2021 17:06 )             34.0                         11.8   9.84  )-----------( 130      ( 13 Oct 2021 07:25 )             35.3     10-16    141  |  105  |  18  ----------------------------<  102<H>  3.2<L>   |  26  |  0.98  10-15    146<H>  |  109<H>  |  18  ----------------------------<  97  3.5   |  25  |  0.89  10-14    147<H>  |  110<H>  |  17  ----------------------------<  110<H>  3.4<L>   |  26  |  0.82  10-13    148<H>  |  112<H>  |  18  ----------------------------<  127<H>  3.9   |  25  |  0.90    Ca    9.0      16 Oct 2021 08:56  Ca    10.2      15 Oct 2021 07:49  Phos  2.1     10-16  Phos  2.1     10-15  Mg     1.80     10-16  Mg     1.90     10-15    TPro  6.2  /  Alb  2.6<L>  /  TBili  2.7<H>  /  DBili  x   /  AST  43<H>  /  ALT  32  /  AlkPhos  123<H>  10-14  TPro  6.3  /  Alb  2.7<L>  /  TBili  2.9<H>  /  DBili  x   /  AST  44<H>  /  ALT  33  /  AlkPhos  127<H>  10-13    CAPILLARY BLOOD GLUCOSE      POCT Blood Glucose.: 169 mg/dL (16 Oct 2021 11:46)  POCT Blood Glucose.: 101 mg/dL (16 Oct 2021 08:09)  POCT Blood Glucose.: 114 mg/dL (15 Oct 2021 22:44)  POCT Blood Glucose.: 94 mg/dL (15 Oct 2021 16:29)          rad< from: US Duplex Venous Lower Ext Complete, Bilateral (10.14.21 @ 09:34) >    RIGHT:  Posterior tibial vein is noncompressible, compatible with thrombus.    Normal compressibility of the RIGHT femoral and popliteal veins. The visualized portion of the right common femoral vein is patent.  Doppler examination shows normal spontaneousand phasic flow.      LEFT:  Normal compressibility of the LEFT common femoral, femoral and popliteal veins.  Doppler examination shows normal spontaneous and phasic flow.  No LEFT calf vein thrombosis is detected.    IMPRESSION:  RIGHT posterior tibial vein thrombus.    A large right groin hematoma limits evaluation of the right common femoral vein.    Acute deep venous thrombosis: below the knee.    --- End of Report ---              KALLIE PATEL MD; Attending Radiologist  This document has been electronically signed. Oct 14 2021 11:02AM    < end of copied text >          RECENT CULTURES:        RESPIRATORY CULTURES:          Studies  Chest X-RAY  CT SCAN Chest   Venous Dopplers: LE:   CT Abdomen  Others        < from: US Duplex Venous Lower Ext Complete, Bilateral (10.08.21 @ 16:17) >    EXAM:  US DPLX LWR EXT VEINS COMPL BI        PROCEDURE DATE:  Oct  8 2021         INTERPRETATION:  CLINICAL INFORMATION: Congestive heart failure. Pulmonary embolus.    COMPARISON: None available.    TECHNIQUE: Duplex sonography of the BILATERAL LOWER extremity veins with color and spectral Doppler, with and without compression.    FINDINGS:    RIGHT:  Normal compressibility of the RIGHT common femoral, femoral and popliteal veins.  Doppler examination shows normal spontaneous and phasic flow.  No RIGHT calf vein thrombosis is detected.    LEFT:  Normal compressibility of the LEFT common femoral, femoral and popliteal veins.  Doppler examination shows normal spontaneous and phasic flow.  No LEFT calf vein thrombosis is detected.    IMPRESSION:  No evidence of deep venous thrombosis in either lower extremity in the visualized veins..          --- End of Report ---              RADHA LILLY MD; Attending Radiologist  This document has been electronically signed. Oct  8 2021  4:26PM    < end of copied text >

## 2021-10-16 NOTE — PROGRESS NOTE ADULT - SUBJECTIVE AND OBJECTIVE BOX
[facility]   MD ZOHRA SCHAFFER MD RUORU WONG, PA    TEL:  OFFICE: [phone]  DR MITCHELL CELL: [phone]  TITI SOTOMAYOR CELL: [phone]  DR. CONROY CELL: [phone]      FROM 5 PM - 7 AM PLEASE CALL ANSWERING SERVICE: [phone]    RENAL FOLLOW UP NOTE--Date of Service 10-16-21 @ 11:33  --------------------------------------------------------------------------------  HPI:      Pt seen and examined at bedside.        PAST HISTORY  --------------------------------------------------------------------------------  No significant changes to PMH, PSH, FHx, SHx, unless otherwise noted    ALLERGIES & MEDICATIONS  --------------------------------------------------------------------------------  Allergies    No Known Drug Allergies  shellfish (Other; Urticaria)    Intolerances      Standing Inpatient Medications  apixaban 10 milliGRAM(s) Oral every 12 hours  carvedilol 25 milliGRAM(s) Oral every 12 hours  cinacalcet 60 milliGRAM(s) Oral daily  dextrose 40% Gel 15 Gram(s) Oral once  dextrose 5%. 1000 milliLiter(s) IV Continuous <Continuous>  dextrose 5%. 1000 milliLiter(s) IV Continuous <Continuous>  dextrose 50% Injectable 25 Gram(s) IV Push once  dextrose 50% Injectable 12.5 Gram(s) IV Push once  dextrose 50% Injectable 25 Gram(s) IV Push once  furosemide    Tablet 40 milliGRAM(s) Oral daily  glucagon  Injectable 1 milliGRAM(s) IntraMuscular once  insulin lispro (ADMELOG) corrective regimen sliding scale   SubCutaneous three times a day before meals  insulin lispro (ADMELOG) corrective regimen sliding scale   SubCutaneous at bedtime  lactulose Syrup 10 Gram(s) Oral daily  melatonin 3 milliGRAM(s) Oral at bedtime  memantine 5 milliGRAM(s) Oral at bedtime  pantoprazole    Tablet 40 milliGRAM(s) Oral before breakfast  potassium chloride    Tablet ER 20 milliEquivalent(s) Oral once  sacubitril 24 mG/valsartan 26 mG 1 Tablet(s) Oral two times a day  senna 2 Tablet(s) Oral at bedtime    PRN Inpatient Medications  ALBUTerol    90 MICROgram(s) HFA Inhaler 2 Puff(s) Inhalation every 6 hours PRN  bisacodyl Suppository 10 milliGRAM(s) Rectal daily PRN  nitroglycerin     SubLingual 0.4 milliGRAM(s) SubLingual every 5 minutes PRN      REVIEW OF SYSTEMS  --------------------------------------------------------------------------------  General: no fever    MSK: no edema     VITALS/PHYSICAL EXAM  --------------------------------------------------------------------------------  T(C): 36.8 (10-16-21 @ 07:50), Max: 36.8 (10-16-21 @ 07:50)  HR: 60 (10-16-21 @ 07:50) (52 - 64)  BP: 110/60 (10-16-21 @ 07:50) (84/44 - 110/60)  RR: 17 (10-16-21 @ 07:50) (17 - 19)  SpO2: 98% (10-16-21 @ 07:50) (96% - 98%)  Wt(kg): --        Physical Exam:  	Gen: NAD  	HEENT: MMM  	Pulm: CTA B/L  	CV: S1S2  	Abd: Soft, +BS  	Ext: No LE edema B/L                      Neuro: Awake   	Skin: Warm and Dry   	Vascular access: NO HD catheter           [facility]  LABS/STUDIES  --------------------------------------------------------------------------------              10.4   7.04  >-----------<  187      [10-16-21 @ 08:56]              30.0     141  |  105  |  18  ----------------------------<  102      [10-16-21 @ 08:56]  3.2   |  26  |  0.98        Ca     9.0     [10-16-21 @ 08:56]      Mg     1.80     [10-16-21 @ 08:56]      Phos  2.1     [10-16-21 @ 08:56]            Creatinine Trend:  SCr 0.98 [10-16 @ 08:56]  SCr 0.89 [10-15 @ 07:49]  SCr 0.82 [10-14 @ 07:46]  SCr 0.90 [10-13 @ 07:25]  SCr 0.91 [10-12 @ 06:52]        PTH -- (Ca --)      [10-11-21 @ 03:57]   114  Vitamin D (25OH) 47.5      [10-11-21 @ 09:08]  HbA1c 6.5      [10-19-16 @ 08:19]  TSH 0.94      [10-11-21 @ 03:57]      Free Light Chains: kappa 2.28, lambda 3.37, ratio = 0.68      [10-11 @ 09:07]  SPEP Interpretation: with acute phase reaction. LORA Reyes MD      [10-11-21 @ 03:57]

## 2021-10-16 NOTE — PROGRESS NOTE ADULT - SUBJECTIVE AND OBJECTIVE BOX
Date of Service  : 10-16-21 @ 14:23    INTERVAL HPI/OVERNIGHT EVENTS: I feel fine.   Vital Signs Last 24 Hrs  T(C): 36.9 (16 Oct 2021 11:50), Max: 36.9 (16 Oct 2021 11:50)  T(F): 98.4 (16 Oct 2021 11:50), Max: 98.4 (16 Oct 2021 11:50)  HR: 54 (16 Oct 2021 11:50) (54 - 64)  BP: 110/87 (16 Oct 2021 11:50) (84/44 - 110/87)  BP(mean): --  RR: 18 (16 Oct 2021 11:50) (17 - 19)  SpO2: 96% (16 Oct 2021 11:50) (96% - 98%)  I&O's Summary    MEDICATIONS  (STANDING):  apixaban 10 milliGRAM(s) Oral every 12 hours  carvedilol 25 milliGRAM(s) Oral every 12 hours  cinacalcet 60 milliGRAM(s) Oral daily  dextrose 40% Gel 15 Gram(s) Oral once  dextrose 5%. 1000 milliLiter(s) (50 mL/Hr) IV Continuous <Continuous>  dextrose 5%. 1000 milliLiter(s) (100 mL/Hr) IV Continuous <Continuous>  dextrose 50% Injectable 25 Gram(s) IV Push once  dextrose 50% Injectable 12.5 Gram(s) IV Push once  dextrose 50% Injectable 25 Gram(s) IV Push once  furosemide    Tablet 40 milliGRAM(s) Oral daily  glucagon  Injectable 1 milliGRAM(s) IntraMuscular once  insulin lispro (ADMELOG) corrective regimen sliding scale   SubCutaneous three times a day before meals  insulin lispro (ADMELOG) corrective regimen sliding scale   SubCutaneous at bedtime  lactulose Syrup 10 Gram(s) Oral daily  melatonin 3 milliGRAM(s) Oral at bedtime  memantine 5 milliGRAM(s) Oral at bedtime  pantoprazole    Tablet 40 milliGRAM(s) Oral before breakfast  sacubitril 24 mG/valsartan 26 mG 1 Tablet(s) Oral two times a day  senna 2 Tablet(s) Oral at bedtime    MEDICATIONS  (PRN):  ALBUTerol    90 MICROgram(s) HFA Inhaler 2 Puff(s) Inhalation every 6 hours PRN Shortness of Breath and/or Wheezing  bisacodyl Suppository 10 milliGRAM(s) Rectal daily PRN Constipation  nitroglycerin     SubLingual 0.4 milliGRAM(s) SubLingual every 5 minutes PRN Chest Pain    LABS:                        10.4   7.04  )-----------( 187      ( 16 Oct 2021 08:56 )             30.0     10-16    141  |  105  |  18  ----------------------------<  102<H>  3.2<L>   |  26  |  0.98    Ca    9.0      16 Oct 2021 08:56  Phos  2.1     10-16  Mg     1.80     10-16          CAPILLARY BLOOD GLUCOSE      POCT Blood Glucose.: 169 mg/dL (16 Oct 2021 11:46)  POCT Blood Glucose.: 101 mg/dL (16 Oct 2021 08:09)  POCT Blood Glucose.: 114 mg/dL (15 Oct 2021 22:44)  POCT Blood Glucose.: 94 mg/dL (15 Oct 2021 16:29)                Consultant(s) Notes Reviewed:  [x ] YES  [ ] NO    PHYSICAL EXAM:  GENERAL: NAD, well-groomed, well-developed, not in any distress ,  HEAD:  Atraumatic, Normocephalic  EYES: EOMI, PERRLA, conjunctiva and sclera clear  ENMT: No tonsillar erythema, exudates, or enlargement; Moist mucous membranes, Good dentition, No lesions  NECK: Supple, No JVD, Normal thyroid  NERVOUS SYSTEM:  Alert & , No focal deficit   CHEST/LUNG: Good air entry bilateral with no  rales, rhonchi, wheezing, or rubs  HEART: Regular rate and rhythm; No murmurs, rubs, or gallops  ABDOMEN: Soft, Nontender, Nondistended; Bowel sounds present  EXTREMITIES:  2+ Peripheral Pulses, No clubbing, cyanosis, or edema  SKIN: No rashes or lesions    Care Discussed with Consultants/Other Providers [ x] YES  [ ] NO

## 2021-10-16 NOTE — PROGRESS NOTE ADULT - SUBJECTIVE AND OBJECTIVE BOX
Date of service: 10/16/21    chief complaint: sob     extended hpi: 75 yo M with a PMH of CAD s/p MI w/stents 2007, CHF with 20% LVEF in 2016, AICD,  HTN, HLD, DM, gout, CKD, COPD, PVD s/p aortobifemoral bypass, proximal right leg dvt per April 20th sono on eliquis.  now with chf, acute  PE.     S: no chest pain or sob; pleasantly confused; ros otherwise unable to obtain    Review of Systems:   Constitutional: [ ] fevers, [ ] chills.   Skin: [ ] dry skin. [ ] rashes.  Psychiatric: [ ] depression, [ ] anxiety.   Gastrointestinal: [ ] BRBPR, [ ] melena.   Neurological: [ ] confusion. [ ] seizures. [ ] shuffling gait.   Ears,Nose,Mouth and Throat: [ ] ear pain [ ] sore throat.   Eyes: [ ] diplopia.   Respiratory: [ ] hemoptysis. [ ] shortness of breath  Cardiovascular: See HPI above  Hematologic/Lymphatic: [ ] anemia. [ ] painful nodes. [ ] prolonged bleeding.   Genitourinary: [ ] hematuria. [ ] flank pain.   Endocrine: [ ] significant change in weight. [ ] intolerance to heat and cold.     Review of systems [x] otherwise negative, [ ] otherwise unable to obtain    FH: no family history of sudden cardiac death in first degree relatives    SH: [ ] tobacco, [ ] alcohol, [ ] drugs    ALBUTerol    90 MICROgram(s) HFA Inhaler 2 Puff(s) Inhalation every 6 hours PRN  apixaban 10 milliGRAM(s) Oral every 12 hours  bisacodyl Suppository 10 milliGRAM(s) Rectal daily PRN  carvedilol 25 milliGRAM(s) Oral every 12 hours  cinacalcet 60 milliGRAM(s) Oral daily  dextrose 40% Gel 15 Gram(s) Oral once  dextrose 5%. 1000 milliLiter(s) IV Continuous <Continuous>  dextrose 5%. 1000 milliLiter(s) IV Continuous <Continuous>  dextrose 50% Injectable 25 Gram(s) IV Push once  dextrose 50% Injectable 12.5 Gram(s) IV Push once  dextrose 50% Injectable 25 Gram(s) IV Push once  furosemide    Tablet 40 milliGRAM(s) Oral daily  glucagon  Injectable 1 milliGRAM(s) IntraMuscular once  insulin lispro (ADMELOG) corrective regimen sliding scale   SubCutaneous three times a day before meals  insulin lispro (ADMELOG) corrective regimen sliding scale   SubCutaneous at bedtime  lactulose Syrup 10 Gram(s) Oral daily  melatonin 3 milliGRAM(s) Oral at bedtime  memantine 5 milliGRAM(s) Oral at bedtime  nitroglycerin     SubLingual 0.4 milliGRAM(s) SubLingual every 5 minutes PRN  pantoprazole    Tablet 40 milliGRAM(s) Oral before breakfast  sacubitril 24 mG/valsartan 26 mG 1 Tablet(s) Oral two times a day  senna 2 Tablet(s) Oral at bedtime                            10.4   7.04  )-----------( 187      ( 16 Oct 2021 08:56 )             30.0     141  |  105  |  18  ----------------------------<  102<H>  3.2<L>   |  26  |  0.98    Ca    9.0      16 Oct 2021 08:56  Phos  2.1     10-16  Mg     1.80     10-16    T(C): 36.6 (10-16-21 @ 17:33), Max: 36.9 (10-16-21 @ 11:50)  HR: 57 (10-16-21 @ 17:33) (54 - 64)  BP: 94/52 (10-16-21 @ 17:33) (84/44 - 110/87)  RR: 18 (10-16-21 @ 17:33) (17 - 19)  SpO2: 97% (10-16-21 @ 17:33) (96% - 98%)      General: NAD, remains confused   Head: Normocephalic and atraumatic.   Neck: No JVD. No bruits. Supple. Does not appear to be enlarged.   Cardiovascular: + S1,S2 ; RRR Soft systolic murmur at the left lower sternal border. No rubs noted.    Lungs: CTA b/l. No rhonchi, rales or wheezes.   Abdomen: + BS, soft. Non tender. Non distended. No rebound. No guarding.   Extremities: No clubbing/cyanosis/edema.   Skin: Warm and moist. The patient's skin has normal elasticity and good skin turgor.   Psychiatric: unable to assess    Tele: AP    < from: Transthoracic Echocardiogram (10.08.21 @ 11:41) >  1. Mitral annular calcification, otherwise normal mitral  valve. Mild-moderate mitral regurgitation.  2. Calcified trileaflet aortic valve with normal opening.  Mild-moderate aortic regurgitation.  3. Mildly dilated left atrium.  LA volume index = 36 cc/m2.  4. Mildleft ventricular enlargement.  5. Severe global left ventricular systolic dysfunction.  6. Unable to accurately evaluate right ventricular size or  systolic function.  A device wire is noted in the right  heart.  7. Inadequate tricuspid regurgitationDoppler envelope  precludes estimation of RVSP.  *** No previous Echo exam.    < end of copied text >      < from: CT Abdomen and Pelvis w/ IV Cont (10.13.21 @ 09:35) >  IMPRESSION:  Large right groin hematoma extending into the right retroperitoneum.    Findings were discussed with NATHALIA Soriano 10/13/2021 11:12 AM by Dr. Mar with read back confirmation.    < end of copied text >      A/P: 75 yo M with a PMH of CAD s/p MI w/stents 2007, CHF with 20% LVEF in 2016, AICD,  HTN, HLD, DM, gout, CKD, COPD, PVD s/p aortobifemoral bypass, proximal right leg dvt per 4/20, was on Eliquis, admitted with acute on chronic CHF and PE.     -pt. currently chest pain free with no symptoms of dyspnea  -AC for PE /DVT per primary team--currently on hold due to groin hematoma and RP bleed  -TTE with severe LV dysfunction which is known - pt. with AICD and last echocardiogram in 2020 demonstrated severe LV dysfunction as well  -continue with medical management of known cardiomyopathy with coreg and entresto   -appears euvolemic - continue with po lasix  -pt. with history of stents and has been maintained on sapt at home per chart - APT and ASA on hold given large Groin hematoma and RP bleed  -monitor H/H  -remains confused - workup per neurology

## 2021-10-16 NOTE — PROGRESS NOTE ADULT - ASSESSMENT
75 yo M with a PMH of CAD s/p MI w/stents 2007, CHF with 20% LVEF in 2016, AICD,  HTN, HLD, DM, gout, CKD, COPD, PVD s/p aortobifemoral bypass, proximal right leg dvt per April 20th sono on eliquis present with confusion and sob found to have pe started on AC. nephrology consulted for hypernatremia and hypercalcemia    Hypernatremia  encourage po free water  Sodium improved   monitor NA  at present  chf EF 20% on lasix 40mg po daily, Follow up Cardiology    Hypercalcemia  likely primary hyperparathyroidism. On  sensipar 60 daily per endo  pending Pthrp  vit d 25 optimal  spep, sif, k/l  monitor at present    HTN  controlled  monitor BP      HYpophosphatemia  REpelte K phos today  MOnitor serum PO4     PE/DVT   RP bleed AC on hold  DVT  f/u vascular

## 2021-10-17 NOTE — PROGRESS NOTE ADULT - SUBJECTIVE AND OBJECTIVE BOX
[facility]      Seen earlier today, and examined.  - Today, patient is without complaints.           *****MEDICATIONS: Current medication reviewed and documented.    MEDICATIONS  (STANDING):  apixaban 10 milliGRAM(s) Oral every 12 hours  carvedilol 25 milliGRAM(s) Oral every 12 hours  cinacalcet 60 milliGRAM(s) Oral daily  dextrose 40% Gel 15 Gram(s) Oral once  dextrose 5%. 1000 milliLiter(s) (50 mL/Hr) IV Continuous <Continuous>  dextrose 5%. 1000 milliLiter(s) (100 mL/Hr) IV Continuous <Continuous>  dextrose 50% Injectable 25 Gram(s) IV Push once  dextrose 50% Injectable 12.5 Gram(s) IV Push once  dextrose 50% Injectable 25 Gram(s) IV Push once  furosemide    Tablet 40 milliGRAM(s) Oral daily  glucagon  Injectable 1 milliGRAM(s) IntraMuscular once  insulin lispro (ADMELOG) corrective regimen sliding scale   SubCutaneous three times a day before meals  insulin lispro (ADMELOG) corrective regimen sliding scale   SubCutaneous at bedtime  lactulose Syrup 10 Gram(s) Oral daily  melatonin 3 milliGRAM(s) Oral at bedtime  memantine 5 milliGRAM(s) Oral at bedtime  pantoprazole    Tablet 40 milliGRAM(s) Oral before breakfast  sacubitril 24 mG/valsartan 26 mG 1 Tablet(s) Oral two times a day  senna 2 Tablet(s) Oral at bedtime    MEDICATIONS  (PRN):  ALBUTerol    90 MICROgram(s) HFA Inhaler 2 Puff(s) Inhalation every 6 hours PRN Shortness of Breath and/or Wheezing  bisacodyl Suppository 10 milliGRAM(s) Rectal daily PRN Constipation  nitroglycerin     SubLingual 0.4 milliGRAM(s) SubLingual every 5 minutes PRN Chest Pain          ***** VITAL SIGNS:  T(F): 97.8 (10-17-21 @ 20:53), Max: 97.9 (10-17-21 @ 10:40)  HR: 74 (10-17-21 @ 20:53) (56 - 80)  BP: 125/102 (10-17-21 @ 17:58) (98/60 - 125/102)  RR: 17 (10-17-21 @ 20:53) (15 - 18)  SpO2: 99% (10-17-21 @ 20:53) (95% - 100%)  Wt(kg): --  ,   I&O's Summary           *****PHYSICAL EXAM:   alert oriented x 2 attention comprehension are limited   Able to name, repeat. limited insight     EOmi fundi not visualized   no nystagmus VFF to confrontation  Tongue is midline  Palate elevates symmetrically   Moving all 4 ext spontaneously no drift appreciated    Gait not assessed.            *****LAB AND IMAGING:                        10.5   7.08  )-----------( 231      ( 17 Oct 2021 07:22 )             31.5               10-17    140  |  104  |  17  ----------------------------<  95  3.5   |  25  |  0.97    Ca    8.5      17 Oct 2021 07:22  Phos  2.4     10-17  Mg     1.80     10-17                           [All pertinent recent Imaging/Reports reviewed]           *****A S S E S S M E N T   A N D   P L A N :      75 yo M with a PMH of CAD s/p MI w/stents 2007, CHF with 20% LVEF in 2016, AICD,  HTN, HLD, DM, gout, CKD, COPD, PVD s/p aortobifemoral bypass, proximal right leg dvt per April 20th so  on eliquis. Pt lives alone,  speaks with sister  routinely (Gabriela [phone]). Per sister, had noticed increasing confusion over last 2weeks, but today was worst she had seen. Sister grew concerned, EMS called, noted to be sob. Pt per ed noted not taking any of his home meds.  CT angio chest with PE/pulm infarcts/RHF and strain, possible covid pna. Pt a/o 1, unable to provide history. Follow commands. HS trop 31, 32. ekg sinus tach, lafb. CT head with no acute findings    Of note, tbili>5 (as high as 10 April 2020) was supposed to be on lactulose per sister but not taking. Prior GI note mentioned possibility of liver biopsy, but appears not to have been performed. US abd from April 2020 noteworthy only for slight hepatomegaly     as per prior records, pt was admitted in 2016 with similiar presentation.   pt unable to provide any information regarding his presentation        Problem/Recommendations 1: ams of unclear etiology   suspect multifactorial metabolic encephalopathy due to hypercalcemia, hypernatremia, elevated bili, poor po intake, worsening underlying cognitive impairement   ( seen in 2016 with similar presentation )  r/o infectious process    b12/folate/tsh/ wnl   thiamine 500 iv q 8   ct head no acute path  unable to get mr due to AICD   sleep wake cycle regulation with melatonin   correct metabolic derangements.   namenda 5 mg bid   10/14 some improvement in mental state   10/15 engages in conversation   10/16 engages   avoid day time somnolence   10/17 doing ok     Problem/Recommendations 2: transaminitis   elevated bili low albumin   ? etiology   ammonia wnl       Problem/Recommendations 3: R retroperitoneal hematoma noted   vascular input appreciated   ac on hold     Thank you for allowing me to participate in the care of this patient. Will continue to follow patient periodically. Please do not hesitate to call me if you have any  questions or if there has been a change in patients neurological status     ________________  Mila Cazares MD  [facility]  [phone]      33 minutes spent on total encounter; more than 50 % of the visit was  spent counseling about plan of care, compliance to diet/exercise and medication regimen and or  coordinating care by the attending physician.      It is advised that stroke patients follow up with MCKAY Solorzano @ [phone] in 1- 2 weeks.   Others please follow up with Dr. Michael Nissenbaum [phone]

## 2021-10-17 NOTE — PROGRESS NOTE ADULT - ASSESSMENT
Assessment  DMT2: 76y Male with DM T2 with hyperglycemia admitted with SOB, patient was on oral hypoglycemic agents at home, sugars improved.  Hypercalcemia: Primary hyperparathyroidism nuclear scan pending, started on Sensipar calcium improving .  CAD: On medications, monitored, stable.  HTN: On medications, monitored,             Rosalva Louis MD  Cell: [phone]  Office: [phone]

## 2021-10-17 NOTE — PROGRESS NOTE ADULT - SUBJECTIVE AND OBJECTIVE BOX
[facility]   MD ZOHRA SCHAFFER MD RUORU WONG, PA    TEL:  OFFICE: [phone]  DR MITCHELL CELL: [phone]  TITI SOTOMAYOR CELL: [phone]  DR. CONROY CELL: [phone]      FROM 5 PM - 7 AM PLEASE CALL ANSWERING SERVICE: [phone]    RENAL FOLLOW UP NOTE--Date of Service 10-17-21 @ 10:32  --------------------------------------------------------------------------------  HPI:      Pt seen and examined at bedside.   Denies SOB, chest pain     PAST HISTORY  --------------------------------------------------------------------------------  No significant changes to PMH, PSH, FHx, SHx, unless otherwise noted    ALLERGIES & MEDICATIONS  --------------------------------------------------------------------------------  Allergies    No Known Drug Allergies  shellfish (Other; Urticaria)    Intolerances      Standing Inpatient Medications  apixaban 10 milliGRAM(s) Oral every 12 hours  carvedilol 25 milliGRAM(s) Oral every 12 hours  cinacalcet 60 milliGRAM(s) Oral daily  dextrose 40% Gel 15 Gram(s) Oral once  dextrose 5%. 1000 milliLiter(s) IV Continuous <Continuous>  dextrose 5%. 1000 milliLiter(s) IV Continuous <Continuous>  dextrose 50% Injectable 25 Gram(s) IV Push once  dextrose 50% Injectable 12.5 Gram(s) IV Push once  dextrose 50% Injectable 25 Gram(s) IV Push once  furosemide    Tablet 40 milliGRAM(s) Oral daily  glucagon  Injectable 1 milliGRAM(s) IntraMuscular once  insulin lispro (ADMELOG) corrective regimen sliding scale   SubCutaneous three times a day before meals  insulin lispro (ADMELOG) corrective regimen sliding scale   SubCutaneous at bedtime  lactulose Syrup 10 Gram(s) Oral daily  melatonin 3 milliGRAM(s) Oral at bedtime  memantine 5 milliGRAM(s) Oral at bedtime  pantoprazole    Tablet 40 milliGRAM(s) Oral before breakfast  potassium phosphate IVPB 15 milliMole(s) IV Intermittent once  sacubitril 24 mG/valsartan 26 mG 1 Tablet(s) Oral two times a day  senna 2 Tablet(s) Oral at bedtime    PRN Inpatient Medications  ALBUTerol    90 MICROgram(s) HFA Inhaler 2 Puff(s) Inhalation every 6 hours PRN  bisacodyl Suppository 10 milliGRAM(s) Rectal daily PRN  nitroglycerin     SubLingual 0.4 milliGRAM(s) SubLingual every 5 minutes PRN      REVIEW OF SYSTEMS  --------------------------------------------------------------------------------  General: no fever  CVS: no chest pain  MSK: no edema     VITALS/PHYSICAL EXAM  --------------------------------------------------------------------------------  T(C): 36.4 (10-17-21 @ 06:40), Max: 36.9 (10-16-21 @ 11:50)  HR: 79 (10-17-21 @ 06:40) (54 - 80)  BP: 112/59 (10-17-21 @ 06:40) (94/52 - 112/59)  RR: 18 (10-17-21 @ 06:40) (17 - 18)  SpO2: 99% (10-17-21 @ 06:40) (96% - 99%)  Wt(kg): --        Physical Exam:  	Gen: NAD  	HEENT: MMM  	Pulm: CTA B/L  	CV: S1S2  	Abd: Soft, +BS  	Ext: No LE edema B/L                      Neuro: Awake   	Skin: Warm and Dry   	Vascular access: NO HD catheter             riley  LABS/STUDIES  --------------------------------------------------------------------------------              10.5   7.08  >-----------<  231      [10-17-21 @ 07:22]              31.5     140  |  104  |  17  ----------------------------<  95      [10-17-21 @ 07:22]  3.5   |  25  |  0.97        Ca     8.5     [10-17-21 @ 07:22]      Mg     1.80     [10-17-21 @ 07:22]      Phos  2.4     [10-17-21 @ 07:22]            Creatinine Trend:  SCr 0.97 [10-17 @ 07:22]  SCr 0.98 [10-16 @ 08:56]  SCr 0.89 [10-15 @ 07:49]  SCr 0.82 [10-14 @ 07:46]  SCr 0.90 [10-13 @ 07:25]        PTH -- (Ca --)      [10-11-21 @ 03:57]   114  Vitamin D (25OH) 47.5      [10-11-21 @ 09:08]  HbA1c 6.5      [10-19-16 @ 08:19]  TSH 0.94      [10-11-21 @ 03:57]      Free Light Chains: kappa 2.28, lambda 3.37, ratio = 0.68      [10-11 @ 09:07]  SPEP Interpretation: with acute phase reaction. LORA Reyes MD      [10-11-21 @ 03:57]

## 2021-10-17 NOTE — PROGRESS NOTE ADULT - SUBJECTIVE AND OBJECTIVE BOX
Date of Service: 10-17-21 @ 15:01    Patient is a 76y old  Male who presents with a chief complaint of PE/AMS (17 Oct 2021 10:32)      Any change in ROS: Doingok: remains confused:     MEDICATIONS  (STANDING):  apixaban 10 milliGRAM(s) Oral every 12 hours  carvedilol 25 milliGRAM(s) Oral every 12 hours  cinacalcet 60 milliGRAM(s) Oral daily  dextrose 40% Gel 15 Gram(s) Oral once  dextrose 5%. 1000 milliLiter(s) (50 mL/Hr) IV Continuous <Continuous>  dextrose 5%. 1000 milliLiter(s) (100 mL/Hr) IV Continuous <Continuous>  dextrose 50% Injectable 25 Gram(s) IV Push once  dextrose 50% Injectable 12.5 Gram(s) IV Push once  dextrose 50% Injectable 25 Gram(s) IV Push once  furosemide    Tablet 40 milliGRAM(s) Oral daily  glucagon  Injectable 1 milliGRAM(s) IntraMuscular once  insulin lispro (ADMELOG) corrective regimen sliding scale   SubCutaneous at bedtime  insulin lispro (ADMELOG) corrective regimen sliding scale   SubCutaneous three times a day before meals  lactulose Syrup 10 Gram(s) Oral daily  melatonin 3 milliGRAM(s) Oral at bedtime  memantine 5 milliGRAM(s) Oral at bedtime  pantoprazole    Tablet 40 milliGRAM(s) Oral before breakfast  potassium phosphate IVPB 15 milliMole(s) IV Intermittent once  sacubitril 24 mG/valsartan 26 mG 1 Tablet(s) Oral two times a day  senna 2 Tablet(s) Oral at bedtime    MEDICATIONS  (PRN):  ALBUTerol    90 MICROgram(s) HFA Inhaler 2 Puff(s) Inhalation every 6 hours PRN Shortness of Breath and/or Wheezing  bisacodyl Suppository 10 milliGRAM(s) Rectal daily PRN Constipation  nitroglycerin     SubLingual 0.4 milliGRAM(s) SubLingual every 5 minutes PRN Chest Pain    Vital Signs Last 24 Hrs  T(C): 36.4 (17 Oct 2021 11:56), Max: 36.9 (16 Oct 2021 15:50)  T(F): 97.5 (17 Oct 2021 11:56), Max: 98.5 (16 Oct 2021 15:50)  HR: 57 (17 Oct 2021 11:56) (55 - 80)  BP: 114/60 (17 Oct 2021 11:56) (94/52 - 114/60)  BP(mean): --  RR: 18 (17 Oct 2021 11:56) (17 - 18)  SpO2: 100% (17 Oct 2021 11:56) (96% - 100%)    I&O's Summary        Physical Exam:   GENERAL: NAD, well-groomed, well-developed  HEENT: MICHELLE/   Atraumatic, Normocephalic  ENMT: No tonsillar erythema, exudates, or enlargement; Moist mucous membranes, Good dentition, No lesions  NECK: Supple, No JVD, Normal thyroid  CHEST/LUNG: Clear to auscultaion  CVS: Regular rate and rhythm; No murmurs, rubs, or gallops  GI: : Soft, Nontender, Nondistended; Bowel sounds present  NERVOUS SYSTEM:  Alert & Oriented X31  EXTREMITIES:  -r edema  LYMPH: No lymphadenopathy noted  SKIN: No rashes or lesions  ENDOCRINOLOGY: No Thyromegaly  PSYCH:calmand confuised@    Labs:                              10.5   7.08  )-----------( 231      ( 17 Oct 2021 07:22 )             31.5                         10.4   7.04  )-----------( 187      ( 16 Oct 2021 08:56 )             30.0                         11.4   8.36  )-----------( 198      ( 15 Oct 2021 07:49 )             33.7                         11.2   8.52  )-----------( 161      ( 14 Oct 2021 07:46 )             33.3                         11.6   8.97  )-----------( 181      ( 13 Oct 2021 17:06 )             34.0     10-17    140  |  104  |  17  ----------------------------<  95  3.5   |  25  |  0.97  10-16    141  |  105  |  18  ----------------------------<  102<H>  3.2<L>   |  26  |  0.98  10-15    146<H>  |  109<H>  |  18  ----------------------------<  97  3.5   |  25  |  0.89  10-14    147<H>  |  110<H>  |  17  ----------------------------<  110<H>  3.4<L>   |  26  |  0.82    Ca    8.5      17 Oct 2021 07:22  Ca    9.0      16 Oct 2021 08:56  Phos  2.4     10-17  Phos  2.1     10-16  Mg     1.80     10-17  Mg     1.80     10-16    TPro  6.2  /  Alb  2.6<L>  /  TBili  2.7<H>  /  DBili  x   /  AST  43<H>  /  ALT  32  /  AlkPhos  123<H>  10-14    CAPILLARY BLOOD GLUCOSE      POCT Blood Glucose.: 109 mg/dL (17 Oct 2021 11:23)  POCT Blood Glucose.: 117 mg/dL (17 Oct 2021 07:47)  POCT Blood Glucose.: 168 mg/dL (16 Oct 2021 21:10)  POCT Blood Glucose.: 114 mg/dL (16 Oct 2021 16:52)        r< from: US Duplex Venous Lower Ext Complete, Bilateral (10.14.21 @ 09:34) >  TECHNIQUE: Duplex sonography of theBILATERAL LOWER extremity veins with color and spectral Doppler, with and without compression.    FINDINGS:    A large right groin hematoma partially obscures the right common femoral vein limiting evaluation. Hematoma measures up to 5.4 cm.    RIGHT:  Posterior tibial vein is noncompressible, compatible with thrombus.    Normal compressibility of the RIGHT femoral and popliteal veins. The visualized portion of the right common femoral vein is patent.  Doppler examination shows normal spontaneousand phasic flow.      LEFT:  Normal compressibility of the LEFT common femoral, femoral and popliteal veins.  Doppler examination shows normal spontaneous and phasic flow.  No LEFT calf vein thrombosis is detected.    IMPRESSION:  RIGHT posterior tibial vein thrombus.    A large right groin hematoma limits evaluation of the right common femoral vein.    Acute deep venous thrombosis: below the knee.    --- End of Report ---              KALLIE PATEL MD; Attending Radiologist  This document has been electronically signed. Oct 14 2021 11:02AM    < end of copied text >            RECENT CULTURES:        RESPIRATORY CULTURES:          Studies  Chest X-RAY  CT SCAN Chest   Venous Dopplers: LE:   CT Abdomen  Others

## 2021-10-17 NOTE — PROGRESS NOTE ADULT - ASSESSMENT
75 y/o m with worsening encephelopathy in setting of acute PE     Problem/Plan - 1:  ·  Problem: Pulmonary embolism with DVT .   ·  Plan: -possibly 2/2 to eliquis non-compliance in setting of know proximal right leg dvt  -AC Eliquis started and HH stable .   -Pulmonary helping .      Problem/Plan - 2:  ·  Problem: Right Groin Hematoma with Retroperitoneal Bleed.   ·  Plan: -OFF AC . Monitoring HH . Vascular helping. Restarting AC as  Vascular cleared  and HH is stable.   < from: CT Abdomen and Pelvis w/ IV Cont (10.13.21 @ 09:35) >  IMPRESSION:  Large right groin hematoma extending into the right retroperitoneum.    < end of copied text >     Problem/Plan - 3:  ·  Problem: DM (diabetes mellitus).   ·  Plan: ISS  Sugars fine.      Problem/Plan - 4:  ·  Problem: Chronic Systolic CHF (congestive heart failure).   ·  Plan: c/w sacubitril , lasix, coreg  Cardiology helping.       Problem/Plan - 5:  ·  Problem: Hypernatremia .   ·  Plan: resolved.      Problem/Plan - 5:  ·  Problem: Hyperbilirubinemia  .   ·  Plan: Trending LFT .  GI helping.    CT scan noted.       Problem/Plan - 5:  ·  Problem: Hyperparathyroidism with Hypercalcemia .   ·  Plan: Endo helping .     Problem/Plan - 6:  ·  Problem: Dementia with worsening sec to metabolic Encephalopathy   ·  Plan: -Exact cause not known.  Bilirubin high but Ammonia normal.  CT head noted.   Neurology  helping.    Disposition ; DC planning pending placement .  Medically stable for DC .

## 2021-10-17 NOTE — PROGRESS NOTE ADULT - SUBJECTIVE AND OBJECTIVE BOX
Date of Service  : 10-17-21 @ 16:53    INTERVAL HPI/OVERNIGHT EVENTS: I feel oaky   Vital Signs Last 24 Hrs  T(C): 36.4 (17 Oct 2021 11:56), Max: 36.7 (16 Oct 2021 21:30)  T(F): 97.5 (17 Oct 2021 11:56), Max: 98.1 (16 Oct 2021 21:30)  HR: 57 (17 Oct 2021 11:56) (57 - 80)  BP: 114/60 (17 Oct 2021 11:56) (94/52 - 114/60)  BP(mean): --  RR: 18 (17 Oct 2021 11:56) (17 - 18)  SpO2: 100% (17 Oct 2021 11:56) (96% - 100%)  I&O's Summary    MEDICATIONS  (STANDING):  apixaban 10 milliGRAM(s) Oral every 12 hours  carvedilol 25 milliGRAM(s) Oral every 12 hours  cinacalcet 60 milliGRAM(s) Oral daily  dextrose 40% Gel 15 Gram(s) Oral once  dextrose 5%. 1000 milliLiter(s) (50 mL/Hr) IV Continuous <Continuous>  dextrose 5%. 1000 milliLiter(s) (100 mL/Hr) IV Continuous <Continuous>  dextrose 50% Injectable 25 Gram(s) IV Push once  dextrose 50% Injectable 12.5 Gram(s) IV Push once  dextrose 50% Injectable 25 Gram(s) IV Push once  furosemide    Tablet 40 milliGRAM(s) Oral daily  glucagon  Injectable 1 milliGRAM(s) IntraMuscular once  insulin lispro (ADMELOG) corrective regimen sliding scale   SubCutaneous at bedtime  insulin lispro (ADMELOG) corrective regimen sliding scale   SubCutaneous three times a day before meals  lactulose Syrup 10 Gram(s) Oral daily  melatonin 3 milliGRAM(s) Oral at bedtime  memantine 5 milliGRAM(s) Oral at bedtime  pantoprazole    Tablet 40 milliGRAM(s) Oral before breakfast  potassium phosphate IVPB 15 milliMole(s) IV Intermittent once  sacubitril 24 mG/valsartan 26 mG 1 Tablet(s) Oral two times a day  senna 2 Tablet(s) Oral at bedtime    MEDICATIONS  (PRN):  ALBUTerol    90 MICROgram(s) HFA Inhaler 2 Puff(s) Inhalation every 6 hours PRN Shortness of Breath and/or Wheezing  bisacodyl Suppository 10 milliGRAM(s) Rectal daily PRN Constipation  nitroglycerin     SubLingual 0.4 milliGRAM(s) SubLingual every 5 minutes PRN Chest Pain    LABS:                        10.5   7.08  )-----------( 231      ( 17 Oct 2021 07:22 )             31.5     10-17    140  |  104  |  17  ----------------------------<  95  3.5   |  25  |  0.97    Ca    8.5      17 Oct 2021 07:22  Phos  2.4     10-17  Mg     1.80     10-17          CAPILLARY BLOOD GLUCOSE      POCT Blood Glucose.: 174 mg/dL (17 Oct 2021 16:29)  POCT Blood Glucose.: 109 mg/dL (17 Oct 2021 11:23)  POCT Blood Glucose.: 117 mg/dL (17 Oct 2021 07:47)  POCT Blood Glucose.: 168 mg/dL (16 Oct 2021 21:10)          Consultant(s) Notes Reviewed:  [x ] YES  [ ] NO    PHYSICAL EXAM:  GENERAL: NAD, well-groomed, well-developed,not in any distress ,  HEAD:  Atraumatic, Normocephalic  EYES: EOMI, PERRLA, conjunctiva and sclera clear  ENMT: No tonsillar erythema, exudates, or enlargement; Moist mucous membranes, Good dentition, No lesions  NECK: Supple, No JVD, Normal thyroid  NERVOUS SYSTEM:  Alert & Oriented X1, No focal deficit   CHEST/LUNG: Good air entry bilateral with no  rales, rhonchi, wheezing, or rubs  HEART: Regular rate and rhythm; No murmurs, rubs, or gallops  ABDOMEN: Soft, Nontender, Nondistended; Bowel sounds present  EXTREMITIES:  2+ Peripheral Pulses, No clubbing, cyanosis, or edema  SKIN: No rashes or lesions    Care Discussed with Consultants/Other Providers [ x] YES  [ ] NO

## 2021-10-17 NOTE — PROGRESS NOTE ADULT - SUBJECTIVE AND OBJECTIVE BOX
Date of service: 10/17/21    chief complaint: sob     extended hpi: 75 yo M with a PMH of CAD s/p MI w/stents 2007, CHF with 20% LVEF in 2016, AICD,  HTN, HLD, DM, gout, CKD, COPD, PVD s/p aortobifemoral bypass, proximal right leg dvt per April 20th sono on eliquis.  now with chf, acute  PE.     S: no chest pain or sob; pleasantly confused; ros otherwise unable to obtain    Review of Systems:   Constitutional: [ ] fevers, [ ] chills.   Skin: [ ] dry skin. [ ] rashes.  Psychiatric: [ ] depression, [ ] anxiety.   Gastrointestinal: [ ] BRBPR, [ ] melena.   Neurological: [ ] confusion. [ ] seizures. [ ] shuffling gait.   Ears,Nose,Mouth and Throat: [ ] ear pain [ ] sore throat.   Eyes: [ ] diplopia.   Respiratory: [ ] hemoptysis. [ ] shortness of breath  Cardiovascular: See HPI above  Hematologic/Lymphatic: [ ] anemia. [ ] painful nodes. [ ] prolonged bleeding.   Genitourinary: [ ] hematuria. [ ] flank pain.   Endocrine: [ ] significant change in weight. [ ] intolerance to heat and cold.     Review of systems [x] otherwise negative, [ ] otherwise unable to obtain    FH: no family history of sudden cardiac death in first degree relatives    SH: [ ] tobacco, [ ] alcohol, [ ] drugs         ALBUTerol    90 MICROgram(s) HFA Inhaler 2 Puff(s) Inhalation every 6 hours PRN  apixaban 10 milliGRAM(s) Oral every 12 hours  bisacodyl Suppository 10 milliGRAM(s) Rectal daily PRN  carvedilol 25 milliGRAM(s) Oral every 12 hours  cinacalcet 60 milliGRAM(s) Oral daily  dextrose 40% Gel 15 Gram(s) Oral once  dextrose 5%. 1000 milliLiter(s) IV Continuous <Continuous>  dextrose 5%. 1000 milliLiter(s) IV Continuous <Continuous>  dextrose 50% Injectable 25 Gram(s) IV Push once  dextrose 50% Injectable 12.5 Gram(s) IV Push once  dextrose 50% Injectable 25 Gram(s) IV Push once  furosemide    Tablet 40 milliGRAM(s) Oral daily  glucagon  Injectable 1 milliGRAM(s) IntraMuscular once  insulin lispro (ADMELOG) corrective regimen sliding scale   SubCutaneous three times a day before meals  insulin lispro (ADMELOG) corrective regimen sliding scale   SubCutaneous at bedtime  lactulose Syrup 10 Gram(s) Oral daily  melatonin 3 milliGRAM(s) Oral at bedtime  memantine 5 milliGRAM(s) Oral at bedtime  nitroglycerin     SubLingual 0.4 milliGRAM(s) SubLingual every 5 minutes PRN  pantoprazole    Tablet 40 milliGRAM(s) Oral before breakfast  sacubitril 24 mG/valsartan 26 mG 1 Tablet(s) Oral two times a day  senna 2 Tablet(s) Oral at bedtime                            10.5   7.08  )-----------( 231      ( 17 Oct 2021 07:22 )             31.5       Hemoglobin: 10.5 g/dL (10-17 @ 07:22)  Hemoglobin: 10.4 g/dL (10-16 @ 08:56)  Hemoglobin: 11.4 g/dL (10-15 @ 07:49)  Hemoglobin: 11.2 g/dL (10-14 @ 07:46)  Hemoglobin: 11.6 g/dL (10-13 @ 17:06)      10-16    141  |  105  |  18  ----------------------------<  102<H>  3.2<L>   |  26  |  0.98    Ca    9.0      16 Oct 2021 08:56  Phos  2.1     10-16  Mg     1.80     10-16      Creatinine Trend: 0.98<--, 0.89<--, 0.82<--, 0.90<--, 0.91<--, 0.98<--    COAGS:           T(C): 36.4 (10-17-21 @ 06:40), Max: 36.9 (10-16-21 @ 11:50)  HR: 79 (10-17-21 @ 06:40) (54 - 80)  BP: 112/59 (10-17-21 @ 06:40) (94/52 - 112/59)  RR: 18 (10-17-21 @ 06:40) (17 - 18)  SpO2: 99% (10-17-21 @ 06:40) (96% - 99%)  Wt(kg): --    I&O's Summary    General: NAD, remains confused   Head: Normocephalic and atraumatic.   Neck: No JVD. No bruits. Supple. Does not appear to be enlarged.   Cardiovascular: + S1,S2 ; RRR Soft systolic murmur at the left lower sternal border. No rubs noted.    Lungs: CTA b/l. No rhonchi, rales or wheezes.   Abdomen: + BS, soft. Non tender. Non distended. No rebound. No guarding.   Extremities: No clubbing/cyanosis/edema.   Skin: Warm and moist. The patient's skin has normal elasticity and good skin turgor.   Psychiatric: unable to assess    Tele: AP    < from: Transthoracic Echocardiogram (10.08.21 @ 11:41) >  1. Mitral annular calcification, otherwise normal mitral  valve. Mild-moderate mitral regurgitation.  2. Calcified trileaflet aortic valve with normal opening.  Mild-moderate aortic regurgitation.  3. Mildly dilated left atrium.  LA volume index = 36 cc/m2.  4. Mildleft ventricular enlargement.  5. Severe global left ventricular systolic dysfunction.  6. Unable to accurately evaluate right ventricular size or  systolic function.  A device wire is noted in the right  heart.  7. Inadequate tricuspid regurgitationDoppler envelope  precludes estimation of RVSP.  *** No previous Echo exam.    < end of copied text >      < from: CT Abdomen and Pelvis w/ IV Cont (10.13.21 @ 09:35) >  IMPRESSION:  Large right groin hematoma extending into the right retroperitoneum.    Findings were discussed with NATHALIA Soriano 10/13/2021 11:12 AM by Dr. Mar with read back confirmation.    < end of copied text >      A/P: 75 yo M with a PMH of CAD s/p MI w/stents 2007, CHF with 20% LVEF in 2016, AICD,  HTN, HLD, DM, gout, CKD, COPD, PVD s/p aortobifemoral bypass, proximal right leg dvt per 4/20, was on Eliquis, admitted with acute on chronic CHF and PE.      - Bp stable, no chest pain   -AC for PE /DVT per primary team--currently on hold due to groin hematoma and RP bleed  -TTE with severe LV dysfunction which is known - pt. with AICD and last echocardiogram in 2020 demonstrated severe LV dysfunction as well  -continue with medical management of known cardiomyopathy with coreg and entresto   -appears euvolemic - continue with po lasix  -pt. with history of stents and has been maintained on sapt at home per chart - APT and ASA on hold given large Groin hematoma and RP bleed

## 2021-10-17 NOTE — PROGRESS NOTE ADULT - SUBJECTIVE AND OBJECTIVE BOX
Chief complaint    Patient is a 76y old  Male who presents with a chief complaint of PE/AMS (16 Oct 2021 18:03)   Review of systems  Patient in bed, appears comfortable.    Labs and Fingersticks  CAPILLARY BLOOD GLUCOSE      POCT Blood Glucose.: 117 mg/dL (17 Oct 2021 07:47)  POCT Blood Glucose.: 168 mg/dL (16 Oct 2021 21:10)  POCT Blood Glucose.: 114 mg/dL (16 Oct 2021 16:52)  POCT Blood Glucose.: 169 mg/dL (16 Oct 2021 11:46)  POCT Blood Glucose.: 101 mg/dL (16 Oct 2021 08:09)      Anion Gap, Serum: 10 (10-16 @ 08:56)      Calcium, Total Serum: 9.0 (10-16 @ 08:56)          10-16    141  |  105  |  18  ----------------------------<  102<H>  3.2<L>   |  26  |  0.98    Ca    9.0      16 Oct 2021 08:56  Phos  2.1     10-16  Mg     1.80     10-16                          10.5   7.08  )-----------( 231      ( 17 Oct 2021 07:22 )             31.5     Medications  MEDICATIONS  (STANDING):  apixaban 10 milliGRAM(s) Oral every 12 hours  carvedilol 25 milliGRAM(s) Oral every 12 hours  cinacalcet 60 milliGRAM(s) Oral daily  dextrose 40% Gel 15 Gram(s) Oral once  dextrose 5%. 1000 milliLiter(s) (50 mL/Hr) IV Continuous <Continuous>  dextrose 5%. 1000 milliLiter(s) (100 mL/Hr) IV Continuous <Continuous>  dextrose 50% Injectable 25 Gram(s) IV Push once  dextrose 50% Injectable 12.5 Gram(s) IV Push once  dextrose 50% Injectable 25 Gram(s) IV Push once  furosemide    Tablet 40 milliGRAM(s) Oral daily  glucagon  Injectable 1 milliGRAM(s) IntraMuscular once  insulin lispro (ADMELOG) corrective regimen sliding scale   SubCutaneous three times a day before meals  insulin lispro (ADMELOG) corrective regimen sliding scale   SubCutaneous at bedtime  lactulose Syrup 10 Gram(s) Oral daily  melatonin 3 milliGRAM(s) Oral at bedtime  memantine 5 milliGRAM(s) Oral at bedtime  pantoprazole    Tablet 40 milliGRAM(s) Oral before breakfast  sacubitril 24 mG/valsartan 26 mG 1 Tablet(s) Oral two times a day  senna 2 Tablet(s) Oral at bedtime      Physical Exam  General: Patient comfortable in bed  Vital Signs Last 12 Hrs  T(F): 97.5 (10-17-21 @ 06:40), Max: 98.1 (10-16-21 @ 21:30)  HR: 79 (10-17-21 @ 06:40) (61 - 80)  BP: 112/59 (10-17-21 @ 06:40) (98/60 - 112/59)  BP(mean): --  RR: 18 (10-17-21 @ 06:40) (18 - 18)  SpO2: 99% (10-17-21 @ 06:40) (96% - 99%)  Neck: No palpable thyroid nodules.  CVS: S1S2, No murmurs  Respiratory: No wheezing, no crepitations  GI: Abdomen soft, bowel sounds positive  Musculoskeletal:  edema lower extremities.     Diagnostics    NM Parathyroid Imaging w/Spect: Routine   Indication: Parathyroid adenoma  Transport: Walking  Provider's Contact #: [phone] (10-11 @ 08:08)  Vitamin D, 25-Hydroxy: AM Sched. Collection: 11-Oct-2021 06:00 (10-10 @ 10:41)  Vitamin D, 1, 25-Dihydroxy: AM Sched. Collection: 11-Oct-2021 06:00 (10-10 @ 10:41)  Parathyroid Hormone Intact w/o Calcium, Serum: AM Sched. Collection: 11-Oct-2021 06:00 (10-10 @ 10:41)  PTH Related Peptide: AM Sched. Collection: 11-Oct-2021 06:00 (10-10 @ 10:41)

## 2021-10-18 NOTE — PROGRESS NOTE ADULT - PROBLEM SELECTOR PLAN 4
Known severe LV dysfunction seen on TTE, LVEF 24%  -Diuresis as tolerated  -Management per cards
Suggest to continue medications, monitoring, FU primary team recommendations. .
GI recs noted  -F/u CT A/P
Suggest to continue medications, monitoring, FU primary team recommendations. .
GI recs noted  -F/u CT A/P
Suggest to continue medications, monitoring, FU primary team recommendations. .
GI recs noted  -F/u CT A/P
GI recs noted  -F/u CT A/P
Known severe LV dysfunction seen on TTE, LVEF 24%  -Diuresis as tolerated  -Management per cards

## 2021-10-18 NOTE — PROGRESS NOTE ADULT - SUBJECTIVE AND OBJECTIVE BOX
Date of Service: 10-18-21 @ 12:48    Patient is a 76y old  Male who presents with a chief complaint of PE/AMS (18 Oct 2021 12:43)    Any change in ROS:   o2 sats 93% on RA  No respiratory complaints    MEDICATIONS  (STANDING):  apixaban 10 milliGRAM(s) Oral every 12 hours  carvedilol 25 milliGRAM(s) Oral every 12 hours  cinacalcet 60 milliGRAM(s) Oral daily  dextrose 40% Gel 15 Gram(s) Oral once  dextrose 5%. 1000 milliLiter(s) (50 mL/Hr) IV Continuous <Continuous>  dextrose 5%. 1000 milliLiter(s) (100 mL/Hr) IV Continuous <Continuous>  dextrose 50% Injectable 25 Gram(s) IV Push once  dextrose 50% Injectable 12.5 Gram(s) IV Push once  dextrose 50% Injectable 25 Gram(s) IV Push once  furosemide    Tablet 40 milliGRAM(s) Oral daily  glucagon  Injectable 1 milliGRAM(s) IntraMuscular once  insulin lispro (ADMELOG) corrective regimen sliding scale   SubCutaneous three times a day before meals  insulin lispro (ADMELOG) corrective regimen sliding scale   SubCutaneous at bedtime  lactulose Syrup 10 Gram(s) Oral daily  melatonin 3 milliGRAM(s) Oral at bedtime  memantine 5 milliGRAM(s) Oral at bedtime  pantoprazole    Tablet 40 milliGRAM(s) Oral before breakfast  sacubitril 24 mG/valsartan 26 mG 1 Tablet(s) Oral two times a day  senna 2 Tablet(s) Oral at bedtime    MEDICATIONS  (PRN):  ALBUTerol    90 MICROgram(s) HFA Inhaler 2 Puff(s) Inhalation every 6 hours PRN Shortness of Breath and/or Wheezing  bisacodyl Suppository 10 milliGRAM(s) Rectal daily PRN Constipation  nitroglycerin     SubLingual 0.4 milliGRAM(s) SubLingual every 5 minutes PRN Chest Pain    Vital Signs Last 24 Hrs  T(C): 36.8 (18 Oct 2021 05:30), Max: 36.8 (18 Oct 2021 05:30)  T(F): 98.2 (18 Oct 2021 05:30), Max: 98.2 (18 Oct 2021 05:30)  HR: 60 (18 Oct 2021 05:30) (56 - 74)  BP: 98/60 (18 Oct 2021 05:30) (98/60 - 125/102)  BP(mean): --  RR: 18 (18 Oct 2021 05:30) (15 - 18)  SpO2: 98% (18 Oct 2021 05:30) (95% - 99%)    Physical Exam:   GENERAL: NAD  KEISHA: MICHELLE  ENMT: No tonsillar erythema, exudates, or enlargement  NECK: Supple, No JVD  CHEST/LUNG: Clear to auscultation b/l   CVS: Regular rate and rhythm  GI: : Soft, Nontender, Nondistended  NERVOUS SYSTEM:  Alert & Oriented X2  EXTREMITIES:  2+ Peripheral Pulses  SKIN: No rashes or lesions  PSYCH: Appropriate    Labs:                          10.2   6.79  )-----------( 229      ( 18 Oct 2021 07:37 )             30.1                         10.5   7.08  )-----------( 231      ( 17 Oct 2021 07:22 )             31.5                         10.4   7.04  )-----------( 187      ( 16 Oct 2021 08:56 )             30.0                         11.4   8.36  )-----------( 198      ( 15 Oct 2021 07:49 )             33.7     10-18    139  |  106  |  15  ----------------------------<  81  4.3   |  19<L>  |  0.94  10-17    140  |  104  |  17  ----------------------------<  95  3.5   |  25  |  0.97  10-16    141  |  105  |  18  ----------------------------<  102<H>  3.2<L>   |  26  |  0.98  10-15    146<H>  |  109<H>  |  18  ----------------------------<  97  3.5   |  25  |  0.89    Ca    8.7      18 Oct 2021 07:37  Ca    8.5      17 Oct 2021 07:22  Phos  2.5     10-18  Phos  2.4     10-17  Mg     1.80     10-18  Mg     1.80     10-17      CAPILLARY BLOOD GLUCOSE  POCT Blood Glucose.: 160 mg/dL (18 Oct 2021 11:54)  POCT Blood Glucose.: 98 mg/dL (18 Oct 2021 07:49)  POCT Blood Glucose.: 158 mg/dL (17 Oct 2021 21:11)  POCT Blood Glucose.: 174 mg/dL (17 Oct 2021 16:29)      Studies    CT SCAN Chest < from: CT Angio Chest PE Protocol w/ IV Cont (10.07.21 @ 21:03) >  FINDINGS:    LUNGS AND AIRWAYS:  Patchy bilateral lung opacities,, most prominent in the right middle lobe and right lower lobe. Additional peripheral wedge-shaped opacities in the lingula (2, 54) contain central cavitations. Findings likely reflect pulmonary infarctions; associated Covid-related pneumonia with thrombus/infarctions are not excluded.    PLEURA: Small bilateral right greater than left pleural effusions.  MEDIASTINUM AND MIKAL: A few subcentimeter mediastinal lymph nodes.  VESSELS: Pulmonary embolus in the main right pulmonary artery extending into the segmental branches of branches of the right lower lobe (2, 77), the right middle lobe (2, 54), and the right upper lobe (2, 34). Clear left pulmonary artery  HEART: Straightening of the intraventricular septum and reflux of contrast into the suprahepatic IVC.. No pericardial effusion.  CHEST WALL AND LOWER NECK: Within normal limits.  VISUALIZED UPPER ABDOMEN: Within normal limits.  BONES: Degenerative changes of the spine.    IMPRESSION:    Acute pulmonary emboli in the main right pulmonary artery extending into the segmental branches of the right right upper, middle, and lower lobes. Pulmonary infarction in the right middle and upper lobes, as well as the lingula. Associated Covid 19 is not excluded and testing is recommended.    CT evidence of right heart strain/failure with straightening of the intraventricular septum and reflux of contrast into the suprahepatic IVC. Recommend echocardiogram.    These findings were discussed with Dr. CASTILLO [phone] at 10/7/2021 9:06 PM by Dr. Cohen with read back confirmation.    --- End of Report ---    < end of copied text >    Venous Dopplers: LE:   CT Abdomen< from: CT Abdomen and Pelvis w/ IV Cont (10.13.21 @ 09:35) >  FINDINGS:  LOWER CHEST: Redemonstrated partially imaged bilateral pulmonary emboli with bilateral pulmonary Infarcts. Unchanged trace bilateral pleural effusions. Heart is enlarged with partially imaged cardiac device leads.    LIVER: Within normal limits.  BILE DUCTS: Normal caliber.  GALLBLADDER: Within normal limits.  SPLEEN: A 1.7 cm splenic lesion, likely a hemangioma or hamartoma. PANCREAS: Within normal limits.  ADRENALS: Mildleft adrenal gland thickening.  KIDNEYS/URETERS: Left renal cysts. No hydronephrosis.    BLADDER: Within normal limits.  REPRODUCTIVE ORGANS: Prostate is enlarged.    BOWEL: No bowel obstruction. Appendix is normal. Colonic diverticula.  PERITONEUM: No ascites.  VESSELS: Atherosclerotic changes. 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  LYMPH NODES: No lymphadenopathy.  ABDOMINAL WALL/RETROPERITONEUM: Right groin multilobulated hematoma with the largest component measuring 10.4 x 6.7 x 10.6 cm Hematoma tracks cranially along the iliopsoas muscle into the retroperitoneum.  BONES: Degenerative changes.    IMPRESSION:  Large right groin hematoma extending into the right retroperitoneum.    Findings were discussed with NATHALIA Soriano 10/13/2021 11:12 AM by Dr. Mar with read back confirmation.    --- End of Report ---      < end of copied text >    < from: Transthoracic Echocardiogram (10.08.21 @ 11:41) >  ------------------------------------------------------------------------  DIMENSIONS:  Dimensions:     Normal Values:  LA:     4.5 cm    2.0 - 4.0 cm  Ao:     3.1 cm    2.0 - 3.8 cm  SEPTUM: 0.6 cm    0.6 - 1.2 cm  PWT:    0.7 cm    0.6 - 1.1 cm  LVIDd:  6.3 cm    3.0 - 5.6 cm  LVIDs:  5.6 cm    1.8 - 4.0 cm  Derived Variables:  LVMI: 73 g/m2  RWT: 0.22  Fractional short: 11 %  Ejection Fraction (Teicholtz): 24 %  ------------------------------------------------------------------------  OBSERVATIONS:  Mitral Valve: Mitral annular calcification, otherwise  normal mitral valve. Mild-moderate mitral regurgitation.  Aortic Root: Normal aortic root.  Aortic Valve: Calcified trileaflet aortic valve with normal  opening. Mild-moderate aortic regurgitation.  Left Atrium: Mildly dilated left atrium.  LA volume index =  36 cc/m2.  Left Ventricle: Severe global left ventricular systolic  dysfunction. Mild left ventricular enlargement.  Right Heart: Normal rightatrium. Unable to accurately  evaluate right ventricular size or systolic function.  A  device wire is noted in the right heart. Normal tricuspid  valve.  Mild-moderate tricuspid regurgitation. Pulmonic  valve not well visualized.  Pericardium/PleuraNormal pericardium with no pericardial  effusion.  Hemodynamic: Inadequate tricuspid regurgitation Doppler  envelope precludes estimation of RVSP.  ------------------------------------------------------------------------  CONCLUSIONS:  1. Mitral annular calcification, otherwise normal mitral  valve. Mild-moderate mitral regurgitation.  2. Calcified trileaflet aortic valve with normal opening.  Mild-moderate aortic regurgitation.  3. Mildly dilated left atrium.  LA volume index = 36 cc/m2.  4. Mildleft ventricular enlargement.  5. Severe global left ventricular systolic dysfunction.  6. Unable to accurately evaluate right ventricular size or  systolic function.  A device wire is noted in the right  heart.  7. Inadequate tricuspid regurgitationDoppler envelope  precludes estimation of RVSP.  *** No previous Echo exam.    < end of copied text >

## 2021-10-18 NOTE — PROGRESS NOTE ADULT - PROVIDER SPECIALTY LIST ADULT
Cardiology
Cardiology
Endocrinology
Gastroenterology
Internal Medicine
Nephrology
Nephrology
Neurology
Vascular Surgery
Cardiology
Gastroenterology
Gastroenterology
Internal Medicine
Nephrology
Neurology
Cardiology
Gastroenterology
Internal Medicine
Neurology
Neurology
Gastroenterology
Internal Medicine
Internal Medicine
Nephrology
Vascular Surgery
Pulmonology
Endocrinology
Nephrology
Nephrology
Pulmonology
Endocrinology
Pulmonology
Pulmonology
Endocrinology
Pulmonology

## 2021-10-18 NOTE — PROGRESS NOTE ADULT - PROBLEM SELECTOR PLAN 5
Per hx  -Does not appear exacerbated  -Can monitor off standing bronchodilators, no wheezing on exam  -Will add PRN Albuterol for SOB/wheezing
per GI
Per hx  -Does not appear exacerbated  -Can monitor off standing bronchodilators, no wheezing on exam  -Will add PRN Albuterol for SOB/wheezing
per GI
Per hx  -Does not appear exacerbated  -Can monitor off standing bronchodilators, no wheezing on exam  -Will add PRN Albuterol for SOB/wheezing
per GI
per GI

## 2021-10-18 NOTE — PROGRESS NOTE ADULT - PROBLEM SELECTOR PROBLEM 4
Other encephalopathy
Elevated LFTs
CHF (congestive heart failure)
Elevated LFTs
CHF (congestive heart failure)
Elevated LFTs
Elevated LFTs
Other encephalopathy
CHF (congestive heart failure)

## 2021-10-18 NOTE — PROGRESS NOTE ADULT - ATTENDING COMMENTS
Patient seen and examined.  Agree with above.   AC on hold for right groin hematoma and RP bleed  Management of PE per primary team  Monitor H/H    Kaylen Waldrop MD
Patient seen and examined.  Agree with above.   AC restarted  Follow up primary team to see if and when sapt can safely be restarted    Kaylen Waldrop MD
Patient seen and examined.  Agree with above.   No further inpatient cardiac workup needed at this time.   Treatment of PE and RPH per primary team and vascular    Kaylen Waldrop MD
Patient seen and examined.  Agree with above.   Pt. with atypical chest pain today   Troponin indeterminate with negative CPK inconsistent with acs  Follow up pulmonary regarding PE treatment - currently on ac with hep gtt  Pt. with AMS - workup and treatment of encephalopathy per neurology     Kaylen Waldrop MD

## 2021-10-18 NOTE — DISCHARGE NOTE PROVIDER - CARE PROVIDER_API CALL
Kaylen Waldrop (MD)  Cardiovascular Disease; Internal Medicine; Interventional Cardiology  [address] [place] [id number]  Phone: [phone]  Fax: [phone]  Follow Up Time:

## 2021-10-18 NOTE — PROGRESS NOTE ADULT - TIME BILLING
assessment/plan and coordinating care
assessment/plan and coordinating care
pt and acp
assessment/plan and coordinating care

## 2021-10-18 NOTE — PROGRESS NOTE ADULT - SUBJECTIVE AND OBJECTIVE BOX
Date of Service  : 10-18-21     INTERVAL HPI/OVERNIGHT EVENTS: I feel fine.   Vital Signs Last 24 Hrs  T(C): 36.9 (18 Oct 2021 14:06), Max: 36.9 (18 Oct 2021 14:06)  T(F): 98.5 (18 Oct 2021 14:06), Max: 98.5 (18 Oct 2021 14:06)  HR: 57 (18 Oct 2021 14:06) (57 - 74)  BP: 95/55 (18 Oct 2021 14:06) (95/55 - 125/102)  BP(mean): --  RR: 18 (18 Oct 2021 14:06) (17 - 18)  SpO2: 98% (18 Oct 2021 14:06) (97% - 99%)  I&O's Summary    MEDICATIONS  (STANDING):  apixaban 10 milliGRAM(s) Oral every 12 hours  carvedilol 25 milliGRAM(s) Oral every 12 hours  cinacalcet 60 milliGRAM(s) Oral daily  dextrose 40% Gel 15 Gram(s) Oral once  dextrose 5%. 1000 milliLiter(s) (50 mL/Hr) IV Continuous <Continuous>  dextrose 5%. 1000 milliLiter(s) (100 mL/Hr) IV Continuous <Continuous>  dextrose 50% Injectable 25 Gram(s) IV Push once  dextrose 50% Injectable 12.5 Gram(s) IV Push once  dextrose 50% Injectable 25 Gram(s) IV Push once  furosemide    Tablet 40 milliGRAM(s) Oral daily  glucagon  Injectable 1 milliGRAM(s) IntraMuscular once  insulin lispro (ADMELOG) corrective regimen sliding scale   SubCutaneous three times a day before meals  insulin lispro (ADMELOG) corrective regimen sliding scale   SubCutaneous at bedtime  lactulose Syrup 10 Gram(s) Oral daily  melatonin 3 milliGRAM(s) Oral at bedtime  memantine 5 milliGRAM(s) Oral at bedtime  pantoprazole    Tablet 40 milliGRAM(s) Oral before breakfast  sacubitril 24 mG/valsartan 26 mG 1 Tablet(s) Oral two times a day  senna 2 Tablet(s) Oral at bedtime    MEDICATIONS  (PRN):  ALBUTerol    90 MICROgram(s) HFA Inhaler 2 Puff(s) Inhalation every 6 hours PRN Shortness of Breath and/or Wheezing  bisacodyl Suppository 10 milliGRAM(s) Rectal daily PRN Constipation  nitroglycerin     SubLingual 0.4 milliGRAM(s) SubLingual every 5 minutes PRN Chest Pain    LABS:                        10.2   6.79  )-----------( 229      ( 18 Oct 2021 07:37 )             30.1     10-18    139  |  106  |  15  ----------------------------<  81  4.3   |  19<L>  |  0.94    Ca    8.7      18 Oct 2021 07:37  Phos  2.5     10-18  Mg     1.80     10-18          CAPILLARY BLOOD GLUCOSE      POCT Blood Glucose.: 76 mg/dL (18 Oct 2021 17:05)  POCT Blood Glucose.: 160 mg/dL (18 Oct 2021 11:54)  POCT Blood Glucose.: 98 mg/dL (18 Oct 2021 07:49)  POCT Blood Glucose.: 158 mg/dL (17 Oct 2021 21:11)          REVIEW OF SYSTEMS:  CONSTITUTIONAL: No fever, weight loss, or fatigue  EYES: No eye pain, visual disturbances, or discharge  ENMT:  No difficulty hearing, tinnitus, vertigo; No sinus or throat pain  NECK: No pain or stiffness  RESPIRATORY: No cough, wheezing, chills or hemoptysis; No shortness of breath  CARDIOVASCULAR: No chest pain, palpitations, dizziness, or leg swelling  GASTROINTESTINAL: No abdominal or epigastric pain. No nausea, vomiting, or hematemesis; No diarrhea or constipation. No melena or hematochezia.  GENITOURINARY: No dysuria, frequency, hematuria, or incontinence      Consultant(s) Notes Reviewed:  [x ] YES  [ ] NO    PHYSICAL EXAM:  GENERAL: NAD, well-groomed, well-developed ,not in any distress ,  HEAD:  Atraumatic, Normocephalic  EYES: EOMI, PERRLA, conjunctiva and sclera clear  ENMT: No tonsillar erythema, exudates, or enlargement; Moist mucous membranes, Good dentition, No lesions  NECK: Supple, No JVD, Normal thyroid  NERVOUS SYSTEM:  Alert & , No focal deficit   CHEST/LUNG: Good air entry bilateral with no  rales, rhonchi, wheezing, or rubs  HEART: Regular rate and rhythm; No murmurs, rubs, or gallops  ABDOMEN: Soft, Nontender, Nondistended; Bowel sounds present  EXTREMITIES:  2+ Peripheral Pulses, No clubbing, cyanosis, or edema  SKIN: No rashes or lesions    Care Discussed with Consultants/Other Providers [ x] YES  [ ] NO

## 2021-10-18 NOTE — DISCHARGE NOTE NURSING/CASE MANAGEMENT/SOCIAL WORK - NSDCVIVACCINE_GEN_ALL_CORE_FT
influenza, injectable, quadrivalent, preservative free; 26-Oct-2016 16:29; Tiffany Machuca (RN); [employer]; [id number]; IntraMuscular; Deltoid Right.; 0.5 milliLiter(s); VIS (VIS Published: 07-Aug-2015, VIS Presented: 26-Oct-2016);

## 2021-10-18 NOTE — PROGRESS NOTE ADULT - PROBLEM SELECTOR PLAN 3
AMS appears slightly improved, but remains confused  -per primary team  -neuro recs noted  -CT head negative
Known severe LV dysfunction seen on TTE, LVEF 24%  -Diuresis as tolerated  -Management per cards
Known severe LV dysfunction seen on TTE, LVEF 24%  -Diuresis as tolerated  -Management per cards
On medications,  no chest pain, stable, monitored and followed up by primary team/cardiology team
Known severe LV dysfunction seen on TTE, LVEF 24%  -Diuresis as tolerated  -Management per cards
On medications,  no chest pain, stable, monitored and followed up by primary team/cardiology team
On medications,  no chest pain, stable, monitored and followed up by primary team/cardiology team
AMS appears slightly improved, but remains confused  -per primary team  -neuro recs noted  -CT head negative
On medications,  no chest pain, stable, monitored and followed up by primary team/cardiology team
On medications,  no chest pain, stable, monitored and followed up by primary team/cardiology team
AMS appears slightly improved, but remains confused at times  -per primary team  -neuro recs noted  -CT head negative
Known severe LV dysfunction seen on TTE, LVEF 24%  -Diuresis as tolerated  -Management per cards
AMS appears slightly improved, but remains confused  -per primary team  -neuro recs noted  -CT head negative

## 2021-10-18 NOTE — DISCHARGE NOTE NURSING/CASE MANAGEMENT/SOCIAL WORK - PATIENT PORTAL LINK FT
You can access the FollowMyHealth Patient Portal offered by [facility] by registering at the following website: [email or web address]. By joining [facility]’s FollowMyHealth portal, you will also be able to view your health information using other applications (apps) compatible with our system.

## 2021-10-18 NOTE — PROGRESS NOTE ADULT - PROBLEM SELECTOR PROBLEM 2
Hypercalcemia
Other encephalopathy
Retroperitoneal bleed
Hypercalcemia
Retroperitoneal bleed
Other encephalopathy
Retroperitoneal bleed
Hypercalcemia
Other encephalopathy
Other encephalopathy
Hypercalcemia
Other encephalopathy
Retroperitoneal bleed

## 2021-10-18 NOTE — PROGRESS NOTE ADULT - PROBLEM SELECTOR PLAN 1
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
cont Heparin for now  switch to NOAC eventually
Will continue current insulin regimen for now. Will continue monitoring  blood sugars, will Follow up.  Patient counseled for compliance with consistent low carb diet.
Will continue current insulin regimen for now. Will continue monitoring  blood sugars, will Follow up.  Patient counseled for compliance with consistent low carb diet.
CTA chest with R main pulmonary artery PE, extending into segmental branches of RUL, RML, and RLL with associated infarct  -CT evidence of R heart strain  -AC with heparin drip for now, eventual transition to NOAC  -B/L LE duplex with no DVT
Will continue current insulin regimen for now. Will continue monitoring  blood sugars, will Follow up.  Patient counseled for compliance with consistent low carb diet.
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
CTA chest with R main pulmonary artery PE, extending into segmental branches of RUL, RML, and RLL with associated infarct  new events noted: has RP bleed: off heparin : no dvt: : repeat dopplers:    10/14: he has below knee dvt on repeat dopplers: He does have rt groin hematoma: if ac can not be started secondary to hematoma: then he would need repeat dopplers in 304 days time to ensure the dvt has not extended above the knee:
Will continue current insulin regimen for now. Will continue monitoring  blood sugars, will Follow up.  Patient counseled for compliance with consistent low carb diet.
Will continue current insulin regimen for now. Will continue monitoring  blood sugars, will Follow up.  Patient counseled for compliance with consistent low carb diet.
CTA chest with R main pulmonary artery PE, extending into segmental branches of RUL, RML, and RLL with associated infarct  new evetnts noted: has RP bleed: off heparin : no dvt: : repeat dopplers:
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

## 2021-10-18 NOTE — PROGRESS NOTE ADULT - PROBLEM SELECTOR PLAN 6
Per hx  -Does not appear exacerbated  -Can monitor off standing bronchodilators, no wheezing on exam  -PRN Albuterol for SOB/wheezing

## 2021-10-18 NOTE — PROGRESS NOTE ADULT - SUBJECTIVE AND OBJECTIVE BOX
[facility]   MD ZOHRA SCHAFFER PA    TEL:  OFFICE: [phone]  DR MITCHELL CELL: [phone]  DR. CONROY CELL: [phone]  SOLE SOTOMAYOR CELL: [phone]    From 5pm-7am Answering Service [phone]    -- RENAL FOLLOW UP NOTE ---Date of Service 10-18-21 @ 10:56    Patient is a 76y old  Male who presents with a chief complaint of PE/AMS (17 Oct 2021 16:52)      Patient seen and examined at bedside. No chest pain/sob    VITALS:  T(F): 98.2 (10-18-21 @ 05:30), Max: 98.2 (10-18-21 @ 05:30)  HR: 60 (10-18-21 @ 05:30)  BP: 98/60 (10-18-21 @ 05:30)  RR: 18 (10-18-21 @ 05:30)  SpO2: 98% (10-18-21 @ 05:30)  Wt(kg): --        PHYSICAL EXAM:  Constitutional: NAD  Neck: No JVD  Respiratory: CTAB, no wheezes, rales or rhonchi  Cardiovascular: S1, S2, RRR  Gastrointestinal: BS+, soft, NT/ND  Extremities: No peripheral edema    Hospital Medications:   MEDICATIONS  (STANDING):  apixaban 10 milliGRAM(s) Oral every 12 hours  carvedilol 25 milliGRAM(s) Oral every 12 hours  cinacalcet 60 milliGRAM(s) Oral daily  dextrose 40% Gel 15 Gram(s) Oral once  dextrose 5%. 1000 milliLiter(s) (50 mL/Hr) IV Continuous <Continuous>  dextrose 5%. 1000 milliLiter(s) (100 mL/Hr) IV Continuous <Continuous>  dextrose 50% Injectable 25 Gram(s) IV Push once  dextrose 50% Injectable 12.5 Gram(s) IV Push once  dextrose 50% Injectable 25 Gram(s) IV Push once  furosemide    Tablet 40 milliGRAM(s) Oral daily  glucagon  Injectable 1 milliGRAM(s) IntraMuscular once  insulin lispro (ADMELOG) corrective regimen sliding scale   SubCutaneous three times a day before meals  insulin lispro (ADMELOG) corrective regimen sliding scale   SubCutaneous at bedtime  lactulose Syrup 10 Gram(s) Oral daily  melatonin 3 milliGRAM(s) Oral at bedtime  memantine 5 milliGRAM(s) Oral at bedtime  pantoprazole    Tablet 40 milliGRAM(s) Oral before breakfast  sacubitril 24 mG/valsartan 26 mG 1 Tablet(s) Oral two times a day  senna 2 Tablet(s) Oral at bedtime      LABS:  10-18    139  |  106  |  15  ----------------------------<  81  4.3   |  19<L>  |  0.94    Ca    8.7      18 Oct 2021 07:37  Phos  2.5     10-18  Mg     1.80     10-18      Creatinine Trend: 0.94 <--, 0.97 <--, 0.98 <--, 0.89 <--, 0.82 <--, 0.90 <--, 0.91 <--    Phosphorus Level, Serum: 2.5 mg/dL (10-18 @ 07:37)                              10.2   6.79  )-----------( 229      ( 18 Oct 2021 07:37 )             30.1     Urine Studies:      PTH -- (Ca --)      [10-11-21 @ 03:57]   114  Vitamin D (25OH) 47.5      [10-11-21 @ 09:08]  TSH 0.94      [10-11-21 @ 03:57]      Free Light Chains: kappa 2.28, lambda 3.37, ratio = 0.68      [10-11 @ 09:07]  SPEP Interpretation: with acute phase reaction. LORA Reyes MD      [10-11-21 @ 03:57]    RADIOLOGY & ADDITIONAL STUDIES:

## 2021-10-18 NOTE — DISCHARGE NOTE PROVIDER - HOSPITAL COURSE
75 YO M with a PMH of CAD s/p MI w/stents 2007, CHF with 20% LVEF in 2016, AICD,  HTN, HLD, DM, gout, CKD, COPD, PVD s/p aortobifemoral bypass, proximal right leg dvt per 4/20, was on Eliquis, admitted with acute on chronic CHF and PE.     Pulmonary embolism:   CTA chest with R main pulmonary artery PE, extending into segmental branches of RUL, RML, and RLL with associated infarct  History of proximal R leg DVT on home Eliquis but noncompliant  TTE with severe LV dysfunction which is known - pt. with AICD and last echocardiogram in 2020 demonstrated severe LV dysfunction as well  PE likely 2nd to AC noncompliance in the setting of known DVT  Appears euvolemic - continue with po lasix  CT evidence of R heart strain  Repeat dopplers with + R below the knee DVT  AC stopped for R groin hematoma/RP bleed, now resumed    Retroperitoneal bleed:  CT with large right groin hematoma extending into the right retroperitoneum  AC resumed  Continue Eliquis     Other encephalopathy:  AMS appears slightly improved, but remains confused at times  CT head negative    CHF (congestive heart failure):   Known severe LV dysfunction seen on TTE, LVEF 24%  Low salt diet, fluid restriction to 1500 ml daily, monitor your fluid intake and weight daily, exercise as tolerated 30 minutes daily, and follow up with your physician within 7 days.    Elevated LFTs:   Continue to monitor outpatient     History of COPD:   Does not appear exacerbated  Can monitor off standing bronchodilators, no wheezing on exam  Smoking cessation, continue current medications as prescribed. Follow up with your routine physician's appointments    10/18: Case discussed with Dr. Wilson. Patient stable for discharge. Medications reviewed and sent to agreed upon pharmacy.

## 2021-10-18 NOTE — PROGRESS NOTE ADULT - ASSESSMENT
75 y/o m with worsening encephelopathy in setting of acute PE     Problem/Plan - 1:  ·  Problem: Pulmonary embolism with DVT .   ·  Plan: -possibly 2/2 to eliquis non-compliance in setting of know proximal right leg dvt  -AC Eliquis started and HH stable .   -Pulmonary helping .      Problem/Plan - 2:  ·  Problem: Right Groin Hematoma with Retroperitoneal Bleed.   ·  Plan: -OFF AC . Monitoring HH . Vascular helping. Restarting AC as  Vascular cleared  and HH is stable.   < from: CT Abdomen and Pelvis w/ IV Cont (10.13.21 @ 09:35) >  IMPRESSION:  Large right groin hematoma extending into the right retroperitoneum.    < end of copied text >     Problem/Plan - 3:  ·  Problem: DM (diabetes mellitus).   ·  Plan: ISS  Sugars fine.      Problem/Plan - 4:  ·  Problem: Chronic Systolic CHF (congestive heart failure).   ·  Plan: c/w sacubitril , lasix, coreg  Cardiology helping.       Problem/Plan - 5:  ·  Problem: Hypernatremia .   ·  Plan: resolved.      Problem/Plan - 5:  ·  Problem: Hyperbilirubinemia  .   ·  Plan: Trending LFT .  GI helping.    CT scan noted.       Problem/Plan - 5:  ·  Problem: Hyperparathyroidism with Hypercalcemia .   ·  Plan: Endo helping .     Problem/Plan - 6:  ·  Problem: Dementia with worsening sec to metabolic Encephalopathy   ·  Plan: -Exact cause not known.  Bilirubin high but Ammonia normal.  CT head noted.   Neurology  helping.    Disposition ; DC planning ROGER today .  Medically stable for DC .

## 2021-10-18 NOTE — DISCHARGE NOTE PROVIDER - NSDCCPCAREPLAN_GEN_ALL_CORE_FT
PRINCIPAL DISCHARGE DIAGNOSIS  Diagnosis: Pulmonary embolism  Assessment and Plan of Treatment: Continue Eliquis      SECONDARY DISCHARGE DIAGNOSES  Diagnosis: CHF (congestive heart failure)  Assessment and Plan of Treatment: Low salt diet, fluid restriction to 1500 ml daily, monitor your fluid intake and weight daily, exercise as tolerated 30 minutes daily, and follow up with your physician within 7 days.      Diagnosis: Elevated LFTs  Assessment and Plan of Treatment: Continue to monitor outpatient    Diagnosis: History of COPD  Assessment and Plan of Treatment: Smoking cessation, continue current medications as prescribed. Follow up with your routine physician's appointments.      Diagnosis: Retroperitoneal bleed  Assessment and Plan of Treatment: Continue Eliquis    Diagnosis: DM (diabetes mellitus)  Assessment and Plan of Treatment: Monitor finger sticks pre-meal and bedtime, low salt, fat and carbohydrate diet, minimize glucose intake.  Exercise daily for at least 30 minutes and weight loss.  Follow up with primary care physician and endocrinologist for routine Hemoglobin A1C checks and management.  Follow up with your ophthalmologist for routine yearly vision exams.      Diagnosis: AMS (altered mental status)  Assessment and Plan of Treatment: Appears slightly improved, but remains confused at times  CT head negative. Follow up with Primary Care Provider within 1-2 weeks     PRINCIPAL DISCHARGE DIAGNOSIS  Diagnosis: Pulmonary embolism  Assessment and Plan of Treatment: Continue Eliquis. Started on 10/16 10mg BID. END on 10/22. Start 5mg BID on 11//23      SECONDARY DISCHARGE DIAGNOSES  Diagnosis: CHF (congestive heart failure)  Assessment and Plan of Treatment: Low salt diet, fluid restriction to 1500 ml daily, monitor your fluid intake and weight daily, exercise as tolerated 30 minutes daily, and follow up with your physician within 7 days.      Diagnosis: Elevated LFTs  Assessment and Plan of Treatment: Continue to monitor outpatient    Diagnosis: History of COPD  Assessment and Plan of Treatment: Smoking cessation, continue current medications as prescribed. Follow up with your routine physician's appointments.      Diagnosis: Retroperitoneal bleed  Assessment and Plan of Treatment: Continue Eliquis    Diagnosis: DM (diabetes mellitus)  Assessment and Plan of Treatment: Monitor finger sticks pre-meal and bedtime, low salt, fat and carbohydrate diet, minimize glucose intake.  Exercise daily for at least 30 minutes and weight loss.  Follow up with primary care physician and endocrinologist for routine Hemoglobin A1C checks and management.  Follow up with your ophthalmologist for routine yearly vision exams.      Diagnosis: AMS (altered mental status)  Assessment and Plan of Treatment: Appears slightly improved, but remains confused at times  CT head negative. Follow up with Primary Care Provider within 1-2 weeks     PRINCIPAL DISCHARGE DIAGNOSIS  Diagnosis: Pulmonary embolism  Assessment and Plan of Treatment: Continue Eliquis. Started on 10/16 10mg BID. END on 10/22. Start 5mg BID on 10//23      SECONDARY DISCHARGE DIAGNOSES  Diagnosis: CHF (congestive heart failure)  Assessment and Plan of Treatment: Low salt diet, fluid restriction to 1500 ml daily, monitor your fluid intake and weight daily, exercise as tolerated 30 minutes daily, and follow up with your physician within 7 days.      Diagnosis: Elevated LFTs  Assessment and Plan of Treatment: Continue to monitor outpatient    Diagnosis: History of COPD  Assessment and Plan of Treatment: Smoking cessation, continue current medications as prescribed. Follow up with your routine physician's appointments.      Diagnosis: Retroperitoneal bleed  Assessment and Plan of Treatment: Continue Eliquis    Diagnosis: DM (diabetes mellitus)  Assessment and Plan of Treatment: Monitor finger sticks pre-meal and bedtime, low salt, fat and carbohydrate diet, minimize glucose intake.  Exercise daily for at least 30 minutes and weight loss.  Follow up with primary care physician and endocrinologist for routine Hemoglobin A1C checks and management.  Follow up with your ophthalmologist for routine yearly vision exams.      Diagnosis: AMS (altered mental status)  Assessment and Plan of Treatment: Appears slightly improved, but remains confused at times  CT head negative. Follow up with Primary Care Provider within 1-2 weeks

## 2021-10-18 NOTE — DISCHARGE NOTE PROVIDER - NSDCMRMEDTOKEN_GEN_ALL_CORE_FT
albuterol 90 mcg/inh inhalation aerosol: 2 puff(s) inhaled every 4 hours, As needed, Shortness of Breath  allopurinol 300 mg oral tablet: 1 tab(s) orally once a day  apixaban 5 mg oral tablet: 2 tab(s) orally every 12 hours  aspirin 81 mg oral tablet, chewable: 1 tab(s) orally once a day  carvedilol 25 mg oral tablet: 1 tab(s) orally every 12 hours  cinacalcet 60 mg oral tablet: 1 tab(s) orally once a day  furosemide 40 mg oral tablet: 1 tab(s) orally once a day  HumaLOG KwikPen 200 units/mL (Concentrated) subcutaneous solution:  subcutaneous   memantine 5 mg oral tablet: 1 tab(s) orally once a day (at bedtime)  pantoprazole 40 mg oral delayed release tablet: 1 tab(s) orally once a day (before a meal)  sacubitril-valsartan 24 mg-26 mg oral tablet: 1 tab(s) orally 2 times a day  senna oral tablet: 2 tab(s) orally once a day (at bedtime)   albuterol 90 mcg/inh inhalation aerosol: 2 puff(s) inhaled every 4 hours, As needed, Shortness of Breath  allopurinol 300 mg oral tablet: 1 tab(s) orally once a day  apixaban 5 mg oral tablet: 2 tab(s) orally every 12 hours    UNTIL 10/22 to complete 7 day load    aspirin 81 mg oral tablet, chewable: 1 tab(s) orally once a day  carvedilol 25 mg oral tablet: 1 tab(s) orally every 12 hours  cinacalcet 60 mg oral tablet: 1 tab(s) orally once a day  furosemide 40 mg oral tablet: 1 tab(s) orally once a day  HumaLOG KwikPen 200 units/mL (Concentrated) subcutaneous solution:  subcutaneous   memantine 5 mg oral tablet: 1 tab(s) orally once a day (at bedtime)  pantoprazole 40 mg oral delayed release tablet: 1 tab(s) orally once a day (before a meal)  sacubitril-valsartan 24 mg-26 mg oral tablet: 1 tab(s) orally 2 times a day  senna oral tablet: 2 tab(s) orally once a day (at bedtime)   albuterol 90 mcg/inh inhalation aerosol: 2 puff(s) inhaled every 4 hours, As needed, Shortness of Breath  allopurinol 300 mg oral tablet: 1 tab(s) orally once a day  aspirin 81 mg oral tablet, chewable: 1 tab(s) orally once a day  carvedilol 25 mg oral tablet: 1 tab(s) orally every 12 hours  cinacalcet 60 mg oral tablet: 1 tab(s) orally once a day  Eliquis Starter Pack for Treatment of DVT and PE 5 mg oral tablet: 1 tab(s) orally once a day     Started 10mg BID on 10/16   END on 10/22    START 5mg BID on 10/23   furosemide 40 mg oral tablet: 1 tab(s) orally once a day  HumaLOG KwikPen 200 units/mL (Concentrated) subcutaneous solution:  subcutaneous   memantine 5 mg oral tablet: 1 tab(s) orally once a day (at bedtime)  pantoprazole 40 mg oral delayed release tablet: 1 tab(s) orally once a day (before a meal)  sacubitril-valsartan 24 mg-26 mg oral tablet: 1 tab(s) orally 2 times a day  senna oral tablet: 2 tab(s) orally once a day (at bedtime)

## 2021-10-18 NOTE — PROGRESS NOTE ADULT - PROBLEM SELECTOR PLAN 2
AMS appears slightly improved  -per primary team  -neuro recs noted  -CT head negative
Will continue Sensipar 60 mg PO daily, will monitor labs and FU. Discussed with patient.
Will continue Sensipar 60 mg PO daily, will monitor labs and FU. Discussed with patient.
Will start him on Sensipar 60 mg PO daily for now, will monitor labs and FU. Discussed with patient.
Will continue Sensipar 60 mg PO daily, will monitor labs and FU. Discussed with patient.
Will continue Sensipar 60 mg PO daily, will monitor labs and FU. Discussed with patient.
CT with large right groin hematoma extending into the right retroperitoneum  -Trend H/H  -AC recs per vascular    10/16: 11.4----> 10.4 today : cont to monitor as pt is back on Eliquis: he seems very hypercoagulable:
AMS appears slightly improved  -per primary team  -neuro recs noted  -CT head negative
Will continue Sensipar 60 mg PO daily, will monitor labs and FU. Discussed with patient.
CT with large right groin hematoma extending into the right retroperitoneum  -Trend H/H  -AC recs per vascular
Will continue Sensipar 60 mg PO daily, will monitor labs and FU. Discussed with patient.
Will start him on Sensipar 60 mg PO daily for now, will monitor labs and FU. Discussed with patient.
AMS appears slightly improved  -per primary team  -neuro recs noted  -CT head negative
CT with large right groin hematoma extending into the right retroperitoneum  -Trend H/H  -AC resumed
consider Neuro eval  CT head negative, ammonia normal  obtain more GI history about ? liver biopsy
AMS appears slightly improved  -per primary team  -neuro recs noted  -CT head negative
CT with large right groin hematoma extending into the right retroperitoneum  -Trend H/H  -AC recs per vascular    10/16: 11.4----> 10.4 today : cont to monitor as pt is back on Eliquis: he seems very hypercoagulable:    10/17:" no further bleeding: on AC: vasdculr has cleared to start Eliquis again:

## 2021-10-18 NOTE — PROGRESS NOTE ADULT - SUBJECTIVE AND OBJECTIVE BOX
Date of service: 10/18/21    chief complaint: sob     extended hpi: 77 yo M with a PMH of CAD s/p MI w/stents 2007, CHF with 20% LVEF in 2016, AICD,  HTN, HLD, DM, gout, CKD, COPD, PVD s/p aortobifemoral bypass, proximal right leg dvt per April 20th sono on eliquis.  now with chf, acute PE.     S: no chest pain or sob; pleasantly confused; ros otherwise unable to obtain    Review of Systems:   Constitutional: [ ] fevers, [ ] chills.   Skin: [ ] dry skin. [ ] rashes.  Psychiatric: [ ] depression, [ ] anxiety.   Gastrointestinal: [ ] BRBPR, [ ] melena.   Neurological: [ ] confusion. [ ] seizures. [ ] shuffling gait.   Ears,Nose,Mouth and Throat: [ ] ear pain [ ] sore throat.   Eyes: [ ] diplopia.   Respiratory: [ ] hemoptysis. [ ] shortness of breath  Cardiovascular: See HPI above  Hematologic/Lymphatic: [ ] anemia. [ ] painful nodes. [ ] prolonged bleeding.   Genitourinary: [ ] hematuria. [ ] flank pain.   Endocrine: [ ] significant change in weight. [ ] intolerance to heat and cold.     Review of systems [ ] otherwise negative, [ x] otherwise unable to obtain    FH: no family history of sudden cardiac death in first degree relatives    SH: [ ] tobacco, [ ] alcohol, [ ] drugs    ALBUTerol    90 MICROgram(s) HFA Inhaler 2 Puff(s) Inhalation every 6 hours PRN  apixaban 10 milliGRAM(s) Oral every 12 hours  bisacodyl Suppository 10 milliGRAM(s) Rectal daily PRN  carvedilol 25 milliGRAM(s) Oral every 12 hours  cinacalcet 60 milliGRAM(s) Oral daily  dextrose 40% Gel 15 Gram(s) Oral once  dextrose 5%. 1000 milliLiter(s) IV Continuous <Continuous>  dextrose 5%. 1000 milliLiter(s) IV Continuous <Continuous>  dextrose 50% Injectable 25 Gram(s) IV Push once  dextrose 50% Injectable 12.5 Gram(s) IV Push once  dextrose 50% Injectable 25 Gram(s) IV Push once  furosemide    Tablet 40 milliGRAM(s) Oral daily  glucagon  Injectable 1 milliGRAM(s) IntraMuscular once  insulin lispro (ADMELOG) corrective regimen sliding scale   SubCutaneous three times a day before meals  insulin lispro (ADMELOG) corrective regimen sliding scale   SubCutaneous at bedtime  lactulose Syrup 10 Gram(s) Oral daily  melatonin 3 milliGRAM(s) Oral at bedtime  memantine 5 milliGRAM(s) Oral at bedtime  nitroglycerin     SubLingual 0.4 milliGRAM(s) SubLingual every 5 minutes PRN  pantoprazole    Tablet 40 milliGRAM(s) Oral before breakfast  sacubitril 24 mG/valsartan 26 mG 1 Tablet(s) Oral two times a day  senna 2 Tablet(s) Oral at bedtime                            10.2   6.79  )-----------( 229      ( 18 Oct 2021 07:37 )             30.1       10-18    139  |  106  |  15  ----------------------------<  81  4.3   |  19<L>  |  0.94    Ca    8.7      18 Oct 2021 07:37  Phos  2.5     10-18  Mg     1.80     10-18    T(C): 36.8 (10-18-21 @ 05:30), Max: 36.8 (10-18-21 @ 05:30)  HR: 60 (10-18-21 @ 05:30) (56 - 74)  BP: 98/60 (10-18-21 @ 05:30) (98/60 - 125/102)  RR: 18 (10-18-21 @ 05:30) (15 - 18)  SpO2: 98% (10-18-21 @ 05:30) (95% - 99%)    General: NAD, remains confused   Head: Normocephalic and atraumatic.   Neck: No JVD. No bruits. Supple. Does not appear to be enlarged.   Cardiovascular: + S1,S2 ; RRR Soft systolic murmur at the left lower sternal border. No rubs noted.    Lungs: CTA b/l. No rhonchi, rales or wheezes.   Abdomen: + BS, soft. Non tender. Non distended. No rebound. No guarding.   Extremities: No clubbing/cyanosis/edema.   Skin: Warm and moist. The patient's skin has normal elasticity and good skin turgor.   Psychiatric: unable to assess    Tele: AP    < from: Transthoracic Echocardiogram (10.08.21 @ 11:41) >  1. Mitral annular calcification, otherwise normal mitral  valve. Mild-moderate mitral regurgitation.  2. Calcified trileaflet aortic valve with normal opening.  Mild-moderate aortic regurgitation.  3. Mildly dilated left atrium.  LA volume index = 36 cc/m2.  4. Mildleft ventricular enlargement.  5. Severe global left ventricular systolic dysfunction.  6. Unable to accurately evaluate right ventricular size or  systolic function.  A device wire is noted in the right  heart.  7. Inadequate tricuspid regurgitationDoppler envelope  precludes estimation of RVSP.  *** No previous Echo exam.    < end of copied text >      < from: CT Abdomen and Pelvis w/ IV Cont (10.13.21 @ 09:35) >  IMPRESSION:  Large right groin hematoma extending into the right retroperitoneum.    Findings were discussed with NATHALIA Soriano 10/13/2021 11:12 AM by Dr. Mar with read back confirmation.    < end of copied text >      A/P: 77 yo M with a PMH of CAD s/p MI w/stents 2007, CHF with 20% LVEF in 2016, AICD,  HTN, HLD, DM, gout, CKD, COPD, PVD s/p aortobifemoral bypass, proximal right leg dvt per 4/20, was on Eliquis, admitted with acute on chronic CHF and PE.     -pt. currently chest pain free with no symptoms of dyspnea  -AC for PE /DVT per primary team--resumed Eliquis  -TTE with severe LV dysfunction which is known - pt. with AICD and last echocardiogram in 2020 demonstrated severe LV dysfunction as well  -continue with medical management of known cardiomyopathy with coreg and entresto   -appears euvolemic - continue with po lasix  -pt. with history of stents and has been maintained on sapt at home per chart - AC resumed  -?when ok to resume ASA 81 for CAD

## 2021-10-18 NOTE — PROGRESS NOTE ADULT - SUBJECTIVE AND OBJECTIVE BOX
[facility]      Seen earlier today, and examined.  - Today, patient is without complaints.           *****MEDICATIONS: Current medication reviewed and documented.    MEDICATIONS  (STANDING):  apixaban 10 milliGRAM(s) Oral every 12 hours  carvedilol 25 milliGRAM(s) Oral every 12 hours  cinacalcet 60 milliGRAM(s) Oral daily  dextrose 40% Gel 15 Gram(s) Oral once  dextrose 5%. 1000 milliLiter(s) (50 mL/Hr) IV Continuous <Continuous>  dextrose 5%. 1000 milliLiter(s) (100 mL/Hr) IV Continuous <Continuous>  dextrose 50% Injectable 25 Gram(s) IV Push once  dextrose 50% Injectable 12.5 Gram(s) IV Push once  dextrose 50% Injectable 25 Gram(s) IV Push once  furosemide    Tablet 40 milliGRAM(s) Oral daily  glucagon  Injectable 1 milliGRAM(s) IntraMuscular once  insulin lispro (ADMELOG) corrective regimen sliding scale   SubCutaneous three times a day before meals  insulin lispro (ADMELOG) corrective regimen sliding scale   SubCutaneous at bedtime  lactulose Syrup 10 Gram(s) Oral daily  melatonin 3 milliGRAM(s) Oral at bedtime  memantine 5 milliGRAM(s) Oral at bedtime  pantoprazole    Tablet 40 milliGRAM(s) Oral before breakfast  sacubitril 24 mG/valsartan 26 mG 1 Tablet(s) Oral two times a day  senna 2 Tablet(s) Oral at bedtime    MEDICATIONS  (PRN):  ALBUTerol    90 MICROgram(s) HFA Inhaler 2 Puff(s) Inhalation every 6 hours PRN Shortness of Breath and/or Wheezing  bisacodyl Suppository 10 milliGRAM(s) Rectal daily PRN Constipation  nitroglycerin     SubLingual 0.4 milliGRAM(s) SubLingual every 5 minutes PRN Chest Pain          ***** VITAL SIGNS:  T(F): 98.2 (10-18-21 @ 05:30), Max: 98.2 (10-18-21 @ 05:30)  HR: 60 (10-18-21 @ 05:30) (56 - 74)  BP: 98/60 (10-18-21 @ 05:30) (98/60 - 125/102)  RR: 18 (10-18-21 @ 05:30) (15 - 18)  SpO2: 98% (10-18-21 @ 05:30) (95% - 99%)  Wt(kg): --  ,   I&O's Summary           *****PHYSICAL EXAM:   alert oriented x 2 attention comprehension are  limited   Able to name, repeat.   EOmi fundi not visualized   no nystagmus VFF to confrontation  Tongue is midline  Palate elevates symmetrically   Moving all 4 ext spontaneously no drift appreciated    Gait not assessed.            *****LAB AND IMAGING:                        10.2   6.79  )-----------( 229      ( 18 Oct 2021 07:37 )             30.1               10-18    139  |  106  |  15  ----------------------------<  81  4.3   |  19<L>  |  0.94    Ca    8.7      18 Oct 2021 07:37  Phos  2.5     10-18  Mg     1.80     10-18                           [All pertinent recent Imaging/Reports reviewed]           *****A S S E S S M E N T   A N D   P L A N :      77 yo M with a PMH of CAD s/p MI w/stents 2007, CHF with 20% LVEF in 2016, AICD,  HTN, HLD, DM, gout, CKD, COPD, PVD s/p aortobifemoral bypass, proximal right leg dvt per April 20th so  on eliquis. Pt lives alone,  speaks with sister  routinely (Gabriela [phone]). Per sister, had noticed increasing confusion over last 2weeks, but today was worst she had seen. Sister grew concerned, EMS called, noted to be sob. Pt per ed noted not taking any of his home meds.  CT angio chest with PE/pulm infarcts/RHF and strain, possible covid pna. Pt a/o 1, unable to provide history. Follow commands. HS trop 31, 32. ekg sinus tach, lafb. CT head with no acute findings    Of note, tbili>5 (as high as 10 April 2020) was supposed to be on lactulose per sister but not taking. Prior GI note mentioned possibility of liver biopsy, but appears not to have been performed. US abd from April 2020 noteworthy only for slight hepatomegaly     as per prior records, pt was admitted in 2016 with similiar presentation.   pt unable to provide any information regarding his presentation        Problem/Recommendations 1: ams of unclear etiology   suspect multifactorial metabolic encephalopathy due to hypercalcemia, hypernatremia, elevated bili, poor po intake, worsening underlying cognitive impairement   ( seen in 2016 with similar presentation )  r/o infectious process    b12/folate/tsh/ wnl   thiamine 500 iv q 8   ct head no acute path  unable to get mr due to AICD   sleep wake cycle regulation with melatonin   correct metabolic derangements.   namenda 5 mg bid   10/14 some improvement in mental state   10/15 engages in conversation   10/16 engages   avoid day time somnolence   10/17 doing ok     Problem/Recommendations 2: transaminitis   elevated bili low albumin   ? etiology   ammonia wnl       Problem/Recommendations 3: R retroperitoneal hematoma noted   vascular input appreciated   ac on hold     Thank you for allowing me to participate in the care of this patient. Will continue to follow patient periodically. Please do not hesitate to call me if you have any  questions or if there has been a change in patients neurological status     ________________  Mila Cazares MD  [facility]  [phone]      33 minutes spent on total encounter; more than 50 % of the visit was  spent counseling about plan of care, compliance to diet/exercise and medication regimen and or  coordinating care by the attending physician.      It is advised that stroke patients follow up with MCKAY Solorzano @ [phone] in 1- 2 weeks.   Others please follow up with Dr. Michael Nissenbaum [phone]

## 2021-10-18 NOTE — PROGRESS NOTE ADULT - REASON FOR ADMISSION
PE/AMS

## 2021-10-18 NOTE — PROGRESS NOTE ADULT - SUBJECTIVE AND OBJECTIVE BOX
Chief complaint    Patient is a 76y old  Male who presents with a chief complaint of PE/AMS (18 Oct 2021 13:18)   Review of systems  Patient in bed, appears comfortable.    Labs and Fingersticks  CAPILLARY BLOOD GLUCOSE      POCT Blood Glucose.: 160 mg/dL (18 Oct 2021 11:54)  POCT Blood Glucose.: 98 mg/dL (18 Oct 2021 07:49)  POCT Blood Glucose.: 158 mg/dL (17 Oct 2021 21:11)  POCT Blood Glucose.: 174 mg/dL (17 Oct 2021 16:29)      Anion Gap, Serum: 14 (10-18 @ 07:37)  Anion Gap, Serum: 11 (10-17 @ 07:22)      Calcium, Total Serum: 8.7 (10-18 @ 07:37)  Calcium, Total Serum: 8.5 (10-17 @ 07:22)          10-18    139  |  106  |  15  ----------------------------<  81  4.3   |  19<L>  |  0.94    Ca    8.7      18 Oct 2021 07:37  Phos  2.5     10-18  Mg     1.80     10-18                          10.2   6.79  )-----------( 229      ( 18 Oct 2021 07:37 )             30.1     Medications  MEDICATIONS  (STANDING):  apixaban 10 milliGRAM(s) Oral every 12 hours  carvedilol 25 milliGRAM(s) Oral every 12 hours  cinacalcet 60 milliGRAM(s) Oral daily  dextrose 40% Gel 15 Gram(s) Oral once  dextrose 5%. 1000 milliLiter(s) (50 mL/Hr) IV Continuous <Continuous>  dextrose 5%. 1000 milliLiter(s) (100 mL/Hr) IV Continuous <Continuous>  dextrose 50% Injectable 25 Gram(s) IV Push once  dextrose 50% Injectable 12.5 Gram(s) IV Push once  dextrose 50% Injectable 25 Gram(s) IV Push once  furosemide    Tablet 40 milliGRAM(s) Oral daily  glucagon  Injectable 1 milliGRAM(s) IntraMuscular once  insulin lispro (ADMELOG) corrective regimen sliding scale   SubCutaneous three times a day before meals  insulin lispro (ADMELOG) corrective regimen sliding scale   SubCutaneous at bedtime  lactulose Syrup 10 Gram(s) Oral daily  melatonin 3 milliGRAM(s) Oral at bedtime  memantine 5 milliGRAM(s) Oral at bedtime  pantoprazole    Tablet 40 milliGRAM(s) Oral before breakfast  sacubitril 24 mG/valsartan 26 mG 1 Tablet(s) Oral two times a day  senna 2 Tablet(s) Oral at bedtime    Physical Exam  General: Patient comfortable in bed  Vital Signs Last 12 Hrs  T(F): 98.5 (10-18-21 @ 14:06), Max: 98.5 (10-18-21 @ 14:06)  HR: 57 (10-18-21 @ 14:06) (57 - 60)  BP: 95/55 (10-18-21 @ 14:06) (95/55 - 98/60)  BP(mean): --  RR: 18 (10-18-21 @ 14:06) (18 - 18)  SpO2: 98% (10-18-21 @ 14:06) (98% - 98%)  Neck: No palpable thyroid nodules.  CVS: S1S2, No murmurs  Respiratory: No wheezing, no crepitations  GI: Abdomen soft, bowel sounds positive  Musculoskeletal:  edema lower extremities.     Diagnostics    NM Parathyroid Imaging w/Spect: Routine   Indication: Parathyroid adenoma  Transport: Walking  Provider's Contact #: [phone] (10-11 @ 08:08)  Vitamin D, 25-Hydroxy: AM Sched. Collection: 11-Oct-2021 06:00 (10-10 @ 10:41)  Vitamin D, 1, 25-Dihydroxy: AM Sched. Collection: 11-Oct-2021 06:00 (10-10 @ 10:41)  Parathyroid Hormone Intact w/o Calcium, Serum: AM Sched. Collection: 11-Oct-2021 06:00 (10-10 @ 10:41)  PTH Related Peptide: AM Sched. Collection: 11-Oct-2021 06:00 (10-10 @ 10:41)

## 2021-10-18 NOTE — PROGRESS NOTE ADULT - NSPROGADDITIONALINFOA_GEN_ALL_CORE
he is confused: neuro following: rpt dopplers: monitor h.h
he seems OK: at timers he seems confused to me: he isnot in any resp distress: he is on heparin and has not been bleeding; cont current care: await ct abd
DW  acp
as above:
today he is very confused: his baseline was confused too: he has multiple medical issues: HIS abg IS PRETTY GOOD: HIS AMMONIA IS NORMAL :
DW  PMD
doing ok : still confused: he had RP bleed: now back on eliquis; monitor h/h
he remains confused: his H/H is stable: no SOB ; on roomair: cont ac:

## 2021-10-18 NOTE — PROGRESS NOTE ADULT - PROBLEM SELECTOR PROBLEM 5
History of COPD
History of COPD
Elevated LFTs
Medication management
Elevated LFTs
History of COPD

## 2021-10-18 NOTE — PROGRESS NOTE ADULT - ASSESSMENT
Assessment  DMT2: 76y Male with DM T2 with hyperglycemia admitted with SOB, patient was on oral hypoglycemic agents at home, sugars improved, no overnight events.  Hypercalcemia: Primary hyperparathyroidism nuclear scan pending, started on Sensipar calcium improving .  CAD: On medications, monitored, stable.  HTN: On medications, monitored,             Rosalva Louis MD  Cell: [phone]  Office: [phone]

## 2021-10-18 NOTE — PROGRESS NOTE ADULT - SUBJECTIVE AND OBJECTIVE BOX
INTERVAL HPI/OVERNIGHT EVENTS:    no c/o abd pain   free of rectal bleeding    MEDICATIONS  (STANDING):  apixaban 10 milliGRAM(s) Oral every 12 hours  carvedilol 25 milliGRAM(s) Oral every 12 hours  cinacalcet 60 milliGRAM(s) Oral daily  dextrose 40% Gel 15 Gram(s) Oral once  dextrose 5%. 1000 milliLiter(s) (50 mL/Hr) IV Continuous <Continuous>  dextrose 5%. 1000 milliLiter(s) (100 mL/Hr) IV Continuous <Continuous>  dextrose 50% Injectable 25 Gram(s) IV Push once  dextrose 50% Injectable 12.5 Gram(s) IV Push once  dextrose 50% Injectable 25 Gram(s) IV Push once  furosemide    Tablet 40 milliGRAM(s) Oral daily  glucagon  Injectable 1 milliGRAM(s) IntraMuscular once  insulin lispro (ADMELOG) corrective regimen sliding scale   SubCutaneous three times a day before meals  insulin lispro (ADMELOG) corrective regimen sliding scale   SubCutaneous at bedtime  lactulose Syrup 10 Gram(s) Oral daily  melatonin 3 milliGRAM(s) Oral at bedtime  memantine 5 milliGRAM(s) Oral at bedtime  pantoprazole    Tablet 40 milliGRAM(s) Oral before breakfast  sacubitril 24 mG/valsartan 26 mG 1 Tablet(s) Oral two times a day  senna 2 Tablet(s) Oral at bedtime    MEDICATIONS  (PRN):  ALBUTerol    90 MICROgram(s) HFA Inhaler 2 Puff(s) Inhalation every 6 hours PRN Shortness of Breath and/or Wheezing  bisacodyl Suppository 10 milliGRAM(s) Rectal daily PRN Constipation  nitroglycerin     SubLingual 0.4 milliGRAM(s) SubLingual every 5 minutes PRN Chest Pain      Allergies    No Known Drug Allergies  shellfish (Other; Urticaria)    Intolerances        Review of Systems:    General:  No wt loss, fevers, chills, night sweats, fatigue   Eyes:  Good vision, no reported pain  ENT:  No sore throat, pain, runny nose, dysphagia  CV:  No pain, palpitations, hypo/hypertension  Resp:  No dyspnea, cough, tachypnea, wheezing  GI:  No pain, No nausea, No vomiting, No diarrhea, No constipation, No weight loss, No fever, No pruritis, No rectal bleeding, No melena, No dysphagia  :  No pain, bleeding, incontinence, nocturia  Muscle:  No pain, weakness  Neuro:  No weakness, tingling, memory problems  Psych:  No fatigue, insomnia, mood problems, depression  Endocrine:  No polyuria, polydypsia, cold/heat intolerance  Heme:  No petechiae, ecchymosis, easy bruisability  Skin:  No rash, tattoos, scars, edema      Vital Signs Last 24 Hrs  T(C): 36.8 (18 Oct 2021 05:30), Max: 36.8 (18 Oct 2021 05:30)  T(F): 98.2 (18 Oct 2021 05:30), Max: 98.2 (18 Oct 2021 05:30)  HR: 60 (18 Oct 2021 05:30) (56 - 74)  BP: 98/60 (18 Oct 2021 05:30) (98/60 - 125/102)  BP(mean): --  RR: 18 (18 Oct 2021 05:30) (15 - 18)  SpO2: 98% (18 Oct 2021 05:30) (95% - 99%)    PHYSICAL EXAM:    Constitutional: NAD  HEENT: EOMI, throat clear  Neck: No LAD, supple  Respiratory: CTA and P  Cardiovascular: S1 and S2, RRR, no M  Gastrointestinal: BS+, soft, NT/ND, neg HSM,  Extremities: No peripheral edema, neg clubbing, cyanosis  Vascular: 2+ peripheral pulses  Neurological: A/O x 1, no focal deficits  Psychiatric: Normal mood, normal affect  Skin: No rashes      LABS:                        10.2   6.79  )-----------( 229      ( 18 Oct 2021 07:37 )             30.1     10-18    139  |  106  |  15  ----------------------------<  81  4.3   |  19<L>  |  0.94    Ca    8.7      18 Oct 2021 07:37  Phos  2.5     10-18  Mg     1.80     10-18            RADIOLOGY & ADDITIONAL TESTS:

## 2021-10-18 NOTE — PROGRESS NOTE ADULT - PROBLEM SELECTOR PROBLEM 3
CHF (congestive heart failure)
Other encephalopathy
CHF (congestive heart failure)
Other encephalopathy
CHF (congestive heart failure)
CHF (congestive heart failure)
DM (diabetes mellitus)
CHF (congestive heart failure)

## 2021-10-18 NOTE — PROGRESS NOTE ADULT - ASSESSMENT
77 yo M with a PMH of CAD s/p MI w/stents 2007, CHF with 20% LVEF in 2016, AICD,  HTN, HLD, DM, gout, CKD, COPD, PVD s/p aortobifemoral bypass, proximal right leg dvt per April 20th sono on eliquis present with confusion and sob found to have pe started on AC. nephrology consulted for hypernatremia and hypercalcemia    Hypernatremia  encourage po free water  Sodium improved   monitor NA  at present  chf EF 20% on lasix 40mg po daily, Follow up Cardiology    Hypercalcemia  likely primary hyperparathyroidism. On  sensipar 60 daily per endo  pending Pthrp  vit d 25 optimal  spep, k/l neg  pending sif  monitor at present    HTN  controlled  monitor BP    HYpophosphatemia  REpelte K phos   MOnitor serum PO4     PE/DVT   RP bleed AC on hold  DVT  f/u vascular

## 2021-10-18 NOTE — PROGRESS NOTE ADULT - PROBLEM SELECTOR PROBLEM 1
Pulmonary embolism
DM (diabetes mellitus)
Pulmonary embolism
Pulmonary embolism
DM (diabetes mellitus)
DM (diabetes mellitus)
Pulmonary embolism

## 2021-10-18 NOTE — PROGRESS NOTE ADULT - ASSESSMENT
75yo M with h/o CAD s/p MI w/stents 2007, CHF with 20% LVEF in 2016, AICD, HTN, HLD, DM, gout, CKD, COPD, PVD s/p aortobifemoral bypass, proximal right leg dvt (April 20th) on Eliquis presenting with SOB/AMS. GI consulted for increased liver enzymes     Increased Liver Enzymes   Favor 2/2 Congestive Hepatopathy d/t RHF   US Abd w/dilated IVC and hepatic vein; No cirrhosis noted   diurese per cardiology recs/appreciated   cont home lactulose daily   will follow     Abdominal Pain   improved  CT with RP Bleed  a/c and antiplt recs per vasc sx; input appreciated    CHF  severe LV dysfxn; has AICD  care per cardiology     I reviewed the overnight course of events on the unit, re-confirming the patient history. I discussed the care with the patient and their family. The plan of care was discussed with the physician assistant and modifications were made to the notation where appropriate. Differential diagnosis and plan of care discussed with patient after the evaluation. Advanced care planning was discussed with patient and family.  Advanced care planning forms were reviewed and discussed.  Risks, benefits and alternatives of gastroenterologic procedures were discussed in detail and all questions were answered. 35 minutes spent on total encounter of which more than fifty percent of the encounter was spent counseling and/or coordinating care by the attending physician.

## 2021-12-09 NOTE — DIETITIAN INITIAL EVALUATION ADULT. - OTHER INFO
Problem: Falls - Risk of:  Goal: Will remain free from falls  Description: Will remain free from falls  Outcome: Met This Shift Pt 77 yo male with PMHx of CAD s/p MI w/stents 2007, CHF, AICD, HTN, HLD, DM, gout, CKD, COPD, PVD s/p aortobifemoral bypass, proximal right leg dvt presented with SOB/AMS - per chart review. Of note Pt with increased liver enzymes.     At time of visit, Pt appears disoriented, drowsy. No family @ bed side. Per nurse, Pt eats well; Pt ate > 75% of breakfast this morning; Pt needs to be fed. Of note, Pt passed repeat Swallow Bedside Assessment Adult; SLP rec: Puree with thin liquids (10/14). No report of nausea, vomiting or diarrhea @ this time. +BM (10/11) fecal incontinence, per flow sheet. Unable to discuss diet or weight history @ present. Of note, Pt's weights: 100.4 kg (10/8), 100.7 kg (HIE - 7/17/20). Case discussed with nurse. RDN remains available, nurse made aware.

## 2022-01-01 ENCOUNTER — INPATIENT (INPATIENT)
Facility: HOSPITAL | Age: 77
LOS: 4 days | End: 2022-01-22
Attending: INTERNAL MEDICINE | Admitting: INTERNAL MEDICINE
Payer: MEDICARE

## 2022-01-01 VITALS
RESPIRATION RATE: 18 BRPM | HEART RATE: 48 BPM | OXYGEN SATURATION: 100 % | SYSTOLIC BLOOD PRESSURE: 98 MMHG | TEMPERATURE: 98 F | DIASTOLIC BLOOD PRESSURE: 53 MMHG | HEIGHT: 70 IN

## 2022-01-01 VITALS — HEART RATE: 49 BPM

## 2022-01-01 LAB
-  AMPICILLIN/SULBACTAM: SIGNIFICANT CHANGE UP
-  CEFAZOLIN: SIGNIFICANT CHANGE UP
-  CLINDAMYCIN: SIGNIFICANT CHANGE UP
-  ERYTHROMYCIN: SIGNIFICANT CHANGE UP
-  GENTAMICIN: SIGNIFICANT CHANGE UP
-  OXACILLIN: SIGNIFICANT CHANGE UP
-  PENICILLIN: SIGNIFICANT CHANGE UP
-  RIFAMPIN: SIGNIFICANT CHANGE UP
-  TETRACYCLINE: SIGNIFICANT CHANGE UP
-  TRIMETHOPRIM/SULFAMETHOXAZOLE: SIGNIFICANT CHANGE UP
-  VANCOMYCIN: SIGNIFICANT CHANGE UP
ALBUMIN SERPL ELPH-MCNC: 3 G/DL — LOW (ref 3.5–5.2)
ALBUMIN SERPL ELPH-MCNC: 3.3 G/DL — LOW (ref 3.5–5.2)
ALBUMIN SERPL ELPH-MCNC: 3.4 G/DL — LOW (ref 3.5–5.2)
ALBUMIN SERPL ELPH-MCNC: 3.5 G/DL — SIGNIFICANT CHANGE UP (ref 3.5–5.2)
ALBUMIN SERPL ELPH-MCNC: 3.5 G/DL — SIGNIFICANT CHANGE UP (ref 3.5–5.2)
ALBUMIN SERPL ELPH-MCNC: 3.7 G/DL — SIGNIFICANT CHANGE UP (ref 3.5–5.2)
ALP SERPL-CCNC: 260 U/L — HIGH (ref 30–115)
ALP SERPL-CCNC: 277 U/L — HIGH (ref 30–115)
ALP SERPL-CCNC: 287 U/L — HIGH (ref 30–115)
ALP SERPL-CCNC: 290 U/L — HIGH (ref 30–115)
ALP SERPL-CCNC: 291 U/L — HIGH (ref 30–115)
ALP SERPL-CCNC: 298 U/L — HIGH (ref 30–115)
ALP SERPL-CCNC: 312 U/L — HIGH (ref 30–115)
ALP SERPL-CCNC: 373 U/L — HIGH (ref 30–115)
ALT FLD-CCNC: 183 U/L — HIGH (ref 0–41)
ALT FLD-CCNC: 195 U/L — HIGH (ref 0–41)
ALT FLD-CCNC: 199 U/L — HIGH (ref 0–41)
ALT FLD-CCNC: 288 U/L — HIGH (ref 0–41)
ALT FLD-CCNC: 372 U/L — HIGH (ref 0–41)
ALT FLD-CCNC: 380 U/L — HIGH (ref 0–41)
ALT FLD-CCNC: 520 U/L — HIGH (ref 0–41)
ALT FLD-CCNC: >700 U/L — HIGH (ref 0–41)
AMMONIA BLD-MCNC: 107 UMOL/L — HIGH (ref 11–55)
AMMONIA BLD-MCNC: 84 UMOL/L — HIGH (ref 11–55)
AMPHET UR-MCNC: NEGATIVE — SIGNIFICANT CHANGE UP
ANA PAT FLD IF-IMP: ABNORMAL
ANA TITR SER: ABNORMAL
ANION GAP SERPL CALC-SCNC: 19 MMOL/L — HIGH (ref 7–14)
ANION GAP SERPL CALC-SCNC: 22 MMOL/L — HIGH (ref 7–14)
ANION GAP SERPL CALC-SCNC: 22 MMOL/L — HIGH (ref 7–14)
ANION GAP SERPL CALC-SCNC: 24 MMOL/L — HIGH (ref 7–14)
ANION GAP SERPL CALC-SCNC: 25 MMOL/L — HIGH (ref 7–14)
ANION GAP SERPL CALC-SCNC: 25 MMOL/L — HIGH (ref 7–14)
ANION GAP SERPL CALC-SCNC: 26 MMOL/L — HIGH (ref 7–14)
ANION GAP SERPL CALC-SCNC: 26 MMOL/L — HIGH (ref 7–14)
ANION GAP SERPL CALC-SCNC: 28 MMOL/L — HIGH (ref 7–14)
ANISOCYTOSIS BLD QL: SIGNIFICANT CHANGE UP
APAP SERPL-MCNC: <5 UG/ML — LOW (ref 10–30)
APPEARANCE UR: ABNORMAL
APPEARANCE UR: ABNORMAL
APTT BLD: 35.3 SEC — SIGNIFICANT CHANGE UP (ref 27–39.2)
APTT BLD: 35.7 SEC — SIGNIFICANT CHANGE UP (ref 27–39.2)
APTT BLD: 39.8 SEC — HIGH (ref 27–39.2)
APTT BLD: 39.9 SEC — HIGH (ref 27–39.2)
APTT BLD: 40.3 SEC — HIGH (ref 27–39.2)
APTT BLD: 45 SEC — HIGH (ref 27–39.2)
APTT BLD: 48.2 SEC — HIGH (ref 27–39.2)
AST SERPL-CCNC: 1013 U/L — HIGH (ref 0–41)
AST SERPL-CCNC: 1018 U/L — HIGH (ref 0–41)
AST SERPL-CCNC: 1489 U/L — HIGH (ref 0–41)
AST SERPL-CCNC: 417 U/L — HIGH (ref 0–41)
AST SERPL-CCNC: 445 U/L — HIGH (ref 0–41)
AST SERPL-CCNC: 462 U/L — HIGH (ref 0–41)
AST SERPL-CCNC: 5 U/L — SIGNIFICANT CHANGE UP (ref 0–41)
AST SERPL-CCNC: 777 U/L — HIGH (ref 0–41)
BACTERIA # UR AUTO: NEGATIVE — SIGNIFICANT CHANGE UP
BARBITURATES UR SCN-MCNC: NEGATIVE — SIGNIFICANT CHANGE UP
BASE EXCESS BLDV CALC-SCNC: -8.5 MMOL/L — LOW (ref -2–3)
BASE EXCESS BLDV CALC-SCNC: 0.8 MMOL/L — SIGNIFICANT CHANGE UP (ref -2–3)
BASOPHILS # BLD AUTO: 0 K/UL — SIGNIFICANT CHANGE UP (ref 0–0.2)
BASOPHILS # BLD AUTO: 0.01 K/UL — SIGNIFICANT CHANGE UP (ref 0–0.2)
BASOPHILS NFR BLD AUTO: 0 % — SIGNIFICANT CHANGE UP (ref 0–1)
BASOPHILS NFR BLD AUTO: 0.1 % — SIGNIFICANT CHANGE UP (ref 0–1)
BENZODIAZ UR-MCNC: NEGATIVE — SIGNIFICANT CHANGE UP
BILIRUB DIRECT SERPL-MCNC: 6.8 MG/DL — HIGH (ref 0–0.3)
BILIRUB INDIRECT FLD-MCNC: 3.1 MG/DL — HIGH (ref 0.2–1.2)
BILIRUB SERPL-MCNC: 13.6 MG/DL — HIGH (ref 0.2–1.2)
BILIRUB SERPL-MCNC: 14.4 MG/DL — HIGH (ref 0.2–1.2)
BILIRUB SERPL-MCNC: 7.1 MG/DL — HIGH (ref 0.2–1.2)
BILIRUB SERPL-MCNC: 7.7 MG/DL — HIGH (ref 0.2–1.2)
BILIRUB SERPL-MCNC: 7.8 MG/DL — HIGH (ref 0.2–1.2)
BILIRUB SERPL-MCNC: 9.2 MG/DL — HIGH (ref 0.2–1.2)
BILIRUB SERPL-MCNC: 9.6 MG/DL — HIGH (ref 0.2–1.2)
BILIRUB SERPL-MCNC: 9.8 MG/DL — HIGH (ref 0.2–1.2)
BILIRUB SERPL-MCNC: 9.9 MG/DL — HIGH (ref 0.2–1.2)
BILIRUB UR-MCNC: ABNORMAL
BILIRUB UR-MCNC: NEGATIVE — SIGNIFICANT CHANGE UP
BLD GP AB SCN SERPL QL: SIGNIFICANT CHANGE UP
BLD GP AB SCN SERPL QL: SIGNIFICANT CHANGE UP
BUN SERPL-MCNC: 31 MG/DL — HIGH (ref 10–20)
BUN SERPL-MCNC: 50 MG/DL — HIGH (ref 10–20)
BUN SERPL-MCNC: 56 MG/DL — HIGH (ref 10–20)
BUN SERPL-MCNC: 60 MG/DL — HIGH (ref 10–20)
BUN SERPL-MCNC: 61 MG/DL — CRITICAL HIGH (ref 10–20)
BUN SERPL-MCNC: 70 MG/DL — CRITICAL HIGH (ref 10–20)
BUN SERPL-MCNC: 71 MG/DL — CRITICAL HIGH (ref 10–20)
BUN SERPL-MCNC: 74 MG/DL — CRITICAL HIGH (ref 10–20)
BUN SERPL-MCNC: 74 MG/DL — CRITICAL HIGH (ref 10–20)
BURR CELLS BLD QL SMEAR: PRESENT — SIGNIFICANT CHANGE UP
C3 SERPL-MCNC: 72 MG/DL — LOW (ref 81–157)
C4 SERPL-MCNC: 18 MG/DL — SIGNIFICANT CHANGE UP (ref 13–39)
CA-I SERPL-SCNC: 0.88 MMOL/L — LOW (ref 1.15–1.33)
CA-I SERPL-SCNC: 0.92 MMOL/L — LOW (ref 1.15–1.33)
CALCIUM SERPL-MCNC: 6.6 MG/DL — LOW (ref 8.5–10.1)
CALCIUM SERPL-MCNC: 6.9 MG/DL — LOW (ref 8.5–10.1)
CALCIUM SERPL-MCNC: 7.2 MG/DL — LOW (ref 8.5–10.1)
CALCIUM SERPL-MCNC: 7.2 MG/DL — LOW (ref 8.5–10.1)
CALCIUM SERPL-MCNC: 7.3 MG/DL — LOW (ref 8.5–10.1)
CALCIUM SERPL-MCNC: 7.4 MG/DL — LOW (ref 8.5–10.1)
CALCIUM SERPL-MCNC: 7.5 MG/DL — LOW (ref 8.5–10.1)
CHLORIDE SERPL-SCNC: 88 MMOL/L — LOW (ref 98–110)
CHLORIDE SERPL-SCNC: 90 MMOL/L — LOW (ref 98–110)
CHLORIDE SERPL-SCNC: 91 MMOL/L — LOW (ref 98–110)
CHLORIDE SERPL-SCNC: 97 MMOL/L — LOW (ref 98–110)
CHLORIDE SERPL-SCNC: 98 MMOL/L — SIGNIFICANT CHANGE UP (ref 98–110)
CO2 SERPL-SCNC: 11 MMOL/L — LOW (ref 17–32)
CO2 SERPL-SCNC: 11 MMOL/L — LOW (ref 17–32)
CO2 SERPL-SCNC: 12 MMOL/L — LOW (ref 17–32)
CO2 SERPL-SCNC: 12 MMOL/L — LOW (ref 17–32)
CO2 SERPL-SCNC: 13 MMOL/L — LOW (ref 17–32)
CO2 SERPL-SCNC: 13 MMOL/L — LOW (ref 17–32)
CO2 SERPL-SCNC: 16 MMOL/L — LOW (ref 17–32)
CO2 SERPL-SCNC: 17 MMOL/L — SIGNIFICANT CHANGE UP (ref 17–32)
CO2 SERPL-SCNC: 19 MMOL/L — SIGNIFICANT CHANGE UP (ref 17–32)
COCAINE METAB.OTHER UR-MCNC: NEGATIVE — SIGNIFICANT CHANGE UP
COLOR SPEC: ABNORMAL
COLOR SPEC: ABNORMAL
CORTIS AM PEAK SERPL-MCNC: 34.4 UG/DL — HIGH (ref 6–18.4)
CORTIS AM PEAK SERPL-MCNC: 60.3 UG/DL — HIGH (ref 6–18.4)
CREAT ?TM UR-MCNC: 6 MG/DL — SIGNIFICANT CHANGE UP
CREAT SERPL-MCNC: 2.1 MG/DL — HIGH (ref 0.7–1.5)
CREAT SERPL-MCNC: 2.2 MG/DL — HIGH (ref 0.7–1.5)
CREAT SERPL-MCNC: 2.2 MG/DL — HIGH (ref 0.7–1.5)
CREAT SERPL-MCNC: 2.3 MG/DL — HIGH (ref 0.7–1.5)
CREAT SERPL-MCNC: 2.9 MG/DL — HIGH (ref 0.7–1.5)
CREAT SERPL-MCNC: 3.1 MG/DL — HIGH (ref 0.7–1.5)
CREAT SERPL-MCNC: 3.6 MG/DL — HIGH (ref 0.7–1.5)
CREAT SERPL-MCNC: 3.6 MG/DL — HIGH (ref 0.7–1.5)
CREAT SERPL-MCNC: 3.8 MG/DL — HIGH (ref 0.7–1.5)
CULTURE RESULTS: NO GROWTH — SIGNIFICANT CHANGE UP
D DIMER BLD IA.RAPID-MCNC: 1053 NG/ML DDU — HIGH (ref 0–230)
D DIMER BLD IA.RAPID-MCNC: 1922 NG/ML DDU — HIGH (ref 0–230)
DIFF PNL FLD: ABNORMAL
DIFF PNL FLD: ABNORMAL
DIR ANTIGLOB POLYSPECIFIC INTERPRETATION: SIGNIFICANT CHANGE UP
EOSINOPHIL # BLD AUTO: 0 K/UL — SIGNIFICANT CHANGE UP (ref 0–0.7)
EOSINOPHIL # BLD AUTO: 0.11 K/UL — SIGNIFICANT CHANGE UP (ref 0–0.7)
EOSINOPHIL # BLD AUTO: 0.2 K/UL — SIGNIFICANT CHANGE UP (ref 0–0.7)
EOSINOPHIL NFR BLD AUTO: 0 % — SIGNIFICANT CHANGE UP (ref 0–8)
EOSINOPHIL NFR BLD AUTO: 0.9 % — SIGNIFICANT CHANGE UP (ref 0–8)
EOSINOPHIL NFR BLD AUTO: 1.8 % — SIGNIFICANT CHANGE UP (ref 0–8)
EPI CELLS # UR: 3 /HPF — SIGNIFICANT CHANGE UP (ref 0–5)
FERRITIN SERPL-MCNC: 178 NG/ML — SIGNIFICANT CHANGE UP (ref 30–400)
FERRITIN SERPL-MCNC: 193 NG/ML — SIGNIFICANT CHANGE UP (ref 30–400)
FIBRINOGEN PPP-MCNC: 258 MG/DL — SIGNIFICANT CHANGE UP (ref 204.4–570.6)
FOLATE SERPL-MCNC: 16.7 NG/ML — SIGNIFICANT CHANGE UP
GAS PNL BLDV: 126 MMOL/L — LOW (ref 136–145)
GAS PNL BLDV: 126 MMOL/L — LOW (ref 136–145)
GAS PNL BLDV: SIGNIFICANT CHANGE UP
GAS PNL BLDV: SIGNIFICANT CHANGE UP
GIANT PLATELETS BLD QL SMEAR: PRESENT — SIGNIFICANT CHANGE UP
GLUCOSE BLDC GLUCOMTR-MCNC: 124 MG/DL — HIGH (ref 70–99)
GLUCOSE BLDC GLUCOMTR-MCNC: 136 MG/DL — HIGH (ref 70–99)
GLUCOSE BLDC GLUCOMTR-MCNC: 139 MG/DL — HIGH (ref 70–99)
GLUCOSE BLDC GLUCOMTR-MCNC: 33 MG/DL — CRITICAL LOW (ref 70–99)
GLUCOSE BLDC GLUCOMTR-MCNC: 37 MG/DL — CRITICAL LOW (ref 70–99)
GLUCOSE BLDC GLUCOMTR-MCNC: 40 MG/DL — CRITICAL LOW (ref 70–99)
GLUCOSE BLDC GLUCOMTR-MCNC: 40 MG/DL — CRITICAL LOW (ref 70–99)
GLUCOSE BLDC GLUCOMTR-MCNC: 41 MG/DL — CRITICAL LOW (ref 70–99)
GLUCOSE BLDC GLUCOMTR-MCNC: 63 MG/DL — LOW (ref 70–99)
GLUCOSE BLDC GLUCOMTR-MCNC: 65 MG/DL — LOW (ref 70–99)
GLUCOSE BLDC GLUCOMTR-MCNC: 82 MG/DL — SIGNIFICANT CHANGE UP (ref 70–99)
GLUCOSE BLDC GLUCOMTR-MCNC: 83 MG/DL — SIGNIFICANT CHANGE UP (ref 70–99)
GLUCOSE BLDC GLUCOMTR-MCNC: 85 MG/DL — SIGNIFICANT CHANGE UP (ref 70–99)
GLUCOSE BLDC GLUCOMTR-MCNC: 87 MG/DL — SIGNIFICANT CHANGE UP (ref 70–99)
GLUCOSE BLDC GLUCOMTR-MCNC: 95 MG/DL — SIGNIFICANT CHANGE UP (ref 70–99)
GLUCOSE SERPL-MCNC: 31 MG/DL — CRITICAL LOW (ref 70–99)
GLUCOSE SERPL-MCNC: 35 MG/DL — CRITICAL LOW (ref 70–99)
GLUCOSE SERPL-MCNC: 62 MG/DL — LOW (ref 70–99)
GLUCOSE SERPL-MCNC: 68 MG/DL — LOW (ref 70–99)
GLUCOSE SERPL-MCNC: 80 MG/DL — SIGNIFICANT CHANGE UP (ref 70–99)
GLUCOSE SERPL-MCNC: 82 MG/DL — SIGNIFICANT CHANGE UP (ref 70–99)
GLUCOSE SERPL-MCNC: 88 MG/DL — SIGNIFICANT CHANGE UP (ref 70–99)
GLUCOSE SERPL-MCNC: 93 MG/DL — SIGNIFICANT CHANGE UP (ref 70–99)
GLUCOSE SERPL-MCNC: 99 MG/DL — SIGNIFICANT CHANGE UP (ref 70–99)
GLUCOSE UR QL: NEGATIVE — SIGNIFICANT CHANGE UP
GLUCOSE UR QL: NEGATIVE — SIGNIFICANT CHANGE UP
GRAM STN FLD: SIGNIFICANT CHANGE UP
HAPTOGLOB SERPL-MCNC: <20 MG/DL — LOW (ref 34–200)
HAV IGM SER-ACNC: SIGNIFICANT CHANGE UP
HBV CORE IGM SER-ACNC: SIGNIFICANT CHANGE UP
HBV SURFACE AG SER-ACNC: SIGNIFICANT CHANGE UP
HCO3 BLDV-SCNC: 16 MMOL/L — LOW (ref 22–29)
HCO3 BLDV-SCNC: 25 MMOL/L — SIGNIFICANT CHANGE UP (ref 22–29)
HCT VFR BLD CALC: 28.1 % — LOW (ref 42–52)
HCT VFR BLD CALC: 28.9 % — LOW (ref 42–52)
HCT VFR BLD CALC: 29.6 % — LOW (ref 42–52)
HCT VFR BLD CALC: 30.3 % — LOW (ref 42–52)
HCT VFR BLD CALC: 30.4 % — LOW (ref 42–52)
HCT VFR BLD CALC: 30.7 % — LOW (ref 42–52)
HCT VFR BLD CALC: 30.9 % — LOW (ref 42–52)
HCT VFR BLD CALC: 31 % — LOW (ref 42–52)
HCT VFR BLD CALC: 31.8 % — LOW (ref 42–52)
HCT VFR BLDA CALC: 30 % — LOW (ref 39–51)
HCT VFR BLDA CALC: 33 % — LOW (ref 39–51)
HCV AB S/CO SERPL IA: 0.31 S/CO — SIGNIFICANT CHANGE UP (ref 0–0.99)
HCV AB SERPL-IMP: SIGNIFICANT CHANGE UP
HEPARIN-PF4 AB RESULT: 0.6 U/ML — SIGNIFICANT CHANGE UP (ref 0–0.9)
HGB BLD CALC-MCNC: 10 G/DL — LOW (ref 12.6–17.4)
HGB BLD CALC-MCNC: 11 G/DL — LOW (ref 12.6–17.4)
HGB BLD-MCNC: 10.4 G/DL — LOW (ref 14–18)
HGB BLD-MCNC: 10.6 G/DL — LOW (ref 14–18)
HGB BLD-MCNC: 10.7 G/DL — LOW (ref 14–18)
HGB BLD-MCNC: 11 G/DL — LOW (ref 14–18)
HGB BLD-MCNC: 9.3 G/DL — LOW (ref 14–18)
HGB BLD-MCNC: 9.7 G/DL — LOW (ref 14–18)
HGB BLD-MCNC: 9.9 G/DL — LOW (ref 14–18)
HIV 1+2 AB+HIV1 P24 AG SERPL QL IA: SIGNIFICANT CHANGE UP
HOWELL-JOLLY BOD BLD QL SMEAR: PRESENT — SIGNIFICANT CHANGE UP
HYALINE CASTS # UR AUTO: 46 /LPF — HIGH (ref 0–7)
HYPOCHROMIA BLD QL: SIGNIFICANT CHANGE UP
IMM GRANULOCYTES NFR BLD AUTO: 0.2 % — SIGNIFICANT CHANGE UP (ref 0.1–0.3)
IMM GRANULOCYTES NFR BLD AUTO: 0.3 % — SIGNIFICANT CHANGE UP (ref 0.1–0.3)
IMM GRANULOCYTES NFR BLD AUTO: 0.4 % — HIGH (ref 0.1–0.3)
IMM GRANULOCYTES NFR BLD AUTO: 0.6 % — HIGH (ref 0.1–0.3)
INR BLD: 2.12 RATIO — HIGH (ref 0.65–1.3)
INR BLD: 2.27 RATIO — HIGH (ref 0.65–1.3)
INR BLD: 3.15 RATIO — HIGH (ref 0.65–1.3)
INR BLD: 3.47 RATIO — HIGH (ref 0.65–1.3)
INR BLD: 3.59 RATIO — HIGH (ref 0.65–1.3)
INR BLD: 3.64 RATIO — HIGH (ref 0.65–1.3)
INR BLD: 3.82 RATIO — HIGH (ref 0.65–1.3)
INR BLD: 5.52 RATIO — CRITICAL HIGH (ref 0.65–1.3)
IRON SATN MFR SERPL: 18 % — SIGNIFICANT CHANGE UP (ref 15–50)
IRON SATN MFR SERPL: 65 UG/DL — SIGNIFICANT CHANGE UP (ref 35–150)
KETONES UR-MCNC: NEGATIVE — SIGNIFICANT CHANGE UP
KETONES UR-MCNC: SIGNIFICANT CHANGE UP
LACTATE BLDV-MCNC: 3.1 MMOL/L — HIGH (ref 0.5–2)
LACTATE BLDV-MCNC: 7.4 MMOL/L — CRITICAL HIGH (ref 0.5–2)
LACTATE SERPL-SCNC: 10.4 MMOL/L — CRITICAL HIGH (ref 0.7–2)
LACTATE SERPL-SCNC: 10.5 MMOL/L — CRITICAL HIGH (ref 0.7–2)
LACTATE SERPL-SCNC: 3.1 MMOL/L — HIGH (ref 0.7–2)
LACTATE SERPL-SCNC: 4.2 MMOL/L — CRITICAL HIGH (ref 0.7–2)
LACTATE SERPL-SCNC: 8.1 MMOL/L — CRITICAL HIGH (ref 0.7–2)
LACTATE SERPL-SCNC: 8.2 MMOL/L — CRITICAL HIGH (ref 0.7–2)
LACTATE SERPL-SCNC: 8.7 MMOL/L — CRITICAL HIGH (ref 0.7–2)
LACTATE SERPL-SCNC: 8.9 MMOL/L — CRITICAL HIGH (ref 0.7–2)
LDH SERPL L TO P-CCNC: 1653 U/L — HIGH (ref 50–242)
LDH SERPL L TO P-CCNC: 1692 U/L — HIGH (ref 50–242)
LDH SERPL L TO P-CCNC: 973 U/L — HIGH (ref 50–242)
LEUKOCYTE ESTERASE UR-ACNC: ABNORMAL
LEUKOCYTE ESTERASE UR-ACNC: NEGATIVE — SIGNIFICANT CHANGE UP
LYMPHOCYTES # BLD AUTO: 0.18 K/UL — LOW (ref 1.2–3.4)
LYMPHOCYTES # BLD AUTO: 0.24 K/UL — LOW (ref 1.2–3.4)
LYMPHOCYTES # BLD AUTO: 0.25 K/UL — LOW (ref 1.2–3.4)
LYMPHOCYTES # BLD AUTO: 0.4 K/UL — LOW (ref 1.2–3.4)
LYMPHOCYTES # BLD AUTO: 0.54 K/UL — LOW (ref 1.2–3.4)
LYMPHOCYTES # BLD AUTO: 0.54 K/UL — LOW (ref 1.2–3.4)
LYMPHOCYTES # BLD AUTO: 0.58 K/UL — LOW (ref 1.2–3.4)
LYMPHOCYTES # BLD AUTO: 0.64 K/UL — LOW (ref 1.2–3.4)
LYMPHOCYTES # BLD AUTO: 0.8 K/UL — LOW (ref 1.2–3.4)
LYMPHOCYTES # BLD AUTO: 3.5 % — LOW (ref 20.5–51.1)
LYMPHOCYTES # BLD AUTO: 3.6 % — LOW (ref 20.5–51.1)
LYMPHOCYTES # BLD AUTO: 3.7 % — LOW (ref 20.5–51.1)
LYMPHOCYTES # BLD AUTO: 4.3 % — LOW (ref 20.5–51.1)
LYMPHOCYTES # BLD AUTO: 4.5 % — LOW (ref 20.5–51.1)
LYMPHOCYTES # BLD AUTO: 4.8 % — LOW (ref 20.5–51.1)
LYMPHOCYTES # BLD AUTO: 5 % — LOW (ref 20.5–51.1)
LYMPHOCYTES # BLD AUTO: 5.1 % — LOW (ref 20.5–51.1)
LYMPHOCYTES # BLD AUTO: 6.4 % — LOW (ref 20.5–51.1)
MACROCYTES BLD QL: SIGNIFICANT CHANGE UP
MACROCYTES BLD QL: SIGNIFICANT CHANGE UP
MACROCYTES BLD QL: SLIGHT — SIGNIFICANT CHANGE UP
MAGNESIUM SERPL-MCNC: 2 MG/DL — SIGNIFICANT CHANGE UP (ref 1.8–2.4)
MAGNESIUM SERPL-MCNC: 2.3 MG/DL — SIGNIFICANT CHANGE UP (ref 1.8–2.4)
MAGNESIUM SERPL-MCNC: 2.6 MG/DL — HIGH (ref 1.8–2.4)
MAGNESIUM SERPL-MCNC: 2.6 MG/DL — HIGH (ref 1.8–2.4)
MAGNESIUM SERPL-MCNC: 2.7 MG/DL — HIGH (ref 1.8–2.4)
MAGNESIUM SERPL-MCNC: 2.8 MG/DL — HIGH (ref 1.8–2.4)
MANUAL SMEAR VERIFICATION: SIGNIFICANT CHANGE UP
MCHC RBC-ENTMCNC: 23.6 PG — LOW (ref 27–31)
MCHC RBC-ENTMCNC: 23.9 PG — LOW (ref 27–31)
MCHC RBC-ENTMCNC: 23.9 PG — LOW (ref 27–31)
MCHC RBC-ENTMCNC: 24 PG — LOW (ref 27–31)
MCHC RBC-ENTMCNC: 24.2 PG — LOW (ref 27–31)
MCHC RBC-ENTMCNC: 24.3 PG — LOW (ref 27–31)
MCHC RBC-ENTMCNC: 24.3 PG — LOW (ref 27–31)
MCHC RBC-ENTMCNC: 24.4 PG — LOW (ref 27–31)
MCHC RBC-ENTMCNC: 24.5 PG — LOW (ref 27–31)
MCHC RBC-ENTMCNC: 32.2 G/DL — SIGNIFICANT CHANGE UP (ref 32–37)
MCHC RBC-ENTMCNC: 33.1 G/DL — SIGNIFICANT CHANGE UP (ref 32–37)
MCHC RBC-ENTMCNC: 33.3 G/DL — SIGNIFICANT CHANGE UP (ref 32–37)
MCHC RBC-ENTMCNC: 33.6 G/DL — SIGNIFICANT CHANGE UP (ref 32–37)
MCHC RBC-ENTMCNC: 33.7 G/DL — SIGNIFICANT CHANGE UP (ref 32–37)
MCHC RBC-ENTMCNC: 34.5 G/DL — SIGNIFICANT CHANGE UP (ref 32–37)
MCHC RBC-ENTMCNC: 35 G/DL — SIGNIFICANT CHANGE UP (ref 32–37)
MCHC RBC-ENTMCNC: 35.8 G/DL — SIGNIFICANT CHANGE UP (ref 32–37)
MCHC RBC-ENTMCNC: 36.2 G/DL — SIGNIFICANT CHANGE UP (ref 32–37)
MCV RBC AUTO: 67.7 FL — LOW (ref 80–94)
MCV RBC AUTO: 68 FL — LOW (ref 80–94)
MCV RBC AUTO: 68.6 FL — LOW (ref 80–94)
MCV RBC AUTO: 70.5 FL — LOW (ref 80–94)
MCV RBC AUTO: 70.8 FL — LOW (ref 80–94)
MCV RBC AUTO: 70.9 FL — LOW (ref 80–94)
MCV RBC AUTO: 72.2 FL — LOW (ref 80–94)
MCV RBC AUTO: 72.4 FL — LOW (ref 80–94)
MCV RBC AUTO: 75.1 FL — LOW (ref 80–94)
METHADONE UR-MCNC: NEGATIVE — SIGNIFICANT CHANGE UP
METHOD TYPE: SIGNIFICANT CHANGE UP
MICROCYTES BLD QL: SLIGHT — SIGNIFICANT CHANGE UP
MITOCHONDRIA AB SER-ACNC: SIGNIFICANT CHANGE UP
MONOCYTES # BLD AUTO: 0.36 K/UL — SIGNIFICANT CHANGE UP (ref 0.1–0.6)
MONOCYTES # BLD AUTO: 0.41 K/UL — SIGNIFICANT CHANGE UP (ref 0.1–0.6)
MONOCYTES # BLD AUTO: 0.6 K/UL — SIGNIFICANT CHANGE UP (ref 0.1–0.6)
MONOCYTES # BLD AUTO: 1.2 K/UL — HIGH (ref 0.1–0.6)
MONOCYTES # BLD AUTO: 1.25 K/UL — HIGH (ref 0.1–0.6)
MONOCYTES # BLD AUTO: 1.59 K/UL — HIGH (ref 0.1–0.6)
MONOCYTES # BLD AUTO: 1.64 K/UL — HIGH (ref 0.1–0.6)
MONOCYTES # BLD AUTO: 1.8 K/UL — HIGH (ref 0.1–0.6)
MONOCYTES # BLD AUTO: 2.06 K/UL — HIGH (ref 0.1–0.6)
MONOCYTES NFR BLD AUTO: 10 % — HIGH (ref 1.7–9.3)
MONOCYTES NFR BLD AUTO: 10.8 % — HIGH (ref 1.7–9.3)
MONOCYTES NFR BLD AUTO: 13.3 % — HIGH (ref 1.7–9.3)
MONOCYTES NFR BLD AUTO: 14.2 % — HIGH (ref 1.7–9.3)
MONOCYTES NFR BLD AUTO: 15.9 % — HIGH (ref 1.7–9.3)
MONOCYTES NFR BLD AUTO: 16.4 % — HIGH (ref 1.7–9.3)
MONOCYTES NFR BLD AUTO: 7 % — SIGNIFICANT CHANGE UP (ref 1.7–9.3)
MONOCYTES NFR BLD AUTO: 7.1 % — SIGNIFICANT CHANGE UP (ref 1.7–9.3)
MONOCYTES NFR BLD AUTO: 9.1 % — SIGNIFICANT CHANGE UP (ref 1.7–9.3)
MRSA PCR RESULT.: NEGATIVE — SIGNIFICANT CHANGE UP
MYELOCYTES NFR BLD: 0.9 % — HIGH (ref 0–0)
NEUTROPHILS # BLD AUTO: 10.08 K/UL — HIGH (ref 1.4–6.5)
NEUTROPHILS # BLD AUTO: 4.53 K/UL — SIGNIFICANT CHANGE UP (ref 1.4–6.5)
NEUTROPHILS # BLD AUTO: 5.15 K/UL — SIGNIFICANT CHANGE UP (ref 1.4–6.5)
NEUTROPHILS # BLD AUTO: 5.7 K/UL — SIGNIFICANT CHANGE UP (ref 1.4–6.5)
NEUTROPHILS # BLD AUTO: 8.89 K/UL — HIGH (ref 1.4–6.5)
NEUTROPHILS # BLD AUTO: 8.9 K/UL — HIGH (ref 1.4–6.5)
NEUTROPHILS # BLD AUTO: 9.24 K/UL — HIGH (ref 1.4–6.5)
NEUTROPHILS # BLD AUTO: 9.74 K/UL — HIGH (ref 1.4–6.5)
NEUTROPHILS # BLD AUTO: 9.8 K/UL — HIGH (ref 1.4–6.5)
NEUTROPHILS NFR BLD AUTO: 77.8 % — HIGH (ref 42.2–75.2)
NEUTROPHILS NFR BLD AUTO: 78.8 % — HIGH (ref 42.2–75.2)
NEUTROPHILS NFR BLD AUTO: 79.1 % — HIGH (ref 42.2–75.2)
NEUTROPHILS NFR BLD AUTO: 79.3 % — HIGH (ref 42.2–75.2)
NEUTROPHILS NFR BLD AUTO: 80.3 % — HIGH (ref 42.2–75.2)
NEUTROPHILS NFR BLD AUTO: 81.7 % — HIGH (ref 42.2–75.2)
NEUTROPHILS NFR BLD AUTO: 86.9 % — HIGH (ref 42.2–75.2)
NEUTROPHILS NFR BLD AUTO: 88.5 % — HIGH (ref 42.2–75.2)
NEUTROPHILS NFR BLD AUTO: 89.4 % — HIGH (ref 42.2–75.2)
NEUTS BAND # BLD: 0.9 % — SIGNIFICANT CHANGE UP (ref 0–6)
NEUTS BAND # BLD: 1.8 % — SIGNIFICANT CHANGE UP (ref 0–6)
NITRITE UR-MCNC: NEGATIVE — SIGNIFICANT CHANGE UP
NITRITE UR-MCNC: NEGATIVE — SIGNIFICANT CHANGE UP
NRBC # BLD: 0 /100 WBCS — SIGNIFICANT CHANGE UP (ref 0–0)
NRBC # BLD: 0 /100 WBCS — SIGNIFICANT CHANGE UP (ref 0–0)
NRBC # BLD: 2 /100 WBCS — HIGH (ref 0–0)
NRBC # BLD: 23 /100 — HIGH (ref 0–0)
NRBC # BLD: 26 /100 — HIGH (ref 0–0)
NRBC # BLD: 4 /100 WBCS — HIGH (ref 0–0)
NRBC # BLD: SIGNIFICANT CHANGE UP /100 WBCS (ref 0–0)
NRBC # BLD: SIGNIFICANT CHANGE UP /100 WBCS (ref 0–0)
NT-PROBNP SERPL-SCNC: 2661 PG/ML — HIGH (ref 0–300)
OPIATES UR-MCNC: NEGATIVE — SIGNIFICANT CHANGE UP
OSMOLALITY UR: 288 MOS/KG — SIGNIFICANT CHANGE UP (ref 50–1200)
PCO2 BLDV: 30 MMHG — LOW (ref 42–55)
PCO2 BLDV: 39 MMHG — LOW (ref 42–55)
PCP SPEC-MCNC: SIGNIFICANT CHANGE UP
PF4 HEPARIN CMPLX AB SER-ACNC: NEGATIVE — SIGNIFICANT CHANGE UP
PH BLDV: 7.34 — SIGNIFICANT CHANGE UP (ref 7.32–7.43)
PH BLDV: 7.42 — SIGNIFICANT CHANGE UP (ref 7.32–7.43)
PH UR: 5 — SIGNIFICANT CHANGE UP (ref 5–8)
PH UR: 6 — SIGNIFICANT CHANGE UP (ref 5–8)
PHOSPHATE SERPL-MCNC: 4.7 MG/DL — SIGNIFICANT CHANGE UP (ref 2.1–4.9)
PLAT MORPH BLD: ABNORMAL
PLAT MORPH BLD: NORMAL — SIGNIFICANT CHANGE UP
PLAT MORPH BLD: NORMAL — SIGNIFICANT CHANGE UP
PLATELET # BLD AUTO: 21 K/UL — LOW (ref 130–400)
PLATELET # BLD AUTO: 46 K/UL — LOW (ref 130–400)
PLATELET # BLD AUTO: 46 K/UL — LOW (ref 130–400)
PLATELET # BLD AUTO: 48 K/UL — LOW (ref 130–400)
PLATELET # BLD AUTO: 48 K/UL — LOW (ref 130–400)
PLATELET # BLD AUTO: 51 K/UL — LOW (ref 130–400)
PLATELET # BLD AUTO: 54 K/UL — LOW (ref 130–400)
PLATELET # BLD AUTO: 60 K/UL — LOW (ref 130–400)
PLATELET # BLD AUTO: 62 K/UL — LOW (ref 130–400)
PO2 BLDV: 51 MMHG — SIGNIFICANT CHANGE UP
PO2 BLDV: 51 MMHG — SIGNIFICANT CHANGE UP
POIKILOCYTOSIS BLD QL AUTO: SIGNIFICANT CHANGE UP
POTASSIUM BLDV-SCNC: 4 MMOL/L — SIGNIFICANT CHANGE UP (ref 3.5–5.1)
POTASSIUM BLDV-SCNC: 4.5 MMOL/L — SIGNIFICANT CHANGE UP (ref 3.5–5.1)
POTASSIUM SERPL-MCNC: 4.1 MMOL/L — SIGNIFICANT CHANGE UP (ref 3.5–5)
POTASSIUM SERPL-MCNC: 4.3 MMOL/L — SIGNIFICANT CHANGE UP (ref 3.5–5)
POTASSIUM SERPL-MCNC: 4.5 MMOL/L — SIGNIFICANT CHANGE UP (ref 3.5–5)
POTASSIUM SERPL-MCNC: 4.6 MMOL/L — SIGNIFICANT CHANGE UP (ref 3.5–5)
POTASSIUM SERPL-MCNC: 4.9 MMOL/L — SIGNIFICANT CHANGE UP (ref 3.5–5)
POTASSIUM SERPL-MCNC: 4.9 MMOL/L — SIGNIFICANT CHANGE UP (ref 3.5–5)
POTASSIUM SERPL-MCNC: 5 MMOL/L — SIGNIFICANT CHANGE UP (ref 3.5–5)
POTASSIUM SERPL-MCNC: 5.1 MMOL/L — HIGH (ref 3.5–5)
POTASSIUM SERPL-MCNC: 5.4 MMOL/L — HIGH (ref 3.5–5)
POTASSIUM SERPL-SCNC: 4.1 MMOL/L — SIGNIFICANT CHANGE UP (ref 3.5–5)
POTASSIUM SERPL-SCNC: 4.3 MMOL/L — SIGNIFICANT CHANGE UP (ref 3.5–5)
POTASSIUM SERPL-SCNC: 4.5 MMOL/L — SIGNIFICANT CHANGE UP (ref 3.5–5)
POTASSIUM SERPL-SCNC: 4.6 MMOL/L — SIGNIFICANT CHANGE UP (ref 3.5–5)
POTASSIUM SERPL-SCNC: 4.9 MMOL/L — SIGNIFICANT CHANGE UP (ref 3.5–5)
POTASSIUM SERPL-SCNC: 4.9 MMOL/L — SIGNIFICANT CHANGE UP (ref 3.5–5)
POTASSIUM SERPL-SCNC: 5 MMOL/L — SIGNIFICANT CHANGE UP (ref 3.5–5)
POTASSIUM SERPL-SCNC: 5.1 MMOL/L — HIGH (ref 3.5–5)
POTASSIUM SERPL-SCNC: 5.4 MMOL/L — HIGH (ref 3.5–5)
PROCALCITONIN SERPL-MCNC: 0.35 NG/ML — HIGH (ref 0.02–0.1)
PROCALCITONIN SERPL-MCNC: 0.37 NG/ML — HIGH (ref 0.02–0.1)
PROMYELOCYTES # FLD: 0.9 % — HIGH (ref 0–0)
PROMYELOCYTES # FLD: 1.8 % — HIGH (ref 0–0)
PROPOXYPHENE QUALITATIVE URINE RESULT: NEGATIVE — SIGNIFICANT CHANGE UP
PROT SERPL-MCNC: 5.5 G/DL — LOW (ref 6–8)
PROT SERPL-MCNC: 6.2 G/DL — SIGNIFICANT CHANGE UP (ref 6–8)
PROT SERPL-MCNC: 6.3 G/DL — SIGNIFICANT CHANGE UP (ref 6–8)
PROT SERPL-MCNC: 6.4 G/DL — SIGNIFICANT CHANGE UP (ref 6–8)
PROT SERPL-MCNC: 6.4 G/DL — SIGNIFICANT CHANGE UP (ref 6–8)
PROT SERPL-MCNC: 6.5 G/DL — SIGNIFICANT CHANGE UP (ref 6–8)
PROT UR-MCNC: ABNORMAL
PROT UR-MCNC: ABNORMAL
PROTHROM AB SERPL-ACNC: 24.2 SEC — HIGH (ref 9.95–12.87)
PROTHROM AB SERPL-ACNC: 25.9 SEC — HIGH (ref 9.95–12.87)
PROTHROM AB SERPL-ACNC: 35.8 SEC — HIGH (ref 9.95–12.87)
PROTHROM AB SERPL-ACNC: 39.4 SEC — HIGH (ref 9.95–12.87)
PROTHROM AB SERPL-ACNC: >40 SEC — HIGH (ref 9.95–12.87)
RAPID RVP RESULT: SIGNIFICANT CHANGE UP
RBC # BLD: 3.89 M/UL — LOW (ref 4.7–6.1)
RBC # BLD: 3.99 M/UL — LOW (ref 4.7–6.1)
RBC # BLD: 4.09 M/UL — LOW (ref 4.7–6.1)
RBC # BLD: 4.35 M/UL — LOW (ref 4.7–6.1)
RBC # BLD: 4.36 M/UL — LOW (ref 4.7–6.1)
RBC # BLD: 4.4 M/UL — LOW (ref 4.7–6.1)
RBC # BLD: 4.42 M/UL — LOW (ref 4.7–6.1)
RBC # BLD: 4.49 M/UL — LOW (ref 4.7–6.1)
RBC # BLD: 4.49 M/UL — LOW (ref 4.7–6.1)
RBC # BLD: 4.56 M/UL — LOW (ref 4.7–6.1)
RBC # FLD: 20 % — HIGH (ref 11.5–14.5)
RBC # FLD: 20 % — HIGH (ref 11.5–14.5)
RBC # FLD: 20.2 % — HIGH (ref 11.5–14.5)
RBC # FLD: 20.4 % — HIGH (ref 11.5–14.5)
RBC # FLD: 20.8 % — HIGH (ref 11.5–14.5)
RBC # FLD: 20.9 % — HIGH (ref 11.5–14.5)
RBC # FLD: 20.9 % — HIGH (ref 11.5–14.5)
RBC # FLD: 21.6 % — HIGH (ref 11.5–14.5)
RBC # FLD: 21.7 % — HIGH (ref 11.5–14.5)
RBC BLD AUTO: ABNORMAL
RBC CASTS # UR COMP ASSIST: 7 /HPF — HIGH (ref 0–4)
RETICS #: 69.3 K/UL — SIGNIFICANT CHANGE UP (ref 25–125)
RETICS/RBC NFR: 1.5 % — SIGNIFICANT CHANGE UP (ref 0.5–1.5)
SAO2 % BLDV: 79.5 % — SIGNIFICANT CHANGE UP
SAO2 % BLDV: 82.3 % — SIGNIFICANT CHANGE UP
SARS-COV-2 RNA SPEC QL NAA+PROBE: SIGNIFICANT CHANGE UP
SCHISTOCYTES BLD QL AUTO: SLIGHT — SIGNIFICANT CHANGE UP
SCHISTOCYTES BLD QL AUTO: SLIGHT — SIGNIFICANT CHANGE UP
SMUDGE CELLS # BLD: PRESENT — SIGNIFICANT CHANGE UP
SODIUM SERPL-SCNC: 126 MMOL/L — LOW (ref 135–146)
SODIUM SERPL-SCNC: 127 MMOL/L — LOW (ref 135–146)
SODIUM SERPL-SCNC: 128 MMOL/L — LOW (ref 135–146)
SODIUM SERPL-SCNC: 129 MMOL/L — LOW (ref 135–146)
SODIUM SERPL-SCNC: 132 MMOL/L — LOW (ref 135–146)
SODIUM SERPL-SCNC: 133 MMOL/L — LOW (ref 135–146)
SODIUM SERPL-SCNC: 134 MMOL/L — LOW (ref 135–146)
SODIUM UR-SCNC: 102 MMOL/L — SIGNIFICANT CHANGE UP
SODIUM UR-SCNC: <20 MMOL/L — SIGNIFICANT CHANGE UP
SP GR SPEC: 1.02 — SIGNIFICANT CHANGE UP (ref 1.01–1.03)
SP GR SPEC: 1.03 — SIGNIFICANT CHANGE UP (ref 1.01–1.03)
SPECIMEN SOURCE: SIGNIFICANT CHANGE UP
SPECIMEN SOURCE: SIGNIFICANT CHANGE UP
T3 SERPL-MCNC: 54 NG/DL — LOW (ref 80–200)
T4 AB SER-ACNC: 4.7 UG/DL — SIGNIFICANT CHANGE UP (ref 4.6–12)
TARGETS BLD QL SMEAR: SIGNIFICANT CHANGE UP
TIBC SERPL-MCNC: 361 UG/DL — SIGNIFICANT CHANGE UP (ref 220–430)
TOTAL HEM COMP BLD-ACNC: 27 U/ML — LOW (ref 42–95)
TRANSFERRIN SERPL-MCNC: 288 MG/DL — SIGNIFICANT CHANGE UP (ref 200–360)
TROPONIN T SERPL-MCNC: 0.02 NG/ML — HIGH
TROPONIN T SERPL-MCNC: 0.02 NG/ML — HIGH
TROPONIN T SERPL-MCNC: 0.06 NG/ML — CRITICAL HIGH
TSH SERPL-MCNC: 1.99 UIU/ML — SIGNIFICANT CHANGE UP (ref 0.27–4.2)
TSH SERPL-MCNC: 2.06 UIU/ML — SIGNIFICANT CHANGE UP (ref 0.27–4.2)
TSH SERPL-MCNC: 2.17 UIU/ML — SIGNIFICANT CHANGE UP (ref 0.27–4.2)
TSH SERPL-MCNC: 2.32 UIU/ML — SIGNIFICANT CHANGE UP (ref 0.27–4.2)
UIBC SERPL-MCNC: 296 UG/DL — SIGNIFICANT CHANGE UP (ref 110–370)
UROBILINOGEN FLD QL: ABNORMAL
UROBILINOGEN FLD QL: SIGNIFICANT CHANGE UP
UUN UR-MCNC: <6 MG/DL — SIGNIFICANT CHANGE UP
VARIANT LYMPHS # BLD: 1.8 % — SIGNIFICANT CHANGE UP (ref 0–5)
VIT B12 SERPL-MCNC: >2000 PG/ML — HIGH (ref 232–1245)
WBC # BLD: 11.09 K/UL — HIGH (ref 4.8–10.8)
WBC # BLD: 11.29 K/UL — HIGH (ref 4.8–10.8)
WBC # BLD: 11.52 K/UL — HIGH (ref 4.8–10.8)
WBC # BLD: 11.99 K/UL — HIGH (ref 4.8–10.8)
WBC # BLD: 12.46 K/UL — HIGH (ref 4.8–10.8)
WBC # BLD: 12.53 K/UL — HIGH (ref 4.8–10.8)
WBC # BLD: 5.07 K/UL — SIGNIFICANT CHANGE UP (ref 4.8–10.8)
WBC # BLD: 5.82 K/UL — SIGNIFICANT CHANGE UP (ref 4.8–10.8)
WBC # BLD: 6.56 K/UL — SIGNIFICANT CHANGE UP (ref 4.8–10.8)
WBC # FLD AUTO: 11.09 K/UL — HIGH (ref 4.8–10.8)
WBC # FLD AUTO: 11.29 K/UL — HIGH (ref 4.8–10.8)
WBC # FLD AUTO: 11.52 K/UL — HIGH (ref 4.8–10.8)
WBC # FLD AUTO: 11.99 K/UL — HIGH (ref 4.8–10.8)
WBC # FLD AUTO: 12.46 K/UL — HIGH (ref 4.8–10.8)
WBC # FLD AUTO: 12.53 K/UL — HIGH (ref 4.8–10.8)
WBC # FLD AUTO: 5.07 K/UL — SIGNIFICANT CHANGE UP (ref 4.8–10.8)
WBC # FLD AUTO: 5.82 K/UL — SIGNIFICANT CHANGE UP (ref 4.8–10.8)
WBC # FLD AUTO: 6.56 K/UL — SIGNIFICANT CHANGE UP (ref 4.8–10.8)
WBC UR QL: 40 /HPF — HIGH (ref 0–5)

## 2022-01-01 PROCEDURE — 99221 1ST HOSP IP/OBS SF/LOW 40: CPT | Mod: GC

## 2022-01-01 PROCEDURE — 99222 1ST HOSP IP/OBS MODERATE 55: CPT

## 2022-01-01 PROCEDURE — 99291 CRITICAL CARE FIRST HOUR: CPT

## 2022-01-01 PROCEDURE — 71045 X-RAY EXAM CHEST 1 VIEW: CPT | Mod: 26

## 2022-01-01 PROCEDURE — 71045 X-RAY EXAM CHEST 1 VIEW: CPT | Mod: 26,77

## 2022-01-01 PROCEDURE — 99223 1ST HOSP IP/OBS HIGH 75: CPT

## 2022-01-01 PROCEDURE — 93306 TTE W/DOPPLER COMPLETE: CPT | Mod: 26

## 2022-01-01 PROCEDURE — 71045 X-RAY EXAM CHEST 1 VIEW: CPT | Mod: 26,76

## 2022-01-01 PROCEDURE — 93010 ELECTROCARDIOGRAM REPORT: CPT

## 2022-01-01 PROCEDURE — 74177 CT ABD & PELVIS W/CONTRAST: CPT | Mod: 26

## 2022-01-01 PROCEDURE — 99232 SBSQ HOSP IP/OBS MODERATE 35: CPT

## 2022-01-01 PROCEDURE — 93010 ELECTROCARDIOGRAM REPORT: CPT | Mod: 77,76

## 2022-01-01 PROCEDURE — 93283 PRGRMG EVAL IMPLANTABLE DFB: CPT | Mod: 26

## 2022-01-01 PROCEDURE — 99233 SBSQ HOSP IP/OBS HIGH 50: CPT

## 2022-01-01 PROCEDURE — 93010 ELECTROCARDIOGRAM REPORT: CPT | Mod: 77

## 2022-01-01 PROCEDURE — 93970 EXTREMITY STUDY: CPT | Mod: 26

## 2022-01-01 PROCEDURE — 93010 ELECTROCARDIOGRAM REPORT: CPT | Mod: 76

## 2022-01-01 PROCEDURE — 70450 CT HEAD/BRAIN W/O DYE: CPT | Mod: 26,MA

## 2022-01-01 PROCEDURE — 76705 ECHO EXAM OF ABDOMEN: CPT | Mod: 26

## 2022-01-01 RX ORDER — ALBUTEROL 90 UG/1
2 AEROSOL, METERED ORAL EVERY 6 HOURS
Refills: 0 | Status: DISCONTINUED | OUTPATIENT
Start: 2022-01-01 | End: 2022-01-01

## 2022-01-01 RX ORDER — DEXTROSE 50 % IN WATER 50 %
50 SYRINGE (ML) INTRAVENOUS ONCE
Refills: 0 | Status: DISCONTINUED | OUTPATIENT
Start: 2022-01-01 | End: 2022-01-01

## 2022-01-01 RX ORDER — VASOPRESSIN 20 [USP'U]/ML
0.04 INJECTION INTRAVENOUS
Qty: 50 | Refills: 0 | Status: DISCONTINUED | OUTPATIENT
Start: 2022-01-01 | End: 2022-01-01

## 2022-01-01 RX ORDER — FUROSEMIDE 40 MG
80 TABLET ORAL ONCE
Refills: 0 | Status: COMPLETED | OUTPATIENT
Start: 2022-01-01 | End: 2022-01-01

## 2022-01-01 RX ORDER — DEXTROSE 10 % IN WATER 10 %
250 INTRAVENOUS SOLUTION INTRAVENOUS ONCE
Refills: 0 | Status: COMPLETED | OUTPATIENT
Start: 2022-01-01 | End: 2022-01-01

## 2022-01-01 RX ORDER — DOBUTAMINE HCL 250MG/20ML
10 VIAL (ML) INTRAVENOUS
Qty: 1000 | Refills: 0 | Status: DISCONTINUED | OUTPATIENT
Start: 2022-01-01 | End: 2022-01-01

## 2022-01-01 RX ORDER — CEFEPIME 1 G/1
2000 INJECTION, POWDER, FOR SOLUTION INTRAMUSCULAR; INTRAVENOUS ONCE
Refills: 0 | Status: COMPLETED | OUTPATIENT
Start: 2022-01-01 | End: 2022-01-01

## 2022-01-01 RX ORDER — DEXAMETHASONE 0.5 MG/5ML
4 ELIXIR ORAL EVERY 12 HOURS
Refills: 0 | Status: DISCONTINUED | OUTPATIENT
Start: 2022-01-01 | End: 2022-01-01

## 2022-01-01 RX ORDER — SODIUM CHLORIDE 9 MG/ML
250 INJECTION INTRAMUSCULAR; INTRAVENOUS; SUBCUTANEOUS ONCE
Refills: 0 | Status: COMPLETED | OUTPATIENT
Start: 2022-01-01 | End: 2022-01-01

## 2022-01-01 RX ORDER — SODIUM CHLORIDE 9 MG/ML
1000 INJECTION, SOLUTION INTRAVENOUS
Refills: 0 | Status: DISCONTINUED | OUTPATIENT
Start: 2022-01-01 | End: 2022-01-01

## 2022-01-01 RX ORDER — DEXTROSE 50 % IN WATER 50 %
50 SYRINGE (ML) INTRAVENOUS ONCE
Refills: 0 | Status: COMPLETED | OUTPATIENT
Start: 2022-01-01 | End: 2022-01-01

## 2022-01-01 RX ORDER — CHLORHEXIDINE GLUCONATE 213 G/1000ML
15 SOLUTION TOPICAL
Refills: 0 | Status: DISCONTINUED | OUTPATIENT
Start: 2022-01-01 | End: 2022-01-01

## 2022-01-01 RX ORDER — ACETYLCYSTEINE 200 MG/ML
10 VIAL (ML) MISCELLANEOUS ONCE
Refills: 0 | Status: DISCONTINUED | OUTPATIENT
Start: 2022-01-01 | End: 2022-01-01

## 2022-01-01 RX ORDER — HYDROCORTISONE 20 MG
100 TABLET ORAL EVERY 8 HOURS
Refills: 0 | Status: DISCONTINUED | OUTPATIENT
Start: 2022-01-01 | End: 2022-01-01

## 2022-01-01 RX ORDER — SODIUM CHLORIDE 9 MG/ML
250 INJECTION, SOLUTION INTRAVENOUS ONCE
Refills: 0 | Status: DISCONTINUED | OUTPATIENT
Start: 2022-01-01 | End: 2022-01-01

## 2022-01-01 RX ORDER — CEFTRIAXONE 500 MG/1
1000 INJECTION, POWDER, FOR SOLUTION INTRAMUSCULAR; INTRAVENOUS EVERY 24 HOURS
Refills: 0 | Status: DISCONTINUED | OUTPATIENT
Start: 2022-01-01 | End: 2022-01-01

## 2022-01-01 RX ORDER — DEXTROSE 10 % IN WATER 10 %
250 INTRAVENOUS SOLUTION INTRAVENOUS
Refills: 0 | Status: DISCONTINUED | OUTPATIENT
Start: 2022-01-01 | End: 2022-01-01

## 2022-01-01 RX ORDER — ONDANSETRON 8 MG/1
4 TABLET, FILM COATED ORAL ONCE
Refills: 0 | Status: COMPLETED | OUTPATIENT
Start: 2022-01-01 | End: 2022-01-01

## 2022-01-01 RX ORDER — MEROPENEM 1 G/30ML
500 INJECTION INTRAVENOUS EVERY 12 HOURS
Refills: 0 | Status: DISCONTINUED | OUTPATIENT
Start: 2022-01-01 | End: 2022-01-01

## 2022-01-01 RX ORDER — PHYTONADIONE (VIT K1) 5 MG
10 TABLET ORAL ONCE
Refills: 0 | Status: COMPLETED | OUTPATIENT
Start: 2022-01-01 | End: 2022-01-01

## 2022-01-01 RX ORDER — NOREPINEPHRINE BITARTRATE/D5W 8 MG/250ML
0.05 PLASTIC BAG, INJECTION (ML) INTRAVENOUS
Qty: 8 | Refills: 0 | Status: DISCONTINUED | OUTPATIENT
Start: 2022-01-01 | End: 2022-01-01

## 2022-01-01 RX ORDER — SENNA PLUS 8.6 MG/1
2 TABLET ORAL AT BEDTIME
Refills: 0 | Status: DISCONTINUED | OUTPATIENT
Start: 2022-01-01 | End: 2022-01-01

## 2022-01-01 RX ORDER — CEFTRIAXONE 500 MG/1
2000 INJECTION, POWDER, FOR SOLUTION INTRAMUSCULAR; INTRAVENOUS EVERY 24 HOURS
Refills: 0 | Status: DISCONTINUED | OUTPATIENT
Start: 2022-01-01 | End: 2022-01-01

## 2022-01-01 RX ORDER — SODIUM BICARBONATE 1 MEQ/ML
0.12 SYRINGE (ML) INTRAVENOUS
Qty: 150 | Refills: 0 | Status: DISCONTINUED | OUTPATIENT
Start: 2022-01-01 | End: 2022-01-01

## 2022-01-01 RX ORDER — ACETYLCYSTEINE 200 MG/ML
5 VIAL (ML) MISCELLANEOUS ONCE
Refills: 0 | Status: DISCONTINUED | OUTPATIENT
Start: 2022-01-01 | End: 2022-01-01

## 2022-01-01 RX ORDER — HYDROMORPHONE HYDROCHLORIDE 2 MG/ML
1 INJECTION INTRAMUSCULAR; INTRAVENOUS; SUBCUTANEOUS
Refills: 0 | Status: DISCONTINUED | OUTPATIENT
Start: 2022-01-01 | End: 2022-01-01

## 2022-01-01 RX ORDER — PANTOPRAZOLE SODIUM 20 MG/1
40 TABLET, DELAYED RELEASE ORAL DAILY
Refills: 0 | Status: DISCONTINUED | OUTPATIENT
Start: 2022-01-01 | End: 2022-01-01

## 2022-01-01 RX ORDER — LACTULOSE 10 G/15ML
20 SOLUTION ORAL EVERY 12 HOURS
Refills: 0 | Status: DISCONTINUED | OUTPATIENT
Start: 2022-01-01 | End: 2022-01-01

## 2022-01-01 RX ORDER — HALOPERIDOL DECANOATE 100 MG/ML
5 INJECTION INTRAMUSCULAR EVERY 6 HOURS
Refills: 0 | Status: DISCONTINUED | OUTPATIENT
Start: 2022-01-01 | End: 2022-01-01

## 2022-01-01 RX ORDER — LACTULOSE 10 G/15ML
20 SOLUTION ORAL EVERY 8 HOURS
Refills: 0 | Status: DISCONTINUED | OUTPATIENT
Start: 2022-01-01 | End: 2022-01-01

## 2022-01-01 RX ORDER — MEMANTINE HYDROCHLORIDE 10 MG/1
5 TABLET ORAL AT BEDTIME
Refills: 0 | Status: DISCONTINUED | OUTPATIENT
Start: 2022-01-01 | End: 2022-01-01

## 2022-01-01 RX ORDER — HYDROCORTISONE 20 MG
50 TABLET ORAL EVERY 6 HOURS
Refills: 0 | Status: DISCONTINUED | OUTPATIENT
Start: 2022-01-01 | End: 2022-01-01

## 2022-01-01 RX ORDER — CHLORHEXIDINE GLUCONATE 213 G/1000ML
1 SOLUTION TOPICAL
Refills: 0 | Status: DISCONTINUED | OUTPATIENT
Start: 2022-01-01 | End: 2022-01-01

## 2022-01-01 RX ORDER — NOREPINEPHRINE BITARTRATE/D5W 8 MG/250ML
0.05 PLASTIC BAG, INJECTION (ML) INTRAVENOUS
Qty: 32 | Refills: 0 | Status: DISCONTINUED | OUTPATIENT
Start: 2022-01-01 | End: 2022-01-01

## 2022-01-01 RX ORDER — HALOPERIDOL DECANOATE 100 MG/ML
2 INJECTION INTRAMUSCULAR ONCE
Refills: 0 | Status: COMPLETED | OUTPATIENT
Start: 2022-01-01 | End: 2022-01-01

## 2022-01-01 RX ORDER — EPINEPHRINE 0.3 MG/.3ML
0.03 INJECTION INTRAMUSCULAR; SUBCUTANEOUS
Qty: 8 | Refills: 0 | Status: DISCONTINUED | OUTPATIENT
Start: 2022-01-01 | End: 2022-01-01

## 2022-01-01 RX ORDER — ALTEPLASE 100 MG
10 KIT INTRAVENOUS ONCE
Refills: 0 | Status: DISCONTINUED | OUTPATIENT
Start: 2022-01-01 | End: 2022-01-01

## 2022-01-01 RX ORDER — LACTULOSE 10 G/15ML
20 SOLUTION ORAL EVERY 4 HOURS
Refills: 0 | Status: DISCONTINUED | OUTPATIENT
Start: 2022-01-01 | End: 2022-01-01

## 2022-01-01 RX ORDER — SODIUM BICARBONATE 1 MEQ/ML
0.09 SYRINGE (ML) INTRAVENOUS
Qty: 150 | Refills: 0 | Status: DISCONTINUED | OUTPATIENT
Start: 2022-01-01 | End: 2022-01-01

## 2022-01-01 RX ORDER — DOBUTAMINE HCL 250MG/20ML
5 VIAL (ML) INTRAVENOUS
Qty: 500 | Refills: 0 | Status: DISCONTINUED | OUTPATIENT
Start: 2022-01-01 | End: 2022-01-01

## 2022-01-01 RX ORDER — VANCOMYCIN HCL 1 G
1000 VIAL (EA) INTRAVENOUS ONCE
Refills: 0 | Status: COMPLETED | OUTPATIENT
Start: 2022-01-01 | End: 2022-01-01

## 2022-01-01 RX ORDER — ROBINUL 0.2 MG/ML
0.4 INJECTION INTRAMUSCULAR; INTRAVENOUS ONCE
Refills: 0 | Status: COMPLETED | OUTPATIENT
Start: 2022-01-01 | End: 2022-01-01

## 2022-01-01 RX ORDER — FENTANYL CITRATE 50 UG/ML
0.5 INJECTION INTRAVENOUS
Qty: 2500 | Refills: 0 | Status: DISCONTINUED | OUTPATIENT
Start: 2022-01-01 | End: 2022-01-01

## 2022-01-01 RX ORDER — POLYETHYLENE GLYCOL 3350 17 G/17G
17 POWDER, FOR SOLUTION ORAL DAILY
Refills: 0 | Status: DISCONTINUED | OUTPATIENT
Start: 2022-01-01 | End: 2022-01-01

## 2022-01-01 RX ORDER — PANTOPRAZOLE SODIUM 20 MG/1
40 TABLET, DELAYED RELEASE ORAL
Refills: 0 | Status: DISCONTINUED | OUTPATIENT
Start: 2022-01-01 | End: 2022-01-01

## 2022-01-01 RX ORDER — HYDROCORTISONE 20 MG
100 TABLET ORAL ONCE
Refills: 0 | Status: COMPLETED | OUTPATIENT
Start: 2022-01-01 | End: 2022-01-01

## 2022-01-01 RX ORDER — SODIUM CHLORIDE 9 MG/ML
1000 INJECTION, SOLUTION INTRAVENOUS ONCE
Refills: 0 | Status: COMPLETED | OUTPATIENT
Start: 2022-01-01 | End: 2022-01-01

## 2022-01-01 RX ORDER — ALTEPLASE 100 MG
2 KIT INTRAVENOUS ONCE
Refills: 0 | Status: COMPLETED | OUTPATIENT
Start: 2022-01-01 | End: 2022-01-01

## 2022-01-01 RX ORDER — NOREPINEPHRINE BITARTRATE/D5W 8 MG/250ML
0.05 PLASTIC BAG, INJECTION (ML) INTRAVENOUS
Qty: 16 | Refills: 0 | Status: DISCONTINUED | OUTPATIENT
Start: 2022-01-01 | End: 2022-01-01

## 2022-01-01 RX ORDER — ASPIRIN/CALCIUM CARB/MAGNESIUM 324 MG
81 TABLET ORAL DAILY
Refills: 0 | Status: DISCONTINUED | OUTPATIENT
Start: 2022-01-01 | End: 2022-01-01

## 2022-01-01 RX ORDER — FUROSEMIDE 40 MG
80 TABLET ORAL EVERY 12 HOURS
Refills: 0 | Status: DISCONTINUED | OUTPATIENT
Start: 2022-01-01 | End: 2022-01-01

## 2022-01-01 RX ORDER — ACETYLCYSTEINE 200 MG/ML
15 VIAL (ML) MISCELLANEOUS ONCE
Refills: 0 | Status: COMPLETED | OUTPATIENT
Start: 2022-01-01 | End: 2022-01-01

## 2022-01-01 RX ORDER — MEROPENEM 1 G/30ML
1000 INJECTION INTRAVENOUS EVERY 12 HOURS
Refills: 0 | Status: DISCONTINUED | OUTPATIENT
Start: 2022-01-01 | End: 2022-01-01

## 2022-01-01 RX ORDER — DOBUTAMINE HCL 250MG/20ML
2.5 VIAL (ML) INTRAVENOUS
Qty: 500 | Refills: 0 | Status: DISCONTINUED | OUTPATIENT
Start: 2022-01-01 | End: 2022-01-01

## 2022-01-01 RX ORDER — SODIUM BICARBONATE 1 MEQ/ML
1300 SYRINGE (ML) INTRAVENOUS THREE TIMES A DAY
Refills: 0 | Status: DISCONTINUED | OUTPATIENT
Start: 2022-01-01 | End: 2022-01-01

## 2022-01-01 RX ORDER — SODIUM BICARBONATE 1 MEQ/ML
0.06 SYRINGE (ML) INTRAVENOUS
Qty: 150 | Refills: 0 | Status: DISCONTINUED | OUTPATIENT
Start: 2022-01-01 | End: 2022-01-01

## 2022-01-01 RX ORDER — LACTULOSE 10 G/15ML
15 SOLUTION ORAL EVERY 6 HOURS
Refills: 0 | Status: DISCONTINUED | OUTPATIENT
Start: 2022-01-01 | End: 2022-01-01

## 2022-01-01 RX ORDER — SODIUM BICARBONATE 1 MEQ/ML
100 SYRINGE (ML) INTRAVENOUS ONCE
Refills: 0 | Status: COMPLETED | OUTPATIENT
Start: 2022-01-01 | End: 2022-01-01

## 2022-01-01 RX ORDER — LACTULOSE 10 G/15ML
10 SOLUTION ORAL EVERY 8 HOURS
Refills: 0 | Status: DISCONTINUED | OUTPATIENT
Start: 2022-01-01 | End: 2022-01-01

## 2022-01-01 RX ADMIN — CHLORHEXIDINE GLUCONATE 1 APPLICATION(S): 213 SOLUTION TOPICAL at 05:26

## 2022-01-01 RX ADMIN — Medication 2 MILLIGRAM(S): at 22:05

## 2022-01-01 RX ADMIN — Medication 250 MILLIGRAM(S): at 21:19

## 2022-01-01 RX ADMIN — SODIUM CHLORIDE 333.33 MILLILITER(S): 9 INJECTION, SOLUTION INTRAVENOUS at 23:06

## 2022-01-01 RX ADMIN — LACTULOSE 15 GRAM(S): 10 SOLUTION ORAL at 05:23

## 2022-01-01 RX ADMIN — CHLORHEXIDINE GLUCONATE 15 MILLILITER(S): 213 SOLUTION TOPICAL at 17:11

## 2022-01-01 RX ADMIN — Medication 81 MILLIGRAM(S): at 11:11

## 2022-01-01 RX ADMIN — Medication 100 MILLIGRAM(S): at 22:10

## 2022-01-01 RX ADMIN — EPINEPHRINE 7.16 MICROGRAM(S)/KG/MIN: 0.3 INJECTION INTRAMUSCULAR; SUBCUTANEOUS at 21:40

## 2022-01-01 RX ADMIN — Medication 5.96 MICROGRAM(S)/KG/MIN: at 05:47

## 2022-01-01 RX ADMIN — POLYETHYLENE GLYCOL 3350 17 GRAM(S): 17 POWDER, FOR SOLUTION ORAL at 12:32

## 2022-01-01 RX ADMIN — PANTOPRAZOLE SODIUM 40 MILLIGRAM(S): 20 TABLET, DELAYED RELEASE ORAL at 12:34

## 2022-01-01 RX ADMIN — ONDANSETRON 4 MILLIGRAM(S): 8 TABLET, FILM COATED ORAL at 23:05

## 2022-01-01 RX ADMIN — CHLORHEXIDINE GLUCONATE 15 MILLILITER(S): 213 SOLUTION TOPICAL at 05:26

## 2022-01-01 RX ADMIN — Medication 9.54 MICROGRAM(S)/KG/MIN: at 10:48

## 2022-01-01 RX ADMIN — CHLORHEXIDINE GLUCONATE 1 APPLICATION(S): 213 SOLUTION TOPICAL at 05:23

## 2022-01-01 RX ADMIN — CHLORHEXIDINE GLUCONATE 1 APPLICATION(S): 213 SOLUTION TOPICAL at 05:36

## 2022-01-01 RX ADMIN — LACTULOSE 20 GRAM(S): 10 SOLUTION ORAL at 05:46

## 2022-01-01 RX ADMIN — SENNA PLUS 2 TABLET(S): 8.6 TABLET ORAL at 22:11

## 2022-01-01 RX ADMIN — Medication 100 MEQ/KG/HR: at 11:23

## 2022-01-01 RX ADMIN — Medication 100 MEQ/KG/HR: at 05:47

## 2022-01-01 RX ADMIN — ALTEPLASE 2 MILLIGRAM(S): KIT at 17:50

## 2022-01-01 RX ADMIN — Medication 9999 MILLILITER(S): at 06:00

## 2022-01-01 RX ADMIN — SENNA PLUS 2 TABLET(S): 8.6 TABLET ORAL at 22:05

## 2022-01-01 RX ADMIN — LACTULOSE 10 GRAM(S): 10 SOLUTION ORAL at 05:21

## 2022-01-01 RX ADMIN — LACTULOSE 10 GRAM(S): 10 SOLUTION ORAL at 14:15

## 2022-01-01 RX ADMIN — Medication 19.1 MICROGRAM(S)/KG/MIN: at 05:46

## 2022-01-01 RX ADMIN — Medication 100 MILLIEQUIVALENT(S): at 21:15

## 2022-01-01 RX ADMIN — SENNA PLUS 2 TABLET(S): 8.6 TABLET ORAL at 21:12

## 2022-01-01 RX ADMIN — Medication 19.1 MICROGRAM(S)/KG/MIN: at 16:50

## 2022-01-01 RX ADMIN — Medication 1300 MILLIGRAM(S): at 05:26

## 2022-01-01 RX ADMIN — Medication 4.69 MICROGRAM(S)/KG/MIN: at 03:03

## 2022-01-01 RX ADMIN — MEROPENEM 100 MILLIGRAM(S): 1 INJECTION INTRAVENOUS at 05:27

## 2022-01-01 RX ADMIN — LACTULOSE 20 GRAM(S): 10 SOLUTION ORAL at 10:19

## 2022-01-01 RX ADMIN — Medication 81 MILLIGRAM(S): at 12:33

## 2022-01-01 RX ADMIN — MEMANTINE HYDROCHLORIDE 5 MILLIGRAM(S): 10 TABLET ORAL at 21:12

## 2022-01-01 RX ADMIN — LACTULOSE 20 GRAM(S): 10 SOLUTION ORAL at 17:23

## 2022-01-01 RX ADMIN — Medication 1300 MILLIGRAM(S): at 05:21

## 2022-01-01 RX ADMIN — Medication 4 MILLIGRAM(S): at 05:26

## 2022-01-01 RX ADMIN — CHLORHEXIDINE GLUCONATE 1 APPLICATION(S): 213 SOLUTION TOPICAL at 05:45

## 2022-01-01 RX ADMIN — VASOPRESSIN 2.4 UNIT(S)/MIN: 20 INJECTION INTRAVENOUS at 05:47

## 2022-01-01 RX ADMIN — LACTULOSE 10 GRAM(S): 10 SOLUTION ORAL at 22:10

## 2022-01-01 RX ADMIN — Medication 81 MILLIGRAM(S): at 12:01

## 2022-01-01 RX ADMIN — MEMANTINE HYDROCHLORIDE 5 MILLIGRAM(S): 10 TABLET ORAL at 22:11

## 2022-01-01 RX ADMIN — ROBINUL 0.4 MILLIGRAM(S): 0.2 INJECTION INTRAMUSCULAR; INTRAVENOUS at 14:19

## 2022-01-01 RX ADMIN — MEROPENEM 100 MILLIGRAM(S): 1 INJECTION INTRAVENOUS at 17:11

## 2022-01-01 RX ADMIN — Medication 100 MILLIGRAM(S): at 21:19

## 2022-01-01 RX ADMIN — Medication 9.38 MICROGRAM(S)/KG/MIN: at 22:05

## 2022-01-01 RX ADMIN — Medication 100 MILLIGRAM(S): at 15:24

## 2022-01-01 RX ADMIN — MEROPENEM 100 MILLIGRAM(S): 1 INJECTION INTRAVENOUS at 17:07

## 2022-01-01 RX ADMIN — MEROPENEM 100 MILLIGRAM(S): 1 INJECTION INTRAVENOUS at 05:21

## 2022-01-01 RX ADMIN — HYDROMORPHONE HYDROCHLORIDE 1 MILLIGRAM(S): 2 INJECTION INTRAMUSCULAR; INTRAVENOUS; SUBCUTANEOUS at 15:56

## 2022-01-01 RX ADMIN — Medication 50 MILLILITER(S): at 07:00

## 2022-01-01 RX ADMIN — SODIUM CHLORIDE 75 MILLILITER(S): 9 INJECTION, SOLUTION INTRAVENOUS at 07:37

## 2022-01-01 RX ADMIN — CEFTRIAXONE 100 MILLIGRAM(S): 500 INJECTION, POWDER, FOR SOLUTION INTRAMUSCULAR; INTRAVENOUS at 10:37

## 2022-01-01 RX ADMIN — Medication 100 MILLIGRAM(S): at 05:46

## 2022-01-01 RX ADMIN — Medication 1300 MILLIGRAM(S): at 18:45

## 2022-01-01 RX ADMIN — CHLORHEXIDINE GLUCONATE 15 MILLILITER(S): 213 SOLUTION TOPICAL at 05:46

## 2022-01-01 RX ADMIN — FENTANYL CITRATE 6.36 MICROGRAM(S)/KG/HR: 50 INJECTION INTRAVENOUS at 05:24

## 2022-01-01 RX ADMIN — Medication 19.1 MICROGRAM(S)/KG/MIN: at 05:22

## 2022-01-01 RX ADMIN — Medication 3000 MILLILITER(S): at 08:01

## 2022-01-01 RX ADMIN — Medication 9999 MILLILITER(S): at 18:25

## 2022-01-01 RX ADMIN — CEFTRIAXONE 100 MILLIGRAM(S): 500 INJECTION, POWDER, FOR SOLUTION INTRAMUSCULAR; INTRAVENOUS at 01:32

## 2022-01-01 RX ADMIN — Medication 750 MILLILITER(S): at 07:08

## 2022-01-01 RX ADMIN — CHLORHEXIDINE GLUCONATE 15 MILLILITER(S): 213 SOLUTION TOPICAL at 17:06

## 2022-01-01 RX ADMIN — LACTULOSE 20 GRAM(S): 10 SOLUTION ORAL at 21:12

## 2022-01-01 RX ADMIN — Medication 4 MILLIGRAM(S): at 18:45

## 2022-01-01 RX ADMIN — Medication 102 MILLIGRAM(S): at 03:03

## 2022-01-01 RX ADMIN — Medication 1300 MILLIGRAM(S): at 14:15

## 2022-01-01 RX ADMIN — Medication 100 MILLIGRAM(S): at 13:44

## 2022-01-01 RX ADMIN — Medication 5.96 MICROGRAM(S)/KG/MIN: at 21:11

## 2022-01-01 RX ADMIN — Medication 3000 MILLILITER(S): at 08:00

## 2022-01-01 RX ADMIN — MEMANTINE HYDROCHLORIDE 5 MILLIGRAM(S): 10 TABLET ORAL at 23:18

## 2022-01-01 RX ADMIN — HYDROMORPHONE HYDROCHLORIDE 1 MILLIGRAM(S): 2 INJECTION INTRAMUSCULAR; INTRAVENOUS; SUBCUTANEOUS at 14:16

## 2022-01-01 RX ADMIN — Medication 100 MILLIGRAM(S): at 21:12

## 2022-01-01 RX ADMIN — Medication 5.96 MICROGRAM(S)/KG/MIN: at 21:40

## 2022-01-01 RX ADMIN — Medication 100 MILLIGRAM(S): at 11:11

## 2022-01-01 RX ADMIN — Medication 80 MILLIGRAM(S): at 01:32

## 2022-01-01 RX ADMIN — Medication 1300 MILLIGRAM(S): at 22:05

## 2022-01-01 RX ADMIN — MEROPENEM 100 MILLIGRAM(S): 1 INJECTION INTRAVENOUS at 05:46

## 2022-01-01 RX ADMIN — Medication 50 MILLILITER(S): at 06:00

## 2022-01-01 RX ADMIN — Medication 100 MILLIGRAM(S): at 05:21

## 2022-01-01 RX ADMIN — Medication 325 GRAM(S): at 12:27

## 2022-01-01 RX ADMIN — CHLORHEXIDINE GLUCONATE 15 MILLILITER(S): 213 SOLUTION TOPICAL at 18:45

## 2022-01-01 RX ADMIN — PANTOPRAZOLE SODIUM 40 MILLIGRAM(S): 20 TABLET, DELAYED RELEASE ORAL at 11:11

## 2022-01-01 RX ADMIN — LACTULOSE 20 GRAM(S): 10 SOLUTION ORAL at 13:02

## 2022-01-01 RX ADMIN — PANTOPRAZOLE SODIUM 40 MILLIGRAM(S): 20 TABLET, DELAYED RELEASE ORAL at 11:20

## 2022-01-01 RX ADMIN — Medication 19.1 MICROGRAM(S)/KG/MIN: at 11:01

## 2022-01-01 RX ADMIN — VASOPRESSIN 2.4 UNIT(S)/MIN: 20 INJECTION INTRAVENOUS at 05:24

## 2022-01-01 RX ADMIN — HALOPERIDOL DECANOATE 2 MILLIGRAM(S): 100 INJECTION INTRAMUSCULAR at 17:23

## 2022-01-01 RX ADMIN — ALTEPLASE 2 MILLIGRAM(S): KIT at 16:38

## 2022-01-01 RX ADMIN — CHLORHEXIDINE GLUCONATE 15 MILLILITER(S): 213 SOLUTION TOPICAL at 05:21

## 2022-01-01 RX ADMIN — Medication 1000 MILLILITER(S): at 11:40

## 2022-01-01 RX ADMIN — LACTULOSE 20 GRAM(S): 10 SOLUTION ORAL at 10:38

## 2022-01-01 RX ADMIN — Medication 81 MILLIGRAM(S): at 11:19

## 2022-01-01 RX ADMIN — Medication 1 MILLIGRAM(S): at 14:16

## 2022-01-01 RX ADMIN — Medication 100 MILLIGRAM(S): at 13:02

## 2022-01-01 RX ADMIN — SODIUM CHLORIDE 3000 MILLILITER(S): 9 INJECTION INTRAMUSCULAR; INTRAVENOUS; SUBCUTANEOUS at 18:20

## 2022-01-01 RX ADMIN — Medication 1300 MILLIGRAM(S): at 22:11

## 2022-01-01 RX ADMIN — LACTULOSE 20 GRAM(S): 10 SOLUTION ORAL at 15:52

## 2022-01-01 RX ADMIN — Medication 9.38 MICROGRAM(S)/KG/MIN: at 10:10

## 2022-01-01 RX ADMIN — POLYETHYLENE GLYCOL 3350 17 GRAM(S): 17 POWDER, FOR SOLUTION ORAL at 11:20

## 2022-01-01 RX ADMIN — CEFEPIME 100 MILLIGRAM(S): 1 INJECTION, POWDER, FOR SOLUTION INTRAMUSCULAR; INTRAVENOUS at 21:20

## 2022-01-01 RX ADMIN — Medication 3000 MILLILITER(S): at 06:30

## 2022-01-01 RX ADMIN — MEROPENEM 100 MILLIGRAM(S): 1 INJECTION INTRAVENOUS at 18:45

## 2022-01-17 NOTE — ED ADULT NURSE NOTE - OBJECTIVE STATEMENT
As per pt's brother, "He was in rehab and about 5 days ago he started to be more weak and slurred speech."

## 2022-01-17 NOTE — ED PROVIDER NOTE - PROGRESS NOTE DETAILS
Og: pt initially found to have borderline BP , hypothermic to 90 and altered. Was treated empirically for sepsis w/ abx. Pt also given stress dose steroids for possible adrenal crisis vs hypothyroidism . Og: pt initially found to have borderline BP , hypothermic to 90 and altered. Was treated empirically for sepsis w/ abx. Pt also given stress dose steroids for possible adrenal crisis vs hypothyroidism . Started on warming blanket. Temperature riley placed.

## 2022-01-17 NOTE — ED PROVIDER NOTE - OBJECTIVE STATEMENT
77 YO M with a PMH of CAD s/p MI w/stents 2007, CHF with 20% LVEF in 2016, AICD,  HTN, HLD, DM, gout, CKD, COPD, PVD s/p aortobifemoral bypass, proximal right leg dvt per 4/20, was on Eliquis, admitted with acute on chronic CHF and PE. 77 YO M with a PMH of CAD s/p MI w/stents 2007, CHF with 20% LVEF in 2020 s/p AICD,  HTN, HLD, DM, gout, CKD, COPD, PVD s/p aortobifemoral bypass hx of PE , DVT not compliant w/ Eliquis 2/2 cost issues presenting w/ AMS and slurred speech x 1 week .

## 2022-01-17 NOTE — ED PROVIDER NOTE - CARE PLAN
Principal Discharge DX:	Weakness   1 Principal Discharge DX:	Weakness  Secondary Diagnosis:	ACE (acute kidney injury)  Secondary Diagnosis:	Endogenous hypothermia  Secondary Diagnosis:	Elevated liver enzymes

## 2022-01-17 NOTE — ED PROVIDER NOTE - ATTENDING CONTRIBUTION TO CARE
76-year-old male past medical history significant for CAD status post MI and stents CHF with an EF of 20%, AICD, hypertension, hyperlipidemia, diabetes, gout, CKD, COPD, PVD, DVT and PE, patient is noncompliant with Eliquis due to due to economic hardship who presents today due to movements.  Patient's family said that for the last couple of days approximately 5 days patient has been having slurred speech.  In addition family states that he has been having more weakness and worsening inability to ambulate.  Patient denies any chest pain shortness of breath or any other medical complaints.    VITAL SIGNS: I have reviewed nursing notes and confirm.  CONSTITUTIONAL: non-toxic, well appearing  SKIN: no rash, no petechiae.  EYES: EOMI, pink conjunctiva, anicteric  ENT: tongue midline, no exudates, MMM  NECK: Supple; no meningismus, no JVD  CARD: RRR, no murmurs, equal radial pulses bilaterally 2+  RESP: CTAB, no respiratory distress  ABD: Soft, non-tender, non-distended, no peritoneal signs, no HSM, no CVA tenderness  EXT: Normal ROM x4.   NEURO: Alert, oriented x3. CN2-12 intact, equal strength bilaterally. slurred speech noted on exam.     a/p  76 yr old m that presents with weakness  -labs  -ekg  -imaging  -admission

## 2022-01-17 NOTE — ED ADULT TRIAGE NOTE - CHIEF COMPLAINT QUOTE
Pt BIBA from home for slurred speech and generalized weakness x5 days; family endorses pt is becoming worse everyday and today is now unable to ambulate. Family states pt has not taken Eliquis and one other med since dc from rehab center 1 month ago due to cost.   fs 96 Pt BIBA from home for slurred speech and generalized weakness x5 days; family endorses pt is becoming worse everyday and today is now unable to ambulate. Family states pt has not taken Eliquis and one other med since dc from rehab center 1 month ago due to cost. Family also endorses multiple falls in the last few days, denies head injury/LOC   fs 96

## 2022-01-17 NOTE — ED PROVIDER NOTE - PHYSICAL EXAMINATION
CONSTITUTIONAL: Well-developed; well-nourished; in no acute distress.   SKIN: warm, dry  HEAD: Normocephalic; atraumatic.  EYES: icteric sclera   ENT: No nasal discharge; airway clear.  NECK: Supple; non tender.  CARD:  Regular rate and rhythm.   RESP: NO inc WOB , speaking in full sentences, slurred speech   ABD: soft ntnd  EXT: Normal ROM.  no LE edema no unilateral leg swellingf  NEURO: AAO x 3, slurred speech, no facial asymmetry, negative pronator drift  PSYCH: Cooperative, appropriate.

## 2022-01-17 NOTE — ED ADULT NURSE NOTE - CHIEF COMPLAINT QUOTE
Pt BIBA from home for slurred speech and generalized weakness x5 days; family endorses pt is becoming worse everyday and today is now unable to ambulate. Family states pt has not taken Eliquis and one other med since dc from rehab center 1 month ago due to cost. Family also endorses multiple falls in the last few days, denies head injury/LOC   fs 96

## 2022-01-17 NOTE — ED ADULT NURSE REASSESSMENT NOTE - NS ED NURSE REASSESS COMMENT FT1
Pt noted to be hypothermic, warming blanket placed on pt. Pt placed on CCM, noted to be MD radha notified and aware. Nursing care continues.

## 2022-01-18 NOTE — CHART NOTE - NSCHARTNOTEFT_GEN_A_CORE
Device: [employer] [place] DC ICD    Indication: HF  Interrogation complete. Device functioning normally.     Mode: DDI 50 bpm  Dependent: No (AP 54%,  2.2%)  MRI compatible: NO  Battery: 4.3-4.9 years    Events: None    Recommendations: Routine f/u as outpatient with Dr. Melo    EPS [id number] Device: [employer] DC ICD    Indication: HF  Interrogation complete. Device functioning normally.     Mode: DDI 50 bpm  Dependent: No (AP 54%,  2.2%)  MRI compatible: NO  Battery: 4.3-4.9 years  Underlying Rhythm: Sinus bradycardia 48 bpm    Events: None    Recommendations: Routine f/u as outpatient with Dr. Melo    EPS [id number]

## 2022-01-18 NOTE — CONSULT NOTE ADULT - ATTENDING COMMENTS
Patient examined for AMS and slurred speech.  No lateralized findings present and team members note some improvement in mentation compared to exam a few hours earlier.  Agree with toxic metabolic encephalopathy.  Anticipate continued improvement with treatment of infection, hyperammonemia, CHF.

## 2022-01-18 NOTE — CONSULT NOTE ADULT - ATTENDING COMMENTS
76 year old  male admitted with slurred speech and weakness.  Hx of DM CAD AICD  HFrEF AICD PAD HTN COPD Hx of DVT on DOAC which was not taking. On memantine likely has dementia   On admission found to be hypotensive hypothermic with ACE elevated liver enzymes thrombocytopenia coagulopathy and encephalopathy with hyperammonemia.   Patient lives in [place] PCP [phone]. Was discharged from rehab to sister's house mid December.  He was walking and able to self care then but 2 weeks ago he started worsening with confusion and was bed bound.   He does have memory impairment but was able to carry out conversation and laugh at jokes.    Patient stuporous barely responsive  Jaundice +   Unable to test for asterixis   Abdomen distended soft non tender     Liver tests are suggestive of chronic hepatic congestion due to heart failure.   No evidence of cirrhosis on prior imaging.   Has hyperbilirubinemia could be heart failure or sepsis   Has acute thrombocytopenia coagulopathy from sepsis  Hyperammonemia may not be a reliable result and advise repeating it and sending it in ice.   Acute liver failure is unlikely. 76 year old  male admitted with slurred speech and weakness.  Hx of DM CAD AICD  HFrEF AICD PAD HTN COPD Hx of DVT on DOAC which was not taking. On memantine likely has dementia   On admission found to be hypotensive hypothermic with ACE elevated liver enzymes thrombocytopenia coagulopathy and encephalopathy with hyperammonemia.   Patient lives in [place] PCP [phone]. Was discharged from rehab to sister's house mid December.  He was walking and able to self care then but 2 weeks ago he started worsening with confusion and was bed bound.   He does have memory impairment but was able to carry out conversation and laugh at jokes.    Patient stuporous barely responsive  Jaundice +   Unable to test for asterixis   Abdomen distended soft non tender     Liver tests are suggestive of chronic hepatic congestion due to heart failure.   No evidence of cirrhosis on imaging.   Has hyperbilirubinemia could be heart failure or sepsis   Has acute thrombocytopenia coagulopathy from sepsis  Acute liver failure is unlikely.    Agree with plan as above.  Hyperammonemia may not be a reliable result and advise repeating it and sending it in ice.   Ascitic tap if possible

## 2022-01-18 NOTE — CHART NOTE - NSCHARTNOTEFT_GEN_A_CORE
Pt assessed at the bedside. Appears confused, with hands flailing in the air.  Blood pressure on via cuff 117/57 mmHg, currently on levophed 0.3 lonny/min.  Will continue to monitor for now w/o A-line as patient is high risk for pulling out lines.   Earlier attempt to pace central line was failed.

## 2022-01-18 NOTE — CONSULT NOTE ADULT - ASSESSMENT
77 yo M with PMHx of HFrEF (EF in 10/21: 24%) s/p AICD, CAD s/p MI w/stents 2007, HTN, HLD, DM, gout, CKD, COPD, PVD s/p aortobifemoral bypass, proximal right leg DVT (4/2021) on Eliquis (non compliant) presented for weakness and slurred speech.     Found to be in shock w/ MODS including elevated LFTS' ACE, AMS    ACE liekly ATn      no urgent indication for Hd  Keep MAP > 65   w/ LEvo and/or IVF  - elevated lactate noted (rising) though in spite of that is [place] due to resp alkalosis (no role for IV bicarb as is already alkelemic)     will follow

## 2022-01-18 NOTE — H&P ADULT - NSHPLABSRESULTS_GEN_ALL_CORE
( @ 21:05)                      11.0  5.07 )-----------( 46                 30.4    Neutrophils = 4.53 (89.4%)  Lymphocytes = 0.18 (3.5%)  Eosinophils = 0.00 (0.0%)  Basophils = 0.00 (0.0%)  Monocytes = 0.36 (7.1%)  Bands = --%        126<L>  |  88<L>  |  56<H>  ----------------------------<  99  4.1   |  19  |  2.1<H>    Ca    7.5<L>      2022 21:05  Mg     2.6         TPro  6.3  /  Alb  3.3<L>  /  TBili  7.8<H>  /  DBili  x   /  AST  417<H>  /  ALT  183<H>  /  AlkPhos  287<H>      ( 2022 22:14 )   PT: 39.40 sec;   INR: 3.47 ratio;         CARDIAC MARKERS ( 2022 21:05 )  Trop 0.02 ng/mL<H> / CK x     / CKMB x           RVP:( @ 22:10)  [facility]      Venous Blood Gas:   @ 20:37  7.42/39/51/25/79.5  VBG Lactate: 3.10        Tox:         Urinalysis Basic - ( 2022 22:30 )    Color: June / Appearance: Turbid / S.017 / pH: x  Gluc: x / Ketone: Negative  / Bili: Small / Urobili: 3 mg/dL   Blood: x / Protein: 100 mg/dL / Nitrite: Negative   Leuk Esterase: Moderate / RBC: 7 /HPF / WBC 40 /HPF   Sq Epi: x / Non Sq Epi: 3 /HPF / Bacteria: Negative

## 2022-01-18 NOTE — SWALLOW BEDSIDE ASSESSMENT ADULT - SWALLOW EVAL: DIAGNOSIS
Pt with altered mental status. min trials presented. mild oral dysphagia for puree and thin liquid trials characterized by delayed oral transit.

## 2022-01-18 NOTE — H&P ADULT - ASSESSMENT
Impression   Acutely decompensated RHF   Congestive hepatopathy, possible hepatic encephalopathy   ACE Cardiorenal syndrome vs congestive nephropathy   Thrombocytopenia   Hyponatremia/hypochloremia - dilutional?   Hyperbilirubinemia      Plan     CNS: Avoid CNS depressants. Lactulose 20g q8 hours. Check serum NH3.    CARDIOVASCULAR: Keep MAP >65. Repeat ECHO. Lasix 80mg IV q12h. EKG in AM. Cardiology eval. BNP. trend trops. Hold BB and entresto given HR and BP/ACE.       PULMONARY: HOB >45 degrees. Keep SpO2 >95%. CXR in AM. Inhalers PRN.     INFECTIOUS DISEASE: IV and catheter care PRN. Monitor off ABX. Panculture. Procal.    GI: Prophylaxis with Protonix 40mg PO daily. Check NH3. Lactulose. CT A/P.     RENAL: I < O. Replace electrolytes if needed. Place Eckert for accurate I&O's. Lasix 80mg IV q12. Renal US.     ENDOCRINE: FS at bedtime and before meals. Insulin protocol if needed. TSH and HbA1c.     HEMATOLOGY: DVT ppx eliquis 2.5mg PO twice daily. CBC q12. Monitor platelets. Could be underproduction given  liver disease vs sequestration. VA duplex.     FULL CODE  Prognosis guarded   MICU           Impression   Acutely decompensated RHF   Congestive hepatopathy, possible hepatic encephalopathy   ACE Cardiorenal syndrome vs congestive nephropathy   Thrombocytopenia   Hyponatremia/hypochloremia - dilutional?   Hyperbilirubinemia      Plan     CNS: Avoid CNS depressants. Lactulose 20g q8 hours. Check serum NH3.    CARDIOVASCULAR: Keep MAP >65. Repeat ECHO. Lasix 80mg IV q12h. EKG in AM. Cardiology eval. BNP. trend trops. Hold BB and entresto given HR and BP/ACE.       PULMONARY: HOB >45 degrees. Keep SpO2 >95%. CXR in AM. Inhalers PRN.     INFECTIOUS DISEASE: IV and catheter care PRN. Monitor off ABX. Panculture. Procal.    GI: Prophylaxis with Protonix 40mg PO daily. Check NH3. Lactulose. CT A/P.     RENAL: I < O. Replace electrolytes if needed. Place Eckert for accurate I&O's. Lasix 80mg IV q12. Renal US.     ENDOCRINE: FS at bedtime and before meals. Insulin protocol if needed. TSH and HbA1c.     HEMATOLOGY: DVT ppx eliquis 2.5mg PO twice daily. CBC q12. Monitor platelets. Could be underproduction given  liver disease vs sequestration. VA duplex. Check coags.     FULL CODE  Prognosis guarded   MICU           Impression   Acutely decompensated RHF   Congestive hepatopathy, possible hepatic encephalopathy   ACE Cardiorenal syndrome vs congestive nephropathy   Thrombocytopenia   Hyponatremia/hypochloremia - dilutional?   Hyperbilirubinemia      Plan     CNS: Avoid CNS depressants. Lactulose 20g q8 hours. Check serum NH3.    CARDIOVASCULAR: Keep MAP >65. Repeat ECHO. Lasix 80mg IV q12h. EKG in AM. Cardiology eval. BNP. trend trops. Hold BB and entresto given HR and BP/ACE.       PULMONARY: HOB >45 degrees. Keep SpO2 >95%. CXR in AM. Inhalers PRN.     INFECTIOUS DISEASE: IV and catheter care PRN. Ceftriaxone daily for SBP ppx. Panculture. Procal.    GI: Prophylaxis with Protonix 40mg PO daily. Check NH3. Lactulose. CT A/P. Hepatology consult.      RENAL: I < O. Replace electrolytes if needed. Place Eckert for accurate I&O's. Lasix 80mg IV q12. Renal US.     ENDOCRINE: FS at bedtime and before meals. Insulin protocol if needed. TSH and HbA1c.     HEMATOLOGY: DVT ppx eliquis 2.5mg PO twice daily. CBC q12. Monitor platelets. Could be underproduction given  liver disease vs sequestration. VA duplex. Check coags.     FULL CODE  Prognosis guarded   MICU           Impression   Acutely decompensated RHF   Congestive hepatopathy, possible hepatic encephalopathy   ACE Cardiorenal syndrome vs congestive nephropathy   Thrombocytopenia   Hyponatremia/hypochloremia - dilutional?   Hyperbilirubinemia      Plan     CNS: Avoid CNS depressants. Lactulose 20g q8 hours. Check serum NH3.    CARDIOVASCULAR: Keep MAP >65. Repeat ECHO. Lasix 80mg IV q12h. EKG in AM. Cardiology eval. BNP. trend trops. Hold BB and entresto given HR and BP/ACE.       PULMONARY: HOB >45 degrees. Keep SpO2 >95%. CXR in AM. Inhalers PRN.     INFECTIOUS DISEASE: IV and catheter care PRN. Ceftriaxone daily for SBP ppx. Panculture. Procal.    GI: Prophylaxis with Protonix 40mg PO daily. Check NH3. Lactulose. CT A/P. Hepatology consult.      RENAL: I < O. Replace electrolytes if needed. Place Eckert for accurate I&O's. Lasix 80mg IV q12. Renal US.     ENDOCRINE: FS at bedtime and before meals. Insulin protocol if needed. TSH and HbA1c.     HEMATOLOGY: Hold chemical DVT ppx given elevated coags and INR. CBC q12. Monitor platelets. Could be underproduction given liver disease vs sequestration. VA duplex. INR 3.47    FULL CODE  Prognosis guarded   MICU           Impression   Acutely decompensated RHF   Congestive hepatopathy, possible hepatic encephalopathy   ACE Cardiorenal syndrome vs congestive nephropathy   Thrombocytopenia   Hyponatremia/hypochloremia - dilutional?   Hyperbilirubinemia    UTI    Plan     CNS: Avoid CNS depressants. Lactulose 20g q8 hours. Check serum NH3.    CARDIOVASCULAR: Keep MAP >65. Repeat ECHO. Lasix 80mg IV q12h. EKG in AM. Cardiology eval. BNP. trend trops. Hold BB and entresto given HR and BP/ACE.       PULMONARY: HOB >45 degrees. Keep SpO2 >95%. CXR in AM. Inhalers PRN.     INFECTIOUS DISEASE: IV and catheter care PRN. Ceftriaxone daily for SBP ppx. Panculture. Procal.    GI: Prophylaxis with Protonix 40mg PO daily. Check NH3. Lactulose. CT A/P. Hepatology consult.      RENAL: I < O. Replace electrolytes if needed. Place Eckert for accurate I&O's. Lasix 80mg IV q12. Renal US.     ENDOCRINE: FS at bedtime and before meals. Insulin protocol if needed. TSH and HbA1c.     HEMATOLOGY: Hold chemical DVT ppx given elevated coags and INR. CBC q12. Monitor platelets. Could be underproduction given liver disease vs sequestration. VA duplex. INR 3.47    FULL CODE  Prognosis guarded   MICU           Impression   Acutely decompensated RHF   Congestive hepatopathy, possible hepatic encephalopathy   ACE Cardiorenal syndrome vs congestive nephropathy   Thrombocytopenia   Hyponatremia/hypochloremia - dilutional?   Hyperbilirubinemia    UTI    Plan     CNS: Avoid CNS depressants. Lactulose 20g q8 hours. Check serum NH3.    CARDIOVASCULAR: Keep MAP >65. Repeat ECHO. Lasix 80mg IV q12h. EKG in AM. Cardiology eval. BNP. trend trops. Hold BB and entresto given HR and BP/ACE.       PULMONARY: HOB >45 degrees. Keep SpO2 >95%. CXR in AM. Inhalers PRN.     INFECTIOUS DISEASE: IV and catheter care PRN. Ceftriaxone daily for SBP ppx. Panculture. Procal.    GI: Prophylaxis with Protonix 40mg PO daily. Check NH3. Lactulose. CT A/P. Hepatology consult.      RENAL: I < O. Replace electrolytes if needed. Place Eckert for accurate I&O's.    ENDOCRINE: FS at bedtime and before meals. Insulin protocol if needed. TSH and HbA1c.     HEMATOLOGY: Hold chemical DVT ppx given elevated coags and INR. CBC q12. Monitor platelets. Could be underproduction given liver disease vs sequestration. VA duplex. INR 3.47    FULL CODE  Prognosis guarded   MICU

## 2022-01-18 NOTE — CONSULT NOTE ADULT - SUBJECTIVE AND OBJECTIVE BOX
Patient is a 76y old  Male who presents with a chief complaint of ACE, transaminitis, thrombocytopenia (2022 06:30)      HPI:  Chief Complaint: Weakness     Past Medical History: HFrEF (EF in 10/21: 24%) s/p AICD, CAD s/p MI w/stents , HTN, HLD, DM, gout, CKD, COPD, PVD s/p aortobifemoral bypass, proximal right leg DVT (2021) on Eliquis (wasn't taking due to cost)    Mr. Berg is a 76M with a past medical history as described above who presented to the hospital for evaluation of weakness and slurred speech. The patient is encephalopathic upon my examination. Per family, patient has been having difficulty ambulating and speaking clearly for the past 5-6 days. He was in his usual state of health prior. They were concerned given his mental status and transported him to the ED.     In the ED, he was hypotensive and hypothermic. BP improved after LR bolus. Given Vanc/Cefepime and stress-dose steroids for possible myxedema. BNP not checked by ED, presumably will be high. CBC noted with severe thrombocytopenia. CMP with hyponatremia/hypochloremia as well as elevated LFTs and ACE. No abdominal imaging preformed at this point.    ROS: Denies headache, changes in vision, chest pain, palpitations, shortness of breath, cough, fever, chills, nausea, vomiting, diarrhea, and constipation. +weakenss, swollen BLE  (2022 00:18)         PAST MEDICAL & SURGICAL HISTORY:  Coronary Artery Disease    Hypercholesteremia    Myocardial Infarction    Hypertension    Gout    Diabetes mellitus    Renal insufficiency    Chronic obstructive pulmonary disease    Peripheral vascular disease    S/P aortobifemoral bypass surgery    Sickle cell trait    AICD (automatic cardioverter/defibrillator) present    Abdominal Hernia    s/p Femoral-Popliteal Bypass Surgery        SOCIAL HISTORY:    FAMILY HISTORY:    Allergies    No Known Drug Allergies  shellfish (Other; Urticaria)    Intolerances      ROS:  Negative except for:        Height (cm): 177.8 (22 @ 20:06)      HOME MEDICATIONS:  albuterol 90 mcg/inh inhalation aerosol: 2 puff(s) inhaled every 4 hours, As needed, Shortness of Breath (18 Oct 2021 14:28)  allopurinol 300 mg oral tablet: 1 tab(s) orally once a day (08 Oct 2021 09:35)  aspirin 81 mg oral tablet, chewable: 1 tab(s) orally once a day (18 Oct 2021 14:28)  carvedilol 25 mg oral tablet: 1 tab(s) orally every 12 hours (08 Oct 2021 09:35)  HumaLOG KwikPen 200 units/mL (Concentrated) subcutaneous solution:  subcutaneous  (08 Oct 2021 09:35)      Vital Signs Last 24 Hrs  T(C): 34.9 (2022 07:42), Max: 36.9 (2022 20:06)  T(F): 94.8 (2022 07:42), Max: 98.5 (2022 20:06)  HR: 50 (2022 07:42) (48 - 54)  BP: 93/52 (2022 07:42) (87/53 - 111/70)  BP(mean): 67 (2022 07:42) (67 - 67)  RR: 17 (2022 07:42) (17 - 18)  SpO2: 96% (2022 07:42) (96% - 100%)    PHYSICAL EXAM  General: adult in NAD  HEENT: clear oropharynx, anicteric sclera, pink conjunctiva  Neck: supple  CV: normal S1/S2 with no murmur rubs or gallops  Lungs: positive air movement b/l ant lungs,clear to auscultation, no wheezes, no rales  Abdomen: soft non-tender non-distended, no hepatosplenomegaly  Ext: no clubbing cyanosis or edema  Skin: no rashes and no petechiae  Neuro: alert and oriented X 4, no focal deficits    MEDICATIONS  (STANDING):  aspirin  chewable 81 milliGRAM(s) Oral daily  cefTRIAXone   IVPB 1000 milliGRAM(s) IV Intermittent every 24 hours  chlorhexidine 4% Liquid 1 Application(s) Topical <User Schedule>  lactated ringers. 1000 milliLiter(s) (75 mL/Hr) IV Continuous <Continuous>  lactulose Syrup 20 Gram(s) Oral every 12 hours  memantine 5 milliGRAM(s) Oral at bedtime  pantoprazole    Tablet 40 milliGRAM(s) Oral before breakfast  senna 2 Tablet(s) Oral at bedtime    MEDICATIONS  (PRN):  ALBUTerol    90 MICROgram(s) HFA Inhaler 2 Puff(s) Inhalation every 6 hours PRN Shortness of Breath and/or Wheezing      LABS:                          11.0   5.07  )-----------( 46       ( 2022 21:05 )             30.4         Mean Cell Volume : 67.7 fL  Mean Cell Hemoglobin : 24.5 pg  Mean Cell Hemoglobin Concentration : 36.2 g/dL  Auto Neutrophil # : 4.53 K/uL  Auto Lymphocyte # : 0.18 K/uL  Auto Monocyte # : 0.36 K/uL  Auto Eosinophil # : 0.00 K/uL  Auto Basophil # : 0.00 K/uL  Auto Neutrophil % : 89.4 %  Auto Lymphocyte % : 3.5 %  Auto Monocyte % : 7.1 %  Auto Eosinophil % : 0.0 %  Auto Basophil % : 0.0 %      Serial CBC's   @ 21:05  Hct-30.4 / Hgb-11.0 / Plat-46 / RBC-4.49 / WBC-5.07          126<L>  |  88<L>  |  56<H>  ----------------------------<  99  4.1   |  19  |  2.1<H>    Ca    7.5<L>      2022 21:05  Mg     2.6         TPro  6.3  /  Alb  3.3<L>  /  TBili  7.8<H>  /  DBili  x   /  AST  417<H>  /  ALT  183<H>  /  AlkPhos  287<H>        PT/INR - ( 2022 22:14 )   PT: 39.40 sec;   INR: 3.47 ratio    PTT - ( 2022 22:14 )  PTT:48.2 sec    Urinalysis Basic - ( 2022 22:30 )  Color: Juen / Appearance: Turbid / S.017 / pH: x  Gluc: x / Ketone: Negative  / Bili: Small / Urobili: 3 mg/dL   Blood: x / Protein: 100 mg/dL / Nitrite: Negative   Leuk Esterase: Moderate / RBC: 7 /HPF / WBC 40 /HPF   Sq Epi: x / Non Sq Epi: 3 /HPF / Bacteria: Negative      RADIOLOGY & ADDITIONAL STUDIES:    < from: Xray Chest 1 View- PORTABLE-Urgent (22 @ 21:33) >  Impression:    Low lung volume.    Bilateral opacities.    Left-sided permanent pacemaker.    < from: CT Head No Cont (22 @ 20:51) >  IMPRESSION:    No CT evidence of acute intracranial pathology.    < from: US Duplex Venous Lower Ext Complete, Bilateral (10.14.21 @ 09:34) >  IMPRESSION:  RIGHT posterior tibial vein thrombus.    A large right groin hematoma limits evaluation of the right common femoral vein.    Acute deep venous thrombosis: below the knee.       Patient is a 76y old  Male who presents with a chief complaint of ACE, transaminitis, thrombocytopenia (2022 06:30)      HPI:  Chief Complaint: Weakness     Past Medical History: HFrEF (EF in 10/21: 24%) s/p AICD, CAD s/p MI w/stents , HTN, HLD, DM, gout, CKD, COPD, PVD s/p aortobifemoral bypass, proximal right leg DVT (2021) on Eliquis (wasn't taking due to cost)    Mr. Berg is a 76M with a past medical history as described above who presented to the hospital for evaluation of weakness and slurred speech. The patient is encephalopathic upon my examination. Per family, patient has been having difficulty ambulating and speaking clearly for the past 5-6 days. He was in his usual state of health prior. They were concerned given his mental status and transported him to the ED.     In the ED, he was hypotensive and hypothermic. BP improved after LR bolus. Given Vanc/Cefepime and stress-dose steroids for possible myxedema. BNP not checked by ED, presumably will be high. CBC noted with severe thrombocytopenia. CMP with hyponatremia/hypochloremia as well as elevated LFTs and ACE. No abdominal imaging preformed at this point.  ROS: Denies headache, changes in vision, chest pain, palpitations, shortness of breath, cough, fever, chills, nausea, vomiting, diarrhea, and constipation. +weakenss, swollen BLE  (2022 00:18)    INTERVAL HX: Pt admitted to ICU for possible sepsis and HF exacerbation.        PAST MEDICAL & SURGICAL HISTORY:  Coronary Artery Disease  Hypercholesteremia  Myocardial Infarction  Hypertension  Gout  Diabetes mellitus  Renal insufficiency  Chronic obstructive pulmonary disease  Peripheral vascular disease  S/P aortobifemoral bypass surgery  Sickle cell trait  AICD (automatic cardioverter/defibrillator) present  Abdominal Hernia  s/p Femoral-Popliteal Bypass Surgery    SOCIAL HISTORY: could not be obtained    FAMILY HISTORY: could not be obtained    Allergies    No Known Drug Allergies  shellfish (Other; Urticaria)    Intolerances      ROS:  Negative except for:  could not be obtained      Height (cm): 177.8 (22 @ 20:06)      HOME MEDICATIONS:  albuterol 90 mcg/inh inhalation aerosol: 2 puff(s) inhaled every 4 hours, As needed, Shortness of Breath (18 Oct 2021 14:28)  allopurinol 300 mg oral tablet: 1 tab(s) orally once a day (08 Oct 2021 09:35)  aspirin 81 mg oral tablet, chewable: 1 tab(s) orally once a day (18 Oct 2021 14:28)  carvedilol 25 mg oral tablet: 1 tab(s) orally every 12 hours (08 Oct 2021 09:35)  HumaLOG KwikPen 200 units/mL (Concentrated) subcutaneous solution:  subcutaneous  (08 Oct 2021 09:35)      Vital Signs Last 24 Hrs  T(C): 34.9 (2022 07:42), Max: 36.9 (2022 20:06)  T(F): 94.8 (2022 07:42), Max: 98.5 (2022 20:06)  HR: 50 (2022 07:42) (48 - 54)  BP: 93/52 (2022 07:42) (87/53 - 111/70)  BP(mean): 67 (2022 07:42) (67 - 67)  RR: 17 (2022 07:42) (17 - 18)  SpO2: 96% (2022 07:42) (96% - 100%)    PHYSICAL EXAM  General: lying in bed, not able to converse  HEENT: anicteric sclera  Neck: supple  CV: normal S1/S2  Lungs: Dec BS b/l  Abdomen: soft distended  Ext: + b/l edema  Skin: no rashes and no petechiae  Neuro: alert and oriented X 0    MEDICATIONS  (STANDING):  aspirin  chewable 81 milliGRAM(s) Oral daily  cefTRIAXone   IVPB 1000 milliGRAM(s) IV Intermittent every 24 hours  chlorhexidine 4% Liquid 1 Application(s) Topical <User Schedule>  lactated ringers. 1000 milliLiter(s) (75 mL/Hr) IV Continuous <Continuous>  lactulose Syrup 20 Gram(s) Oral every 12 hours  memantine 5 milliGRAM(s) Oral at bedtime  pantoprazole    Tablet 40 milliGRAM(s) Oral before breakfast  senna 2 Tablet(s) Oral at bedtime    MEDICATIONS  (PRN):  ALBUTerol    90 MICROgram(s) HFA Inhaler 2 Puff(s) Inhalation every 6 hours PRN Shortness of Breath and/or Wheezing      LABS:                          11.0   5.07  )-----------( 46       ( 2022 21:05 )             30.4         Mean Cell Volume : 67.7 fL  Mean Cell Hemoglobin : 24.5 pg  Mean Cell Hemoglobin Concentration : 36.2 g/dL  Auto Neutrophil # : 4.53 K/uL  Auto Lymphocyte # : 0.18 K/uL  Auto Monocyte # : 0.36 K/uL  Auto Eosinophil # : 0.00 K/uL  Auto Basophil # : 0.00 K/uL  Auto Neutrophil % : 89.4 %  Auto Lymphocyte % : 3.5 %  Auto Monocyte % : 7.1 %  Auto Eosinophil % : 0.0 %  Auto Basophil % : 0.0 %      Serial CBC's   @ 21:05  Hct-30.4 / Hgb-11.0 / Plat-46 / RBC-4.49 / WBC-5.07          126<L>  |  88<L>  |  56<H>  ----------------------------<  99  4.1   |  19  |  2.1<H>    Ca    7.5<L>      2022 21:05  Mg     2.6         TPro  6.3  /  Alb  3.3<L>  /  TBili  7.8<H>  /  DBili  x   /  AST  417<H>  /  ALT  183<H>  /  AlkPhos  287<H>        PT/INR - ( 2022 22:14 )   PT: 39.40 sec;   INR: 3.47 ratio    PTT - ( 2022 22:14 )  PTT:48.2 sec    Urinalysis Basic - ( 2022 22:30 )  Color: June / Appearance: Turbid / S.017 / pH: x  Gluc: x / Ketone: Negative  / Bili: Small / Urobili: 3 mg/dL   Blood: x / Protein: 100 mg/dL / Nitrite: Negative   Leuk Esterase: Moderate / RBC: 7 /HPF / WBC 40 /HPF   Sq Epi: x / Non Sq Epi: 3 /HPF / Bacteria: Negative      RADIOLOGY & ADDITIONAL STUDIES:    < from: Xray Chest 1 View- PORTABLE-Urgent (22 @ 21:33) >  Impression:    Low lung volume.    Bilateral opacities.    Left-sided permanent pacemaker.    < from: CT Head No Cont (22 @ 20:51) >  IMPRESSION:    No CT evidence of acute intracranial pathology.    < from: CT Abdomen and Pelvis w/ IV Cont (22 @ 01:42) >  FINDINGS:    LOWER CHEST: Persistent but improving bibasilar peripheral opacities.   Trace bilateral pleural effusions. Partially imaged cardiac leads.    HEPATOBILIARY: Unremarkable.    SPLEEN: Stable homogeneously hyperenhancing 1.9 cm focus in the medial   spleen, possible hemangioma.    PANCREAS: Unremarkable.    ADRENAL GLANDS: Unremarkable.    KIDNEYS: Bilateral renal cysts. No hydronephrosis bilaterally. Symmetric   renal enhancement.    ABDOMINOPELVIC NODES: No lymphadenopathy.    PELVIC ORGANS: Urinary bladder is collapsed around a Eckert catheter   balloon. Prostate gland appears mildly enlarged.    PERITONEUM/MESENTERY/BOWEL: There is rectus diastases with superimposed   hernias within the ventral abdominal pelvic wall containing loopsof   nonobstructed bowel. No evidence for bowel obstruction. Unremarkable   appendix. No pneumoperitoneum. Small volume ascites.    BONES/SOFT TISSUES: No acute osseous abnormalities..    OTHER: Redemonstrated iliac bypass graft Resolution of previously   demonstrated right groin hematoma. Diffuse moderate anasarca.    IMPRESSION:    Interval development of trace pleural effusions, small ascites and   diffuse anasarca which may reflect a component of volume overload.    Otherwise no definite CT evidence for acute intra-abdominal pathology.    Persistent peripheral lung opacities, appear to be improving.    < from: US Duplex Venous Lower Ext Complete, Bilateral (10.14.21 @ 09:34) >  IMPRESSION:  RIGHT posterior tibial vein thrombus.    A large right groin hematoma limits evaluation of the right common femoral vein.    Acute deep venous thrombosis: below the knee.       Patient is a 76y old  Male who presents with a chief complaint of ACE, transaminitis, thrombocytopenia (2022 06:30)      HPI:  Chief Complaint: Weakness     Past Medical History: HFrEF (EF in 10/21: 24%) s/p AICD, CAD s/p MI w/stents , HTN, HLD, DM, gout, CKD, COPD, PVD s/p aortobifemoral bypass, proximal right leg DVT (2021) on Eliquis (wasn't taking due to cost)    Mr. Berg is a 76M with a past medical history as described above who presented to the hospital for evaluation of weakness and slurred speech. The patient is encephalopathic upon my examination. Per family, patient has been having difficulty ambulating and speaking clearly for the past 5-6 days. He was in his usual state of health prior. They were concerned given his mental status and transported him to the ED.     In the ED, he was hypotensive and hypothermic. BP improved after LR bolus. Given Vanc/Cefepime and stress-dose steroids for possible myxedema. BNP not checked by ED, presumably will be high. CBC noted with severe thrombocytopenia. CMP with hyponatremia/hypochloremia as well as elevated LFTs and ACE. No abdominal imaging preformed at this point.  ROS: Denies headache, changes in vision, chest pain, palpitations, shortness of breath, cough, fever, chills, nausea, vomiting, diarrhea, and constipation. +weakenss, swollen BLE  (2022 00:18)    INTERVAL HX: Pt admitted to ICU for possible sepsis and HF exacerbation.        PAST MEDICAL & SURGICAL HISTORY:  Coronary Artery Disease  Hypercholesteremia  Myocardial Infarction  Hypertension  Gout  Diabetes mellitus  Renal insufficiency  Chronic obstructive pulmonary disease  Peripheral vascular disease  S/P aortobifemoral bypass surgery  Sickle cell trait  AICD (automatic cardioverter/defibrillator) present  Abdominal Hernia  s/p Femoral-Popliteal Bypass Surgery    SOCIAL HISTORY: could not be obtained    FAMILY HISTORY: could not be obtained    Allergies    No Known Drug Allergies  shellfish (Other; Urticaria)    Intolerances      ROS:  Negative except for:  could not be obtained      Height (cm): 177.8 (22 @ 20:06)      HOME MEDICATIONS:  albuterol 90 mcg/inh inhalation aerosol: 2 puff(s) inhaled every 4 hours, As needed, Shortness of Breath (18 Oct 2021 14:28)  allopurinol 300 mg oral tablet: 1 tab(s) orally once a day (08 Oct 2021 09:35)  aspirin 81 mg oral tablet, chewable: 1 tab(s) orally once a day (18 Oct 2021 14:28)  carvedilol 25 mg oral tablet: 1 tab(s) orally every 12 hours (08 Oct 2021 09:35)  HumaLOG KwikPen 200 units/mL (Concentrated) subcutaneous solution:  subcutaneous  (08 Oct 2021 09:35)      Vital Signs Last 24 Hrs  T(C): 34.9 (2022 07:42), Max: 36.9 (2022 20:06)  T(F): 94.8 (2022 07:42), Max: 98.5 (2022 20:06)  HR: 50 (2022 07:42) (48 - 54)  BP: 93/52 (2022 07:42) (87/53 - 111/70)  BP(mean): 67 (2022 07:42) (67 - 67)  RR: 17 (2022 07:42) (17 - 18)  SpO2: 96% (2022 07:42) (96% - 100%)    PHYSICAL EXAM  General: lying in bed, not able to converse  HEENT: anicteric sclera  Neck: supple  CV: normal S1/S2  Lungs: Dec BS b/l  Abdomen: soft distended  Ext: + b/l edema  Skin: no rashes and no petechiae  Neuro: alert and oriented X 0    MEDICATIONS  (STANDING):  aspirin  chewable 81 milliGRAM(s) Oral daily  cefTRIAXone   IVPB 1000 milliGRAM(s) IV Intermittent every 24 hours  chlorhexidine 4% Liquid 1 Application(s) Topical <User Schedule>  lactated ringers. 1000 milliLiter(s) (75 mL/Hr) IV Continuous <Continuous>  lactulose Syrup 20 Gram(s) Oral every 12 hours  memantine 5 milliGRAM(s) Oral at bedtime  pantoprazole    Tablet 40 milliGRAM(s) Oral before breakfast  senna 2 Tablet(s) Oral at bedtime    MEDICATIONS  (PRN):  ALBUTerol    90 MICROgram(s) HFA Inhaler 2 Puff(s) Inhalation every 6 hours PRN Shortness of Breath and/or Wheezing      LABS:                          11.0   5.07  )-----------( 46       ( 2022 21:05 )             30.4         Mean Cell Volume : 67.7 fL  Mean Cell Hemoglobin : 24.5 pg  Mean Cell Hemoglobin Concentration : 36.2 g/dL  Auto Neutrophil # : 4.53 K/uL  Auto Lymphocyte # : 0.18 K/uL  Auto Monocyte # : 0.36 K/uL  Auto Eosinophil # : 0.00 K/uL  Auto Basophil # : 0.00 K/uL  Auto Neutrophil % : 89.4 %  Auto Lymphocyte % : 3.5 %  Auto Monocyte % : 7.1 %  Auto Eosinophil % : 0.0 %  Auto Basophil % : 0.0 %      Serial CBC's   @ 21:05  Hct-30.4 / Hgb-11.0 / Plat-46 / RBC-4.49 / WBC-5.07          126<L>  |  88<L>  |  56<H>  ----------------------------<  99  4.1   |  19  |  2.1<H>    Ca    7.5<L>      2022 21:05  Mg     2.6         TPro  6.3  /  Alb  3.3<L>  /  TBili  7.8<H>  /  DBili  x   /  AST  417<H>  /  ALT  183<H>  /  AlkPhos  287<H>        PT/INR - ( 2022 22:14 )   PT: 39.40 sec;   INR: 3.47 ratio    PTT - ( 2022 22:14 )  PTT:48.2 sec  D-Dimer Assay, Quantitative: 1053: Manufacturers recommended Cut off for VTE is 230 ng/ml D-DU ng/mL DDU (22 @ 09:20)    Lactate, Blood: 4.2 (22 @ 09:20)    Urinalysis Basic - ( 2022 22:30 )  Color: June / Appearance: Turbid / S.017 / pH: x  Gluc: x / Ketone: Negative  / Bili: Small / Urobili: 3 mg/dL   Blood: x / Protein: 100 mg/dL / Nitrite: Negative   Leuk Esterase: Moderate / RBC: 7 /HPF / WBC 40 /HPF   Sq Epi: x / Non Sq Epi: 3 /HPF / Bacteria: Negative      RADIOLOGY & ADDITIONAL STUDIES:    < from: Xray Chest 1 View- PORTABLE-Urgent (22 @ 21:33) >  Impression:    Low lung volume.    Bilateral opacities.    Left-sided permanent pacemaker.    < from: CT Head No Cont (22 @ 20:51) >  IMPRESSION:    No CT evidence of acute intracranial pathology.    < from: CT Abdomen and Pelvis w/ IV Cont (22 @ 01:42) >  FINDINGS:    LOWER CHEST: Persistent but improving bibasilar peripheral opacities.   Trace bilateral pleural effusions. Partially imaged cardiac leads.    HEPATOBILIARY: Unremarkable.    SPLEEN: Stable homogeneously hyperenhancing 1.9 cm focus in the medial   spleen, possible hemangioma.    PANCREAS: Unremarkable.    ADRENAL GLANDS: Unremarkable.    KIDNEYS: Bilateral renal cysts. No hydronephrosis bilaterally. Symmetric   renal enhancement.    ABDOMINOPELVIC NODES: No lymphadenopathy.    PELVIC ORGANS: Urinary bladder is collapsed around a Eckert catheter   balloon. Prostate gland appears mildly enlarged.    PERITONEUM/MESENTERY/BOWEL: There is rectus diastases with superimposed   hernias within the ventral abdominal pelvic wall containing loopsof   nonobstructed bowel. No evidence for bowel obstruction. Unremarkable   appendix. No pneumoperitoneum. Small volume ascites.    BONES/SOFT TISSUES: No acute osseous abnormalities..    OTHER: Redemonstrated iliac bypass graft Resolution of previously   demonstrated right groin hematoma. Diffuse moderate anasarca.    IMPRESSION:    Interval development of trace pleural effusions, small ascites and   diffuse anasarca which may reflect a component of volume overload.    Otherwise no definite CT evidence for acute intra-abdominal pathology.    Persistent peripheral lung opacities, appear to be improving.    < from: US Duplex Venous Lower Ext Complete, Bilateral (10.14.21 @ 09:34) >  IMPRESSION:  RIGHT posterior tibial vein thrombus.    A large right groin hematoma limits evaluation of the right common femoral vein.    Acute deep venous thrombosis: below the knee.

## 2022-01-18 NOTE — SWALLOW BEDSIDE ASSESSMENT ADULT - MODE OF PRESENTATION
Pt attempted straw sip but was unable to initiate sucking,, controlled cup sip by SLP/cup/fed by clinician

## 2022-01-18 NOTE — CONSULT NOTE ADULT - ASSESSMENT
IMPRESSION:    AMS likely toxic metabolic  Sepsis POA  UTI  HO PE and retroperitoneal bleed  HO HPREF  ACE   Thrombocytopenia with coagulopathy   Right sided nodule to be followed   Probable Teri     PLAN:    CNS:  NO depressants.  Neuro eval     HEENT: Oral care    PULMONARY:  HOB @ 45 degrees.  Aspiration precautions.  OP CT Chest NC in 4-6 weeks     CARDIOVASCULAR:  ECHO.  Avoid volume overload.  Gentle hydration while NPO.  LR 75 cc per hour.  Cardiology eval.  HJold Beta blockers     GI: GI prophylaxis.  NPO for now.  Feeding per speech when more awake  Bowel regimen.  FU CTA     RENAL:  Follow up lytes.  Correct as needed.  Urine LYtes.      INFECTIOUS DISEASE: Follow up cultures.  Rocephin.  Procal.      HEMATOLOGICAL:  DVT prophylaxis.  Repeat CBC.  Hem onc.  LE duplex.      ENDOCRINE:  Follow up FS.  Insulin protocol if needed.  COrtisol and TSH    MUSCULOSKELETAL:  Bed rest     MICU

## 2022-01-18 NOTE — ED ADULT NURSE REASSESSMENT NOTE - NS ED NURSE REASSESS COMMENT FT1
Patient on continuous cardiac monitoring found to be hypotensive, bradycardic, and increasing confused. RR called on patient at 08:54 patient was given 10 ml of phenylephrine for hypotension. Patient started on levophed. EKG showed bradycardia. FS 82. Patient mental status is lethargic requires repeated stimulation.

## 2022-01-18 NOTE — SWALLOW BEDSIDE ASSESSMENT ADULT - SLP PERTINENT HISTORY OF CURRENT PROBLEM
76 y. O M  p/w c/o ACE, transaminitis thrombocytopenia.  p/w weakness, slurred speech, . Chart review revealed Pt found to be encephalopathic, Pt has been having difficulty with ambulation x5-6 days . In ED pt found to be hypotensive and hypothermic . CXR 1/17/22 Low lung volume, b/l opacities

## 2022-01-18 NOTE — SWALLOW BEDSIDE ASSESSMENT ADULT - NS SPL SWALLOW CLINIC TRIAL FT
no overt s/s of aspiration noted with 1 sip and 1 bite  presented, no more trials given 2'2 Pt's fluctuating mental status.

## 2022-01-18 NOTE — CONSULT NOTE ADULT - ATTENDING COMMENTS
extensive prior card hx  shock  unlikely cardiac ethiology  icu mx/swan/hemodyn support  will follow

## 2022-01-18 NOTE — CONSULT NOTE ADULT - ATTENDING COMMENTS
The patient was seen. Agree with above.  75 yo male with h/o multiple medical problems was admitted for sepsis, CHF exacerbation, hepatic failure and acute kidney injury.     -- Thrombocytopenia and coagulopathy is likely due to hepatic failure, sepsis, DIC (?).  Will review blood smear, check Retic count, B12, Folate, LDH, Haptoglobin.  Supportive care.    -- Microcytic anemia  Check Fe, TIBC, Ferritin.     -- DVT and PE in 10/2021.  Would hold AC for now in light of thrombocytopenia and coagulopathy.     -- Followup cardiology for CHF exacerbation.

## 2022-01-18 NOTE — CONSULT NOTE ADULT - ASSESSMENT
75 yo M with PMHx of HFrEF (EF in 10/21: 24%) s/p AICD, CAD s/p MI w/stents 2007, HTN, HLD, DM, gout, CKD, COPD, PVD s/p aortobifemoral bypass, proximal right leg DVT (4/2021) on Eliquis (non compliant) presented for weakness and slurred speech.     Hem/Onc consulted for thrombocytopenia    #Thrombocytopenia  - Plt count on admission 46; Plt count in 2021 and 2020 was mostly WNL, was low in 2016 (platelet clumping reported at that time)  - Differential at this time is broad including acute sepsis present on admission, acute HFrEF exacerbation with acute liver injury (? congestive hepatopathy? vs primary liver injury) vs DIC 2/2 acute infection/liver injury; Cannot r/o ITP.   -Peripheral smear:   - Check Vit B12, folate  - Check HIV, acute viral hepatitis panel, alcohol level.   - Check US liver and spleen to r/o hepatomegaly and splenomegaly.   - Monitor CBC with diff daily  - Treat underlying infection/critical illness     #Hx of DVT and PE  - Non compliant with Eliquis   - Check repeat US duplex b/l LE     75 yo M with PMHx of HFrEF (EF in 10/21: 24%) s/p AICD, CAD s/p MI w/stents 2007, HTN, HLD, DM, gout, CKD, COPD, PVD s/p aortobifemoral bypass, proximal right leg DVT (4/2021) on Eliquis (non compliant) presented for weakness and slurred speech.     Hem/Onc consulted for thrombocytopenia    #Thrombocytopenia  - Plt count on admission 46; Plt count in 2021 and 2020 was mostly WNL, was low in 2016 (platelet clumping reported at that time)  - Differential at this time is broad including acute sepsis present on admission, acute HFrEF exacerbation with acute liver injury (? congestive hepatopathy? vs primary liver injury) vs DIC 2/2 acute infection/liver injury; Cannot r/o ITP.   -Peripheral smear:   - CT A/P: Diffuse anasacra, ascites, pleural effusions, possible fluid overload, no hepatomegaly, 1.9cm hyperenhancing focus in the medial spleen (possibly a hemangioma)    RECOMMENDATIONS  - Check Vit B12, folate  - Check HIV, acute viral hepatitis panel, alcohol level.   - Check US liver and spleen to r/o hepatomegaly and splenomegaly.   - Monitor CBC with diff daily  - Treat underlying infection/critical illness (Sepsis and CHF)    #Hx of DVT and PE  - Non compliant with Eliquis   - Check repeat US duplex b/l LE  - Can start IV heparin for AC once Plt >50k and pt is not actively bleeding.      77 yo M with PMHx of HFrEF (EF in 10/21: 24%) s/p AICD, CAD s/p MI w/stents 2007, HTN, HLD, DM, gout, CKD, COPD, PVD s/p aortobifemoral bypass, proximal right leg DVT (4/2021) on Eliquis (non compliant) presented for weakness and slurred speech.     Pt admitted for Sepsis present on admission with possible CHF exacerbation, with congestive hepatopathy, and acute kidney injury.     Hem/Onc consulted for thrombocytopenia    #Thrombocytopenia  #Microcytic anemia  #Coagulopathy with elevated D-dimer  - Plt count on admission 46; Plt count in 2021 and 2020 was mostly WNL, was low in 2016 (platelet clumping reported at that time)  - Differential at this time is broad including acute sepsis present on admission, acute HFrEF exacerbation with acute liver injury (? congestive hepatopathy? vs primary liver injury) vs DIC 2/2 acute infection/liver injury; Cannot r/o ITP.   - Peripheral smear:   - CT A/P: Diffuse anasacra, ascites, pleural effusions, possible fluid overload, no hepatomegaly, 1.9cm hyperenhancing focus in the medial spleen (possibly a hemangioma)    RECOMMENDATIONS  - Check Vit B12, folate  - Check HIV, acute viral hepatitis panel, blood alcohol level.   - Check iron studies, retic count, haptoglobin, LDH, indirect bili, direct jatin test  - Check US liver and spleen to r/o hepatomegaly and splenomegaly.   - Monitor CBC with diff daily  - Treat underlying infection/critical illness (Sepsis and CHF)    #Hx of DVT and PE  - Non compliant with Eliquis   - Last US in 10/2021: Rt post tibial vein thrombus  - Check repeat US duplex b/l LE  - Can start IV heparin for AC once Plt >50k and pt is not actively bleeding.      77 yo M with PMHx of HFrEF (EF in 10/21: 24%) s/p AICD, CAD s/p MI w/stents 2007, HTN, HLD, DM, gout, CKD, COPD, PVD s/p aortobifemoral bypass, proximal right leg DVT (4/2021) on Eliquis (non compliant) presented for weakness and slurred speech.     Pt admitted for Sepsis present on admission with possible CHF exacerbation, with congestive hepatopathy, and acute kidney injury.     Hem/Onc consulted for thrombocytopenia    #Thrombocytopenia  #Microcytic anemia  #Coagulopathy with elevated D-dimer  - Plt count on admission 46; Plt count in 2021 and 2020 was mostly WNL, was low in 2016 (platelet clumping reported at that time)  - Differential at this time is broad including acute sepsis present on admission, acute HFrEF exacerbation with acute liver injury (? congestive hepatopathy? vs primary liver injury) vs DIC 2/2 acute infection/liver injury; Cannot r/o ITP.   - Peripheral smear:   - CT A/P: Diffuse anasacra, ascites, pleural effusions, possible fluid overload, no hepatomegaly, 1.9cm hyperenhancing focus in the medial spleen (possibly a hemangioma)    RECOMMENDATIONS  - Check Vit B12, folate  - Check HIV, acute viral hepatitis panel, blood alcohol level.   - Check iron studies, retic count, haptoglobin, LDH, indirect bili, direct jatin test  - Check US liver and spleen to r/o hepatomegaly and splenomegaly.   - Monitor CBC with diff daily  - Treat underlying infection/critical illness (Sepsis and CHF)    #Hx of DVT and PE (w/ right heart strain) in 10/2021  - Non compliant with Eliquis   - Last US in 10/2021: Rt post tibial vein thrombus  - Check repeat US duplex b/l LE  - Hold old AC at this time, given pt is at high risk of bleeding with thrombocytopenia and elevated INR.      75 yo M with PMHx of HFrEF (EF in 10/21: 24%) s/p AICD, CAD s/p MI w/stents 2007, HTN, HLD, DM, gout, CKD, COPD, PVD s/p aortobifemoral bypass, proximal right leg DVT (4/2021) on Eliquis (non compliant) presented for weakness and slurred speech.     Pt admitted for Sepsis present on admission with possible CHF exacerbation, with congestive hepatopathy, and acute kidney injury.     Hem/Onc consulted for thrombocytopenia    #Thrombocytopenia  #Microcytic anemia  #Coagulopathy with elevated D-dimer  - Plt count on admission 46; Plt count in 2021 and 2020 was mostly WNL, was low in 2016 (platelet clumping reported at that time)  - Differential at this time is broad including acute sepsis present on admission, acute HFrEF exacerbation with acute liver injury (? congestive hepatopathy? vs primary liver injury) vs DIC 2/2 acute infection/liver injury; Cannot r/o ITP.   - CT A/P: Diffuse anasacra, ascites, pleural effusions, possible fluid overload, no hepatomegaly, 1.9cm hyper enhancing focus in the medial spleen (possibly a hemangioma)    RECOMMENDATIONS  - Will review peripheral smear  - Check Vit B12, folate  - Check HIV, acute viral hepatitis panel, blood alcohol level.   - Check iron studies, retic count, haptoglobin, LDH, indirect bili, direct jatin test  - Check US liver and spleen to r/o hepatomegaly and splenomegaly.   - Monitor CBC with diff daily  - Treat underlying infection/critical illness (Sepsis and CHF)  - Hepatology follow up     #Hx of DVT and PE (w/ right heart strain) in 10/2021  - Non compliant with Eliquis   - Last US in 10/2021: Rt post tibial vein thrombus  - Check repeat US duplex b/l LE  - Hold old AC at this time, given pt is at high risk of bleeding with thrombocytopenia and elevated INR.

## 2022-01-18 NOTE — CONSULT NOTE ADULT - ASSESSMENT
75 yo M with PMHx of HFrEF (EF in 10/21: 24%) s/p AICD, CAD s/p MI w/stents 2007, HTN, HLD, DM, gout, CKD, COPD, PVD s/p aortobifemoral bypass, proximal right leg DVT (4/2021) on Eliquis (non compliant) presented for weakness and slurred speech. Pt admitted for Sepsis present on admission with possible CHF exacerbation, with congestive hepatopathy, and acute kidney injury. AMS likely multifactorial 2/2 toxic metabolic encephalopathy due to sepsis vs hyperammonia vs electrolyte disturbance .    Plan:  -Imaging including CT head reviewed, negative  -Labs including ammonia level reviewed 84 --> 107  -Cont lactulose 20 q12  -No further imaging at this time   -Continue rest of care as per primary team  -Neurology will continue to follow    75 yo M with PMHx of HFrEF (EF in 10/21: 24%) s/p AICD, CAD s/p MI w/stents 2007, HTN, HLD, DM, gout, CKD, COPD, PVD s/p aortobifemoral bypass, proximal right leg DVT (4/2021) on Eliquis (non compliant) presented for weakness and slurred speech. Pt admitted for Sepsis present on admission with possible CHF exacerbation, with congestive hepatopathy, and acute kidney injury. AMS likely multifactorial 2/2 toxic metabolic encephalopathy due to sepsis vs hyperammonia vs electrolyte disturbance .    Plan:  -Imaging including CT head reviewed, negative for acute changes.  -Labs including ammonia level reviewed 84 --> 107  -Cont lactulose 20 q12  -No further imaging at this time   -Continue rest of care as per primary team  -Neurology will continue to follow

## 2022-01-18 NOTE — CONSULT NOTE ADULT - SUBJECTIVE AND OBJECTIVE BOX
Neurology Consult Note    HPI:  Chief Complaint: Weakness     Past Medical History: HFrEF (EF in 10/21: 24%) s/p AICD, CAD s/p MI w/stents , HTN, HLD, DM, gout, CKD, COPD, PVD s/p aortobifemoral bypass, proximal right leg DVT (2021) on Eliquis (wasn't taking due to cost)    Mr. Berg is a 76M with a past medical history as described above who presented to the hospital for evaluation of weakness and slurred speech. The patient is encephalopathic upon my examination. Per family, patient has been having difficulty ambulating and speaking clearly for the past 5-6 days. He was in his usual state of health prior. They were concerned given his mental status and transported him to the ED.     In the ED, he was hypotensive and hypothermic. BP improved after LR bolus. Given Vanc/Cefepime and stress-dose steroids for possible myxedema. BNP not checked by ED, presumably will be high. CBC noted with severe thrombocytopenia. CMP with hyponatremia/hypochloremia as well as elevated LFTs and ACE. No abdominal imaging preformed at this point.    ROS: Denies headache, changes in vision, chest pain, palpitations, shortness of breath, cough, fever, chills, nausea, vomiting, diarrhea, and constipation. +weakenss, swollen BLE  (2022 00:18)      PAST MEDICAL & SURGICAL HISTORY:  Coronary Artery Disease  Hypercholesteremia  Myocardial Infarction  Hypertension  Gout  Diabetes mellitus  Renal insufficiency  Chronic obstructive pulmonary disease  Peripheral vascular disease  S/P aortobifemoral bypass surgery  Sickle cell trait  AICD (automatic cardioverter/defibrillator) present  Abdominal Hernia  s/p Femoral-Popliteal Bypass Surgery      Medications:  ALBUTerol    90 MICROgram(s) HFA Inhaler 2 Puff(s) Inhalation every 6 hours PRN  aspirin  chewable 81 milliGRAM(s) Oral daily  chlorhexidine 4% Liquid 1 Application(s) Topical <User Schedule>  lactated ringers. 1000 milliLiter(s) IV Continuous <Continuous>  lactulose Syrup 20 Gram(s) Oral every 12 hours  memantine 5 milliGRAM(s) Oral at bedtime  meropenem  IVPB 1000 milliGRAM(s) IV Intermittent every 12 hours  norepinephrine Infusion 0.05 MICROgram(s)/kG/Min IV Continuous <Continuous>  pantoprazole    Tablet 40 milliGRAM(s) Oral before breakfast  senna 2 Tablet(s) Oral at bedtime      Vital Signs Last 24 Hrs  T(C): 34.9 (2022 17:49), Max: 36.9 (2022 20:06)  T(F): 94.8 (2022 17:49), Max: 98.5 (2022 20:06)  HR: 50 (2022 17:49) (39 - 79)  BP: 102/59 (2022 17:49) (56/21 - 111/70)  BP(mean): 67 (2022 07:42) (67 - 67)  RR: 17 (2022 16:33) (17 - 18)  SpO2: 99% (2022 16:33) (90% - 100%)      Neurological Exam:   Mental status: Awake, alert somewhat, not oriented to person, place or time, following simple commands. No dysarthria, no aphasia.  Speaking in 1-2 words at a time.  Cranial nerves: Pupils equally round and reactive to light, visual fields full, no nystagmus, extraocular muscles intact, V1 through V3 intact bilaterally and symmetric, face symmetric, hearing intact to finger rub, palate elevation symmetric, tongue was midline.  Motor:  MRC grading 3/5 B/L UE/LE.   strength 4/5.  Normal tone and bulk.  No abnormal movements.    Sensation: Intact to light touch, proprioception, and pinprick.   Coordination: No dysmetria on finger-to-nose and heel-to-shin.    Reflexes: 2+ in bilateral UE/LE, downgoing toes bilaterally.       Labs:  CBC Full  -  ( 2022 09:20 )  WBC Count : 6.56 K/uL  RBC Count : 4.35 M/uL  Hemoglobin : 10.6 g/dL  Hematocrit : 29.6 %  Platelet Count - Automated : 48 K/uL  Mean Cell Volume : 68.0 fL  Mean Cell Hemoglobin : 24.4 pg  Mean Cell Hemoglobin Concentration : 35.8 g/dL  Auto Neutrophil # : 5.70 K/uL  Auto Lymphocyte # : 0.24 K/uL  Auto Monocyte # : 0.60 K/uL  Auto Eosinophil # : 0.00 K/uL  Auto Basophil # : 0.00 K/uL  Auto Neutrophil % : 86.9 %  Auto Lymphocyte % : 3.7 %  Auto Monocyte % : 9.1 %  Auto Eosinophil % : 0.0 %  Auto Basophil % : 0.0 %        129<L>  |  91<L>  |  61<HH>  ----------------------------<  80  4.5   |  16<L>  |  2.2<H>    Ca    7.5<L>      2022 09:20  Phos  4.7       Mg     2.6         TPro  6.2  /  Alb  3.3<L>  /  TBili  7.7<H>  /  DBili  x   /  AST  462<H>  /  ALT  199<H>  /  AlkPhos  290<H>      LIVER FUNCTIONS - ( 2022 09:20 )  Alb: 3.3 g/dL / Pro: 6.2 g/dL / ALK PHOS: 290 U/L / ALT: 199 U/L / AST: 462 U/L / GGT: x           PT/INR - ( 2022 09:20 )   PT: >40.00 sec;   INR: 3.64 ratio         PTT - ( 2022 09:20 )  PTT:45.0 sec  Urinalysis Basic - ( 2022 22:30 )    Color: June / Appearance: Turbid / S.017 / pH: x  Gluc: x / Ketone: Negative  / Bili: Small / Urobili: 3 mg/dL   Blood: x / Protein: 100 mg/dL / Nitrite: Negative   Leuk Esterase: Moderate / RBC: 7 /HPF / WBC 40 /HPF   Sq Epi: x / Non Sq Epi: 3 /HPF / Bacteria: Negative

## 2022-01-18 NOTE — H&P ADULT - HISTORY OF PRESENT ILLNESS
Chief Complaint: Weakness     Past Medical History: HFrEF (EF in 10/21: 24%) s/p AICD, CAD s/p MI w/stents 2007, HTN, HLD, DM, gout, CKD, COPD, PVD s/p aortobifemoral bypass, proximal right leg DVT (4/2021) on Eliquis (wasn't taking due to cost)    Mr. Berg is a 76M with a past medical history as described above who presented to the hospital for evaluation of weakness and slurred speech. The patient is encephalopathic upon my examination. Per family, patient has been having difficulty ambulating and speaking clearly for the past 5-6 days. He was in his usual state of health prior. They were concerned given his mental status and transported him to the ED.     In the ED, he was hypotensive and hypothermic. BP improved after LR bolus. Given Vanc/Cefepime and stress-dose steroids for possible myxedema. BNP not checked by ED, presumably will be high. CBC noted with severe thrombocytopenia. CMP with hyponatremia/hypochloremia as well as elevated LFTs and ACE. No abdominal imaging preformed at this point.    ROS: Denies headache, changes in vision, chest pain, palpitations, shortness of breath, cough, fever, chills, nausea, vomiting, diarrhea, and constipation. +weakenss, swollen BLE

## 2022-01-18 NOTE — CONSULT NOTE ADULT - SUBJECTIVE AND OBJECTIVE BOX
Patient is a 76y old  Male who presents with a chief complaint of ACE, transaminitis, thrombocytopenia (2022 00:18)      HPI:  Chief Complaint: Weakness     Past Medical History: HFrEF (EF in 10/21: 24%) s/p AICD, CAD s/p MI w/stents , HTN, HLD, DM, gout, CKD, COPD, PVD s/p aortobifemoral bypass, proximal right leg DVT (2021) on Eliquis (wasn't taking due to cost)    Mr. Berg is a 76M with a past medical history as described above who presented to the hospital for evaluation of weakness and slurred speech. The patient is encephalopathic upon my examination. Per family, patient has been having difficulty ambulating and speaking clearly for the past 5-6 days. He was in his usual state of health prior. They were concerned given his mental status and transported him to the ED.     In the ED, he was hypotensive and hypothermic. BP improved after LR bolus. Given Vanc/Cefepime and stress-dose steroids for possible myxedema. BNP not checked by ED, presumably will be high. CBC noted with severe thrombocytopenia. CMP with hyponatremia/hypochloremia as well as elevated LFTs and ACE. No abdominal imaging preformed at this point.    ROS: Denies headache, changes in vision, chest pain, palpitations, shortness of breath, cough, fever, chills, nausea, vomiting, diarrhea, and constipation. +weakenss, swollen BLE  (2022 00:18)      PAST MEDICAL & SURGICAL HISTORY:  Coronary Artery Disease    Hypercholesteremia    Myocardial Infarction    Hypertension    Gout    Diabetes mellitus    Renal insufficiency    Chronic obstructive pulmonary disease    Peripheral vascular disease    S/P aortobifemoral bypass surgery    Sickle cell trait    AICD (automatic cardioverter/defibrillator) present    Abdominal Hernia    s/p Femoral-Popliteal Bypass Surgery        SOCIAL HX:   Smoking     no                    ETOH                            Other    FAMILY HISTORY:  :  No known cardiovacular family hisotry     Review Of Systems:     All ROS are negative except per HPI       Allergies    No Known Drug Allergies  shellfish (Other; Urticaria)    Intolerances          PHYSICAL EXAM    ICU Vital Signs Last 24 Hrs  T(C): 34.5 (2022 04:11), Max: 36.9 (2022 20:06)  T(F): 94.1 (2022 04:11), Max: 98.5 (2022 20:06)  HR: 50 (2022 05:11) (48 - 54)  BP: 90/57 (2022 05:11) (87/53 - 111/70)  BP(mean): --  ABP: --  ABP(mean): --  RR: 18 (2022 05:11) (18 - 18)  SpO2: 99% (2022 05:11) (99% - 100%)      CONSTITUTIONAL:  Well nourished. Ill appearing in NAD    ENT:   Airway patent,   Mouth with normal mucosa.   No thrush      CARDIAC:   Normal rate,   Regular rhythm.    No edema      Vascular:   normal systolic impulse  no bruits    RESPIRATORY:   No wheezing  Bilateral BS   Not tachypneic,  No use of accessory muscles    GASTROINTESTINAL:  Abdomen soft,   Non-tender,   No guarding,   + BS      NEUROLOGICAL:   Lethargic   Does not follow commands       SKIN:   Skin normal color for race,   No evidence of rash.      HEME LYMPH: .  No cervical  lymphadenopathy.  No inguinal lymphadenopathy              LABS:                          11.0   5.07  )-----------( 46       ( 2022 21:05 )             30.4                                                   126<L>  |  88<L>  |  56<H>  ----------------------------<  99  4.1   |  19  |  2.1<H>    Creatinine Trend  BUN 56, Cr 2.1, (22 @ 21:05)      Ca    7.5<L>      2022 21:05  Mg     2.6         TPro  6.3  /  Alb  3.3<L>  /  TBili  7.8<H>  /  DBili  x   /  AST  417<H>  /  ALT  183<H>  /  AlkPhos  287<H>        PT/INR - ( 2022 22:14 )   PT: 39.40 sec;   INR: 3.47 ratio         PTT - ( 2022 22:14 )  PTT:48.2 sec                                       Urinalysis Basic - ( 2022 22:30 )    Color: June / Appearance: Turbid / S.017 / pH: x  Gluc: x / Ketone: Negative  / Bili: Small / Urobili: 3 mg/dL   Blood: x / Protein: 100 mg/dL / Nitrite: Negative   Leuk Esterase: Moderate / RBC: 7 /HPF / WBC 40 /HPF   Sq Epi: x / Non Sq Epi: 3 /HPF / Bacteria: Negative        CARDIAC MARKERS ( 2022 21:05 )  x     / 0.02 ng/mL / x     / x     / x                                                LIVER FUNCTIONS - ( 2022 21:05 )  Alb: 3.3 g/dL / Pro: 6.3 g/dL / ALK PHOS: 287 U/L / ALT: 183 U/L / AST: 417 U/L / GGT: x                                                                                                                                       X-Rays reviewed                                                                                     ECHO    CXR interpreted by me     MEDICATIONS  (STANDING):  aspirin  chewable 81 milliGRAM(s) Oral daily  cefTRIAXone   IVPB 1000 milliGRAM(s) IV Intermittent every 24 hours  chlorhexidine 4% Liquid 1 Application(s) Topical <User Schedule>  furosemide   Injectable 80 milliGRAM(s) IV Push every 12 hours  lactulose Syrup 20 Gram(s) Oral every 12 hours  memantine 5 milliGRAM(s) Oral at bedtime  pantoprazole    Tablet 40 milliGRAM(s) Oral before breakfast  senna 2 Tablet(s) Oral at bedtime    MEDICATIONS  (PRN):  ALBUTerol    90 MICROgram(s) HFA Inhaler 2 Puff(s) Inhalation every 6 hours PRN Shortness of Breath and/or Wheezing

## 2022-01-18 NOTE — H&P ADULT - NSHPPHYSICALEXAM_GEN_ALL_CORE
PHYSICAL EXAM:  GENERAL: NAD, lying in bed with klaudia hugger  HEAD:  Atraumatic, Normocephalic  EYES: EOMI, PERRLA, conjunctiva and sclera clear  ENT: Moist mucous membranes  NECK: Supple, No JVD  CHEST/LUNG: Clear to auscultation bilaterally; No rales, rhonchi, wheezing, or rubs. Unlabored respirations  HEART: radha rate and rhythm; No murmurs, rubs, or gallops  ABDOMEN: Grossly distended, pitting edema present, +hepatomegaly. midline scar    EXTREMITIES:  No clubbing, cyanosis, ++edema  NERVOUS SYSTEM:  Alert & Oriented to self, speech slurred.   SKIN: midline abdominal scar from prior laparotomy

## 2022-01-18 NOTE — H&P ADULT - REASON FOR ADMISSION
Called and left patient a voicemail
Managed Care: please process prior authorization for CT Chest due this upcoming month and contact patient. Thank you. Letter sent to patient to inform testing is due.
AEC, transaminitis, thrombocytopenia

## 2022-01-18 NOTE — CONSULT NOTE ADULT - ASSESSMENT
76 y.o male patient with extensive cardiac and PMH presented for slurred speech and altered mental status; found to be in multi-organ failure    # Shock  - With multi-organ failure (ACE, ALI, AMS with elevated ammonia)  - Possible septic shock? (significantly hypothermic on presentation)  - Cardiac component to shock  - Continue Levophed for now  - Get central line (preferably [place] catheter), to check CVP and calculate cardiac output and SVR  - Get arterial line  - Monitor VS closely and check lactic acid q2h and increase pressors / add inotrope if needed  - Check 2D echo  - Monitor strict I&O  - Wide spectrum antibiotics  - Check blood cultures  - Management as per critical care team    # History of PE with right heart strain in 10/2021  - Patient has not been taking Eliquis for at least 1 month  - Management as per pulm/CC team

## 2022-01-18 NOTE — CONSULT NOTE ADULT - ASSESSMENT
76 y.o male patient with extensive cardiac and PMH presented for slurred speech and altered mental status;    #Acute on chronic Liver Injury - Mixed, likely d/t Congestive hepatopathy - r/o other etiologies  #Acute on Chronic HFrEF  #Shock of unclear etiology - sepsis vs cardiac?  #Cardio renal syndrome  - LFTs 10/21: 2.7/123/43/32  - LFTS on admission: 7.8/287/417/183  - Medications reviewed    Rec  - Please obtain LFT, coagulation profile qdaily  - Please send Hepatitis A IgM, Hepatitis B core IgM, core Ab total, surface Ag, surface Ab, HCV antibody, HCV RNA, Anti HEV  - Please send Iron studies and ceruloplasmin  - Please obtain Quantiferon level  - please send  IGG4 and ACE level  - Please send ALEJANDRO, AMA, anti-Smooth Muscle Ab type 1, Anti liver-kidney microsomal Ab, Anti-soluble liver Ag, immunoglobulin panel  - Please send CMV PCR, EBV PCR, HSV IgM  - Please send Serum Drug screen and Utox  - Please check tylenol level  - Please obtain RUQ sono with Doppler  - Please obtain NH3 from A line and place on ice when giving to lab -notified ICu team  - Please avoid hepatotoxic medications  - avoid hepatotoxic medications  - aggressive diuresis - recommend negative balance  - Target MAP >70

## 2022-01-18 NOTE — ED ADULT NURSE REASSESSMENT NOTE - NS ED NURSE REASSESS COMMENT FT1
ICU stephanie Echeverria called due to patient agitated pulling lines and tossing himself in bed. Patient removed his left arteria line bleeding controlled with pressure. As per md will replace line

## 2022-01-18 NOTE — SWALLOW BEDSIDE ASSESSMENT ADULT - SLP GENERAL OBSERVATIONS
Pt with altered mental status, inconsistent w/ verbal responses and joint attention. Limited verbal output- however speech was noted to be clear and intelligible when stating name Pt with o2 via NC 4L .  altered mental status, inconsistent w/ verbal responses and joint attention. Limited verbal output- however speech was noted to be clear and intelligible when stating name

## 2022-01-18 NOTE — CONSULT NOTE ADULT - SUBJECTIVE AND OBJECTIVE BOX
Gastroenterology Consultation:    Patient is a 76y old  Male who presents with a chief complaint of ACE, transaminitis, thrombocytopenia (18 Jan 2022 11:04)        Admitted on: 01-17-22      HPI:  Chief Complaint: Weakness     Past Medical History: HFrEF (EF in 10/21: 24%) s/p AICD, CAD s/p MI w/stents 2007, HTN, HLD, DM, gout, CKD, COPD, PVD s/p aortobifemoral bypass, proximal right leg DVT (4/2021) on Eliquis (wasn't taking due to cost)    Mr. Berg is a 76M with a past medical history as described above who presented to the hospital for evaluation of weakness and slurred speech. The patient is encephalopathic upon my examination. Per family, patient has been having difficulty ambulating and speaking clearly for the past 5-6 days. He was in his usual state of health prior. They were concerned given his mental status and transported him to the ED.     In the ED, he was hypotensive and hypothermic. BP improved after LR bolus. Given Vanc/Cefepime and stress-dose steroids for possible myxedema. BNP not checked by ED, presumably will be high. CBC noted with severe thrombocytopenia. CMP with hyponatremia/hypochloremia as well as elevated LFTs and ACE. No abdominal imaging preformed at this point.    ROS: Denies headache, changes in vision, chest pain, palpitations, shortness of breath, cough, fever, chills, nausea, vomiting, diarrhea, and constipation. +weakenss, swollen BLE  (18 Jan 2022 00:18)        Prior EGD: none in chart    Prior Colonoscopy: < from: Colonoscopy (04.22.13 @ 08:06) >        - One 5 mm polyp at the hepatic flexure. Resected and retrieved.                       - Diverticulosis in the ascending colon.                       - Erythematous mucosa at the ileocecal valve. This was biopsied.                       - The examination was otherwise normal.    < end of copied text >        PAST MEDICAL & SURGICAL HISTORY:  Coronary Artery Disease    Hypercholesteremia    Myocardial Infarction    Hypertension    Gout    Diabetes mellitus    Renal insufficiency    Chronic obstructive pulmonary disease    Peripheral vascular disease    S/P aortobifemoral bypass surgery    Sickle cell trait    AICD (automatic cardioverter/defibrillator) present    Abdominal Hernia    s/p Femoral-Popliteal Bypass Surgery          FAMILY HISTORY:      Social History:  unable to obtain    Home Medications:  albuterol 90 mcg/inh inhalation aerosol: 2 puff(s) inhaled every 4 hours, As needed, Shortness of Breath (18 Oct 2021 14:28)  allopurinol 300 mg oral tablet: 1 tab(s) orally once a day (08 Oct 2021 09:35)  aspirin 81 mg oral tablet, chewable: 1 tab(s) orally once a day (18 Oct 2021 14:28)  carvedilol 25 mg oral tablet: 1 tab(s) orally every 12 hours (08 Oct 2021 09:35)  HumaLOG KwikPen 200 units/mL (Concentrated) subcutaneous solution:  subcutaneous  (08 Oct 2021 09:35)        MEDICATIONS  (STANDING):  aspirin  chewable 81 milliGRAM(s) Oral daily  chlorhexidine 4% Liquid 1 Application(s) Topical <User Schedule>  lactated ringers. 1000 milliLiter(s) (75 mL/Hr) IV Continuous <Continuous>  lactulose Syrup 20 Gram(s) Oral every 12 hours  memantine 5 milliGRAM(s) Oral at bedtime  meropenem  IVPB 1000 milliGRAM(s) IV Intermittent every 12 hours  norepinephrine Infusion 0.05 MICROgram(s)/kG/Min (9.38 mL/Hr) IV Continuous <Continuous>  pantoprazole    Tablet 40 milliGRAM(s) Oral before breakfast  senna 2 Tablet(s) Oral at bedtime    MEDICATIONS  (PRN):  ALBUTerol    90 MICROgram(s) HFA Inhaler 2 Puff(s) Inhalation every 6 hours PRN Shortness of Breath and/or Wheezing      Allergies  No Known Drug Allergies  shellfish (Other; Urticaria)      Review of Systems:   unable to obtain d/t current mental status          Physical Examination:  T(C): 35.6 (01-18-22 @ 11:00), Max: 36.9 (01-17-22 @ 20:06)  HR: 50 (01-18-22 @ 11:00) (39 - 54)  BP: 83/53 (01-18-22 @ 11:00) (56/21 - 111/70)  RR: 17 (01-18-22 @ 08:54) (17 - 18)  SpO2: 90% (01-18-22 @ 08:54) (90% - 100%)  Height (cm): 177.8 (01-17-22 @ 20:06)  Weight (kg): 100 (01-18-22 @ 09:28)    01-18-22 @ 07:01  -  01-18-22 @ 12:01  --------------------------------------------------------  IN: 0 mL / OUT: 700 mL / NET: -700 mL          GENERAL: AAOx0, in mild distress  HEAD:  Atraumatic, Normocephalic  EYES: conjunctiva and sclera clear  NECK: Supple, +ve JVD  CHEST/LUNG: bibasilar crackles  HEART: Regular rate and rhythm; normal S1, S2, No murmurs.  ABDOMEN: Soft, nontender, dsitended d/t abdominal girth; Bowel sounds present  NEUROLOGY: No asterixis or tremor.   SKIN: Intact, no jaundice        Data:                        10.6   6.56  )-----------( 48       ( 18 Jan 2022 09:20 )             29.6     Hgb Trend:  10.6  01-18-22 @ 09:20  10.6  01-18-22 @ 06:58  11.0  01-17-22 @ 21:05        01-18    129<L>  |  91<L>  |  61<HH>  ----------------------------<  80  4.5   |  16<L>  |  2.2<H>    Ca    7.5<L>      18 Jan 2022 09:20  Phos  4.7     01-18  Mg     2.6     01-18    TPro  6.2  /  Alb  3.3<L>  /  TBili  7.7<H>  /  DBili  x   /  AST  462<H>  /  ALT  199<H>  /  AlkPhos  290<H>  01-18    Liver panel trend:  TBili 7.7   /      /      /   AlkP 290   /   Tptn 6.2   /   Alb 3.3    /   DBili --      01-18  TBili 7.1   /      /      /   AlkP 298   /   Tptn 6.2   /   Alb 3.3    /   DBili --      01-18  TBili 7.8   /      /      /   AlkP 287   /   Tptn 6.3   /   Alb 3.3    /   DBili --      01-17      PT/INR - ( 18 Jan 2022 09:20 )   PT: >40.00 sec;   INR: 3.64 ratio         PTT - ( 18 Jan 2022 09:20 )  PTT:45.0 sec        Radiology:  CT Abdomen and Pelvis w/ IV Cont:   ACC: [id number] EXAM:  CT ABDOMEN AND PELVIS IC                          PROCEDURE DATE:  01/18/2022          INTERPRETATION:  CLINICAL STATEMENT: Altered mental status    TECHNIQUE: Contiguous axial CT images were obtained from the lower chest   to the pubic symphysis following the administration of 100 cc Omnipaque   350 intravenous contrast.  Oral contrast was not administered.    Reformatted images in the coronal and sagittal planes were acquired.    COMPARISON CT: CT abdomen 10/13/2021    FINDINGS:    LOWER CHEST: Persistent but improving bibasilar peripheral opacities.   Trace bilateral pleural effusions. Partially imaged cardiac leads.    HEPATOBILIARY: Unremarkable.    SPLEEN: Stable homogeneously hyperenhancing 1.9 cm focus in the medial   spleen, possible hemangioma.    PANCREAS: Unremarkable.    ADRENAL GLANDS: Unremarkable.    KIDNEYS: Bilateral renal cysts. No hydronephrosis bilaterally. Symmetric   renal enhancement.    ABDOMINOPELVIC NODES: No lymphadenopathy.    PELVIC ORGANS: Urinary bladder is collapsed around a Eckert catheter   balloon. Prostate gland appears mildly enlarged.    PERITONEUM/MESENTERY/BOWEL: There is rectus diastases with superimposed   hernias within the ventral abdominal pelvic wall containing loopsof   nonobstructed bowel. No evidence for bowel obstruction. Unremarkable   appendix. No pneumoperitoneum. Small volume ascites.    BONES/SOFT TISSUES: No acute osseous abnormalities..    OTHER: Redemonstrated iliac bypass graft Resolution of previously   demonstrated right groin hematoma. Diffuse moderate anasarca.    IMPRESSION:    Interval development of trace pleural effusions, small ascites and   diffuse anasarca which may reflect a component of volume overload.    Otherwise no definite CT evidence for acute intra-abdominal pathology.    Persistent peripheral lung opacities, appear to be improving.    --- End of Report ---          LULA DRIVER DO; Resident Radiologist  This document has been electronically signed.  KRISTINE JIMENEZ MD; Attending Radiologist  This document has been electronically signed. Jan 18 2022  7:56AM (01-18-22 @ 01:42)       Gastroenterology Consultation:    Patient is a 76y old  Male who presents with a chief complaint of ACE, transaminitis, thrombocytopenia (18 Jan 2022 11:04)        Admitted on: 01-17-22      HPI:  Chief Complaint: Weakness     Past Medical History: HFrEF (EF in 10/21: 24%) s/p AICD, CAD s/p MI w/stents 2007, HTN, HLD, DM, gout, CKD, COPD, PVD s/p aortobifemoral bypass, proximal right leg DVT (4/2021) on Eliquis (wasn't taking due to cost)    Mr. Berg is a 76M with a past medical history as described above who presented to the hospital for evaluation of weakness and slurred speech. The patient is encephalopathic upon my examination. Per family, patient has been having difficulty ambulating and speaking clearly for the past 5-6 days. He was in his usual state of health prior. They were concerned given his mental status and transported him to the ED.     In the ED, he was hypotensive and hypothermic. BP improved after LR bolus. Given Vanc/Cefepime and stress-dose steroids for possible myxedema. BNP not checked by ED, presumably will be high. CBC noted with severe thrombocytopenia. CMP with hyponatremia/hypochloremia as well as elevated LFTs and ACE. No abdominal imaging preformed at this point.    ROS: Denies headache, changes in vision, chest pain, palpitations, shortness of breath, cough, fever, chills, nausea, vomiting, diarrhea, and constipation. +weakenss, swollen BLE  (18 Jan 2022 00:18)        Prior EGD: none in chart    Prior Colonoscopy: < from: Colonoscopy (04.22.13 @ 08:06) >        - One 5 mm polyp at the hepatic flexure. Resected and retrieved.                       - Diverticulosis in the ascending colon.                       - Erythematous mucosa at the ileocecal valve. This was biopsied.                       - The examination was otherwise normal.    < end of copied text >        PAST MEDICAL & SURGICAL HISTORY:  Coronary Artery Disease    Hypercholesteremia    Myocardial Infarction    Hypertension    Gout    Diabetes mellitus    Renal insufficiency    Chronic obstructive pulmonary disease    Peripheral vascular disease    S/P aortobifemoral bypass surgery    Sickle cell trait    AICD (automatic cardioverter/defibrillator) present    Abdominal Hernia    s/p Femoral-Popliteal Bypass Surgery          FAMILY HISTORY:      Social History:  unable to obtain    Home Medications:  albuterol 90 mcg/inh inhalation aerosol: 2 puff(s) inhaled every 4 hours, As needed, Shortness of Breath (18 Oct 2021 14:28)  allopurinol 300 mg oral tablet: 1 tab(s) orally once a day (08 Oct 2021 09:35)  aspirin 81 mg oral tablet, chewable: 1 tab(s) orally once a day (18 Oct 2021 14:28)  carvedilol 25 mg oral tablet: 1 tab(s) orally every 12 hours (08 Oct 2021 09:35)  HumaLOG KwikPen 200 units/mL (Concentrated) subcutaneous solution:  subcutaneous  (08 Oct 2021 09:35)        MEDICATIONS  (STANDING):  aspirin  chewable 81 milliGRAM(s) Oral daily  chlorhexidine 4% Liquid 1 Application(s) Topical <User Schedule>  lactated ringers. 1000 milliLiter(s) (75 mL/Hr) IV Continuous <Continuous>  lactulose Syrup 20 Gram(s) Oral every 12 hours  memantine 5 milliGRAM(s) Oral at bedtime  meropenem  IVPB 1000 milliGRAM(s) IV Intermittent every 12 hours  norepinephrine Infusion 0.05 MICROgram(s)/kG/Min (9.38 mL/Hr) IV Continuous <Continuous>  pantoprazole    Tablet 40 milliGRAM(s) Oral before breakfast  senna 2 Tablet(s) Oral at bedtime    MEDICATIONS  (PRN):  ALBUTerol    90 MICROgram(s) HFA Inhaler 2 Puff(s) Inhalation every 6 hours PRN Shortness of Breath and/or Wheezing      Allergies  No Known Drug Allergies  shellfish (Other; Urticaria)      Review of Systems:   unable to obtain d/t current mental status          Physical Examination:  T(C): 35.6 (01-18-22 @ 11:00), Max: 36.9 (01-17-22 @ 20:06)  HR: 50 (01-18-22 @ 11:00) (39 - 54)  BP: 83/53 (01-18-22 @ 11:00) (56/21 - 111/70)  RR: 17 (01-18-22 @ 08:54) (17 - 18)  SpO2: 90% (01-18-22 @ 08:54) (90% - 100%)  Height (cm): 177.8 (01-17-22 @ 20:06)  Weight (kg): 100 (01-18-22 @ 09:28)    01-18-22 @ 07:01  -  01-18-22 @ 12:01  --------------------------------------------------------  IN: 0 mL / OUT: 700 mL / NET: -700 mL          GENERAL: AAOx0, in mild distress  HEAD:  Atraumatic, Normocephalic  EYES: conjunctiva and scleral icterus  NECK: Supple, +ve JVD  CHEST/LUNG: bibasilar crackles  HEART: Regular rate and rhythm; normal S1, S2, No murmurs.  ABDOMEN: Soft, nontender, dsitended d/t abdominal girth; Bowel sounds present  NEUROLOGY: No asterixis or tremor.   SKIN: Intact,       Data:                        10.6   6.56  )-----------( 48       ( 18 Jan 2022 09:20 )             29.6     Hgb Trend:  10.6  01-18-22 @ 09:20  10.6  01-18-22 @ 06:58  11.0  01-17-22 @ 21:05        01-18    129<L>  |  91<L>  |  61<HH>  ----------------------------<  80  4.5   |  16<L>  |  2.2<H>    Ca    7.5<L>      18 Jan 2022 09:20  Phos  4.7     01-18  Mg     2.6     01-18    TPro  6.2  /  Alb  3.3<L>  /  TBili  7.7<H>  /  DBili  x   /  AST  462<H>  /  ALT  199<H>  /  AlkPhos  290<H>  01-18    Liver panel trend:  TBili 7.7   /      /      /   AlkP 290   /   Tptn 6.2   /   Alb 3.3    /   DBili --      01-18  TBili 7.1   /      /      /   AlkP 298   /   Tptn 6.2   /   Alb 3.3    /   DBili --      01-18  TBili 7.8   /      /      /   AlkP 287   /   Tptn 6.3   /   Alb 3.3    /   DBili --      01-17      PT/INR - ( 18 Jan 2022 09:20 )   PT: >40.00 sec;   INR: 3.64 ratio         PTT - ( 18 Jan 2022 09:20 )  PTT:45.0 sec        Radiology:  CT Abdomen and Pelvis w/ IV Cont:   ACC: [id number] EXAM:  CT ABDOMEN AND PELVIS IC                          PROCEDURE DATE:  01/18/2022          INTERPRETATION:  CLINICAL STATEMENT: Altered mental status    TECHNIQUE: Contiguous axial CT images were obtained from the lower chest   to the pubic symphysis following the administration of 100 cc Omnipaque   350 intravenous contrast.  Oral contrast was not administered.    Reformatted images in the coronal and sagittal planes were acquired.    COMPARISON CT: CT abdomen 10/13/2021    FINDINGS:    LOWER CHEST: Persistent but improving bibasilar peripheral opacities.   Trace bilateral pleural effusions. Partially imaged cardiac leads.    HEPATOBILIARY: Unremarkable.    SPLEEN: Stable homogeneously hyperenhancing 1.9 cm focus in the medial   spleen, possible hemangioma.    PANCREAS: Unremarkable.    ADRENAL GLANDS: Unremarkable.    KIDNEYS: Bilateral renal cysts. No hydronephrosis bilaterally. Symmetric   renal enhancement.    ABDOMINOPELVIC NODES: No lymphadenopathy.    PELVIC ORGANS: Urinary bladder is collapsed around a Eckert catheter   balloon. Prostate gland appears mildly enlarged.    PERITONEUM/MESENTERY/BOWEL: There is rectus diastases with superimposed   hernias within the ventral abdominal pelvic wall containing loopsof   nonobstructed bowel. No evidence for bowel obstruction. Unremarkable   appendix. No pneumoperitoneum. Small volume ascites.    BONES/SOFT TISSUES: No acute osseous abnormalities..    OTHER: Redemonstrated iliac bypass graft Resolution of previously   demonstrated right groin hematoma. Diffuse moderate anasarca.    IMPRESSION:    Interval development of trace pleural effusions, small ascites and   diffuse anasarca which may reflect a component of volume overload.    Otherwise no definite CT evidence for acute intra-abdominal pathology.    Persistent peripheral lung opacities, appear to be improving.    --- End of Report ---          LULA DRIVER DO; Resident Radiologist  This document has been electronically signed.  KRISTINE JIMENEZ MD; Attending Radiologist  This document has been electronically signed. Jan 18 2022  7:56AM (01-18-22 @ 01:42)

## 2022-01-18 NOTE — CONSULT NOTE ADULT - SUBJECTIVE AND OBJECTIVE BOX
NEPHROLOGY CONSULTATION NOTE       76M with a past medical history  HFrEF (EF in 10/21: 24%) s/p AICD, CAD s/p MI w/stents , HTN, HLD, DM, gout, CKD 2, COPD, PVD s/p aortobifemoral bypass, proximal right leg DVT (2021) on Eliquis (wasn't taking due to cost) who presented to the hospital for evaluation of weakness and slurred speech.  He  is encephalopathic upon my examination. Per notes , he has been having difficulty ambulating and speaking clearly for the past 5-6 days. He was in his usual state of health prior. They were concerned given his mental status and transported him to the ED.     In the ED, he was hypotensive and hypothermic. BP improved after LR bolus. Given Vanc/Cefepime and stress-dose steroids for possible myxedema. BNP not checked by ED, presumably will be high. CBC noted with severe thrombocytopenia. CMP with hyponatremia/hypochloremia as well as elevated LFTs and ACE. No abdominal imaging preformed at this point.      On Levo BP in 90's       PAST MEDICAL & SURGICAL HISTORY:  Coronary Artery Disease    Hypercholesteremia    Myocardial Infarction    Hypertension    Gout    Diabetes mellitus    Renal insufficiency    Chronic obstructive pulmonary disease    Peripheral vascular disease    S/P aortobifemoral bypass surgery    Sickle cell trait    AICD (automatic cardioverter/defibrillator) present    Abdominal Hernia    s/p Femoral-Popliteal Bypass Surgery      Allergies:  No Known Drug Allergies  shellfish (Other; Urticaria)    Home Medications Reviewed  Hospital Medications:   MEDICATIONS  (STANDING):  aspirin  chewable 81 milliGRAM(s) Oral daily  chlorhexidine 4% Liquid 1 Application(s) Topical <User Schedule>  lactated ringers. 1000 milliLiter(s) (75 mL/Hr) IV Continuous <Continuous>  lactulose Syrup 20 Gram(s) Oral every 12 hours  memantine 5 milliGRAM(s) Oral at bedtime  meropenem  IVPB 1000 milliGRAM(s) IV Intermittent every 12 hours  norepinephrine Infusion 0.05 MICROgram(s)/kG/Min (9.38 mL/Hr) IV Continuous <Continuous>  pantoprazole    Tablet 40 milliGRAM(s) Oral before breakfast  senna 2 Tablet(s) Oral at bedtime      SOCIAL HISTORY:  Denies ETOH,Smoking,   FAMILY HISTORY:        REVIEW OF SYSTEMS:  unable to obtain    VITALS:  T(F): 96.5 (22 @ 10:12), Max: 98.5 (22 @ 20:06)  HR: 50 (22 @ 10:12)  BP: 101/55 (22 @ 10:12)  RR: 17 (22 @ 08:54)  SpO2: 90% (22 @ 08:54)     @ 07:01  -   11:05  --------------------------------------------------------  IN: 0 mL / OUT: 700 mL / NET: -700 mL      Height (cm): 177.8 ( 20:06)  Weight (kg): 100 (:28)  BMI (kg/m2): 31.6 (:28)  BSA (m2): 2.18 (:)    22 @ 07:01  -  22 @ 11:05  --------------------------------------------------------  IN: 0 mL / OUT: 700 mL / NET: -700 mL      I&O's Detail    2022 07:01  -  2022 11:05  --------------------------------------------------------  IN:  Total IN: 0 mL    OUT:    Indwelling Catheter - Urethral (mL): 700 mL  Total OUT: 700 mL    Total NET: -700 mL            PHYSICAL EXAM:  Constitutional: confused   HEENT: anicteric sclera, oropharynx clear, MMM  Neck: No JVD  Respiratory: CTAB, no wheezes, rales or rhonchi  Cardiovascular: S1, S2, RRR  Gastrointestinal: BS+, soft, NT/ND  Extremities: No cyanosis or clubbing. No peripheral edema  Neurological: confused   : No CVA tenderness. + riley.   Skin: No rashes  Vascular Access:    LABS:      129<L>  |  91<L>  |  61<HH>  ----------------------------<  80  4.5   |  16<L>  |  2.2<H>    Ca    7.5<L>      2022 09:20  Phos  4.7       Mg     2.6         TPro  6.2  /  Alb  3.3<L>  /  TBili  7.7<H>  /  DBili      /  AST  462<H>  /  ALT  199<H>  /  AlkPhos  290<H>      Creatinine Trend: 2.2 <--, 2.3 <--, 2.1 <--                        10.6   6.56  )-----------( 48       ( 2022 09:20 )             29.6     Urine Studies:  Urinalysis Basic - ( 2022 22:30 )    Color: June / Appearance: Turbid / S.017 / pH:   Gluc:  / Ketone: Negative  / Bili: Small / Urobili: 3 mg/dL   Blood:  / Protein: 100 mg/dL / Nitrite: Negative   Leuk Esterase: Moderate / RBC: 7 /HPF / WBC 40 /HPF   Sq Epi:  / Non Sq Epi: 3 /HPF / Bacteria: Negative                RADIOLOGY & ADDITIONAL STUDIES:

## 2022-01-18 NOTE — CONSULT NOTE ADULT - SUBJECTIVE AND OBJECTIVE BOX
HPI:  Chief Complaint: Weakness     Past Medical History: HFrEF (EF in 10/21: 24%) s/p AICD, CAD s/p MI w/stents 2007, HTN, HLD, DM, gout, CKD, COPD, PVD s/p aortobifemoral bypass, proximal right leg DVT (4/2021) on Eliquis (wasn't taking due to cost)    Mr. Berg is a 76M with a past medical history as described above who presented to the hospital for evaluation of weakness and slurred speech. The patient is encephalopathic upon my examination. Per family, patient has been having difficulty ambulating and speaking clearly for the past 5-6 days. He was in his usual state of health prior. They were concerned given his mental status and transported him to the ED.     In the ED, he was hypotensive and hypothermic. BP improved after LR bolus. Given Vanc/Cefepime and stress-dose steroids for possible myxedema. BNP not checked by ED, presumably will be high. CBC noted with severe thrombocytopenia. CMP with hyponatremia/hypochloremia as well as elevated LFTs and ACE. No abdominal imaging preformed at this point.    ROS: Denies headache, changes in vision, chest pain, palpitations, shortness of breath, cough, fever, chills, nausea, vomiting, diarrhea, and constipation. +weakenss, swollen BLE  (18 Jan 2022 00:18)      PAST MEDICAL & SURGICAL HISTORY  Coronary Artery Disease    Hypercholesteremia    Myocardial Infarction    Hypertension    Gout    Diabetes mellitus    Renal insufficiency    Chronic obstructive pulmonary disease    Peripheral vascular disease    S/P aortobifemoral bypass surgery    Sickle cell trait    AICD (automatic cardioverter/defibrillator) present    Abdominal Hernia    s/p Femoral-Popliteal Bypass Surgery        FAMILY HISTORY:  FAMILY HISTORY:      SOCIAL HISTORY:  []smoker  []Alcohol  []Drug    ALLERGIES:  No Known Drug Allergies  shellfish (Other; Urticaria)      MEDICATIONS:  MEDICATIONS  (STANDING):  aspirin  chewable 81 milliGRAM(s) Oral daily  chlorhexidine 4% Liquid 1 Application(s) Topical <User Schedule>  lactated ringers. 1000 milliLiter(s) (75 mL/Hr) IV Continuous <Continuous>  lactulose Syrup 20 Gram(s) Oral every 12 hours  memantine 5 milliGRAM(s) Oral at bedtime  meropenem  IVPB 1000 milliGRAM(s) IV Intermittent every 12 hours  norepinephrine Infusion 0.05 MICROgram(s)/kG/Min (9.38 mL/Hr) IV Continuous <Continuous>  pantoprazole    Tablet 40 milliGRAM(s) Oral before breakfast  senna 2 Tablet(s) Oral at bedtime    MEDICATIONS  (PRN):  ALBUTerol    90 MICROgram(s) HFA Inhaler 2 Puff(s) Inhalation every 6 hours PRN Shortness of Breath and/or Wheezing      HOME MEDICATIONS:  Home Medications:  albuterol 90 mcg/inh inhalation aerosol: 2 puff(s) inhaled every 4 hours, As needed, Shortness of Breath (18 Oct 2021 14:28)  allopurinol 300 mg oral tablet: 1 tab(s) orally once a day (08 Oct 2021 09:35)  aspirin 81 mg oral tablet, chewable: 1 tab(s) orally once a day (18 Oct 2021 14:28)  carvedilol 25 mg oral tablet: 1 tab(s) orally every 12 hours (08 Oct 2021 09:35)  HumaLOG KwikPen 200 units/mL (Concentrated) subcutaneous solution:  subcutaneous  (08 Oct 2021 09:35)      VITALS:   T(F): 96.5 (01-18 @ 10:12), Max: 98.5 (01-17 @ 20:06)  HR: 50 (01-18 @ 10:12) (39 - 54)  BP: 101/55 (01-18 @ 10:12) (56/21 - 111/70)  BP(mean): 67 (01-18 @ 07:42) (67 - 67)  RR: 17 (01-18 @ 08:54) (17 - 18)  SpO2: 90% (01-18 @ 08:54) (90% - 100%)    I&O's Summary    18 Jan 2022 07:01  -  18 Jan 2022 10:57  --------------------------------------------------------  IN: 0 mL / OUT: 700 mL / NET: -700 mL        REVIEW OF SYSTEMS:  Contacted patient's sister for history and ROS  CONSTITUTIONAL: Weakness; fatigue  EYES: No visual changes  ENT: No vertigo or throat pain   NECK: No pain or stiffness  RESPIRATORY: Shortness of breath for at least one month  CARDIOVASCULAR: No chest pain or palpitations  GASTROINTESTINAL: No abdominal or epigastric pain. No nausea, vomiting, or hematemesis; No diarrhea or constipation. No melena or hematochezia.  GENITOURINARY: No dysuria, frequency or hematuria  NEUROLOGICAL: No numbness or weakness  SKIN: No itching, no rashes  MSK: No pain    PHYSICAL EXAM:  NEURO: patient is altered  GEN: Not in acute distress  NECK: no thyroid enlargement, no JVD  LUNGS: Clear to auscultation bilaterally   CARDIOVASCULAR: S1/S2 present, RRR , no murmurs or rubs, no carotid bruits,  + PP bilaterally  ABD: Soft, non-tender, non-distended, +BS  EXT: No LINDA  SKIN: Intact    LABS:                        10.6   6.56  )-----------( 48       ( 18 Jan 2022 09:20 )             29.6     01-18    129<L>  |  91<L>  |  61<HH>  ----------------------------<  80  4.5   |  16<L>  |  2.2<H>    Ca    7.5<L>      18 Jan 2022 09:20  Phos  4.7     01-18  Mg     2.6     01-18    TPro  6.2  /  Alb  3.3<L>  /  TBili  7.7<H>  /  DBili  x   /  AST  462<H>  /  ALT  199<H>  /  AlkPhos  290<H>  01-18    PT/INR - ( 18 Jan 2022 09:20 )   PT: >40.00 sec;   INR: 3.64 ratio         PTT - ( 18 Jan 2022 09:20 )  PTT:45.0 sec  Lactate, Blood: 4.2 mmol/L *HH* (01-18-22 @ 09:20)  Troponin T, Serum: 0.02 ng/mL *H* (01-18-22 @ 09:20)  Troponin T, Serum: 0.02 ng/mL *H* (01-17-22 @ 21:05)  Lactate, Blood: 3.1 mmol/L *H* (01-17-22 @ 21:05)    CARDIAC MARKERS ( 18 Jan 2022 09:20 )  x     / 0.02 ng/mL / x     / x     / x      CARDIAC MARKERS ( 17 Jan 2022 21:05 )  x     / 0.02 ng/mL / x     / x     / x            Troponin trend:    Serum Pro-Brain Natriuretic Peptide: 2661 pg/mL (01-18-22 @ 06:58)          RADIOLOGY:  -CXR:  -TTE:  -CCTA:  -STRESS TEST:  -CATHETERIZATION:    ECG:    TELEMETRY EVENTS:   HPI:  Chief Complaint: Weakness     Past Medical History: HFrEF (EF in 10/21: 24%) s/p AICD, CAD s/p MI w/stents 2007, HTN, HLD, DM, gout, CKD, COPD, PVD s/p aortobifemoral bypass, proximal right leg DVT (4/2021) on Eliquis (wasn't taking due to cost)    Mr. Berg is a 76M with a past medical history as described above who presented to the hospital for evaluation of weakness and slurred speech. The patient is encephalopathic upon my examination. Per family, patient has been having difficulty ambulating and speaking clearly for the past 5-6 days. He was in his usual state of health prior. They were concerned given his mental status and transported him to the ED.     In the ED, he was hypotensive and hypothermic. BP improved after LR bolus. Given Vanc/Cefepime and stress-dose steroids for possible myxedema. BNP not checked by ED, presumably will be high. CBC noted with severe thrombocytopenia. CMP with hyponatremia/hypochloremia as well as elevated LFTs and ACE. No abdominal imaging preformed at this point.    ROS: Denies headache, changes in vision, chest pain, palpitations, shortness of breath, cough, fever, chills, nausea, vomiting, diarrhea, and constipation. +weakenss, swollen BLE  (18 Jan 2022 00:18)      PAST MEDICAL & SURGICAL HISTORY  Coronary Artery Disease    Hypercholesteremia    Myocardial Infarction    Hypertension    Gout    Diabetes mellitus    Renal insufficiency    Chronic obstructive pulmonary disease    Peripheral vascular disease    S/P aortobifemoral bypass surgery    Sickle cell trait    AICD (automatic cardioverter/defibrillator) present    Abdominal Hernia    s/p Femoral-Popliteal Bypass Surgery        FAMILY HISTORY:  FAMILY HISTORY:      SOCIAL HISTORY:  []smoker  []Alcohol  []Drug    ALLERGIES:  No Known Drug Allergies  shellfish (Other; Urticaria)      MEDICATIONS:  MEDICATIONS  (STANDING):  aspirin  chewable 81 milliGRAM(s) Oral daily  chlorhexidine 4% Liquid 1 Application(s) Topical <User Schedule>  lactated ringers. 1000 milliLiter(s) (75 mL/Hr) IV Continuous <Continuous>  lactulose Syrup 20 Gram(s) Oral every 12 hours  memantine 5 milliGRAM(s) Oral at bedtime  meropenem  IVPB 1000 milliGRAM(s) IV Intermittent every 12 hours  norepinephrine Infusion 0.05 MICROgram(s)/kG/Min (9.38 mL/Hr) IV Continuous <Continuous>  pantoprazole    Tablet 40 milliGRAM(s) Oral before breakfast  senna 2 Tablet(s) Oral at bedtime    MEDICATIONS  (PRN):  ALBUTerol    90 MICROgram(s) HFA Inhaler 2 Puff(s) Inhalation every 6 hours PRN Shortness of Breath and/or Wheezing      HOME MEDICATIONS:  Home Medications:  albuterol 90 mcg/inh inhalation aerosol: 2 puff(s) inhaled every 4 hours, As needed, Shortness of Breath (18 Oct 2021 14:28)  allopurinol 300 mg oral tablet: 1 tab(s) orally once a day (08 Oct 2021 09:35)  aspirin 81 mg oral tablet, chewable: 1 tab(s) orally once a day (18 Oct 2021 14:28)  carvedilol 25 mg oral tablet: 1 tab(s) orally every 12 hours (08 Oct 2021 09:35)  HumaLOG KwikPen 200 units/mL (Concentrated) subcutaneous solution:  subcutaneous  (08 Oct 2021 09:35)      VITALS:   T(F): 96.5 (01-18 @ 10:12), Max: 98.5 (01-17 @ 20:06)  HR: 50 (01-18 @ 10:12) (39 - 54)  BP: 101/55 (01-18 @ 10:12) (56/21 - 111/70)  BP(mean): 67 (01-18 @ 07:42) (67 - 67)  RR: 17 (01-18 @ 08:54) (17 - 18)  SpO2: 90% (01-18 @ 08:54) (90% - 100%)    I&O's Summary    18 Jan 2022 07:01  -  18 Jan 2022 10:57  --------------------------------------------------------  IN: 0 mL / OUT: 700 mL / NET: -700 mL        REVIEW OF SYSTEMS:  Contacted patient's sister for history and ROS  CONSTITUTIONAL: Weakness; fatigue  EYES: No visual changes  ENT: No vertigo or throat pain   NECK: No pain or stiffness  RESPIRATORY: Shortness of breath for at least one month  CARDIOVASCULAR: No chest pain or palpitations  GASTROINTESTINAL: No abdominal or epigastric pain. No nausea, vomiting, or hematemesis; No diarrhea or constipation. No melena or hematochezia.  GENITOURINARY: No dysuria, frequency or hematuria  NEUROLOGICAL: No numbness or weakness  SKIN: No itching, no rashes  MSK: No pain    PHYSICAL EXAM:  NEURO: patient is altered  GEN: Not in acute distress  NECK: no thyroid enlargement, no JVD  LUNGS: Clear to auscultation bilaterally   CARDIOVASCULAR: S1/S2 present, bradycardic  ABD: Soft  EXT: LE edema  SKIN: Intact    LABS:                        10.6   6.56  )-----------( 48       ( 18 Jan 2022 09:20 )             29.6     01-18    129<L>  |  91<L>  |  61<HH>  ----------------------------<  80  4.5   |  16<L>  |  2.2<H>    Ca    7.5<L>      18 Jan 2022 09:20  Phos  4.7     01-18  Mg     2.6     01-18    TPro  6.2  /  Alb  3.3<L>  /  TBili  7.7<H>  /  DBili  x   /  AST  462<H>  /  ALT  199<H>  /  AlkPhos  290<H>  01-18    PT/INR - ( 18 Jan 2022 09:20 )   PT: >40.00 sec;   INR: 3.64 ratio         PTT - ( 18 Jan 2022 09:20 )  PTT:45.0 sec  Lactate, Blood: 4.2 mmol/L *HH* (01-18-22 @ 09:20)  Troponin T, Serum: 0.02 ng/mL *H* (01-18-22 @ 09:20)  Troponin T, Serum: 0.02 ng/mL *H* (01-17-22 @ 21:05)  Lactate, Blood: 3.1 mmol/L *H* (01-17-22 @ 21:05)    CARDIAC MARKERS ( 18 Jan 2022 09:20 )  x     / 0.02 ng/mL / x     / x     / x      CARDIAC MARKERS ( 17 Jan 2022 21:05 )  x     / 0.02 ng/mL / x     / x     / x            Troponin trend:    Serum Pro-Brain Natriuretic Peptide: 2661 pg/mL (01-18-22 @ 06:58)          RADIOLOGY:  -CXR:    < from: Xray Chest 1 View- PORTABLE-Urgent (01.17.22 @ 21:33) >  Impression:    Low lung volume.    Bilateral opacities.    Left-sided permanent pacemaker.    < end of copied text >  -TTE:  < from: Transthoracic Echocardiogram (10.08.21 @ 11:41) >  CONCLUSIONS:  1. Mitral annular calcification, otherwise normal mitral  valve. Mild-moderate mitral regurgitation.  2. Calcified trileaflet aortic valve with normal opening.  Mild-moderate aortic regurgitation.  3. Mildly dilated left atrium.  LA volume index = 36 cc/m2.  4. Mildleft ventricular enlargement.  5. Severe global left ventricular systolic dysfunction.  6. Unable to accurately evaluate right ventricular size or  systolic function.  A device wire is noted in the right  heart.  7. Inadequate tricuspid regurgitationDoppler envelope  precludes estimation of RVSP.    < end of copied text >    -CCTA:  -STRESS TEST:  -CATHETERIZATION:    ECG: Paced rhythm

## 2022-01-19 NOTE — CONSULT NOTE ADULT - CONSULT REQUESTED DATE/TIME
18-Jan-2022 06:30
18-Jan-2022 10:55
19-Jan-2022 22:50
18-Jan-2022 07:55
18-Jan-2022 11:04
18-Jan-2022 12:01
18-Jan-2022
19-Jan-2022 12:48

## 2022-01-19 NOTE — PATIENT PROFILE ADULT - FALL HARM RISK - HARM RISK INTERVENTIONS
Communicate Risk of Fall with Harm to all staff/Reinforce activity limits and safety measures with patient and family/Tailored Fall Risk Interventions/Visual Cue: Yellow wristband and red socks/Bed in lowest position, wheels locked, appropriate side rails in place/Call bell, personal items and telephone in reach/Instruct patient to call for assistance before getting out of bed or chair/Non-slip footwear when patient is out of bed/[place] to call system/Physically safe environment - no spills, clutter or unnecessary equipment/Purposeful Proactive Rounding/Room/bathroom lighting operational, light cord in reach Assistance with ambulation/Assistance OOB with selected safe patient handling equipment/Communicate Risk of Fall with Harm to all staff/Discuss with provider need for PT consult/Monitor gait and stability/Provide patient with walking aids - walker, cane, crutches/Reinforce activity limits and safety measures with patient and family/Tailored Fall Risk Interventions/Visual Cue: Yellow wristband and red socks/Bed in lowest position, wheels locked, appropriate side rails in place/Call bell, personal items and telephone in reach/Instruct patient to call for assistance before getting out of bed or chair/Non-slip footwear when patient is out of bed/[place] to call system/Physically safe environment - no spills, clutter or unnecessary equipment/Purposeful Proactive Rounding/Room/bathroom lighting operational, light cord in reach

## 2022-01-19 NOTE — PROGRESS NOTE ADULT - SUBJECTIVE AND OBJECTIVE BOX
Patient is a 76y old  Male who presents with a chief complaint of ACE, transaminitis, thrombocytopenia (2022 12:47)      Subjective:      Vital Signs Last 24 Hrs  T(C): 35.8 (2022 12:00), Max: 35.8 (2022 12:00)  T(F): 96.5 (2022 12:00), Max: 96.5 (2022 12:00)  HR: 56 (2022 12:00) (50 - 79)  BP: 69/42 (2022 12:00) (62/46 - 130/53)  BP(mean): 62 (2022 12:00) (39 - 93)  RR: 10 (2022 12:00) (10 - 30)  SpO2: 100% (2022 12:00) (82% - 100%)    PHYSICAL EXAM  General: intubated  HEENT: icteric sclera, pink conjunctiva  Neck: supple  CV: normal S1/S2 with no murmur rubs or gallops  Lungs: positive air movement b/l ant lungs  Abdomen: soft non-tender distended,   Ext: + edema  Skin: no rashes and no petechiae  Neuro: sedated, intubated    MEDICATIONS  (STANDING):  aspirin  chewable 81 milliGRAM(s) Oral daily  chlorhexidine 0.12% Liquid 15 milliLiter(s) Swish and Spit two times a day  chlorhexidine 4% Liquid 1 Application(s) Topical <User Schedule>  dexAMETHasone  Injectable 4 milliGRAM(s) IV Push every 12 hours  dextrose 10% Bolus 250 milliLiter(s) IV Bolus once  lactated ringers Bolus 250 milliLiter(s) IV Bolus once  lactated ringers. 1000 milliLiter(s) (75 mL/Hr) IV Continuous <Continuous>  lactulose Syrup 20 Gram(s) Oral every 4 hours  memantine 5 milliGRAM(s) Oral at bedtime  meropenem  IVPB 500 milliGRAM(s) IV Intermittent every 12 hours  norepinephrine Infusion 0.05 MICROgram(s)/kG/Min (4.69 mL/Hr) IV Continuous <Continuous>  pantoprazole    Tablet 40 milliGRAM(s) Oral before breakfast  senna 2 Tablet(s) Oral at bedtime  vasopressin Infusion 0.04 Unit(s)/Min (2.4 mL/Hr) IV Continuous <Continuous>    MEDICATIONS  (PRN):  ALBUTerol    90 MICROgram(s) HFA Inhaler 2 Puff(s) Inhalation every 6 hours PRN Shortness of Breath and/or Wheezing  haloperidol    Injectable 5 milliGRAM(s) IV Push every 6 hours PRN Agitation      LABS:                          10.7   11.99 )-----------( 62       ( 2022 05:20 )             31.0         Mean Cell Volume : 70.5 fL  Mean Cell Hemoglobin : 24.3 pg  Mean Cell Hemoglobin Concentration : 34.5 g/dL  Auto Neutrophil # : 9.80 K/uL  Auto Lymphocyte # : 0.54 K/uL  Auto Monocyte # : 1.59 K/uL  Auto Eosinophil # : 0.00 K/uL  Auto Basophil # : 0.01 K/uL  Auto Neutrophil % : 81.7 %  Auto Lymphocyte % : 4.5 %  Auto Monocyte % : 13.3 %  Auto Eosinophil % : 0.0 %  Auto Basophil % : 0.1 %      Serial CBC's   @ 05:20  Hct-31.0 / Hgb-10.7 / Plat-62 / RBC-4.40 / WBC-11.99  Serial CBC's   @ 09:20  Hct-29.6 / Hgb-10.6 / Plat-48 / RBC-4.35 / WBC-6.56  Serial CBC's   @ 06:58  Hct-30.3 / Hgb-10.6 / Plat-48 / RBC-4.42 / WBC-5.82  Serial CBC's   @ 21:05  Hct-30.4 / Hgb-11.0 / Plat-46 / RBC-4.49 / WBC-5.07          132<L>  |  91<L>  |  74<HH>  ----------------------------<  31<LL>  4.9   |  13<L>  |  3.1<H>    Ca    7.5<L>      2022 05:20  Phos  4.7       Mg     2.8         TPro  6.5  /  Alb  3.4<L>  /  TBili  9.2<H>  /  DBili  x   /  AST  777<H>  /  ALT  288<H>  /  AlkPhos  291<H>        PT/INR - ( 2022 05:20 )   PT: 35.80 sec;   INR: 3.15 ratio         PTT - ( 2022 09:20 )  PTT:45.0 sec    Ferritin, Serum: 178 ng/mL ( @ 05:20)    Urinalysis Basic - ( 2022 05:20 )  Color: Red / Appearance: Turbid / S.030 / pH: x  Gluc: x / Ketone: Trace  / Bili: Negative / Urobili: <2 mg/dL   Blood: x / Protein: >600 mg/dL / Nitrite: Negative   Leuk Esterase: Negative / RBC: >720 /HPF / WBC 2 /HPF   Sq Epi: x / Non Sq Epi: x / Bacteria: x    Culture - Urine (collected 2022 22:30)  Source: Clean Catch Clean Catch (Midstream)  Final Report (2022 10:35):    No growth       Patient is a 76y old  Male who presents with a chief complaint of ACE, transaminitis, thrombocytopenia (2022 12:47)      Subjective: Pt is intubated, and sedated, on pressor support.      Vital Signs Last 24 Hrs  T(C): 35.8 (2022 12:00), Max: 35.8 (2022 12:00)  T(F): 96.5 (2022 12:00), Max: 96.5 (2022 12:00)  HR: 56 (2022 12:00) (50 - 79)  BP: 69/42 (2022 12:00) (62/46 - 130/53)  BP(mean): 62 (2022 12:00) (39 - 93)  RR: 10 (2022 12:00) (10 - 30)  SpO2: 100% (2022 12:00) (82% - 100%)    PHYSICAL EXAM  General: intubated  HEENT: icteric sclera, pink conjunctiva  Neck: supple  CV: normal S1/S2 with no murmur rubs or gallops  Lungs: positive air movement b/l ant lungs  Abdomen: soft non-tender distended  Ext: + edema  Skin: no rashes and no petechiae  Neuro: sedated, intubated    MEDICATIONS  (STANDING):  aspirin  chewable 81 milliGRAM(s) Oral daily  chlorhexidine 0.12% Liquid 15 milliLiter(s) Swish and Spit two times a day  chlorhexidine 4% Liquid 1 Application(s) Topical <User Schedule>  dexAMETHasone  Injectable 4 milliGRAM(s) IV Push every 12 hours  dextrose 10% Bolus 250 milliLiter(s) IV Bolus once  lactated ringers Bolus 250 milliLiter(s) IV Bolus once  lactated ringers. 1000 milliLiter(s) (75 mL/Hr) IV Continuous <Continuous>  lactulose Syrup 20 Gram(s) Oral every 4 hours  memantine 5 milliGRAM(s) Oral at bedtime  meropenem  IVPB 500 milliGRAM(s) IV Intermittent every 12 hours  norepinephrine Infusion 0.05 MICROgram(s)/kG/Min (4.69 mL/Hr) IV Continuous <Continuous>  pantoprazole    Tablet 40 milliGRAM(s) Oral before breakfast  senna 2 Tablet(s) Oral at bedtime  vasopressin Infusion 0.04 Unit(s)/Min (2.4 mL/Hr) IV Continuous <Continuous>  ALBUTerol    90 MICROgram(s) HFA Inhaler 2 Puff(s) Inhalation every 6 hours PRN Shortness of Breath and/or Wheezing  haloperidol    Injectable 5 milliGRAM(s) IV Push every 6 hours PRN Agitation      LABS:                          10.7   11.99 )-----------( 62       ( 2022 05:20 )             31.0         Mean Cell Volume : 70.5 fL  Mean Cell Hemoglobin : 24.3 pg  Mean Cell Hemoglobin Concentration : 34.5 g/dL  Auto Neutrophil # : 9.80 K/uL  Auto Lymphocyte # : 0.54 K/uL  Auto Monocyte # : 1.59 K/uL  Auto Eosinophil # : 0.00 K/uL  Auto Basophil # : 0.01 K/uL  Auto Neutrophil % : 81.7 %  Auto Lymphocyte % : 4.5 %  Auto Monocyte % : 13.3 %  Auto Eosinophil % : 0.0 %  Auto Basophil % : 0.1 %      Serial CBC's   @ 05:20  Hct-31.0 / Hgb-10.7 / Plat-62 / RBC-4.40 / WBC-11.99  Serial CBC's   @ 09:20  Hct-29.6 / Hgb-10.6 / Plat-48 / RBC-4.35 / WBC-6.56  Serial CBC's   @ 06:58  Hct-30.3 / Hgb-10.6 / Plat-48 / RBC-4.42 / WBC-5.82  Serial CBC's   @ 21:05  Hct-30.4 / Hgb-11.0 / Plat-46 / RBC-4.49 / WBC-5.07          132<L>  |  91<L>  |  74<HH>  ----------------------------<  31<LL>  4.9   |  13<L>  |  3.1<H>    Ca    7.5<L>      2022 05:20  Phos  4.7       Mg     2.8         TPro  6.5  /  Alb  3.4<L>  /  TBili  9.2<H>  /  DBili  x   /  AST  777<H>  /  ALT  288<H>  /  AlkPhos  291<H>        PT/INR - ( 2022 05:20 )   PT: 35.80 sec;   INR: 3.15 ratio    PTT - ( 2022 09:20 )  PTT:45.0 sec    Iron with Total Binding Capacity (22 @ 14:31)    Iron - Total Binding Capacity.: 361 ug/dL    % Saturation, Iron: 18 %    Iron Total, Serum: 65 ug/dL    Unsaturated Iron Binding Capacity: 296 ug/dL  Ferritin, Serum: 178 ng/mL ( @ 05:20)    Urinalysis Basic - ( 2022 05:20 )  Color: Red / Appearance: Turbid / S.030 / pH: x  Gluc: x / Ketone: Trace  / Bili: Negative / Urobili: <2 mg/dL   Blood: x / Protein: >600 mg/dL / Nitrite: Negative   Leuk Esterase: Negative / RBC: >720 /HPF / WBC 2 /HPF   Sq Epi: x / Non Sq Epi: x / Bacteria: x    Culture - Urine (collected 2022 22:30)  Source: Clean Catch Clean Catch (Midstream)  Final Report (2022 10:35):    No growth

## 2022-01-19 NOTE — PROGRESS NOTE ADULT - ATTENDING COMMENTS
The patient was seen. Agree with above.  75 yo male with h/o multiple medical problems was admitted for sepsis, CHF exacerbation, hepatic failure and acute kidney injury.   intubated with sedation.  Thrombocytopenia and coagulopathy is likely due to hepatic failure, sepsis, DIC (?). Continue supportive.  Microcytic anemia. Fe, TIBC, Ferritin are within normal limits.   DVT and PE in 10/2021. Would hold AC for now in light of thrombocytopenia and coagulopathy.   Followup cardiology for CHF exacerbation.

## 2022-01-19 NOTE — PROGRESS NOTE ADULT - ASSESSMENT
77 yo M with PMHx of HFrEF (EF in 10/21: 24%) s/p AICD, CAD s/p MI w/stents 2007, HTN, HLD, DM, gout, CKD, COPD, PVD s/p aortobifemoral bypass, proximal right leg DVT (4/2021) on Eliquis (non compliant) presented for weakness and slurred speech.     Pt admitted for Sepsis present on admission with possible CHF exacerbation, with congestive hepatopathy, and acute kidney injury.     Hem/Onc consulted for thrombocytopenia    #Thrombocytopenia  #Microcytic anemia  #Coagulopathy with elevated D-dimer  - Plt count on admission 46; Plt count in 2021 and 2020 was mostly WNL, was low in 2016 (platelet clumping reported at that time)  - Differential at this time is broad including acute sepsis present on admission, acute HFrEF exacerbation with acute liver injury (? congestive hepatopathy? vs primary liver injury) vs DIC 2/2 acute infection/liver injury; Cannot r/o ITP.   - CT A/P: Diffuse anasacra, ascites, pleural effusions, possible fluid overload, no hepatomegaly, 1.9cm hyper enhancing focus in the medial spleen (possibly a hemangioma)    RECOMMENDATIONS  - peripheral smear: low plt count, crenated rbcs, occasional schistocytes  - Check Vit B12, folate  - Check DIC panel  - Check HIV, acute viral hepatitis panel, blood alcohol level.   - Check iron studies, retic count, haptoglobin, LDH, indirect bili, direct jatin test  - Check US liver and spleen to r/o hepatomegaly and splenomegaly.   - Monitor CBC with diff daily  - Treat underlying infection/critical illness (Sepsis and CHF)    #Hx of DVT and PE (w/ right heart strain) in 10/2021  - Non compliant with Eliquis   - Last US in 10/2021: Rt post tibial vein thrombus  - Check repeat US duplex b/l LE  - Hold old AC at this time, given pt is at high risk of bleeding with thrombocytopenia and elevated INR.        77 yo M with PMHx of HFrEF (EF in 10/21: 24%) s/p AICD, CAD s/p MI w/stents 2007, HTN, HLD, DM, gout, CKD, COPD, PVD s/p aortobifemoral bypass, proximal right leg DVT (4/2021) on Eliquis (non compliant) presented for weakness and slurred speech.     Pt admitted for Sepsis present on admission with possible CHF exacerbation, with congestive hepatopathy, and acute kidney injury.     Hem/Onc consulted for thrombocytopenia    #Thrombocytopenia  #Microcytic anemia  #Coagulopathy with elevated D-dimer  - Plt count on admission 46; Plt count in 2021 and 2020 was mostly WNL, was low in 2016 (platelet clumping reported at that time)  - Differential at this time is broad including acute sepsis present on admission, acute HFrEF exacerbation with acute liver injury (? congestive hepatopathy? vs primary liver injury) vs DIC 2/2 acute infection/liver injury; Cannot r/o ITP.   - CT A/P: Diffuse anasacra, ascites, pleural effusions, possible fluid overload, no hepatomegaly, 1.9cm hyper enhancing focus in the medial spleen (possibly a hemangioma)  - Iron studies (fe 65, TIBC 361, %sat 18%, ferritin 178), retic count 1.5, LDH 1692, Indirect bili 3.1    RECOMMENDATIONS  - peripheral smear: low plt count, crenated rbcs, occasional schistocytes  - F/u Vit B12, folate  - Check DIC panel  - F/u HIV, acute viral hepatitis panel  - F/u haptoglobin, direct jatin test  - Check US spleen to r/o splenomegaly.   - Monitor CBC with diff daily  - Treat underlying infection/critical illness (Sepsis and CHF)    #Hx of DVT and PE (w/ right heart strain) in 10/2021  - Non compliant with Eliquis   - Last US in 10/2021: Rt post tibial vein thrombus  - Check repeat US duplex b/l LE  - Hold AC at this time, given pt is at high risk of bleeding with thrombocytopenia and elevated INR.     Poor prognosis.

## 2022-01-19 NOTE — PROGRESS NOTE ADULT - SUBJECTIVE AND OBJECTIVE BOX
Gastroenterology progress note:     Patient is a 76y old  Male who presents with a chief complaint of ACE, transaminitis, thrombocytopenia (19 Jan 2022 12:47)       Admitted on: 01-17-22    We are following the patient for:       Interval History:    No acute events overnight. Pt was intubated this am for AHRF  - Diet - npo  - last BM - overnight 2  - Abdominal pain - none      PAST MEDICAL & SURGICAL HISTORY:  Coronary Artery Disease    Hypercholesteremia    Myocardial Infarction    Hypertension    Gout    Diabetes mellitus    Renal insufficiency    Chronic obstructive pulmonary disease    Peripheral vascular disease    S/P aortobifemoral bypass surgery    Sickle cell trait    AICD (automatic cardioverter/defibrillator) present    Abdominal Hernia    s/p Femoral-Popliteal Bypass Surgery        MEDICATIONS  (STANDING):  aspirin  chewable 81 milliGRAM(s) Oral daily  chlorhexidine 0.12% Liquid 15 milliLiter(s) Swish and Spit two times a day  chlorhexidine 4% Liquid 1 Application(s) Topical <User Schedule>  dexAMETHasone  Injectable 4 milliGRAM(s) IV Push every 12 hours  dextrose 10% Bolus 250 milliLiter(s) IV Bolus once  lactated ringers Bolus 250 milliLiter(s) IV Bolus once  lactated ringers. 1000 milliLiter(s) (75 mL/Hr) IV Continuous <Continuous>  lactulose Syrup 20 Gram(s) Oral every 4 hours  memantine 5 milliGRAM(s) Oral at bedtime  meropenem  IVPB 500 milliGRAM(s) IV Intermittent every 12 hours  norepinephrine Infusion 0.05 MICROgram(s)/kG/Min (4.69 mL/Hr) IV Continuous <Continuous>  pantoprazole    Tablet 40 milliGRAM(s) Oral before breakfast  senna 2 Tablet(s) Oral at bedtime  vasopressin Infusion 0.04 Unit(s)/Min (2.4 mL/Hr) IV Continuous <Continuous>    MEDICATIONS  (PRN):  ALBUTerol    90 MICROgram(s) HFA Inhaler 2 Puff(s) Inhalation every 6 hours PRN Shortness of Breath and/or Wheezing  haloperidol    Injectable 5 milliGRAM(s) IV Push every 6 hours PRN Agitation      Allergies  No Known Drug Allergies  shellfish (Other; Urticaria)      Review of Systems:   unable to obtain    Physical Examination:  T(C): 35.8 (01-19-22 @ 12:00), Max: 35.8 (01-19-22 @ 12:00)  HR: 56 (01-19-22 @ 12:00) (50 - 79)  BP: 69/42 (01-19-22 @ 12:00) (62/46 - 130/53)  RR: 10 (01-19-22 @ 12:00) (10 - 30)  SpO2: 100% (01-19-22 @ 12:00) (82% - 100%)  Weight (kg): 127.2 (01-19-22 @ 00:45)    01-18-22 @ 07:01  -  01-19-22 @ 07:00  --------------------------------------------------------  IN: 1253.4 mL / OUT: 885 mL / NET: 368.4 mL    01-19-22 @ 07:01  -  01-19-22 @ 14:33  --------------------------------------------------------  IN: 1176.4 mL / OUT: 33 mL / NET: 1143.4 mL        GENERAL: intubated adn sedated  HEAD:  Atraumatic, Normocephalic  EYES: conjunctiva and sclera clear  NECK: Supple, no JVD or thyromegaly  CHEST/LUNG: reduced breath soudns b/l;  HEART: Regular rate and rhythm; normal S1, S2, No murmurs.  ABDOMEN: Soft, nontender, distended abdomen  NEUROLOGY: No asterixis or tremor.   SKIN: Intact, no jaundice     Data:                        10.7   11.99 )-----------( 62       ( 19 Jan 2022 05:20 )             31.0     Hgb trend:  10.7  01-19-22 @ 05:20  10.6  01-18-22 @ 09:20  10.6  01-18-22 @ 06:58  11.0  01-17-22 @ 21:05        01-19    132<L>  |  91<L>  |  74<HH>  ----------------------------<  31<LL>  4.9   |  13<L>  |  3.1<H>    Ca    7.5<L>      19 Jan 2022 05:20  Phos  4.7     01-18  Mg     2.8     01-19    TPro  6.5  /  Alb  3.4<L>  /  TBili  9.2<H>  /  DBili  x   /  AST  777<H>  /  ALT  288<H>  /  AlkPhos  291<H>  01-19    Liver panel trend:  TBili 9.2   /      /      /   AlkP 291   /   Tptn 6.5   /   Alb 3.4    /   DBili --      01-19  TBili 7.7   /      /      /   AlkP 290   /   Tptn 6.2   /   Alb 3.3    /   DBili --      01-18  TBili 7.1   /      /      /   AlkP 298   /   Tptn 6.2   /   Alb 3.3    /   DBili --      01-18  TBili 7.8   /      /      /   AlkP 287   /   Tptn 6.3   /   Alb 3.3    /   DBili --      01-17      PT/INR - ( 19 Jan 2022 05:20 )   PT: 35.80 sec;   INR: 3.15 ratio         PTT - ( 18 Jan 2022 09:20 )  PTT:45.0 sec    Culture - Urine (collected 17 Jan 2022 22:30)  Source: Clean Catch Clean Catch (Midstream)  Final Report (19 Jan 2022 10:35):    No growth         Radiology:    US Abdomen Upper Quadrant Right:   ACC: [id number] EXAM:  US ABDOMEN RT UPR QUADRANT                          PROCEDURE DATE:  01/18/2022          INTERPRETATION:  CLINICAL INFORMATION: Elevated liver function enzymes.    COMPARISON: CT scan dated earlier the same day    TECHNIQUE: Sonography of the right upper quadrant.    FINDINGS:    Liver: Within normal limits.  Bile ducts: Normal caliber. Common bile duct measures 4 mm.  Gallbladder: The gallbladder is not distended.  Pancreas: Poorly visualized.  Right kidney: 12 cm. No hydronephrosis.  Ascites: Mild ascites is noted.  IVC: Visualized portions are within normal limits.    IMPRESSION:    Nondistended gallbladder. Ascites. Unchanged from CT scan done earlier in   the same day.        --- End of Report ---            CHAVEZ RIOS MD; Attending Interventional Radiologist  This document has been electronically signed. Jan 19 2022  7:21AM (01-18-22 @ 22:16)

## 2022-01-19 NOTE — CONSULT NOTE ADULT - CONSULT REASON
Extensive cardiac history
acute liver injury
UDall placement for urgent dialysis
"ACE on CKD, congestive vs hypoperfusion"
thrombocytopenia
AMS
AMS
sepsis

## 2022-01-19 NOTE — PROGRESS NOTE ADULT - SUBJECTIVE AND OBJECTIVE BOX
Nephrology progress note    THIS IS AN INCOMPLETE NOTE . FULL NOTE TO FOLLOW SHORTLY    Patient is seen and examined, events over the last 24 h noted .    Allergies:  No Known Drug Allergies  shellfish (Other; Urticaria)    Hospital Medications:   MEDICATIONS  (STANDING):  aspirin  chewable 81 milliGRAM(s) Oral daily  cefTRIAXone   IVPB 2000 milliGRAM(s) IV Intermittent every 24 hours  chlorhexidine 4% Liquid 1 Application(s) Topical <User Schedule>  dextrose 10% Bolus 250 milliLiter(s) IV Bolus once  dextrose 50% Injectable 50 milliLiter(s) IV Push once  hydrocortisone sodium succinate Injectable 100 milliGRAM(s) IV Push once  hydrocortisone sodium succinate Injectable 50 milliGRAM(s) IV Push every 6 hours  lactated ringers. 1000 milliLiter(s) (75 mL/Hr) IV Continuous <Continuous>  lactulose Syrup 15 Gram(s) Oral every 6 hours  memantine 5 milliGRAM(s) Oral at bedtime  norepinephrine Infusion 0.05 MICROgram(s)/kG/Min (4.69 mL/Hr) IV Continuous <Continuous>  pantoprazole    Tablet 40 milliGRAM(s) Oral before breakfast  senna 2 Tablet(s) Oral at bedtime  sodium bicarbonate  Infusion 0.088 mEq/kG/Hr (75 mL/Hr) IV Continuous <Continuous>        VITALS:  T(F): 96.3 (22 @ 04:00), Max: 96.5 (22 @ 10:12)  HR: 66 (22 @ 07:00)  BP: 116/64 (22 @ 07:00)  RR: 30 (22 @ 07:00)  SpO2: 99% (22 @ 07:00)  Wt(kg): --    : 07:00  --------------------------------------------------------  IN: 1253.4 mL / OUT: 885 mL / NET: 368.4 mL      Height (cm): 170.2 ( 00:45)  Weight (kg): 127.2 ( 00:45)  BMI (kg/m2): 43.9 ( @ 00:45)  BSA (m2): 2.33 ( @ 00:45)    PHYSICAL EXAM:  Constitutional: NAD  HEENT: anicteric sclera, oropharynx clear, MMM  Neck: No JVD  Respiratory: CTAB, no wheezes, rales or rhonchi  Cardiovascular: S1, S2, RRR  Gastrointestinal: BS+, soft, NT/ND  Extremities: No cyanosis or clubbing. No peripheral edema  :  No riley.   Skin: No rashes    LABS:      132<L>  |  91<L>  |  74<HH>  ----------------------------<  31<LL>  4.9   |  13<L>  |  3.1<H>    Ca    7.5<L>      2022 05:20  Phos  4.7       Mg     2.8         TPro  6.5  /  Alb  3.4<L>  /  TBili  9.2<H>  /  DBili      /  AST  777<H>  /  ALT  288<H>  /  AlkPhos  291<H>                            10.7   11.99 )-----------( 62       ( 2022 05:20 )             31.0       Urine Studies:  Urinalysis Basic - ( 2022 05:20 )    Color: Red / Appearance: Turbid / S.030 / pH:   Gluc:  / Ketone: Trace  / Bili: Negative / Urobili: <2 mg/dL   Blood:  / Protein: >600 mg/dL / Nitrite: Negative   Leuk Esterase: Negative / RBC: >720 /HPF / WBC 2 /HPF   Sq Epi:  / Non Sq Epi:  / Bacteria:       Osmolality, Random Urine: 288 mos/kg ( @ 05:20)  Sodium, Random Urine: 102.0 mmoL/L ( @ 05:20)    RADIOLOGY & ADDITIONAL STUDIES:   Nephrology progress note  Patient is seen and examined, events over the last 24 h noted .  lethargic in resp distress     Allergies:  No Known Drug Allergies  shellfish (Other; Urticaria)    Hospital Medications:   MEDICATIONS  (STANDING):    aspirin  chewable 81 milliGRAM(s) Oral daily  cefTRIAXone   IVPB 2000 milliGRAM(s) IV Intermittent every 24 hours  hydrocortisone sodium succinate Injectable 100 milliGRAM(s) IV Push once  hydrocortisone sodium succinate Injectable 50 milliGRAM(s) IV Push every 6 hours  lactated ringers. 1000 milliLiter(s) (75 mL/Hr) IV Continuous <Continuous>  lactulose Syrup 15 Gram(s) Oral every 6 hours  memantine 5 milliGRAM(s) Oral at bedtime  norepinephrine Infusion 0.05 MICROgram(s)/kG/Min (4.69 mL/Hr) IV Continuous <Continuous>  pantoprazole    Tablet 40 milliGRAM(s) Oral before breakfast  senna 2 Tablet(s) Oral at bedtime  sodium bicarbonate  Infusion 0.088 mEq/kG/Hr (75 mL/Hr) IV Continuous <Continuous>        VITALS:  T(F): 96.3 (22 @ 04:00), Max: 96.5 (22 @ 10:12)  HR: 66 (22 @ 07:00)  BP: 116/64 (22 @ 07:00)  RR: 30 (22 @ 07:00)  SpO2: 99% (22 @ 07:00)       07: @ 07:00  --------------------------------------------------------  IN: 1253.4 mL / OUT: 885 mL / NET: 368.4 mL      Height (cm): 170.2 ( @ 00:45)  Weight (kg): 127.2 ( @ 00:45)  BMI (kg/m2): 43.9 ( @ 00:45)  BSA (m2): 2.33 ( 00:45)    PHYSICAL EXAM:  Constitutional: lethargic confused   Neck: No JVD  Respiratory: Crackles at base   Cardiovascular: S1, S2, RRR  Gastrointestinal: BS+, soft, NT/ND  Extremities: No cyanosis or clubbing. No peripheral edema  :  No riley.   Skin: No rashes    LABS:      132<L>  |  91<L>  |  74<HH>  ----------------------------<  31<LL>  4.9   |  13<L>  |  3.1<H>    Creatinine Trend: 3.1<--, 2.2<--, 2.3<--, 2.1<--    Ca    7.5<L>      2022 05:20  Phos  4.7       Mg     2.8         TPro  6.5  /  Alb  3.4<L>  /  TBili  9.2<H>  /  DBili      /  AST  777<H>  /  ALT  288<H>  /  AlkPhos  291<H>                            10.7   11.99 )-----------( 62       ( 2022 05:20 )             31.0           Urine Studies:  Urinalysis Basic - ( 2022 05:20 )    Color: Red / Appearance: Turbid / S.030 / pH:   Gluc:  / Ketone: Trace  / Bili: Negative / Urobili: <2 mg/dL   Blood:  / Protein: >600 mg/dL / Nitrite: Negative   Leuk Esterase: Negative / RBC: >720 /HPF / WBC 2 /HPF   Sq Epi:  / Non Sq Epi:  / Bacteria:       Osmolality, Random Urine: 288 mos/kg ( @ 05:20)  Sodium, Random Urine: 102.0 mmoL/L ( @ 05:20)    RADIOLOGY & ADDITIONAL STUDIES:

## 2022-01-19 NOTE — PROCEDURAL SAFETY CHECKLIST WITH OR WITHOUT SEDATION - NSPOSTCOMMENTFT_GEN_ALL_CORE
Procedure was emergent due to Pt blood pressure decreasing. Pt on Levophed Peripheral line and needed higher concentration Levophed dose

## 2022-01-19 NOTE — PROGRESS NOTE ADULT - ASSESSMENT
76 y.o male patient with extensive cardiac and PMH presented for slurred speech and altered mental status;    #Acute on chronic Liver Injury/Possible Cardiac cirrhosis? - Mixed, likely d/t Congestive hepatopathy/Septic Shock - r/o other etiologies  #AHRF/ARDS s/p Intubation  #Shock of unclear etiology - sepsis vs cardiac? on Levo and Vaso  #ACE possible ATN?  - LFTs 10/21: 2.7/123/43/32  - LFTS on admission: 7.8/287/417/183  - Medications reviewed  - NH3 - 107  - CLD wokrup thus far - utox/stox negative. tylenol wnl  - Echo < from: TTE Echo Complete w/o Contrast w/ Doppler (01.19.22 @ 09:26) >   1. Left ventricular ejection fraction, by visual estimation, is <20%.   2. Severely decreased global left ventricular systolic function.   3. Multiple left ventricular regional wall motion abnormalities exist.   See wall motion findings.   4. Spectral Doppler shows impaired relaxation pattern of left   ventricular myocardial filling (Grade I diastolic dysfunction).   5. Moderately enlarged right ventricle.   6. Mildly enlarged left atrium.   7. Mildly enlarged right atrium.   8. Mild mitral valve regurgitation.   9. Moderate tricuspid regurgitation.  10. Mild aortic regurgitation.  11. The right atrial pressure is moderately elevated.    < end of copied text >      Rec  - pleas start pt on lactulose 20mg q1 hr and target 2-3bms daily  - Please obtain LFT, coagulation profile qdaily  - please send DIC panel  - pleas f/u cld workup  - Please avoid hepatotoxic medications  - Target negative balance  - Target MAP >70

## 2022-01-19 NOTE — PHARMACOTHERAPY INTERVENTION NOTE - COMMENTS
-IVF LR @ 75 ml/hr &  D5W + 150meq sodium bicarbonate @ 75 ml/hr-d/w team, hold sodium bicarb, repeat labs & adjust IVF accordingly  -add chlorhexidene oral care q12h  -change pantoprazole to suspension 40mg daily ng
ceftriaxone 2g IV q24h, pt received dose  -ID c/s, Dr Benitez recommended meropenem 500mg IV q12h, ABX changed to dillon  -meropenem d/c, new order for ceftriaxone 1g IV daily, d/w team, pt received 2g earlier  -d/c ceftriaxone, team will d/w pulm attending & adjust ABX

## 2022-01-19 NOTE — CONSULT NOTE ADULT - COMMENTS
unable to obtain history secondary to patient's mental status and/or sedation   melena  on pressors  does not follow commands

## 2022-01-19 NOTE — PROGRESS NOTE ADULT - ASSESSMENT
IMPRESSION:    AMS likely toxic metabolic  Sepsis POA  HO PE and retroperitoneal bleed  HO HFREF  ACE   ALI   Thrombocytopenia with coagulopathy   Right sided nodule to be followed   Probable CLAIR     PLAN:    CNS:  Intubate for airway protection.   Might need LP     HEENT: Oral care    PULMONARY:  HOB @ 45 degrees.  Aspiration precautions.  OP CT Chest NC in 4-6 weeks.  ARDS network MV settings.  A line.      CARDIOVASCULAR:  ECHO.  Avoid volume overload.  Gentle hydration while NPO.  LR 75 cc per hour.  Cardiology eval.  Hold Beta blockers.  Trend lactate.  [place]     GI: GI prophylaxis.  OG feeding after intubation.  Bowel regimen.  Lactulose for 2 BMs per 24 hours.         RENAL:  Follow up lytes.  Correct as needed.  Urine Lytes.  Renal eval.  Bladder scans.      INFECTIOUS DISEASE: Follow up cultures.  Continue Rocephin.  ID evaluation     HEMATOLOGICAL:  DVT prophylaxis.  Repeat CBC.  Hem onc.  LE duplex noted.  repeat LE duplex in 3 days.      ENDOCRINE:  Follow up FS.  Insulin protocol if needed.  Cortisol and TSH.  Dexa for adrenal insufficiency.      MUSCULOSKELETAL:  Bed rest     MICU     Prognosis guarded.

## 2022-01-19 NOTE — PROGRESS NOTE ADULT - SUBJECTIVE AND OBJECTIVE BOX
Patient is a 76y old  Male who presents with a chief complaint of ACE, transaminitis, thrombocytopenia (2022 09:18)      INTERVAL HPI/OVERNIGHT EVENTS:   Patient was agitated overnight and pulled out A-Line. Pt this AM was obtunded and was tachypneic. Looked like he could not protect his Airway for long. Pt intubated this AM.    ICU Vital Signs Last 24 Hrs  T(C): 35.7 (2022 04:00), Max: 35.7 (2022 04:00)  T(F): 96.3 (2022 04:00), Max: 96.3 (2022 04:00)  HR: 62 (2022 10:25) (49 - 79)  BP: 116/64 (2022 07:00) (62/46 - 130/53)  BP(mean): 93 (2022 07:00) (56 - 93)  ABP: 113/63 (2022 16:33) (113/63 - 137/73)  ABP(mean): --  RR: 30 (2022 07:00) (12 - 30)  SpO2: 100% (2022 10:25) (82% - 100%)    I&O's Summary    2022 07:01  -  2022 07:00  --------------------------------------------------------  IN: 1253.4 mL / OUT: 885 mL / NET: 368.4 mL    2022 07:01  -  2022 11:33  --------------------------------------------------------  IN: 467 mL / OUT: 0 mL / NET: 467 mL      Mode: AC/ CMV (Assist Control/ Continuous Mandatory Ventilation)  RR (machine): 16  TV (machine): 450  FiO2: 50  PEEP: 5  ITime: 1  MAP: 10  PIP: 23      LABS:                        10.7   11.99 )-----------( 62       ( 2022 05:20 )             31.0         132<L>  |  91<L>  |  74<HH>  ----------------------------<  31<LL>  4.9   |  13<L>  |  3.1<H>    Ca    7.5<L>      2022 05:20  Phos  4.7       Mg     2.8         TPro  6.5  /  Alb  3.4<L>  /  TBili  9.2<H>  /  DBili  x   /  AST  777<H>  /  ALT  288<H>  /  AlkPhos  291<H>      PT/INR - ( 2022 05:20 )   PT: 35.80 sec;   INR: 3.15 ratio         PTT - ( 2022 09:20 )  PTT:45.0 sec  Urinalysis Basic - ( 2022 05:20 )    Color: Red / Appearance: Turbid / S.030 / pH: x  Gluc: x / Ketone: Trace  / Bili: Negative / Urobili: <2 mg/dL   Blood: x / Protein: >600 mg/dL / Nitrite: Negative   Leuk Esterase: Negative / RBC: >720 /HPF / WBC 2 /HPF   Sq Epi: x / Non Sq Epi: x / Bacteria: x      CAPILLARY BLOOD GLUCOSE      POCT Blood Glucose.: 136 mg/dL (2022 08:31)  POCT Blood Glucose.: 41 mg/dL (2022 06:40)  POCT Blood Glucose.: 37 mg/dL (2022 06:04)  POCT Blood Glucose.: 40 mg/dL (2022 04:58)        RADIOLOGY & ADDITIONAL TESTS:    Consultant(s) Notes Reviewed:  [x ] YES  [ ] NO    MEDICATIONS  (STANDING):  aspirin  chewable 81 milliGRAM(s) Oral daily  cefTRIAXone   IVPB 2000 milliGRAM(s) IV Intermittent every 24 hours  chlorhexidine 4% Liquid 1 Application(s) Topical <User Schedule>  dextrose 10% Bolus 250 milliLiter(s) IV Bolus once  hydrocortisone sodium succinate Injectable 50 milliGRAM(s) IV Push every 6 hours  lactated ringers Bolus 250 milliLiter(s) IV Bolus once  lactated ringers. 1000 milliLiter(s) (75 mL/Hr) IV Continuous <Continuous>  lactulose Syrup 20 Gram(s) Oral every 4 hours  memantine 5 milliGRAM(s) Oral at bedtime  norepinephrine Infusion 0.05 MICROgram(s)/kG/Min (4.69 mL/Hr) IV Continuous <Continuous>  pantoprazole    Tablet 40 milliGRAM(s) Oral before breakfast  senna 2 Tablet(s) Oral at bedtime  vasopressin Infusion 0.04 Unit(s)/Min (2.4 mL/Hr) IV Continuous <Continuous>    MEDICATIONS  (PRN):  ALBUTerol    90 MICROgram(s) HFA Inhaler 2 Puff(s) Inhalation every 6 hours PRN Shortness of Breath and/or Wheezing  haloperidol    Injectable 5 milliGRAM(s) IV Push every 6 hours PRN Agitation      PHYSICAL EXAM:  GENERAL: Intubated and Obtunded   HEAD:  Atraumatic, Normocephalic  EYES: Scleral Icterus  NERVOUS SYSTEM: Obtunded  CHEST/LUNG: B/L good air entry; No rales, rhonchi, or wheezing  HEART: S1S2 normal, Regular rate and rhythm; No murmurs  ABDOMEN: Soft, Nontender, Grossly distended; Bowel sounds present  EXTREMITIES: +1 pitting edema  LYMPH: No lymphadenopathy noted    Care Discussed with Consultants/Other Providers [ x] YES  [ ] NO

## 2022-01-19 NOTE — PROGRESS NOTE ADULT - ASSESSMENT
Mr. Berg is a 76M with a past medical history as described above who presented to the hospital for evaluation of weakness and slurred speech. The patient is encephalopathic upon my examination. Per family, patient has been having difficulty ambulating and speaking clearly for the past 5-6 days. He was in his usual state of health prior. They were concerned given his mental status and transported him to the ED.    Mr. Berg is a 76M with a past medical history as described above who presented to the hospital for evaluation of weakness and slurred speech. The patient is encephalopathic upon my examination. Per family, patient has been having difficulty ambulating and speaking clearly for the past 5-6 days. He was in his usual state of health prior. They were concerned given his mental status and transported him to the ED.     #Acute on Chronic Liver Injury - mixed, likely 2/2 Congestive Hepatopathy  #Hypotensive Shock (Sepsis vs Cardiac related)  - Intubated and sedated  - multi-organ failure (ACE, ALI, AMS with elevated ammonia)  - LFTs 10/21: 2.7/123/43/32  - LFTS on admission: 7.8/287/417/183  - ACE due to ATN  - Trend LFT, coagulation profile qdaily, Hepatitis panel, Iron studies and ceruloplasmin  - RUQ sono with Doppler: Liver: Within normal limits. Nondistended gallbladder. Ascites.   - avoid hepatotoxic medications  - f/u DIC panel  - Hepatology recs appreciated  - Metabolic Encephalopathy, Ammonia level 107; on Lactulose 20mg q4 and target 2-3bms daily  -CT head and EEG negative    #H/O HFrEF; ECHO on Oct 21st (24%)  #Hypotensive shock  - Left ventricular ejection fraction, by visual estimation, is <20%  - currently on pressors (Levo and vaso)  - Multiple organ failure  - cardiology recs appreciated  - avoid volume overload  - LR 75 cc/hr  - Hold Beta blockers  - Trend lactate    #Septic shock cryptogenic with GI/ possibiliites  #Unlikely  as no pyuria but has hematuria  - Significant transaminitis with hyperbilirubinemia  - f/u blood cultures   - r/o DIC  - ID recs appreciated, recommending Meropenem 500mg iv q12h  - thrombocytopenia likely 2/2 sepsis    #ACE 2/2 ATN 2/2 hypotensive shock  - nephrology consult appreciated    Dispo: MICU  Diet: OG feeding after intubation  DVT PPx: not needed; Hypocoaguable  Code: Full

## 2022-01-19 NOTE — PROGRESS NOTE ADULT - SUBJECTIVE AND OBJECTIVE BOX
Patient is a 76y old  Male who presents with a chief complaint of ACE, transaminitis, thrombocytopenia (2022 09:18)        Over Night Events:  Remains altered.  On Levophed 0.8. On RA        ROS:     All ROS are negative except HPI         PHYSICAL EXAM    ICU Vital Signs Last 24 Hrs  T(C): 35.7 (2022 04:00), Max: 35.8 (2022 10:12)  T(F): 96.3 (2022 04:00), Max: 96.5 (2022 10:12)  HR: 66 (2022 07:00) (49 - 79)  BP: 116/64 (2022 07:00) (62/46 - 130/53)  BP(mean): 93 (2022 07:00) (56 - 93)  ABP: 113/63 (2022 16:33) (113/63 - 137/73)  ABP(mean): --  RR: 30 (2022 07:00) (12 - 30)  SpO2: 99% (2022 07:00) (82% - 100%)      CONSTITUTIONAL:  Well nourished. Ill appearing in NAD    ENT:   Airway patent,   Mouth with normal mucosa.   No thrush    EYES:   Pupils equal,   Round and reactive to light.    CARDIAC:   Normal rate,   Regular rhythm.     edema      Vascular:  Normal systolic impulse  No Carotid bruits    RESPIRATORY:   No wheezing  Bilateral BS  Normal chest expansion  Not tachypneic,  No use of accessory muscles    GASTROINTESTINAL:  Abdomen soft,   Non-tender,   No guarding,   + BS    MUSCULOSKELETAL:   Range of motion is not limited,  No clubbing, cyanosis    NEUROLOGICAL:   Obtunded.  Does not follow commands     SKIN:   Skin normal color for race,   Warm and dry  No evidence of rash.    PSYCHIATRIC:   No apparent risk to self or others.    HEMATOLOGICAL:  No cervical  lymphadenopathy.  no inguinal lymphadenopathy      22 @ 07:01  -  22 @ 07:00  --------------------------------------------------------  IN:    IV PiggyBack: 50 mL    Lactated Ringers: 525 mL    Norepinephrine: 214.7 mL    Norepinephrine: 363.7 mL    Oral Fluid: 100 mL  Total IN: 1253.4 mL    OUT:    Indwelling Catheter - Urethral (mL): 885 mL  Total OUT: 885 mL    Total NET: 368.4 mL          LABS:                            10.7   11.99 )-----------( 62       ( 2022 05:20 )             31.0                                                   132<L>  |  91<L>  |  74<HH>  ----------------------------<  31<LL>  4.9   |  13<L>  |  3.1<H>    Creatinine Trend  BUN 74, Cr 3.1, (22 @ 05:20)  Creatinine Trend  BUN 61, Cr 2.2, (22 @ 09:20)  Creatinine Trend  BUN 60, Cr 2.3, (22 @ 06:58)  Creatinine Trend  BUN 56, Cr 2.1, (22 @ 21:05)      Ca    7.5<L>      2022 05:20  Phos  4.7       Mg     2.8         TPro  6.5  /  Alb  3.4<L>  /  TBili  9.2<H>  /  DBili  x   /  AST  777<H>  /  ALT  288<H>  /  AlkPhos  291<H>        PT/INR - ( 2022 05:20 )   PT: 35.80 sec;   INR: 3.15 ratio         PTT - ( 2022 09:20 )  PTT:45.0 sec                                       Urinalysis Basic - ( 2022 05:20 )    Color: Red / Appearance: Turbid / S.030 / pH: x  Gluc: x / Ketone: Trace  / Bili: Negative / Urobili: <2 mg/dL   Blood: x / Protein: >600 mg/dL / Nitrite: Negative   Leuk Esterase: Negative / RBC: >720 /HPF / WBC 2 /HPF   Sq Epi: x / Non Sq Epi: x / Bacteria: x        CARDIAC MARKERS ( 2022 05:20 )  x     / 0.06 ng/mL / x     / x     / x      CARDIAC MARKERS ( 2022 09:20 )  x     / 0.02 ng/mL / x     / x     / x      CARDIAC MARKERS ( 2022 21:05 )  x     / 0.02 ng/mL / x     / x     / x                                                LIVER FUNCTIONS - ( 2022 05:20 )  Alb: 3.4 g/dL / Pro: 6.5 g/dL / ALK PHOS: 291 U/L / ALT: 288 U/L / AST: 777 U/L / GGT: x                                                                                                                                       MEDICATIONS  (STANDING):  aspirin  chewable 81 milliGRAM(s) Oral daily  cefTRIAXone   IVPB 2000 milliGRAM(s) IV Intermittent every 24 hours  chlorhexidine 4% Liquid 1 Application(s) Topical <User Schedule>  dextrose 10% Bolus 250 milliLiter(s) IV Bolus once  dextrose 50% Injectable 50 milliLiter(s) IV Push once  hydrocortisone sodium succinate Injectable 100 milliGRAM(s) IV Push once  hydrocortisone sodium succinate Injectable 50 milliGRAM(s) IV Push every 6 hours  lactated ringers. 1000 milliLiter(s) (75 mL/Hr) IV Continuous <Continuous>  lactulose Syrup 15 Gram(s) Oral every 6 hours  memantine 5 milliGRAM(s) Oral at bedtime  norepinephrine Infusion 0.05 MICROgram(s)/kG/Min (4.69 mL/Hr) IV Continuous <Continuous>  pantoprazole    Tablet 40 milliGRAM(s) Oral before breakfast  senna 2 Tablet(s) Oral at bedtime  sodium bicarbonate  Infusion 0.088 mEq/kG/Hr (75 mL/Hr) IV Continuous <Continuous>    MEDICATIONS  (PRN):  ALBUTerol    90 MICROgram(s) HFA Inhaler 2 Puff(s) Inhalation every 6 hours PRN Shortness of Breath and/or Wheezing  haloperidol    Injectable 5 milliGRAM(s) IV Push every 6 hours PRN Agitation      New X-rays reviewed:                                                                                  ECHO    CXR interpreted by me:  NG OK>  NO infiltrates

## 2022-01-19 NOTE — PROGRESS NOTE ADULT - ASSESSMENT
76 year old  male admitted with slurred speech and weakness.  Hx of DM CAD AICD  HFrEF AICD PAD HTN COPD Hx of DVT on DOAC which was not taking.    ·	ACE due to ATN/ hypotension shock / transaminitis / acidosis as evidenced by low bicarb / resp failure   ·	check ABG- [place]   ·	suggest start bicarb drip (D5w with three amps bicarb) at 75 cc per hour   ·	thrombocytopenia due to sepsis   ·	keep riley - strict I and O   ·	liver/GI appreciated  ·	keep on rocephine decrease to 1g po q24h  ·	may need RRT soon if no improvement     will follow

## 2022-01-19 NOTE — CONSULT NOTE ADULT - SUBJECTIVE AND OBJECTIVE BOX
ALEXANDR BERG [id number]  76y Male  2d    HPI:  Chief Complaint: Weakness     Past Medical History: HFrEF (EF in 10/21: 24%) s/p AICD, CAD s/p MI w/stents , HTN, HLD, DM, gout, CKD, COPD, PVD s/p aortobifemoral bypass, proximal right leg DVT (2021) on Eliquis (wasn't taking due to cost)    Mr. Berg is a 76M with a past medical history as described above who presented to the hospital for evaluation of weakness and slurred speech. The patient is encephalopathic upon my examination. Per family, patient has been having difficulty ambulating and speaking clearly for the past 5-6 days. He was in his usual state of health prior. They were concerned given his mental status and transported him to the ED.     In the ED, he was hypotensive and hypothermic. BP improved after LR bolus. Given Vanc/Cefepime and stress-dose steroids for possible myxedema. BNP not checked by ED, presumably will be high. CBC noted with severe thrombocytopenia. CMP with hyponatremia/hypochloremia as well as elevated LFTs and ACE. No abdominal imaging preformed at this point.    ROS: Denies headache, changes in vision, chest pain, palpitations, shortness of breath, cough, fever, chills, nausea, vomiting, diarrhea, and constipation. +weakenss, swollen BLE  (2022 00:18)    Reason for Consult:     PAST MEDICAL & SURGICAL HISTORY:  Coronary Artery Disease  Hypercholesteremia  Myocardial Infarction  Hypertension  Gout  Diabetes mellitus  Renal insufficiency  Chronic obstructive pulmonary disease  Peripheral vascular disease  S/P aortobifemoral bypass surgery  Sickle cell trait  AICD (automatic cardioverter/defibrillator) present  Abdominal Hernia  s/p Femoral-Popliteal Bypass Surgery    MEDICATIONS  (STANDING):  aspirin  chewable 81 milliGRAM(s) Oral daily  chlorhexidine 0.12% Liquid 15 milliLiter(s) Swish and Spit two times a day  chlorhexidine 4% Liquid 1 Application(s) Topical <User Schedule>  dexAMETHasone  Injectable 4 milliGRAM(s) IV Push every 12 hours  fentaNYL   Infusion. 0.5 MICROgram(s)/kG/Hr (6.36 mL/Hr) IV Continuous <Continuous>  lactulose Syrup 20 Gram(s) Oral every 4 hours  memantine 5 milliGRAM(s) Oral at bedtime  meropenem  IVPB 500 milliGRAM(s) IV Intermittent every 12 hours  norepinephrine Infusion 0.05 MICROgram(s)/kG/Min (5.96 mL/Hr) IV Continuous <Continuous>  pantoprazole   Suspension 40 milliGRAM(s) Oral daily  senna 2 Tablet(s) Oral at bedtime  sodium bicarbonate 1300 milliGRAM(s) Oral three times a day  vasopressin Infusion 0.04 Unit(s)/Min (2.4 mL/Hr) IV Continuous <Continuous>    MEDICATIONS  (PRN):  ALBUTerol    90 MICROgram(s) HFA Inhaler 2 Puff(s) Inhalation every 6 hours PRN Shortness of Breath and/or Wheezing  haloperidol    Injectable 5 milliGRAM(s) IV Push every 6 hours PRN Agitation    Allergies  No Known Drug Allergies  shellfish (Other; Urticaria)    REVIEW OF SYSTEMS    [ ] A ten-point review of systems was otherwise negative except as noted.  [ X ] Due to altered mental status/intubation, subjective information were not able to be obtained from the patient. History was obtained, to the extent possible, from review of the chart and collateral sources of information.    Vital Signs Last 24 Hrs  T(C): 35.6 (2022 20:00), Max: 35.8 (2022 12:00)  T(F): 96 (2022 20:00), Max: 96.5 (2022 12:00)  HR: 52 (2022 21:45) (48 - 68)  BP: 92/54 (2022 13:15) (62/46 - 130/53)  BP(mean): 73 (2022 13:15) (39 - 93)  RR: 18 (2022 20:00) (7 - 30)  SpO2: 94% (2022 21:45) (82% - 100%)    PHYSICAL EXAM:  GENERAL: NAD, well-appearing  CHEST/LUNG: Clear to auscultation bilaterally  HEART: Regular rate and rhythm  ABDOMEN: Soft, Nontender, Nondistended;   EXTREMITIES:  No clubbing, cyanosis, or edema    LABS:    POCT Blood Glucose.: 83 mg/dL (2022 20:33)  POCT Blood Glucose.: 85 mg/dL (2022 13:24)  POCT Blood Glucose.: 136 mg/dL (2022 08:31)  POCT Blood Glucose.: 41 mg/dL (2022 06:40)  POCT Blood Glucose.: 37 mg/dL (2022 06:04)  POCT Blood Glucose.: 40 mg/dL (2022 04:58)                        10.4   11.29 )-----------( 54       ( 2022 20:15 )             30.9       Auto Neutrophil %: 78.8 % (22 @ 20:15)  Auto Immature Granulocyte %: 0.4 % (22 @ 20:15)  Auto Neutrophil %: 77.8 % (22 @ 16:30)  Auto Immature Granulocyte %: 0.6 % (22 @ 16:30)  Auto Neutrophil %: 81.7 % (22 @ 05:20)  Auto Immature Granulocyte %: 0.4 % (22 @ 05:20)        127<L>  |  91<L>  |  70<HH>  ----------------------------<  82  4.9   |  12<L>  |  3.6<H>    Calcium, Total Serum: 7.2 mg/dL (22 @ 20:15)    LFTs:             6.4  | 9.6  | 1013     ------------------[277     ( 2022 20:15 )  3.5  | x    | 372         Blood Gas Arterial, Lactate: 7.70 mmol/L (22 @ 20:47)  Lactate, Blood: 8.1 mmol/L (22 @ 20:15)  Lactate, Blood: 8.9 mmol/L (22 @ 16:28)  Blood Gas Arterial, Lactate: 8.50 mmol/L (22 @ 13:01)  Lactate, Blood: 8.2 mmol/L (22 @ 05:20)  Blood Gas Venous - Lactate: 4.60 mmol/L (22 @ 09:58)  Lactate, Blood: 4.2 mmol/L (22 @ 09:20)  Lactate, Blood: 3.1 mmol/L (22 @ 21:05)  Blood Gas Venous - Lactate: 3.10 mmol/L (22 @ 20:37)    ABG - ( 2022 20:47 )  pH: 7.26  /  pCO2: 33    /  pO2: 122   / HCO3: 15    / Base Excess: -11.2 /  SaO2: 98.6      ABG - ( 2022 13:01 )  pH: 7.25  /  pCO2: 32    /  pO2: 107   / HCO3: 14    / Base Excess: -12.1 /  SaO2: 98.1      Coags:     25.90  ----< 2.27    ( 2022 21:33 )     35.3     CARDIAC MARKERS ( 2022 05:20 )  x     / 0.06 ng/mL / x     / x     / x      CARDIAC MARKERS ( 2022 09:20 )  x     / 0.02 ng/mL / x     / x     / x        Serum Pro-Brain Natriuretic Peptide: 2661 pg/mL (22 @ 06:58)    Urinalysis Basic - ( 2022 05:20 )    Color: Red / Appearance: Turbid / S.030 / pH: x  Gluc: x / Ketone: Trace  / Bili: Negative / Urobili: <2 mg/dL   Blood: x / Protein: >600 mg/dL / Nitrite: Negative   Leuk Esterase: Negative / RBC: >720 /HPF / WBC 2 /HPF   Sq Epi: x / Non Sq Epi: x / Bacteria: x    Culture - Urine (collected 2022 22:30)  Source: Clean Catch Clean Catch (Midstream)  Final Report (2022 10:35):    No growth    RADIOLOGY & ADDITIONAL STUDIES:

## 2022-01-19 NOTE — PROGRESS NOTE ADULT - ATTENDING COMMENTS
76 year old  male admitted with slurred speech and weakness.  Hx of DM CAD AICD  HFrEF AICD PAD HTN COPD Hx of DVT on DOAC which was not taking. On memantine likely has dementia   On admission found to be hypotensive hypothermic with ACE elevated liver enzymes thrombocytopenia coagulopathy and encephalopathy with hyperammonemia.   Patient lives in [place] PCP [phone]. Was discharged from rehab to sister's house mid December.  He was walking and able to self care then but 2 weeks ago he started worsening with confusion and was bed bound.   He does have memory impairment but was able to carry out conversation and laugh at jokes.    Sedated and intubated on mechanical ventilation  Jaundice +   Abdomen midline surgical scar + distended soft non tender     Liver tests are suggestive of chronic hepatic congestion due to heart failure.   No evidence of cirrhosis on imaging but with hyperammonemia it is possible may have cardia cirrhosis  Has hyperbilirubinemia could be heart failure or sepsis   Has acute thrombocytopenia coagulopathy from sepsis      Agree with plan as above.  Hyperammonemia may not be a reliable result and advise repeating it and sending it in ice. 76 year old  male admitted with slurred speech and weakness.  Hx of T2DM CAD AICD  HFrEF AICD PAD HTN COPD Hx of DVT on DOAC which was not taking. On memantine likely has dementia   On admission found to be hypotensive hypothermic with ACE elevated liver enzymes thrombocytopenia coagulopathy and encephalopathy with hyperammonemia.   Patient lives in [place] PCP [phone]. PCP records note hx of encephalopathy but no hx of cirrhosis. No hx of memantine on PCP records or dementia.  Was admitted here in December with CHF and PE, discharged from rehab to sister's house mid December.  He was walking and able to self care then but 2 weeks ago he started worsening with confusion and was bed bound.   He does have memory impairment but was able to carry out conversation and laugh at jokes.    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   No evidence of cirrhosis on imaging but with hyperammonemia it is possible may have cardia cirrhosis    Agree with plan as above.  Ascitic tap to rule out SBP

## 2022-01-19 NOTE — CONSULT NOTE ADULT - ASSESSMENT
76M with a past medical history as described above who presented to the hospital for evaluation of weakness and slurred speech. The patient is encephalopathic on admission . Per family, patient has been having difficulty ambulating and speaking clearly for the past 5-6 days. He was in his usual state of health prior. They were concerned given his mental status and transported him to the ED.   In the ED, he was hypotensive and hypothermic.     IMPRESSION;  Septic shock cryptogenic with GI/ pssibiliites  Unlikely  as no pyuria but has hematuria  GI; significant transaminitis with hyperbilirubinemia. Clinically no cholecystitis/cholangitis  1/18 US : thickened GB wall ( could be from ascites ) with no pericholecystic fluid, no stones  No PNA. Could well have aspirated with chemical pneumonitis    RECOMMENDATIONS;  BCx  F/u with GI for further w/u ( has melena )  F/u with  for hematuria  R/o DIC  Meropenem 500 mg iv q12h  Prognosis is very poor

## 2022-01-19 NOTE — CONSULT NOTE ADULT - SUBJECTIVE AND OBJECTIVE BOX
ALEXANDR BERG  76y, Male  Allergy: No Known Drug Allergies  shellfish (Other; Urticaria)      All historical available data reviewed.    HPI:  Chief Complaint: Weakness     Past Medical History: HFrEF (EF in 10/21: 24%) s/p AICD, CAD s/p MI w/stents , HTN, HLD, DM, gout, CKD, COPD, PVD s/p aortobifemoral bypass, proximal right leg DVT (2021) on Eliquis (wasn't taking due to cost)    Mr. Berg is a 76M with a past medical history as described above who presented to the hospital for evaluation of weakness and slurred speech. The patient is encephalopathic on admission . Per family, patient has been having difficulty ambulating and speaking clearly for the past 5-6 days. He was in his usual state of health prior. They were concerned given his mental status and transported him to the ED.     In the ED, he was hypotensive and hypothermic. BP improved after LR bolus. Given Vanc/Cefepime and stress-dose steroids for possible myxedema. BNP not checked by ED, presumably will be high. CBC noted with severe thrombocytopenia. CMP with hyponatremia/hypochloremia as well as elevated LFTs and ACE. No abdominal imaging preformed at this point.    ROS: Denies headache, changes in vision, chest pain, palpitations, shortness of breath, cough, fever, chills, nausea, vomiting, diarrhea, and constipation. +weakenss, swollen BLE  (2022 00:18)    FAMILY HISTORY:    PAST MEDICAL & SURGICAL HISTORY:  Coronary Artery Disease    Hypercholesteremia    Myocardial Infarction    Hypertension    Gout    Diabetes mellitus    Renal insufficiency    Chronic obstructive pulmonary disease    Peripheral vascular disease    S/P aortobifemoral bypass surgery    Sickle cell trait    AICD (automatic cardioverter/defibrillator) present    Abdominal Hernia    s/p Femoral-Popliteal Bypass Surgery          VITALS:  T(F): 96.3, Max: 96.3 (22 @ 04:00)  HR: 56  BP: 80/49  RR: 10Vital Signs Last 24 Hrs  T(C): 35.8 (2022 12:00), Max: 35.8 (2022 12:00)  T(F): 96.3 (2022 04:00), Max: 96.3 (2022 04:00)  HR: 54 (2022 11:45) (49 - 79)  BP: 69/- (2022 12:00) (62/46 - 130/53)  BP(mean): 62 (2022 12:00) (50 - 93)  RR: 10 (2022 12:00) (10 - 30)  SpO2: 100% (2022 11:15) (82% - 100%)    TESTS & MEASUREMENTS:                        10.7   11.99 )-----------( 62       ( 2022 05:20 )             31.0         132<L>  |  91<L>  |  74<HH>  ----------------------------<  31<LL>  4.9   |  13<L>  |  3.1<H>    Ca    7.5<L>      2022 05:20  Phos  4.7       Mg     2.8         TPro  6.5  /  Alb  3.4<L>  /  TBili  9.2<H>  /  DBili  x   /  AST  777<H>  /  ALT  288<H>  /  AlkPhos  291<H>      LIVER FUNCTIONS - ( 2022 05:20 )  Alb: 3.4 g/dL / Pro: 6.5 g/dL / ALK PHOS: 291 U/L / ALT: 288 U/L / AST: 777 U/L / GGT: x             Culture - Urine (collected 22 @ 22:30)  Source: Clean Catch Clean Catch (Midstream)  Final Report (22 @ 10:35):    No growth      Urinalysis Basic - ( 2022 05:20 )    Color: Red / Appearance: Turbid / S.030 / pH: x  Gluc: x / Ketone: Trace  / Bili: Negative / Urobili: <2 mg/dL   Blood: x / Protein: >600 mg/dL / Nitrite: Negative   Leuk Esterase: Negative / RBC: >720 /HPF / WBC 2 /HPF   Sq Epi: x / Non Sq Epi: x / Bacteria: x          RADIOLOGY & ADDITIONAL TESTS:  Personal review of radiological diagnostics performed  Echo and EKG results noted when applicable.     MEDICATIONS:  ALBUTerol    90 MICROgram(s) HFA Inhaler 2 Puff(s) Inhalation every 6 hours PRN  aspirin  chewable 81 milliGRAM(s) Oral daily  cefTRIAXone   IVPB 2000 milliGRAM(s) IV Intermittent every 24 hours  chlorhexidine 4% Liquid 1 Application(s) Topical <User Schedule>  dextrose 10% Bolus 250 milliLiter(s) IV Bolus once  haloperidol    Injectable 5 milliGRAM(s) IV Push every 6 hours PRN  hydrocortisone sodium succinate Injectable 50 milliGRAM(s) IV Push every 6 hours  lactated ringers Bolus 250 milliLiter(s) IV Bolus once  lactated ringers. 1000 milliLiter(s) IV Continuous <Continuous>  lactulose Syrup 20 Gram(s) Oral every 4 hours  memantine 5 milliGRAM(s) Oral at bedtime  norepinephrine Infusion 0.05 MICROgram(s)/kG/Min IV Continuous <Continuous>  pantoprazole    Tablet 40 milliGRAM(s) Oral before breakfast  senna 2 Tablet(s) Oral at bedtime  vasopressin Infusion 0.04 Unit(s)/Min IV Continuous <Continuous>      ANTIBIOTICS:  cefTRIAXone   IVPB 2000 milliGRAM(s) IV Intermittent every 24 hours

## 2022-01-20 NOTE — DIETITIAN INITIAL EVALUATION ADULT. - MALNUTRITION
at risk Severe malnutrition in the context of acute illness as evidenced by <50% est needs met for >5 days and moderate to severe edema masking weight loss

## 2022-01-20 NOTE — PROGRESS NOTE ADULT - ASSESSMENT
IMPRESSION  - Shock with multi-organ failure - unlikely cardiac etiology  - Extensive cardiac history with HFrEF, CAD  - Thrombocytopenia  - History of PE    PLAN  - Check CVP, CO and SVR (can draw VBG from IJ central line)  - Continue pressor support, monitor lactic acid  - Continue CVVH  - Care as per MICU team

## 2022-01-20 NOTE — PROGRESS NOTE ADULT - ATTENDING COMMENTS
75 yo male with h/o multiple medical problems was admitted for sepsis, CHF exacerbation, hepatic failure and acute kidney injury.   intubated with sedation.  Thrombocytopenia and coagulopathy is likely due to hepatic failure, sepsis, DIC (?).   Blood smear reviewed. No schistocytosis. Multiple jacky cells.   Continue supportive.

## 2022-01-20 NOTE — PROGRESS NOTE ADULT - ATTENDING COMMENTS
76 year old  male admitted with slurred speech and weakness.  Hx of T2DM CAD AICD  HFrEF AICD PAD HTN COPD Hx of DVT on DOAC which was not taking. On memantine likely has dementia   On admission found to be hypotensive hypothermic with ACE elevated liver enzymes thrombocytopenia coagulopathy and encephalopathy with hyperammonemia. No prior diagnosis of cirrhosis but hx of encephalopathy.     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   No evidence of cirrhosis on imaging, Fib 4 in Oct suggestive of advanced fibrosis>  Hyperammonemia ( not on AED) raises possibility of  cirrhosis    Agree with plan as above. 76 year old  male admitted with slurred speech and weakness.  Hx of T2DM CAD AICD  HFrEF AICD PAD HTN COPD Hx of DVT on DOAC which was not taking. On memantine likely has dementia   On admission found to be hypotensive hypothermic with ACE elevated liver enzymes thrombocytopenia coagulopathy and encephalopathy with hyperammonemia. No prior diagnosis of cirrhosis but hx of encephalopathy.     Sedated and intubated on mechanical ventilation  Jaundice +   Abdomen midline surgical scar + distended soft non tender     Shock - sepsis vs cardiogenic  Acute rise in transaminases due to ischemic hepatitis   Congestive hepatopathy with possible cardiac cirrhosis   Encephalopathy hyperbilirubinemia coagulopathy and thrombocytopenia     Liver tests are suggestive of chronic hepatic congestion due to heart failure.   No evidence of cirrhosis on imaging but Fib 4 in Oct suggestive of advanced fibrosis  Hyperammonemia (not on AED) raises possibility of  cirrhosis  Agree with plan as above.

## 2022-01-20 NOTE — PROGRESS NOTE ADULT - SUBJECTIVE AND OBJECTIVE BOX
SUBJECTIVE:    Patient is a 76y old  Male who presents with a chief complaint of ACE, transaminitis, thrombocytopenia (2022 08:34)    Currently admitted to medicine with the primary diagnosis of Weakness       Today is hospital day 3d. Patient intubated, sedated    PAST MEDICAL & SURGICAL HISTORY  PAST MEDICAL & SURGICAL HISTORY:  Coronary Artery Disease    Hypercholesteremia    Myocardial Infarction    Hypertension    Gout    Diabetes mellitus    Renal insufficiency    Chronic obstructive pulmonary disease    Peripheral vascular disease    S/P aortobifemoral bypass surgery    Sickle cell trait    AICD (automatic cardioverter/defibrillator) present    Abdominal Hernia    s/p Femoral-Popliteal Bypass Surgery      SOCIAL HISTORY:    ALLERGIES:  No Known Drug Allergies  shellfish (Other; Urticaria)    MEDICATIONS:  STANDING MEDICATIONS  aspirin  chewable 81 milliGRAM(s) Oral daily  chlorhexidine 0.12% Liquid 15 milliLiter(s) Swish and Spit two times a day  chlorhexidine 4% Liquid 1 Application(s) Topical <User Schedule>  CRRT Treatment    <Continuous>  CRRT Treatment    <Continuous>  dexAMETHasone  Injectable 4 milliGRAM(s) IV Push every 12 hours  fentaNYL   Infusion. 0.5 MICROgram(s)/kG/Hr IV Continuous <Continuous>  lactulose Syrup 20 Gram(s) Oral every 4 hours  memantine 5 milliGRAM(s) Oral at bedtime  meropenem  IVPB 500 milliGRAM(s) IV Intermittent every 12 hours  norepinephrine Infusion 0.05 MICROgram(s)/kG/Min IV Continuous <Continuous>  pantoprazole   Suspension 40 milliGRAM(s) Oral daily  PureFlow Dialysate RFP-400 (K 2 / Ca 3) 5000 milliLiter(s) CRRT <Continuous>  PureFlow Dialysate RFP-400 (K 2 / Ca 3) 5000 milliLiter(s) CRRT <Continuous>  senna 2 Tablet(s) Oral at bedtime  sodium bicarbonate 1300 milliGRAM(s) Oral three times a day  vasopressin Infusion 0.04 Unit(s)/Min IV Continuous <Continuous>    PRN MEDICATIONS  ALBUTerol    90 MICROgram(s) HFA Inhaler 2 Puff(s) Inhalation every 6 hours PRN    VITALS:   T(F): 92.9  HR: 52  BP: 92/54  RR: 16  SpO2: 96%    LABS:                        9.3    11.52 )-----------( 51       ( 2022 04:30 )             28.1     01-20    129<L>  |  90<L>  |  74<HH>  ----------------------------<  62<L>  5.0   |  13<L>  |  3.8<H>    Ca    7.4<L>      2022 04:30  Mg     2.7         TPro  6.4  /  Alb  3.7  /  TBili  9.8<H>  /  DBili  x   /  AST  1018<H>  /  ALT  380<H>  /  AlkPhos  260<H>      PT/INR - ( 2022 04:30 )   PT: 24.20 sec;   INR: 2.12 ratio         PTT - ( 2022 04:30 )  PTT:35.7 sec  Urinalysis Basic - ( 2022 05:20 )    Color: Red / Appearance: Turbid / S.030 / pH: x  Gluc: x / Ketone: Trace  / Bili: Negative / Urobili: <2 mg/dL   Blood: x / Protein: >600 mg/dL / Nitrite: Negative   Leuk Esterase: Negative / RBC: >720 /HPF / WBC 2 /HPF   Sq Epi: x / Non Sq Epi: x / Bacteria: x      ABG - ( 2022 02:59 )  pH, Arterial: 7.22  pH, Blood: x     /  pCO2: 40    /  pO2: 95    / HCO3: 16    / Base Excess: -10.8 /  SaO2: 98.2              Lactate, Blood: 8.7 mmol/L *HH* (22 @ 04:50)  Lactate, Blood: 8.1 mmol/L *HH* (22 @ 20:15)  Lactate, Blood: 8.9 mmol/L *HH* (22 @ 16:28)      Culture - Blood (collected 2022 11:40)  Source: .Blood Blood  Preliminary Report (2022 23:01):    No growth to date.    Culture - Urine (collected 2022 22:30)  Source: Clean Catch Clean Catch (Midstream)  Final Report (2022 10:35):    No growth      CARDIAC MARKERS ( 2022 05:20 )  x     / 0.06 ng/mL / x     / x     / x      CARDIAC MARKERS ( 2022 09:20 )  x     / 0.02 ng/mL / x     / x     / x          RADIOLOGY:  < from: Xray Chest 1 View- PORTABLE-Urgent (Xray Chest 1 View- PORTABLE-Urgent .) (22 @ 13:13) >  Impression:    Slightly increasing bibasilar opacities.      < end of copied text >      < from: TTE Echo Complete w/o Contrast w/ Doppler (22 @ 09:26) >  Summary:   1. Left ventricular ejection fraction, by visual estimation, is <20%.   2. Severely decreased global left ventricular systolic function.   3. Multiple left ventricular regional wall motion abnormalities exist.   See wall motion findings.   4. Spectral Doppler shows impaired relaxation pattern of left   ventricular myocardial filling (Grade I diastolic dysfunction).   5. Moderately enlarged right ventricle.   6. Mildly enlarged left atrium.   7. Mildly enlarged right atrium.   8. Mild mitral valve regurgitation.   9. Moderate tricuspid regurgitation.  10. Mild aortic regurgitation.  11. The right atrial pressure is moderately elevated.    < end of copied text >      PHYSICAL EXAM:  GEN: Patient intubated, sedated  LUNGS: Crackles  HEART: S1/S2 present.  ABD: Soft  EXT: 2+ bilateral LE edema  NEURO: sedated

## 2022-01-20 NOTE — PROGRESS NOTE ADULT - ASSESSMENT
76M with a past medical history as described above who presented to the hospital for evaluation of weakness and slurred speech. The patient is encephalopathic on admission . Per family, patient has been having difficulty ambulating and speaking clearly for the past 5-6 days. He was in his usual state of health prior. They were concerned given his mental status and transported him to the ED.   In the ED, he was hypotensive and hypothermic.     IMPRESSION;  Septic shock cryptogenic   Unlikely  as no pyuria but has hematuria  GI; significant transaminitis with hyperbilirubinemia. Clinically no cholecystitis/cholangitis  Transaminitis : trending upwards  1/18 US : thickened GB wall ( could be from ascites ) with no pericholecystic fluid, no stones  No PNA. Could well have aspirated with chemical pneumonitis  COVID-19 NG  1/18 BCx NG  1/17 UCx NG  HIV NG  Kemar LANDRY    RECOMMENDATIONS;  Monitor LFTs  Meropenem 500 mg iv q12h  Prognosis is very poor

## 2022-01-20 NOTE — PROGRESS NOTE ADULT - SUBJECTIVE AND OBJECTIVE BOX
Patient is a 76y old  Male who presents with a chief complaint of ACE, transaminitis, thrombocytopenia (2022 08:34)        Over Night Events:  Intubated yesterday.  Sedated.  On Vaso and Levophed.  ON VCVVHD.          ROS:     All ROS are negative except HPI         PHYSICAL EXAM    ICU Vital Signs Last 24 Hrs  T(C): 33.8 (2022 08:00), Max: 35.8 (2022 12:00)  T(F): 92.9 (2022 08:00), Max: 96.5 (2022 12:00)  HR: 52 (2022 09:15) (46 - 66)  BP: 92/54 (2022 13:15) (69/42 - 110/57)  BP(mean): 73 (2022 13:15) (50 - 85)  ABP: 96/48 (2022 09:15) (92/56 - 144/78)  ABP(mean): 64 (2022 09:15) (64 - 102)  RR: 0 (2022 09:15) (0 - 29)  SpO2: 97% (2022 09:15) (88% - 100%)      CONSTITUTIONAL:  Well nourished. In NAD    ENT:   Airway patent,   Mouth with normal mucosa.   No thrush    EYES:   Pupils equal,   Round and reactive to light.    CARDIAC:   Mac.   Regular rhythm.    edema      Vascular:  Normal systolic impulse  No Carotid bruits    RESPIRATORY:   No wheezing  Bilateral BS  Normal chest expansion  Not tachypneic,  No use of accessory muscles    GASTROINTESTINAL:  Abdomen soft,   Non-tender,   No guarding,   + BS    MUSCULOSKELETAL:   Range of motion is not limited,  No clubbing, cyanosis    NEUROLOGICAL:   Sedated    SKIN:   Skin normal color for race,   Warm and dry and intact.   No evidence of rash.    PSYCHIATRIC:   Normal mood and affect.   No apparent risk to self or others.    HEMATOLOGICAL:  No cervical  lymphadenopathy.  no inguinal lymphadenopathy      22 @ 07:01  -  22 @ 07:00  --------------------------------------------------------  IN:    FentaNYL: 120.9 mL    Glucerna: 180 mL    IV PiggyBack: 300 mL    Lactated Ringers: 675 mL    Norepinephrine: 1379 mL    Norepinephrine: 1332 mL    Plasma: 1473 mL    Sodium Chloride 0.9% Bolus: 500 mL    Vasopressin: 45.6 mL  Total IN: 6005.5 mL    OUT:    Indwelling Catheter - Urethral (mL): 73 mL    Other (mL): 446 mL  Total OUT: 519 mL    Total NET: 5486.5 mL      22 @ 07:01  -  22 @ 09:27  --------------------------------------------------------  IN:    FentaNYL: 25.4 mL    Glucerna: 40 mL    Norepinephrine: 214 mL    Vasopressin: 4.8 mL  Total IN: 284.2 mL    OUT:    Indwelling Catheter - Urethral (mL): 0 mL    Other (mL): 457 mL  Total OUT: 457 mL    Total NET: -172.8 mL          LABS:                            9.3    11.52 )-----------( 51       ( 2022 04:30 )             28.1                                9.3    11.52 )-----------( 51       (  @ 04:30 )             28.1                10.4   11.29 )-----------( 54       (  @ 20:15 )             30.9                10.6   12.53 )-----------( 60       (  @ 16:30 )             31.8                10.7   11.99 )-----------( 62       (  @ 05:20 )             31.0                10.6   6.56  )-----------( 48       (  @ 09:20 )             29.6                10.6   5.82  )-----------( 48       (  @ 06:58 )             30.3                11.0   5.07  )-----------( 46       (  @ 21:05 )             30.4                                     01-20    129<L>  |  90<L>  |  74<HH>  ----------------------------<  62<L>  5.0   |  13<L>  |  3.8<H>    Ca    7.4<L>      2022 04:30  Mg     2.7         TPro  6.4  /  Alb  3.7  /  TBili  9.8<H>  /  DBili  x   /  AST  1018<H>  /  ALT  380<H>  /  AlkPhos  260<H>        PT/INR - ( 2022 04:30 )   PT: 24.20 sec;   INR: 2.12 ratio         PTT - ( 2022 04:30 )  PTT:35.7 sec                                       Urinalysis Basic - ( 2022 05:20 )    Color: Red / Appearance: Turbid / S.030 / pH: x  Gluc: x / Ketone: Trace  / Bili: Negative / Urobili: <2 mg/dL   Blood: x / Protein: >600 mg/dL / Nitrite: Negative   Leuk Esterase: Negative / RBC: >720 /HPF / WBC 2 /HPF   Sq Epi: x / Non Sq Epi: x / Bacteria: x        CARDIAC MARKERS ( 2022 05:20 )  x     / 0.06 ng/mL / x     / x     / x                                                LIVER FUNCTIONS - ( 2022 04:30 )  Alb: 3.7 g/dL / Pro: 6.4 g/dL / ALK PHOS: 260 U/L / ALT: 380 U/L / AST: 1018 U/L / GGT: x                                                  Culture - Blood (collected 2022 11:40)  Source: .Blood Blood  Preliminary Report (2022 23:01):    No growth to date.    Culture - Urine (collected 2022 22:30)  Source: Clean Catch Clean Catch (Midstream)  Final Report (2022 10:35):    No growth                                                   Mode: AC/ CMV (Assist Control/ Continuous Mandatory Ventilation)  RR (machine): 16  TV (machine): 450  FiO2: 50  PEEP: 5  ITime: 1  MAP: 9  PIP: 21                                      ABG - ( 2022 02:59 )  pH, Arterial: 7.22  pH, Blood: x     /  pCO2: 40    /  pO2: 95    / HCO3: 16    / Base Excess: -10.8 /  SaO2: 98.2                MEDICATIONS  (STANDING):  aspirin  chewable 81 milliGRAM(s) Oral daily  chlorhexidine 0.12% Liquid 15 milliLiter(s) Swish and Spit two times a day  chlorhexidine 4% Liquid 1 Application(s) Topical <User Schedule>  CRRT Treatment    <Continuous>  CRRT Treatment    <Continuous>  dexAMETHasone  Injectable 4 milliGRAM(s) IV Push every 12 hours  fentaNYL   Infusion. 0.5 MICROgram(s)/kG/Hr (6.36 mL/Hr) IV Continuous <Continuous>  lactulose Syrup 20 Gram(s) Oral every 4 hours  memantine 5 milliGRAM(s) Oral at bedtime  meropenem  IVPB 500 milliGRAM(s) IV Intermittent every 12 hours  norepinephrine Infusion 0.05 MICROgram(s)/kG/Min (5.96 mL/Hr) IV Continuous <Continuous>  pantoprazole   Suspension 40 milliGRAM(s) Oral daily  PureFlow Dialysate RFP-400 (K 2 / Ca 3) 5000 milliLiter(s) (2000 mL/Hr) CRRT <Continuous>  PureFlow Dialysate RFP-400 (K 2 / Ca 3) 5000 milliLiter(s) (2000 mL/Hr) CRRT <Continuous>  senna 2 Tablet(s) Oral at bedtime  sodium bicarbonate 1300 milliGRAM(s) Oral three times a day  vasopressin Infusion 0.04 Unit(s)/Min (2.4 mL/Hr) IV Continuous <Continuous>    MEDICATIONS  (PRN):  ALBUTerol    90 MICROgram(s) HFA Inhaler 2 Puff(s) Inhalation every 6 hours PRN Shortness of Breath and/or Wheezing      New X-rays reviewed:                                                                                  ECHO    CXR interpreted by me:  ET OK?>  OG OK>  Bibasilar nodular infiltrates

## 2022-01-20 NOTE — DIETITIAN INITIAL EVALUATION ADULT. - OTHER CALCULATIONS
Est kcal/pro based on ASPEN obese. Fluid needs UOP+[id number]=[id number] Est needs using .4 kg (hx CHF). Est kcal/pro based on ASPEN obese. Fluid needs UOP+[id number]=[id number]. Est kcal using .4 kg (hx CHF), based on ASPEN obese. Protein based on IBW, CRRT. Fluid needs UOP+[id number].

## 2022-01-20 NOTE — DIETITIAN NUTRITION RISK NOTIFICATION - TREATMENT: THE FOLLOWING DIET HAS BEEN RECOMMENDED
Diet, NPO with Tube Feed:   Tube Feeding Modality: Orogastric  Glucerna 1.2 Manan  Total Volume for 24 Hours (mL): 480  Continuous  Starting Tube Feed Rate {mL per Hour}: 20  Until Goal Tube Feed Rate (mL per Hour): 20  Tube Feed Duration (in Hours): 24  Tube Feed Start Time: 19:00 (01-19-22 @ 18:30) [Active]

## 2022-01-20 NOTE — PROGRESS NOTE ADULT - SUBJECTIVE AND OBJECTIVE BOX
seen and examined   on 2 pressors   on cvvhd        PAST HISTORY  --------------------------------------------------------------------------------  No significant changes to PMH, PSH, FHx, SHx, unless otherwise noted    ALLERGIES & MEDICATIONS  --------------------------------------------------------------------------------  Allergies    No Known Drug Allergies  shellfish (Other; Urticaria)    Intolerances      Standing Inpatient Medications  aspirin  chewable 81 milliGRAM(s) Oral daily  chlorhexidine 0.12% Liquid 15 milliLiter(s) Swish and Spit two times a day  chlorhexidine 4% Liquid 1 Application(s) Topical <User Schedule>  CRRT Treatment    <Continuous>  dexAMETHasone  Injectable 4 milliGRAM(s) IV Push every 12 hours  fentaNYL   Infusion. 0.5 MICROgram(s)/kG/Hr IV Continuous <Continuous>  lactulose Syrup 20 Gram(s) Oral every 4 hours  memantine 5 milliGRAM(s) Oral at bedtime  meropenem  IVPB 500 milliGRAM(s) IV Intermittent every 12 hours  norepinephrine Infusion 0.05 MICROgram(s)/kG/Min IV Continuous <Continuous>  pantoprazole   Suspension 40 milliGRAM(s) Oral daily  PureFlow Dialysate RFP-400 (K 2 / Ca 3) 5000 milliLiter(s) CRRT <Continuous>  senna 2 Tablet(s) Oral at bedtime  sodium bicarbonate 1300 milliGRAM(s) Oral three times a day  vasopressin Infusion 0.04 Unit(s)/Min IV Continuous <Continuous>    PRN Inpatient Medications  ALBUTerol    90 MICROgram(s) HFA Inhaler 2 Puff(s) Inhalation every 6 hours PRN          VITALS/PHYSICAL EXAM  --------------------------------------------------------------------------------  T(C): 35 (01-20-22 @ 04:00), Max: 35.8 (01-19-22 @ 12:00)  HR: 50 (01-20-22 @ 07:00) (48 - 66)  BP: 92/54 (01-19-22 @ 13:15) (69/42 - 110/57)  RR: 17 (01-20-22 @ 07:00) (0 - 29)  SpO2: 94% (01-20-22 @ 07:00) (88% - 100%)  Wt(kg): --  Height (cm): 170.2 (01-19-22 @ 00:45)  Weight (kg): 127.2 (01-19-22 @ 00:45)  BMI (kg/m2): 43.9 (01-19-22 @ 00:45)  BSA (m2): 2.33 (01-19-22 @ 00:45)      01-19-22 @ 07:01  -  01-20-22 @ 07:00  --------------------------------------------------------  IN: 6005.5 mL / OUT: 246 mL / NET: 5759.5 mL      Physical Exam:  	Gen: intubated/ventilated   	Pulm: B/L sheela   	CV:  S1S2; no rub  	Abd: +distended  	Vascular access:d[place]all     LABS/STUDIES  --------------------------------------------------------------------------------              9.3    11.52 >-----------<  51       [01-20-22 @ 04:30]              28.1     129  |  90  |  74  ----------------------------<  62      [01-20-22 @ 04:30]  5.0   |  13  |  3.8        Ca     7.4     [01-20-22 @ 04:30]      Mg     2.7     [01-20-22 @ 04:30]    TPro  6.4  /  Alb  3.7  /  TBili  9.8  /  DBili  x   /  AST  1018  /  ALT  380  /  AlkPhos  260  [01-20-22 @ 04:30]    PT/INR: PT 24.20, INR 2.12       [01-20-22 @ 04:30]  PTT: 35.7       [01-20-22 @ 04:30]    Troponin 0.06      [01-19-22 @ 05:20]  LDH 1692      [01-19-22 @ 14:33]    Creatinine Trend:  SCr 3.8 [01-20 @ 04:30]  SCr 3.6 [01-19 @ 20:15]  SCr 3.6 [01-19 @ 16:28]  SCr 3.1 [01-19 @ 05:20]  SCr 2.2 [01-18 @ 09:20]    Urinalysis - [01-19-22 @ 05:20]      Color Red / Appearance Turbid / SG 1.030 / pH 5.0      Gluc Negative / Ketone Trace  / Bili Negative / Urobili <2 mg/dL       Blood Large / Protein >600 mg/dL / Leuk Est Negative / Nitrite Negative      RBC >720 / WBC 2 / Hyaline  / Gran  / Sq Epi  / Non Sq Epi  / Bacteria     Urine Creatinine 6      [01-20-22 @ 02:40]  Urine Sodium <20.0      [01-20-22 @ 02:40]  Urine Osmolality 288      [01-19-22 @ 05:20]    Iron 65, TIBC 361, %sat 18      [01-19-22 @ 14:31]  Ferritin 178      [01-19-22 @ 05:20]  PTH -- (Ca --)      [10-11-21 @ 03:57]   114  Vitamin D (25OH) 47.5      [10-11-21 @ 09:08]  TSH 1.99      [01-19-22 @ 16:28]    HBsAg Nonreact      [01-19-22 @ 05:20]  HCV 0.31, Nonreact      [01-19-22 @ 05:20]    ALEJANDRO: titer 1:320, pattern Speckled      [01-19-22 @ 05:20]

## 2022-01-20 NOTE — PROGRESS NOTE ADULT - SUBJECTIVE AND OBJECTIVE BOX
Patient is a 76y old  Male who presents with a chief complaint of ACE, transaminitis, thrombocytopenia (2022 09:26)      INTERVAL HPI/OVERNIGHT EVENTS:   -CVVHD started o/n    ICU Vital Signs Last 24 Hrs  T(C): 33.8 (2022 08:00), Max: 35.8 (2022 12:00)  T(F): 92.9 (2022 08:00), Max: 96.5 (2022 12:00)  HR: 50 (2022 09:30) (46 - 64)  BP: 92/54 (2022 13:15) (69/42 - 102/49)  BP(mean): 73 (2022 13:15) (50 - 73)  ABP: 96/48 (2022 09:30) (92/56 - 144/78)  ABP(mean): 64 (2022 09:30) (64 - 102)  RR: 18 (2022 09:30) (0 - 29)  SpO2: 97% (2022 09:30) (88% - 100%)    I&O's Summary    2022 07:01  -  2022 07:00  --------------------------------------------------------  IN: 6005.5 mL / OUT: 519 mL / NET: 5486.5 mL    2022 07:01  -  2022 10:24  --------------------------------------------------------  IN: 284.2 mL / OUT: 457 mL / NET: -172.8 mL      Mode: AC/ CMV (Assist Control/ Continuous Mandatory Ventilation)  RR (machine): 24  TV (machine): 450  FiO2: 50  PEEP: 8  ITime: 1  MAP: 10  PIP: 26      LABS:                        9.3    11.52 )-----------( 51       ( 2022 04:30 )             28.1     01-    129<L>  |  90<L>  |  74<HH>  ----------------------------<  62<L>  5.0   |  13<L>  |  3.8<H>    Ca    7.4<L>      2022 04:30  Mg     2.7         TPro  6.4  /  Alb  3.7  /  TBili  9.8<H>  /  DBili  x   /  AST  1018<H>  /  ALT  380<H>  /  AlkPhos  260<H>      PT/INR - ( 2022 04:30 )   PT: 24.20 sec;   INR: 2.12 ratio         PTT - ( 2022 04:30 )  PTT:35.7 sec  Urinalysis Basic - ( 2022 05:20 )    Color: Red / Appearance: Turbid / S.030 / pH: x  Gluc: x / Ketone: Trace  / Bili: Negative / Urobili: <2 mg/dL   Blood: x / Protein: >600 mg/dL / Nitrite: Negative   Leuk Esterase: Negative / RBC: >720 /HPF / WBC 2 /HPF   Sq Epi: x / Non Sq Epi: x / Bacteria: x      CAPILLARY BLOOD GLUCOSE      POCT Blood Glucose.: 95 mg/dL (2022 08:14)  POCT Blood Glucose.: 63 mg/dL (2022 06:11)  POCT Blood Glucose.: 83 mg/dL (2022 20:33)  POCT Blood Glucose.: 85 mg/dL (2022 13:24)    ABG - ( 2022 02:59 )  pH, Arterial: 7.22  pH, Blood: x     /  pCO2: 40    /  pO2: 95    / HCO3: 16    / Base Excess: -10.8 /  SaO2: 98.2                RADIOLOGY & ADDITIONAL TESTS:    Consultant(s) Notes Reviewed:  [x ] YES  [ ] NO    MEDICATIONS  (STANDING):  aspirin  chewable 81 milliGRAM(s) Oral daily  chlorhexidine 0.12% Liquid 15 milliLiter(s) Swish and Spit two times a day  chlorhexidine 4% Liquid 1 Application(s) Topical <User Schedule>  CRRT Treatment    <Continuous>  CRRT Treatment    <Continuous>  DOBUTamine Infusion 2.5 MICROgram(s)/kG/Min (9.54 mL/Hr) IV Continuous <Continuous>  fentaNYL   Infusion. 0.5 MICROgram(s)/kG/Hr (6.36 mL/Hr) IV Continuous <Continuous>  hydrocortisone sodium succinate Injectable 100 milliGRAM(s) IV Push every 8 hours  memantine 5 milliGRAM(s) Oral at bedtime  meropenem  IVPB 500 milliGRAM(s) IV Intermittent every 12 hours  norepinephrine Infusion 0.05 MICROgram(s)/kG/Min (5.96 mL/Hr) IV Continuous <Continuous>  pantoprazole   Suspension 40 milliGRAM(s) Oral daily  PureFlow Dialysate RFP-400 (K 2 / Ca 3) 5000 milliLiter(s) (2000 mL/Hr) CRRT <Continuous>  PureFlow Dialysate RFP-400 (K 2 / Ca 3) 5000 milliLiter(s) (2000 mL/Hr) CRRT <Continuous>  senna 2 Tablet(s) Oral at bedtime  sodium bicarbonate 1300 milliGRAM(s) Oral three times a day  vasopressin Infusion 0.04 Unit(s)/Min (2.4 mL/Hr) IV Continuous <Continuous>    MEDICATIONS  (PRN):  ALBUTerol    90 MICROgram(s) HFA Inhaler 2 Puff(s) Inhalation every 6 hours PRN Shortness of Breath and/or Wheezing      PHYSICAL EXAM:  GENERAL: Intubated and Obtunded   HEAD:  Atraumatic, Normocephalic  EYES: Scleral Icterus  NERVOUS SYSTEM: Obtunded  CHEST/LUNG: B/L good air entry; No rales, rhonchi, or wheezing  HEART: S1S2 normal, Regular rate and rhythm; No murmurs  ABDOMEN: Soft, Nontender, Grossly distended; Bowel sounds present  EXTREMITIES: +1 pitting edema  LYMPH: No lymphadenopathy noted    Care Discussed with Consultants/Other Providers [ x] YES  [ ] NO

## 2022-01-20 NOTE — PROCEDURE NOTE - NSINDICATIONS_GEN_A_CORE
arterial puncture to obtain ABG's/blood sampling/critical patient
dialysis/CRRT
arterial puncture to obtain ABG's/monitoring purposes

## 2022-01-20 NOTE — DIETITIAN INITIAL EVALUATION ADULT. - PERTINENT MEDS FT
IV abx, decadron, IV fentanyl, lactulose, levophed, sodium bicarb, vasopressin, senna IV abx, decadron, IV fentanyl, lactulose, levophed, sodium bicarb, vasopressin, senna, PureFlow Dialysate RFP-400 (K 2 / Ca 3) for CRRT

## 2022-01-20 NOTE — PROGRESS NOTE ADULT - ASSESSMENT
77 yo M with PMHx of HFrEF (EF in 10/21: 24%) s/p AICD, CAD s/p MI w/stents 2007, HTN, HLD, DM, gout, CKD, COPD, PVD s/p aortobifemoral bypass, proximal right leg DVT (4/2021) on Eliquis (non compliant) presented for weakness and slurred speech.     Pt admitted for Sepsis present on admission with possible CHF exacerbation, with transaminitis and congestive hepatopathy, acute kidney injury, now intubated and requiring urgent dialysis.     Hem/Onc consulted for thrombocytopenia    #Thrombocytopenia  #Microcytic anemia  #Coagulopathy with elevated D-dimer and normal fibrinogen  - Plt count on admission 46; Plt count in 2021 and 2020 was mostly WNL, was low in 2016 (platelet clumping reported at that time)  - Differential at this time is broad including acute sepsis present on admission, acute HFrEF exacerbation with acute liver injury (? congestive hepatopathy? vs primary liver injury) vs DIC 2/2 acute infection/liver injury; Cannot r/o ITP.  - CT A/P: Diffuse anasacra, ascites, pleural effusions, possible fluid overload, no hepatomegaly, 1.9cm hyper enhancing focus in the medial spleen (possibly a hemangioma)  - Iron studies (fe 65, TIBC 361, %sat 18%, ferritin 178), retic count 1.5, LDH 1692, Indirect bili 3.1, hapto <20 (can be low due to liver injury), jatin neg, b12 and folate normal, TSH 1.99  - Now with melena and hematuria     *INCOMPLETE*    RECOMMENDATIONS  - peripheral smear: low plt count, crenated rbcs, occasional schistocytes  - F/u HIV, acute viral hepatitis panel  - Treat underlying infection/critical illness  -     #Hx of DVT and PE (w/ right heart strain) in 10/2021  - Non compliant with Eliquis   - Last US in 10/2021: Rt post tibial vein thrombus  - Check repeat US duplex b/l LE  - Hold AC at this time, given pt is at high risk of bleeding with thrombocytopenia and elevated INR.     Poor prognosis. 77 yo M with PMHx of HFrEF (EF in 10/21: 24%) s/p AICD, CAD s/p MI w/stents 2007, HTN, HLD, DM, gout, CKD, COPD, PVD s/p aortobifemoral bypass, proximal right leg DVT (4/2021) on Eliquis (non compliant) presented for weakness and slurred speech.     Pt admitted for Sepsis present on admission with possible CHF exacerbation, with transaminitis and congestive hepatopathy, acute kidney injury, now intubated and requiring urgent dialysis.     Hem/Onc consulted for thrombocytopenia    #Thrombocytopenia  #Microcytic anemia  #Coagulopathy with elevated D-dimer and normal fibrinogen  - Plt count on admission 46; Plt count in 2021 and 2020 was mostly WNL, was low in 2016 (platelet clumping reported at that time)  - Differential at this time is broad including acute sepsis present on admission, acute HFrEF exacerbation with acute liver injury (? congestive hepatopathy? vs primary liver injury) vs DIC 2/2 acute infection/liver injury; Cannot r/o ITP.  - CT A/P: Diffuse anasacra, ascites, pleural effusions, possible fluid overload, no hepatomegaly, 1.9cm hyper enhancing focus in the medial spleen (possibly a hemangioma)  - Iron studies (fe 65, TIBC 361, %sat 18%, ferritin 178), retic count 1.5, LDH 1692, Indirect bili 3.1, hapto <20 (can be low due to liver injury), jatin neg, b12 and folate normal, TSH 1.99  - Now with melena and hematuria     *INCOMPLETE*    RECOMMENDATIONS  - peripheral smear: low plt count, crenated rbcs, occasional schistocytes  - F/u HIV; Dilma panel neg for Hepatitis  - Treat underlying infection/critical illness  -     #Hx of DVT and PE (w/ right heart strain) in 10/2021  - Non compliant with Eliquis   - Last US in 10/2021: Rt post tibial vein thrombus  - Repeat duplex 1/18/22: No evidence of DVT  - Hold AC at this time, given pt is at high risk of bleeding with thrombocytopenia and elevated INR.     Poor prognosis. 77 yo M with PMHx of HFrEF (EF in 10/21: 24%) s/p AICD, CAD s/p MI w/stents 2007, HTN, HLD, DM, gout, CKD, COPD, PVD s/p aortobifemoral bypass, proximal right leg DVT (4/2021) on Eliquis (non compliant) presented for weakness and slurred speech.     Pt admitted for Sepsis present on admission with possible CHF exacerbation, with transaminitis and congestive hepatopathy, acute kidney injury, now intubated and requiring urgent dialysis.     Hem/Onc consulted for thrombocytopenia    #Thrombocytopenia  #Microcytic anemia  #Coagulopathy with elevated D-dimer and normal fibrinogen  - Plt count on admission 46; Plt count in 2021 and 2020 was mostly WNL, was low in 2016 (platelet clumping reported at that time)  - Differential at this time is broad including acute sepsis present on admission, acute HFrEF exacerbation with acute liver injury (? congestive hepatopathy? vs primary liver injury) vs DIC 2/2 acute infection/liver injury; Cannot r/o ITP.  - CT A/P: Diffuse anasacra, ascites, pleural effusions, possible fluid overload, no hepatomegaly, 1.9cm hyper enhancing focus in the medial spleen (possibly a hemangioma)  - Iron studies (fe 65, TIBC 361, %sat 18%, ferritin 178), retic count 1.5, LDH 1692, Indirect bili 3.1, hapto <20 (can be low due to liver injury), jatin neg, b12 and folate normal, TSH 1.99  - Now with melena and hematuria     RECOMMENDATIONS  - peripheral smear reviewed again: low plt count with large platelets, jacky cells, no blasts seen, rare schistocytes: TTP unlikely.   - F/u HIV; Viral panel neg for Hepatitis  - Treat underlying infection/critical illness  - Supportive care    #Hx of DVT and PE (w/ right heart strain) in 10/2021  - Non compliant with Eliquis   - Last US in 10/2021: Rt post tibial vein thrombus  - Repeat duplex 1/18/22: No evidence of DVT  - Hold AC at this time, given pt is at high risk of bleeding with thrombocytopenia and elevated INR.     Poor prognosis. Palliative care eval

## 2022-01-20 NOTE — DIETITIAN INITIAL EVALUATION ADULT. - ORAL INTAKE PTA/DIET HISTORY
Ht 5'10". .4 kg (10/8/21) 108.7 kg with 2+ edema to b/l legs (4/22/20). Actual weight 120 kg (1/18) -> 127.2 kg (1/19) ->133.8 kg (1/20). Hx obtained from sister via phone. No PO intake 2-3 days PTA d/t lethargy. Ht 5'11". Reported UBW 250lbs. No noticeable recent weight changes. No food allergy/intolerance. No dietary restrictions. No supplement use. No chewing/swallowing issues.  Per chart review, .4 kg (10/8/21),  108.7 kg with 2+ edema to b/l legs (4/22/20). Hx obtained from sister via phone. No PO intake 2-3 days PTA d/t lethargy. Ht 5'11". Reported UBW 250lbs. No noticeable recent weight changes. Allergic to shellfish (but eats fish). No dietary restrictions. No supplement use. No chewing/swallowing issues.  Per chart review, .4 kg (10/8/21),  108.7 kg with 2+ edema to b/l legs (4/22/20).

## 2022-01-20 NOTE — PROGRESS NOTE ADULT - SUBJECTIVE AND OBJECTIVE BOX
ALEXANDR SARAVIA  76y, Male    All available historical data reviewed    OVERNIGHT EVENTS:  no fevers  intubated  Fio2 50%  does not follow commands  on pressors  R IJ catheter with no erythema  On CVVH    ROS:  unable to obtain history secondary to patient's mental status and/or sedation     VITALS:  T(F): 92.9, Max: 96.5 (22 @ 12:00)  HR: 54  BP: 92/54  RR: 23Vital Signs Last 24 Hrs  T(C): 33.8 (2022 08:00), Max: 35.8 (2022 12:00)  T(F): 92.9 (2022 08:00), Max: 96.5 (2022 12:00)  HR: 54 (2022 10:45) (46 - 64)  BP: 92/54 (2022 13:15) (69/42 - 102/49)  BP(mean): 73 (2022 13:15) (50 - 73)  RR: 23 (2022 10:45) (0 - 29)  SpO2: 94% (2022 10:45) (88% - 100%)    TESTS & MEASUREMENTS:                        9.3    11.52 )-----------( 51       ( 2022 04:30 )             28.1         129<L>  |  90<L>  |  74<HH>  ----------------------------<  62<L>  5.0   |  13<L>  |  3.8<H>    Ca    7.4<L>      2022 04:30  Mg     2.7         TPro  6.4  /  Alb  3.7  /  TBili  9.8<H>  /  DBili  x   /  AST  1018<H>  /  ALT  380<H>  /  AlkPhos  260<H>      LIVER FUNCTIONS - ( 2022 04:30 )  Alb: 3.7 g/dL / Pro: 6.4 g/dL / ALK PHOS: 260 U/L / ALT: 380 U/L / AST: 1018 U/L / GGT: x             Culture - Blood (collected 22 @ 11:40)  Source: .Blood Blood  Preliminary Report (22 @ 23:01):    No growth to date.    Culture - Urine (collected 22 @ 22:30)  Source: Clean Catch Clean Catch (Midstream)  Final Report (22 @ 10:35):    No growth      Urinalysis Basic - ( 2022 05:20 )    Color: Red / Appearance: Turbid / S.030 / pH: x  Gluc: x / Ketone: Trace  / Bili: Negative / Urobili: <2 mg/dL   Blood: x / Protein: >600 mg/dL / Nitrite: Negative   Leuk Esterase: Negative / RBC: >720 /HPF / WBC 2 /HPF   Sq Epi: x / Non Sq Epi: x / Bacteria: x          RADIOLOGY & ADDITIONAL TESTS:  Personal review of radiological diagnostics performed  Echo and EKG results noted when applicable.     MEDICATIONS:  ALBUTerol    90 MICROgram(s) HFA Inhaler 2 Puff(s) Inhalation every 6 hours PRN  aspirin  chewable 81 milliGRAM(s) Oral daily  chlorhexidine 0.12% Liquid 15 milliLiter(s) Swish and Spit two times a day  chlorhexidine 4% Liquid 1 Application(s) Topical <User Schedule>  CRRT Treatment    <Continuous>  CRRT Treatment    <Continuous>  DOBUTamine Infusion 5 MICROgram(s)/kG/Min IV Continuous <Continuous>  fentaNYL   Infusion. 0.5 MICROgram(s)/kG/Hr IV Continuous <Continuous>  hydrocortisone sodium succinate Injectable 100 milliGRAM(s) IV Push every 8 hours  memantine 5 milliGRAM(s) Oral at bedtime  meropenem  IVPB 500 milliGRAM(s) IV Intermittent every 12 hours  norepinephrine Infusion 0.05 MICROgram(s)/kG/Min IV Continuous <Continuous>  pantoprazole   Suspension 40 milliGRAM(s) Oral daily  PureFlow Dialysate RFP-400 (K 2 / Ca 3) 5000 milliLiter(s) CRRT <Continuous>  PureFlow Dialysate RFP-400 (K 2 / Ca 3) 5000 milliLiter(s) CRRT <Continuous>  senna 2 Tablet(s) Oral at bedtime  sodium bicarbonate 1300 milliGRAM(s) Oral three times a day  vasopressin Infusion 0.04 Unit(s)/Min IV Continuous <Continuous>      ANTIBIOTICS:  meropenem  IVPB 500 milliGRAM(s) IV Intermittent every 12 hours

## 2022-01-20 NOTE — CHART NOTE - NSCHARTNOTEFT_GEN_A_CORE
Device: [employer] [place] DC ICD    Indication: Pt in cardiogenic shock    Mode: Changed to DDD 80 BPM (from DDI 50), and decreased AV delay to 200 ms ( was 350 ms)  Dependent: NO (AP 54%  2.3%)  Underlying Rhythm: SB 58 bpm with sensed AV delay ~440 ms    Events: None    Recommendations: Please notify EPS once pt improves, would need to reprogram device to original settings  Discussed with Dr. Ceron    EPS [id number]

## 2022-01-20 NOTE — DIETITIAN INITIAL EVALUATION ADULT. - ENTERAL
Switch formula to Vital High Protein. When pressor needs improve to low-dose, titrate up feeds to new goal rate of 45ml/hr. Add prosource 15gm pro 1pck Q6H (4x/day). Flush 30ml Q6H for tube patency. -- provides 1320kcal,153g protein, 907+120ml free water.

## 2022-01-20 NOTE — PROGRESS NOTE ADULT - ASSESSMENT
76 y.o male patient with extensive cardiac and PMH presented for slurred speech and altered mental status;    #Acute on chronic Liver Injury/Possible Cardiac cirrhosis? - Mixed, likely d/t Congestive hepatopathy/Septic Shock - r/o other etiologies  #AHRF/ARDS s/p Intubation  #Shock of unclear etiology - sepsis vs cardiac? on Levo and Vaso  #ACE possible ATN on CVVHD  - LFTs 10/21: 2.7/123/43/32  - LFTS on admission: 7.8/287/417/183  - Medications reviewed  - NH3 - 107  - CLD wokrup thus far - utox/stox negative. tylenol wnl  - Echo < from: TTE Echo Complete w/o Contrast w/ Doppler (01.19.22 @ 09:26) >   1. Left ventricular ejection fraction, by visual estimation, is <20%.   2. Severely decreased global left ventricular systolic function.   3. Multiple left ventricular regional wall motion abnormalities exist.   See wall motion findings.   4. Spectral Doppler shows impaired relaxation pattern of left   ventricular myocardial filling (Grade I diastolic dysfunction).   5. Moderately enlarged right ventricle.   6. Mildly enlarged left atrium.   7. Mildly enlarged right atrium.   8. Mild mitral valve regurgitation.   9. Moderate tricuspid regurgitation.  10. Mild aortic regurgitation.  11. The right atrial pressure is moderately elevated.    < end of copied text >      Rec  - c/w  lactulose 20mg and target 2-3bms daily  - Please obtain LFT, coagulation profile qdaily  - please f/u DIC panel  - pleas f/u cld workup  - Please avoid hepatotoxic medications  - Target negative balance  - Target MAP >70  - recommend diagnostic tap if feasible

## 2022-01-20 NOTE — PROGRESS NOTE ADULT - SUBJECTIVE AND OBJECTIVE BOX
Patient is a 76y old  Male who presents with a chief complaint of ACE, transaminitis, thrombocytopenia (2022 07:56)      Subjective:      Vital Signs Last 24 Hrs  T(C): 33.8 (2022 08:00), Max: 35.8 (2022 12:00)  T(F): 92.9 (2022 08:00), Max: 96.5 (2022 12:00)  HR: 46 (2022 08:00) (46 - 66)  BP: 92/54 (2022 13:15) (69/42 - 110/57)  BP(mean): 73 (2022 13:15) (50 - 85)  RR: 0 (2022 08:00) (0 - 29)  SpO2: 92% (2022 08:00) (88% - 100%)    PHYSICAL EXAM  General: intubated  HEENT: icteric sclera  Neck: supple  CV: normal S1/S2   Lungs: positive air movement b/l ant lungs  Abdomen: soft non-tender distended  Ext: edema+; hematuria  Skin: no rashes and no petechiae  Neuro: intubated, sedated    MEDICATIONS  (STANDING):  aspirin  chewable 81 milliGRAM(s) Oral daily  chlorhexidine 0.12% Liquid 15 milliLiter(s) Swish and Spit two times a day  chlorhexidine 4% Liquid 1 Application(s) Topical <User Schedule>  CRRT Treatment    <Continuous>  CRRT Treatment    <Continuous>  dexAMETHasone  Injectable 4 milliGRAM(s) IV Push every 12 hours  fentaNYL   Infusion. 0.5 MICROgram(s)/kG/Hr (6.36 mL/Hr) IV Continuous <Continuous>  lactulose Syrup 20 Gram(s) Oral every 4 hours  memantine 5 milliGRAM(s) Oral at bedtime  meropenem  IVPB 500 milliGRAM(s) IV Intermittent every 12 hours  norepinephrine Infusion 0.05 MICROgram(s)/kG/Min (5.96 mL/Hr) IV Continuous <Continuous>  pantoprazole   Suspension 40 milliGRAM(s) Oral daily  PureFlow Dialysate RFP-400 (K 2 / Ca 3) 5000 milliLiter(s) (2000 mL/Hr) CRRT <Continuous>  PureFlow Dialysate RFP-400 (K 2 / Ca 3) 5000 milliLiter(s) (2000 mL/Hr) CRRT <Continuous>  senna 2 Tablet(s) Oral at bedtime  sodium bicarbonate 1300 milliGRAM(s) Oral three times a day  vasopressin Infusion 0.04 Unit(s)/Min (2.4 mL/Hr) IV Continuous <Continuous>    MEDICATIONS  (PRN):  ALBUTerol    90 MICROgram(s) HFA Inhaler 2 Puff(s) Inhalation every 6 hours PRN Shortness of Breath and/or Wheezing      LABS:                          9.3    11.52 )-----------( 51       ( 2022 04:30 )             28.1         Mean Cell Volume : 72.2 fL  Mean Cell Hemoglobin : 23.9 pg  Mean Cell Hemoglobin Concentration : 33.1 g/dL  Auto Neutrophil # : 9.24 K/uL  Auto Lymphocyte # : 0.58 K/uL  Auto Monocyte # : 1.64 K/uL  Auto Eosinophil # : 0.00 K/uL  Auto Basophil # : 0.01 K/uL  Auto Neutrophil % : 80.3 %  Auto Lymphocyte % : 5.0 %  Auto Monocyte % : 14.2 %  Auto Eosinophil % : 0.0 %  Auto Basophil % : 0.1 %      Serial CBC's   @ 04:30  Hct-28.1 / Hgb-9.3 / Plat-51 / RBC-3.89 / WBC-11.52  Serial CBC's   @ 20:15  Hct-30.9 / Hgb-10.4 / Plat-54 / RBC-4.36 / WBC-11.29  Serial CBC's   @ 16:30  Hct-31.8 / Hgb-10.6 / Plat-60 / RBC-4.49 / WBC-12.53  Serial CBC's   @ 14:31  Hct--- / Hgb--- / Plat--- / RBC-4.56 / WBC---  Serial CBC's   @ 05:20  Hct-31.0 / Hgb-10.7 / Plat-62 / RBC-4.40 / WBC-11.99  Serial CBC's   @ 09:20  Hct-29.6 / Hgb-10.6 / Plat-48 / RBC-4.35 / WBC-6.56  Serial CBC's   @ 06:58  Hct-30.3 / Hgb-10.6 / Plat-48 / RBC-4.42 / WBC-5.82  Serial CBC's   @ 21:05  Hct-30.4 / Hgb-11.0 / Plat-46 / RBC-4.49 / WBC-5.07          129<L>  |  90<L>  |  74<HH>  ----------------------------<  62<L>  5.0   |  13<L>  |  3.8<H>    Ca    7.4<L>      2022 04:30  Mg     2.7         TPro  6.4  /  Alb  3.7  /  TBili  9.8<H>  /  DBili  x   /  AST  1018<H>  /  ALT  380<H>  /  AlkPhos  260<H>      PT/INR - ( 2022 04:30 )   PT: 24.20 sec;   INR: 2.12 ratio    PTT - ( 2022 04:30 )  PTT:35.7 sec    D-Dimer Assay, Quantitative: 192: Manufacturers recommended Cut off for VTE is 230 ng/ml D-DU ng/mL DDU (22 @ 16:30)    Fibrinogen Assay: 258.0 (22 @ 16:30)    Iron with Total Binding Capacity (22 @ 14:31)    Iron - Total Binding Capacity.: 361 ug/dL    % Saturation, Iron: 18 %    Iron Total, Serum: 65 ug/dL    Unsaturated Iron Binding Capacity: 296 ug/dL    Ferritin, Serum: 178 ng/mL (22 @ 05:20)  Vitamin B12, Serum: 2000 pg/mL (10.11.21 @ 03:57)  Folate, Serum: 8.2 ng/mL (10.11.21 @ 03:57)  Thyroid Stimulating Hormone, Serum: 1.99 uIU/mL (22 @ 16:28)    Reticulocyte Count (22 @ 14:31)    RBC Count: 4.56 M/uL    Reticulocyte Percent: 1.5 %    Absolute Reticulocytes: 69.3 K/uL    Lactate Dehydrogenase, Serum: 1692 U/L (22 @ 14:33)  Haptoglobin, Serum: <20: Test Repeated mg/dL (22 @ 14:33)  Dir Antiglob Polyspecific Interpretation: NEG (22 @ 14:12)      Urinalysis Basic - ( 2022 05:20 )  Color: Red / Appearance: Turbid / S.030 / pH: x  Gluc: x / Ketone: Trace  / Bili: Negative / Urobili: <2 mg/dL   Blood: x / Protein: >600 mg/dL / Nitrite: Negative   Leuk Esterase: Negative / RBC: >720 /HPF / WBC 2 /HPF   Sq Epi: x / Non Sq Epi: x / Bacteria: x    Culture - Blood (collected 2022 11:40)  Source: .Blood Blood  Preliminary Report (2022 23:01):    No growth to date.    Culture - Urine (collected 2022 22:30)  Source: Clean Catch Clean Catch (Midstream)  Final Report (2022 10:35):    No growth      BLOOD SMEAR INTERPRETATION:       RADIOLOGY & ADDITIONAL STUDIES:     Patient is a 76y old  Male who presents with a chief complaint of ACE, transaminitis, thrombocytopenia (2022 07:56)      Subjective:       Vital Signs Last 24 Hrs  T(C): 33.8 (2022 08:00), Max: 35.8 (2022 12:00)  T(F): 92.9 (2022 08:00), Max: 96.5 (2022 12:00)  HR: 46 (2022 08:00) (46 - 66)  BP: 92/54 (2022 13:15) (69/42 - 110/57)  BP(mean): 73 (2022 13:15) (50 - 85)  RR: 0 (2022 08:00) (0 - 29)  SpO2: 92% (2022 08:00) (88% - 100%)    PHYSICAL EXAM  General: intubated  HEENT: icteric sclera  Neck: supple  CV: normal S1/S2   Lungs: positive air movement b/l ant lungs  Abdomen: soft non-tender distended  Ext: edema+; hematuria  Skin: no rashes and no petechiae  Neuro: intubated, sedated    MEDICATIONS  (STANDING):  aspirin  chewable 81 milliGRAM(s) Oral daily  chlorhexidine 0.12% Liquid 15 milliLiter(s) Swish and Spit two times a day  chlorhexidine 4% Liquid 1 Application(s) Topical <User Schedule>  CRRT Treatment    <Continuous>  CRRT Treatment    <Continuous>  dexAMETHasone  Injectable 4 milliGRAM(s) IV Push every 12 hours  fentaNYL   Infusion. 0.5 MICROgram(s)/kG/Hr (6.36 mL/Hr) IV Continuous <Continuous>  lactulose Syrup 20 Gram(s) Oral every 4 hours  memantine 5 milliGRAM(s) Oral at bedtime  meropenem  IVPB 500 milliGRAM(s) IV Intermittent every 12 hours  norepinephrine Infusion 0.05 MICROgram(s)/kG/Min (5.96 mL/Hr) IV Continuous <Continuous>  pantoprazole   Suspension 40 milliGRAM(s) Oral daily  PureFlow Dialysate RFP-400 (K 2 / Ca 3) 5000 milliLiter(s) (2000 mL/Hr) CRRT <Continuous>  PureFlow Dialysate RFP-400 (K 2 / Ca 3) 5000 milliLiter(s) (2000 mL/Hr) CRRT <Continuous>  senna 2 Tablet(s) Oral at bedtime  sodium bicarbonate 1300 milliGRAM(s) Oral three times a day  vasopressin Infusion 0.04 Unit(s)/Min (2.4 mL/Hr) IV Continuous <Continuous>    MEDICATIONS  (PRN):  ALBUTerol    90 MICROgram(s) HFA Inhaler 2 Puff(s) Inhalation every 6 hours PRN Shortness of Breath and/or Wheezing      LABS:                          9.3    11.52 )-----------( 51       ( 2022 04:30 )             28.1         Mean Cell Volume : 72.2 fL  Mean Cell Hemoglobin : 23.9 pg  Mean Cell Hemoglobin Concentration : 33.1 g/dL  Auto Neutrophil # : 9.24 K/uL  Auto Lymphocyte # : 0.58 K/uL  Auto Monocyte # : 1.64 K/uL  Auto Eosinophil # : 0.00 K/uL  Auto Basophil # : 0.01 K/uL  Auto Neutrophil % : 80.3 %  Auto Lymphocyte % : 5.0 %  Auto Monocyte % : 14.2 %  Auto Eosinophil % : 0.0 %  Auto Basophil % : 0.1 %      Serial CBC's   @ 04:30  Hct-28.1 / Hgb-9.3 / Plat-51 / RBC-3.89 / WBC-11.52  Serial CBC's   @ 20:15  Hct-30.9 / Hgb-10.4 / Plat-54 / RBC-4.36 / WBC-11.29  Serial CBC's   @ 16:30  Hct-31.8 / Hgb-10.6 / Plat-60 / RBC-4.49 / WBC-12.53  Serial CBC's   @ 14:31  Hct--- / Hgb--- / Plat--- / RBC-4.56 / WBC---  Serial CBC's   @ 05:20  Hct-31.0 / Hgb-10.7 / Plat-62 / RBC-4.40 / WBC-11.99  Serial CBC's   @ 09:20  Hct-29.6 / Hgb-10.6 / Plat-48 / RBC-4.35 / WBC-6.56  Serial CBC's   @ 06:58  Hct-30.3 / Hgb-10.6 / Plat-48 / RBC-4.42 / WBC-5.82  Serial CBC's   @ 21:05  Hct-30.4 / Hgb-11.0 / Plat-46 / RBC-4.49 / WBC-5.07          129<L>  |  90<L>  |  74<HH>  ----------------------------<  62<L>  5.0   |  13<L>  |  3.8<H>    Ca    7.4<L>      2022 04:30  Mg     2.7         TPro  6.4  /  Alb  3.7  /  TBili  9.8<H>  /  DBili  x   /  AST  1018<H>  /  ALT  380<H>  /  AlkPhos  260<H>      PT/INR - ( 2022 04:30 )   PT: 24.20 sec;   INR: 2.12 ratio    PTT - ( 2022 04:30 )  PTT:35.7 sec    D-Dimer Assay, Quantitative: 192: Manufacturers recommended Cut off for VTE is 230 ng/ml D-DU ng/mL DDU (22 @ 16:30)    Fibrinogen Assay: 258.0 (22 @ 16:30)    Iron with Total Binding Capacity (22 @ 14:31)    Iron - Total Binding Capacity.: 361 ug/dL    % Saturation, Iron: 18 %    Iron Total, Serum: 65 ug/dL    Unsaturated Iron Binding Capacity: 296 ug/dL    Ferritin, Serum: 178 ng/mL (22 @ 05:20)  Vitamin B12, Serum: 2000 pg/mL (10.11.21 @ 03:57)  Folate, Serum: 8.2 ng/mL (10.11.21 @ 03:57)  Thyroid Stimulating Hormone, Serum: 1.99 uIU/mL (22 @ 16:28)    Reticulocyte Count (22 @ 14:31)    RBC Count: 4.56 M/uL    Reticulocyte Percent: 1.5 %    Absolute Reticulocytes: 69.3 K/uL    Lactate Dehydrogenase, Serum: 1692 U/L (22 @ 14:33)  Haptoglobin, Serum: <20: Test Repeated mg/dL (22 @ 14:33)  Dir Antiglob Polyspecific Interpretation: NEG (22 @ 14:12)      Urinalysis Basic - ( 2022 05:20 )  Color: Red / Appearance: Turbid / S.030 / pH: x  Gluc: x / Ketone: Trace  / Bili: Negative / Urobili: <2 mg/dL   Blood: x / Protein: >600 mg/dL / Nitrite: Negative   Leuk Esterase: Negative / RBC: >720 /HPF / WBC 2 /HPF   Sq Epi: x / Non Sq Epi: x / Bacteria: x    Culture - Blood (collected 2022 11:40)  Source: .Blood Blood  Preliminary Report (2022 23:01):    No growth to date.    Culture - Urine (collected 2022 22:30)  Source: Clean Catch Clean Catch (Midstream)  Final Report (2022 10:35):    No growth      BLOOD SMEAR INTERPRETATION:       RADIOLOGY & ADDITIONAL STUDIES:     Patient is a 76y old  Male who presents with a chief complaint of ACE, transaminitis, thrombocytopenia (2022 07:56)      Subjective: Pt is intubated, started on urgent dialysis.       Vital Signs Last 24 Hrs  T(C): 33.8 (2022 08:00), Max: 35.8 (2022 12:00)  T(F): 92.9 (2022 08:00), Max: 96.5 (2022 12:00)  HR: 46 (2022 08:00) (46 - 66)  BP: 92/54 (2022 13:15) (69/42 - 110/57)  BP(mean): 73 (2022 13:15) (50 - 85)  RR: 0 (2022 08:00) (0 - 29)  SpO2: 92% (2022 08:00) (88% - 100%)    PHYSICAL EXAM  General: intubated  HEENT: icteric sclera  Neck: supple  CV: normal S1/S2   Lungs: positive air movement b/l ant lungs  Abdomen: soft non-tender distended  Ext: edema+; hematuria  Skin: no rashes and no petechiae  Neuro: intubated, sedated    MEDICATIONS  (STANDING):  aspirin  chewable 81 milliGRAM(s) Oral daily  chlorhexidine 0.12% Liquid 15 milliLiter(s) Swish and Spit two times a day  chlorhexidine 4% Liquid 1 Application(s) Topical <User Schedule>  CRRT Treatment    <Continuous>  CRRT Treatment    <Continuous>  dexAMETHasone  Injectable 4 milliGRAM(s) IV Push every 12 hours  fentaNYL   Infusion. 0.5 MICROgram(s)/kG/Hr (6.36 mL/Hr) IV Continuous <Continuous>  lactulose Syrup 20 Gram(s) Oral every 4 hours  memantine 5 milliGRAM(s) Oral at bedtime  meropenem  IVPB 500 milliGRAM(s) IV Intermittent every 12 hours  norepinephrine Infusion 0.05 MICROgram(s)/kG/Min (5.96 mL/Hr) IV Continuous <Continuous>  pantoprazole   Suspension 40 milliGRAM(s) Oral daily  PureFlow Dialysate RFP-400 (K 2 / Ca 3) 5000 milliLiter(s) (2000 mL/Hr) CRRT <Continuous>  PureFlow Dialysate RFP-400 (K 2 / Ca 3) 5000 milliLiter(s) (2000 mL/Hr) CRRT <Continuous>  senna 2 Tablet(s) Oral at bedtime  sodium bicarbonate 1300 milliGRAM(s) Oral three times a day  vasopressin Infusion 0.04 Unit(s)/Min (2.4 mL/Hr) IV Continuous <Continuous>    MEDICATIONS  (PRN):  ALBUTerol    90 MICROgram(s) HFA Inhaler 2 Puff(s) Inhalation every 6 hours PRN Shortness of Breath and/or Wheezing      LABS:                          9.3    11.52 )-----------( 51       ( 2022 04:30 )             28.1         Mean Cell Volume : 72.2 fL  Mean Cell Hemoglobin : 23.9 pg  Mean Cell Hemoglobin Concentration : 33.1 g/dL  Auto Neutrophil # : 9.24 K/uL  Auto Lymphocyte # : 0.58 K/uL  Auto Monocyte # : 1.64 K/uL  Auto Eosinophil # : 0.00 K/uL  Auto Basophil # : 0.01 K/uL  Auto Neutrophil % : 80.3 %  Auto Lymphocyte % : 5.0 %  Auto Monocyte % : 14.2 %  Auto Eosinophil % : 0.0 %  Auto Basophil % : 0.1 %      Serial CBC's   @ 04:30  Hct-28.1 / Hgb-9.3 / Plat-51 / RBC-3.89 / WBC-11.52  Serial CBC's   @ 20:15  Hct-30.9 / Hgb-10.4 / Plat-54 / RBC-4.36 / WBC-11.29  Serial CBC's   @ 16:30  Hct-31.8 / Hgb-10.6 / Plat-60 / RBC-4.49 / WBC-12.53  Serial CBC's   @ 14:31  Hct--- / Hgb--- / Plat--- / RBC-4.56 / WBC---  Serial CBC's   @ 05:20  Hct-31.0 / Hgb-10.7 / Plat-62 / RBC-4.40 / WBC-11.99  Serial CBC's   @ 09:20  Hct-29.6 / Hgb-10.6 / Plat-48 / RBC-4.35 / WBC-6.56  Serial CBC's   @ 06:58  Hct-30.3 / Hgb-10.6 / Plat-48 / RBC-4.42 / WBC-5.82  Serial CBC's   @ 21:05  Hct-30.4 / Hgb-11.0 / Plat-46 / RBC-4.49 / WBC-5.07          129<L>  |  90<L>  |  74<HH>  ----------------------------<  62<L>  5.0   |  13<L>  |  3.8<H>    Ca    7.4<L>      2022 04:30  Mg     2.7         TPro  6.4  /  Alb  3.7  /  TBili  9.8<H>  /  DBili  x   /  AST  1018<H>  /  ALT  380<H>  /  AlkPhos  260<H>      PT/INR - ( 2022 04:30 )   PT: 24.20 sec;   INR: 2.12 ratio    PTT - ( 2022 04:30 )  PTT:35.7 sec    D-Dimer Assay, Quantitative: 192: Manufacturers recommended Cut off for VTE is 230 ng/ml D-DU ng/mL DDU (22 @ 16:30)    Fibrinogen Assay: 258.0 (22 @ 16:30)    Iron with Total Binding Capacity (22 @ 14:31)    Iron - Total Binding Capacity.: 361 ug/dL    % Saturation, Iron: 18 %    Iron Total, Serum: 65 ug/dL    Unsaturated Iron Binding Capacity: 296 ug/dL    Ferritin, Serum: 178 ng/mL (22 @ 05:20)  Vitamin B12, Serum: 2000 pg/mL (10.11.21 @ 03:57)  Folate, Serum: 8.2 ng/mL (10.11.21 @ 03:57)  Thyroid Stimulating Hormone, Serum: 1.99 uIU/mL (22 @ 16:28)    Reticulocyte Count (22 @ 14:31)    RBC Count: 4.56 M/uL    Reticulocyte Percent: 1.5 %    Absolute Reticulocytes: 69.3 K/uL    Lactate Dehydrogenase, Serum: 1692 U/L (22 @ 14:33)  Haptoglobin, Serum: <20: Test Repeated mg/dL (22 @ 14:33)  Dir Antiglob Polyspecific Interpretation: NEG (22 @ 14:12)      Urinalysis Basic - ( 2022 05:20 )  Color: Red / Appearance: Turbid / S.030 / pH: x  Gluc: x / Ketone: Trace  / Bili: Negative / Urobili: <2 mg/dL   Blood: x / Protein: >600 mg/dL / Nitrite: Negative   Leuk Esterase: Negative / RBC: >720 /HPF / WBC 2 /HPF   Sq Epi: x / Non Sq Epi: x / Bacteria: x    Culture - Blood (collected 2022 11:40)  Source: .Blood Blood  Preliminary Report (2022 23:01):    No growth to date.    Culture - Urine (collected 2022 22:30)  Source: Clean Catch Clean Catch (Midstream)  Final Report (2022 10:35):    No growth      BLOOD SMEAR INTERPRETATION:       RADIOLOGY & ADDITIONAL STUDIES:

## 2022-01-20 NOTE — DIETITIAN INITIAL EVALUATION ADULT. - PERTINENT LABORATORY DATA
(1/20) WBC 11.52, lactate 8.7, Na 129, K 5.0, Cl 90, CO2 13, BUN 74, creat 3.8, gluc 62, Mag 2.7  (1/19) Dbili 6.8 (1/20) WBC 11.52, lactate 8.7, Na 129, K 5.0, Cl 90, CO2 13, BUN 74, creat 3.8, gluc 62, Mag 2.7  (1/19) Dbili 6.8    CAPILLARY BLOOD GLUCOSE  POCT Blood Glucose.: 95 mg/dL (20 Jan 2022 08:14)  POCT Blood Glucose.: 63 mg/dL (20 Jan 2022 06:11)  POCT Blood Glucose.: 83 mg/dL (19 Jan 2022 20:33)  POCT Blood Glucose.: 85 mg/dL (19 Jan 2022 13:24)

## 2022-01-20 NOTE — PROGRESS NOTE ADULT - SUBJECTIVE AND OBJECTIVE BOX
Gastroenterology progress note:     Patient is a 76y old  Male who presents with a chief complaint of ACE, transaminitis, thrombocytopenia (20 Jan 2022 10:56)       Admitted on: 01-17-22    We are following the patient for: acute liver injury       Interval History:    pt was oliguric overnight. was started on CVVHD  - Diet - tube feeds  - last BM - 3-4 overnight  - Abdominal pain - none appreciated      PAST MEDICAL & SURGICAL HISTORY:  Coronary Artery Disease    Hypercholesteremia    Myocardial Infarction    Hypertension    Gout    Diabetes mellitus    Renal insufficiency    Chronic obstructive pulmonary disease    Peripheral vascular disease    S/P aortobifemoral bypass surgery    Sickle cell trait    AICD (automatic cardioverter/defibrillator) present    Abdominal Hernia    s/p Femoral-Popliteal Bypass Surgery        MEDICATIONS  (STANDING):  aspirin  chewable 81 milliGRAM(s) Oral daily  chlorhexidine 0.12% Liquid 15 milliLiter(s) Swish and Spit two times a day  chlorhexidine 4% Liquid 1 Application(s) Topical <User Schedule>  CRRT Treatment    <Continuous>  CRRT Treatment    <Continuous>  DOBUTamine Infusion 5 MICROgram(s)/kG/Min (19.1 mL/Hr) IV Continuous <Continuous>  fentaNYL   Infusion. 0.5 MICROgram(s)/kG/Hr (6.36 mL/Hr) IV Continuous <Continuous>  hydrocortisone sodium succinate Injectable 100 milliGRAM(s) IV Push every 8 hours  memantine 5 milliGRAM(s) Oral at bedtime  meropenem  IVPB 500 milliGRAM(s) IV Intermittent every 12 hours  norepinephrine Infusion 0.05 MICROgram(s)/kG/Min (5.96 mL/Hr) IV Continuous <Continuous>  pantoprazole   Suspension 40 milliGRAM(s) Oral daily  PureFlow Dialysate RFP-400 (K 2 / Ca 3) 5000 milliLiter(s) (2000 mL/Hr) CRRT <Continuous>  PureFlow Dialysate RFP-400 (K 2 / Ca 3) 5000 milliLiter(s) (2000 mL/Hr) CRRT <Continuous>  senna 2 Tablet(s) Oral at bedtime  sodium bicarbonate 1300 milliGRAM(s) Oral three times a day  vasopressin Infusion 0.04 Unit(s)/Min (2.4 mL/Hr) IV Continuous <Continuous>    MEDICATIONS  (PRN):  ALBUTerol    90 MICROgram(s) HFA Inhaler 2 Puff(s) Inhalation every 6 hours PRN Shortness of Breath and/or Wheezing      Allergies  No Known Drug Allergies  shellfish (Other; Urticaria)      Review of Systems:   unable to obtain    Physical Examination:  T(C): 33.8 (01-20-22 @ 08:00), Max: 35.8 (01-19-22 @ 12:00)  HR: 54 (01-20-22 @ 11:30) (46 - 80)  BP: 92/54 (01-19-22 @ 13:15) (69/42 - 102/49)  RR: 24 (01-20-22 @ 11:30) (0 - 29)  SpO2: 92% (01-20-22 @ 11:30) (88% - 100%)      01-19-22 @ 07:01  -  01-20-22 @ 07:00  --------------------------------------------------------  IN: 6005.5 mL / OUT: 519 mL / NET: 5486.5 mL    01-20-22 @ 07:01  -  01-20-22 @ 11:42  --------------------------------------------------------  IN: 566.3 mL / OUT: 601 mL / NET: -34.7 mL        GENERAL: intubated and sedated  HEAD:  Atraumatic, Normocephalic  EYES: conjunctiva and sclera clear  NECK: Supple, no JVD or thyromegaly  CHEST/LUNG: Clear to auscultation bilaterally; No wheeze, rhonchi, or rales  HEART: Regular rate and rhythm; normal S1, S2, No murmurs.  ABDOMEN: Soft, nontender, distended; Bowel sounds present  NEUROLOGY: No asterixis or tremor.   SKIN: Intact,      Data:                        9.3    11.52 )-----------( 51       ( 20 Jan 2022 04:30 )             28.1     Hgb trend:  9.3  01-20-22 @ 04:30  10.4  01-19-22 @ 20:15  10.6  01-19-22 @ 16:30  10.7  01-19-22 @ 05:20  10.6  01-18-22 @ 09:20  10.6  01-18-22 @ 06:58  11.0  01-17-22 @ 21:05      01-19-22 @ 07:01  -  01-20-22 @ 07:00  --------------------------------------------------------  IN: 1473 mL      01-20    129<L>  |  90<L>  |  74<HH>  ----------------------------<  62<L>  5.0   |  13<L>  |  3.8<H>    Ca    7.4<L>      20 Jan 2022 04:30  Mg     2.7     01-20    TPro  6.4  /  Alb  3.7  /  TBili  9.8<H>  /  DBili  x   /  AST  1018<H>  /  ALT  380<H>  /  AlkPhos  260<H>  01-20    Liver panel trend:  TBili 9.8   /   AST 1018   /      /   AlkP 260   /   Tptn 6.4   /   Alb 3.7    /   DBili --      01-20  TBili 9.6   /   AST 1013   /      /   AlkP 277   /   Tptn 6.4   /   Alb 3.5    /   DBili --      01-19  TBili 9.9   /   AST --   /   ALT --   /   AlkP --   /   Tptn --   /   Alb --    /   DBili 6.8      01-19  TBili 9.2   /      /      /   AlkP 291   /   Tptn 6.5   /   Alb 3.4    /   DBili --      01-19  TBili 7.7   /      /      /   AlkP 290   /   Tptn 6.2   /   Alb 3.3    /   DBili --      01-18  TBili 7.1   /      /      /   AlkP 298   /   Tptn 6.2   /   Alb 3.3    /   DBili --      01-18  TBili 7.8   /      /      /   AlkP 287   /   Tptn 6.3   /   Alb 3.3    /   DBili --      01-17      PT/INR - ( 20 Jan 2022 04:30 )   PT: 24.20 sec;   INR: 2.12 ratio         PTT - ( 20 Jan 2022 04:30 )  PTT:35.7 sec    Culture - Blood (collected 18 Jan 2022 11:40)  Source: .Blood Blood  Preliminary Report (19 Jan 2022 23:01):    No growth to date.    Culture - Urine (collected 17 Jan 2022 22:30)  Source: Clean Catch Clean Catch (Midstream)  Final Report (19 Jan 2022 10:35):    No growth         Radiology:    US Abdomen Upper Quadrant Right:   ACC: [id number] EXAM:  US ABDOMEN RT UPR QUADRANT                          PROCEDURE DATE:  01/18/2022          INTERPRETATION:  CLINICAL INFORMATION: Elevated liver function enzymes.    COMPARISON: CT scan dated earlier the same day    TECHNIQUE: Sonography of the right upper quadrant.    FINDINGS:    Liver: Within normal limits.  Bile ducts: Normal caliber. Common bile duct measures 4 mm.  Gallbladder: The gallbladder is not distended.  Pancreas: Poorly visualized.  Right kidney: 12 cm. No hydronephrosis.  Ascites: Mild ascites is noted.  IVC: Visualized portions are within normal limits.    IMPRESSION:    Nondistended gallbladder. Ascites. Unchanged from CT scan done earlier in   the same day.        --- End of Report ---            CHAVEZ RIOS MD; Attending Interventional Radiologist  This document has been electronically signed. Jan 19 2022  7:21AM (01-18-22 @ 22:16)

## 2022-01-20 NOTE — PROGRESS NOTE ADULT - ASSESSMENT
Mr. Berg is a 76M with a past medical history as described above who presented to the hospital for evaluation of weakness and slurred speech. The patient is encephalopathic upon my examination. Per family, patient has been having difficulty ambulating and speaking clearly for the past 5-6 days. He was in his usual state of health prior. They were concerned given his mental status and transported him to the ED.     #Acute on Chronic Liver Injury - mixed, likely 2/2 Congestive Hepatopathy  #Hypotensive Shock (Sepsis vs Cardiac related)  - Intubated and sedated  - multi-organ failure (ACE, ALI, AMS with elevated ammonia)  - LFTs 10/21: 2.7/123/43/32  - LFTS on admission: 7.8/287/417/183  - ACE due to ATN  - Trend LFT, coagulation profile qdaily  - RUQ sono with Doppler: Liver: Within normal limits. Nondistended gallbladder. Ascites.   - avoid hepatotoxic medications  - f/u DIC panel - negative  - Hepatology recs appreciated  - Metabolic Encephalopathy, Ammonia level 107; on Lactulose 20mg q4 and target 2-3bms daily  - CT head and EEG negative    #H/O HFrEF  #Hypotensive shock  - Left ventricular ejection fraction, by visual estimation, is <20%  - currently on pressors (Levo and vaso)  - Multiple organ failure  - cardiology recs appreciated  - Check CVP, CO and SVR (can draw VBG from IJ central line)  - Continue pressor support, monitor lactic acid  - Continue CVVH  - LR 75 cc/hr  - Hold Beta blockers  - Trend lactate    #Thrombocytopenia  #Microcytic anemia  #Coagulopathy with elevated D-dimer and normal fibrinogen  - Heme/onc recs appreciated  - peripheral smear: low plt count, crenated rbcs, occasional schistocytes  - F/u HIV; Viral panel neg for Hepatitis  - Treat underlying infection/critical illness  - Hemolytic panel: Haptoglobin <20, LDH 1692; Total bilirubin: 9.8, indirect 3.1    #Septic shock cryptogenic with GI/ possibiliites  #Unlikely  as no pyuria but has hematuria  - Significant transaminitis with hyperbilirubinemia  - f/u blood cultures   - DIC negative  - ID recs appreciated, recommending Meropenem 500mg iv q12h  - thrombocytopenia likely 2/2 sepsis    #ACE 2/2 ATN 2/2 hypotensive shock  - nephrology consult appreciated    Dispo: MICU  Diet: OG feeding after intubation  DVT PPx: not needed; Hypocoaguable  Code: Full   Mr. Berg is a 76M with a past medical history as described above who presented to the hospital for evaluation of weakness and slurred speech. The patient is encephalopathic upon my examination. Per family, patient has been having difficulty ambulating and speaking clearly for the past 5-6 days. He was in his usual state of health prior. They were concerned given his mental status and transported him to the ED.     #Acute on Chronic Liver Injury - mixed, likely 2/2 Congestive Hepatopathy  #Hypotensive Shock (Sepsis vs Cardiac related)  - Intubated and sedated  - multi-organ failure (ACE, ALI, AMS with elevated ammonia)  - LFTs 10/21: 2.7/123/43/32  - LFTS on admission: 7.8/287/417/183  - ACE due to ATN  - Trend LFT, coagulation profile qdaily  - RUQ sono with Doppler: Liver: Within normal limits. Nondistended gallbladder. Ascites.   - avoid hepatotoxic medications  - f/u DIC panel - negative  - Hepatology recs appreciated  - Metabolic Encephalopathy, Ammonia level 107; on Lactulose 20mg q4 and target 2-3bms daily  - CT head and EEG negative    #H/O HFrEF  #Hypotensive shock  - Left ventricular ejection fraction, by visual estimation, is <20%  - currently on pressors (Levo and vaso)  - Multiple organ failure  - cardiology recs appreciated  - EPS called to increase rate of ICD (currently set at 50 BPM)   - Check CVP, CO and SVR (can draw VBG from IJ central line)  - Continue pressor support, monitor lactic acid  - Continue CVVH  - LR 75 cc/hr  - Hold Beta blockers  - Trend lactate    #Thrombocytopenia  #Microcytic anemia  #Coagulopathy with elevated D-dimer and normal fibrinogen  - Heme/onc recs appreciated  - peripheral smear: low plt count, crenated rbcs, occasional schistocytes  - F/u HIV; Viral panel neg for Hepatitis  - Treat underlying infection/critical illness  - Hemolytic panel: Haptoglobin <20, LDH 1692; Total bilirubin: 9.8, indirect 3.1    #Septic shock cryptogenic with GI/ possibiliites  #Unlikely  as no pyuria but has hematuria  - Significant transaminitis with hyperbilirubinemia  - f/u blood cultures   - DIC negative  - ID recs appreciated, recommending Meropenem 500mg iv q12h  - thrombocytopenia likely 2/2 sepsis    #ACE 2/2 ATN 2/2 hypotensive shock  - nephrology consult appreciated  - Started on CVVHD    Dispo: MICU  Diet: OG feeding   DVT PPx: not needed; Hypocoaguable  Code: Full   Mr. Berg is a 76M with a past medical history as described above who presented to the hospital for evaluation of weakness and slurred speech. The patient is encephalopathic upon my examination. Per family, patient has been having difficulty ambulating and speaking clearly for the past 5-6 days. He was in his usual state of health prior. They were concerned given his mental status and transported him to the ED.     #Acute on Chronic Liver Injury - mixed, likely 2/2 Congestive Hepatopathy  #Hypotensive Shock (Sepsis vs Cardiac related)  - Intubated and sedated  - multi-organ failure (ACE, ALI, AMS with elevated ammonia)  - LFTs 10/21: 2.7/123/43/32  - LFTS on admission: 7.8/287/417/183  - ACE due to ATN  - Trend LFT, coagulation profile qdaily  - RUQ sono with Doppler: Liver: Within normal limits. Nondistended gallbladder. Ascites.   - avoid hepatotoxic medications  - f/u DIC panel - negative  - Hepatology recs appreciated  - Metabolic Encephalopathy, Ammonia level 107; on Lactulose 20mg q4 and target 2-3bms daily  - CT head and EEG negative    #H/O HFrEF  #Hypotensive shock  - Left ventricular ejection fraction, by visual estimation, is <20%  - currently on pressors (Levo and vaso)  - Multiple organ failure  - cardiology recs appreciated  - EPS called to increase rate of ICD (currently set at 50 BPM)   - CVP 27, CO and SVR (can draw VBG from IJ central line)  - Continue pressor support, monitor lactic acid  - Continue CVVH  - LR 75 cc/hr  - Hold Beta blockers  - Trend lactate    #Thrombocytopenia  #Microcytic anemia  #Coagulopathy with elevated D-dimer and normal fibrinogen  - Heme/onc recs appreciated  - peripheral smear: low plt count, crenated rbcs, occasional schistocytes  - F/u HIV; Viral panel neg for Hepatitis  - Treat underlying infection/critical illness  - Hemolytic panel: Haptoglobin <20, LDH 1692; Total bilirubin: 9.8, indirect 3.1    #Septic shock cryptogenic with GI/ possibiliites  #Unlikely  as no pyuria but has hematuria  - Significant transaminitis with hyperbilirubinemia  - f/u blood cultures   - DIC negative  - ID recs appreciated, recommending Meropenem 500mg iv q12h  - thrombocytopenia likely 2/2 sepsis    #ACE 2/2 ATN 2/2 hypotensive shock  - nephrology consult appreciated  - Started on CVVHD    Dispo: MICU  Diet: OG feeding   DVT PPx: not needed; Hypocoaguable  Code: Full

## 2022-01-20 NOTE — DIETITIAN INITIAL EVALUATION ADULT. - ALTERNATE MEANS OF NUTRITION
Cont with trickle feeds given pressor needs and distended abdomen. No indications for Glucerna formula given low BG. High-fat formulas not warranted given elevated Dbili. Predigested formula to aid tolerance. Pt with high protein needs -> Vital HP to maximize protein provision.

## 2022-01-20 NOTE — PROCEDURE NOTE - NSINFORMCONSENT_GEN_A_CORE
Benefits, risks, and possible complications of procedure explained to patient/caregiver who verbalized understanding and gave written consent.
Benefits, risks, and possible complications of procedure explained to patient/caregiver who verbalized understanding and gave written consent.
This was an emergent procedure.

## 2022-01-20 NOTE — DIETITIAN INITIAL EVALUATION ADULT. - ADD RECOMMEND
mvi;  aspiration precautions;  daily weights Serial abdominal exams (re: distension);  keep MAPs >65 to ensure perfusion to gut;  MVI to help meet RDIs;  aspiration precautions;  daily weights

## 2022-01-20 NOTE — PROGRESS NOTE ADULT - ASSESSMENT
IMPRESSION:    AMS likely toxic metabolic  Sepsis POA  HO PE and retroperitoneal bleed  HO HFREF  ACE on CVVHD   ALI   Thrombocytopenia with coagulopathy   Right sided nodule to be followed   Probable CLAIR     PLAN:    CNS:  SAT.       HEENT: Oral care    PULMONARY:  HOB @ 45 degrees.  Aspiration precautions.  RR 24.  PEEP 8.  Wean O2.  DTA     CARDIOVASCULAR:  ECHO noted.  Avoid volume overload.  Hold Beta blockers.  Trend lactate.  EPS to increase rate    GI: GI prophylaxis.  OG feeding after intubation.  Bowel regimen.  Lactulose for 2 BMs per 24 hours.         RENAL:  Follow up lytes.  Correct as needed.  CVVHD even       INFECTIOUS DISEASE: Follow up cultures. ABX per ID     HEMATOLOGICAL:  DVT prophylaxis.  Repeat CBC.  Hem onc FU.  LE duplex noted.     ENDOCRINE:  Follow up FS.  Insulin protocol if needed.   mg Q8.        MUSCULOSKELETAL:  Bed rest     MICU     Prognosis guarded.

## 2022-01-20 NOTE — PROCEDURE NOTE - NSICDXPROCEDURE_GEN_ALL_CORE_FT
PROCEDURES:  Insertion, arterial line, percutaneous 19-Jan-2022 13:14:22  Patria Maciel  
PROCEDURES:  Insertion of temporary dialysis catheter 20-Jan-2022 03:45:10  Wander Martinez

## 2022-01-20 NOTE — PROCEDURE NOTE - NSPROCDETAILS_GEN_ALL_CORE
guidewire recovered/lumen(s) aspirated and flushed/sterile dressing applied/sterile technique, catheter placed/ultrasound guidance with use of sterile gel and probe cove
location identified, draped/prepped, sterile technique used, needle inserted/introduced/positive blood return obtained via catheter/connected to a pressurized flush line/sutured in place/hemostasis with direct pressure, dressing applied/all materials/supplies accounted for at end of procedure

## 2022-01-20 NOTE — CHART NOTE - NSCHARTNOTEFT_GEN_A_CORE
Called by ICU team, pt continues having minimal urine output (0-5cc/hr), on 2 pressors, increasing O2 requirements.   D/w brother Shadi who makes medical decisions for patient, consented to RRT.   [place] being placed by surgery team (required FFP prior to udall insertion d/t elevated INR)  Hemodynamically unstable, will require continuous RRT.   CVVHD orders placed, start net even, d/c if worsening hemodynamics.  Monitor electrolytes qshift, monitor phos level daily while on CVVHD.    D/w ICU team.

## 2022-01-20 NOTE — PROGRESS NOTE ADULT - ASSESSMENT
76 year old  male admitted with slurred speech and weakness.  Hx of DM CAD AICD  HFrEF AICD PAD HTN COPD Hx of DVT on DOAC which was not taking.    ·	ACE due to ATN/ hypotension shock / transaminitis / acidosis as evidenced by low bicarb / resp failure   ·	started on cvvhd , will continue no loss , no gain   ·	on 2 pressors   ·	start d5 with 3 amp of bicarbonate at 150 cc/h , follow ABG   ·	hematology notes appreciated   ·	check daily ph   ·	ALEJANDRO noted/ check DNA/ c3/ c4   ·	sodium level stable   ·	will follow

## 2022-01-21 NOTE — PROGRESS NOTE ADULT - SUBJECTIVE AND OBJECTIVE BOX
Patient is a 76y old  Male who presents with a chief complaint of ACE, transaminitis, thrombocytopenia (21 Jan 2022 08:53)      INTERVAL HPI/OVERNIGHT EVENTS:   Patient's blood pressure kept dropping and Epinephrine was started.    ICU Vital Signs Last 24 Hrs  T(C): 34.4 (21 Jan 2022 08:00), Max: 34.6 (21 Jan 2022 00:00)  T(F): 94 (21 Jan 2022 08:00), Max: 94.2 (21 Jan 2022 00:00)  HR: 92 (21 Jan 2022 09:45) (52 - 96)  BP: --  BP(mean): --  ABP: 82/60 (21 Jan 2022 09:45) (74/54 - 112/66)  ABP(mean): 70 (21 Jan 2022 09:45) (60 - 82)  RR: 25 (21 Jan 2022 08:00) (12 - 25)  SpO2: 97% (21 Jan 2022 09:45) (66% - 100%)    I&O's Summary    20 Jan 2022 07:01  -  21 Jan 2022 07:00  --------------------------------------------------------  IN: 6618 mL / OUT: 4043 mL / NET: 2575 mL    21 Jan 2022 07:01  -  21 Jan 2022 10:05  --------------------------------------------------------  IN: 683.6 mL / OUT: 729 mL / NET: -45.4 mL      Mode: AC/ CMV (Assist Control/ Continuous Mandatory Ventilation)  RR (machine): 24  TV (machine): 450  FiO2: 60  PEEP: 8  ITime: 1  MAP: 13  PIP: 23      LABS:                        9.9    11.09 )-----------( 46       ( 21 Jan 2022 04:30 )             30.7     01-21    133<L>  |  97<L>  |  50<H>  ----------------------------<  35<LL>  5.4<H>   |  11<L>  |  2.9<H>    Ca    6.9<L>      21 Jan 2022 04:30  Mg     2.3     01-21    TPro  6.2  /  Alb  3.5  /  TBili  13.6<H>  /  DBili  x   /  AST  1489<H>  /  ALT  520<H>  /  AlkPhos  312<H>  01-21    PT/INR - ( 21 Jan 2022 04:30 )   PT: >40.00 sec;   INR: 3.59 ratio         PTT - ( 21 Jan 2022 04:30 )  PTT:39.9 sec    CAPILLARY BLOOD GLUCOSE      POCT Blood Glucose.: 83 mg/dL (21 Jan 2022 06:37)  POCT Blood Glucose.: 33 mg/dL (21 Jan 2022 05:39)  POCT Blood Glucose.: 83 mg/dL (20 Jan 2022 15:35)    ABG - ( 21 Jan 2022 03:09 )  pH, Arterial: 7.15  pH, Blood: x     /  pCO2: 36    /  pO2: 115   / HCO3: 12    / Base Excess: -15.5 /  SaO2: 98.8                RADIOLOGY & ADDITIONAL TESTS:    Consultant(s) Notes Reviewed:  [x ] YES  [ ] NO    MEDICATIONS  (STANDING):  aspirin  chewable 81 milliGRAM(s) Oral daily  chlorhexidine 0.12% Liquid 15 milliLiter(s) Swish and Spit two times a day  chlorhexidine 4% Liquid 1 Application(s) Topical <User Schedule>  CRRT Treatment    <Continuous>  DOBUTamine Infusion 10 MICROgram(s)/kG/Min (19.1 mL/Hr) IV Continuous <Continuous>  EPINEPHrine    Infusion 0.03 MICROgram(s)/kG/Min (7.16 mL/Hr) IV Continuous <Continuous>  fentaNYL   Infusion. 0.5 MICROgram(s)/kG/Hr (6.36 mL/Hr) IV Continuous <Continuous>  hydrocortisone sodium succinate Injectable 100 milliGRAM(s) IV Push every 8 hours  lactulose Syrup 20 Gram(s) Oral every 8 hours  memantine 5 milliGRAM(s) Oral at bedtime  meropenem  IVPB 500 milliGRAM(s) IV Intermittent every 12 hours  norepinephrine Infusion 0.05 MICROgram(s)/kG/Min (5.96 mL/Hr) IV Continuous <Continuous>  pantoprazole   Suspension 40 milliGRAM(s) Oral daily  polyethylene glycol 3350 17 Gram(s) Oral daily  PureFlow Dialysate RFP-400 (K 2 / Ca 3) 5000 milliLiter(s) (2000 mL/Hr) CRRT <Continuous>  senna 2 Tablet(s) Oral at bedtime  sodium bicarbonate  Infusion 0.118 mEq/kG/Hr (100 mL/Hr) IV Continuous <Continuous>  vasopressin Infusion 0.04 Unit(s)/Min (2.4 mL/Hr) IV Continuous <Continuous>    MEDICATIONS  (PRN):  ALBUTerol    90 MICROgram(s) HFA Inhaler 2 Puff(s) Inhalation every 6 hours PRN Shortness of Breath and/or Wheezing      PHYSICAL EXAM:  GENERAL: Intubated and Obtunded   HEAD:  Atraumatic, Normocephalic  EYES: Scleral Icterus  NERVOUS SYSTEM: Obtunded  CHEST/LUNG: B/L good air entry; No rales, rhonchi, or wheezing  HEART: S1S2 normal, Regular rate and rhythm; No murmurs  ABDOMEN: Soft, Nontender, Grossly distended; Bowel sounds present  EXTREMITIES: +1 pitting edema  LYMPH: No lymphadenopathy noted    Care Discussed with Consultants/Other Providers [ x] YES  [ ] NO

## 2022-01-21 NOTE — CHART NOTE - NSCHARTNOTEFT_GEN_A_CORE
Device: [place] DC ICD    FOLLOW UP    Poor prognosis, No improvement with  changes. Currently RV pacing 100%.     Mode: Changed back to original settings DDI 50 BPM and increased paced AV delay to 350 ms  Dependent: NO  Underlying Rhythm: SR 90s    Events: None    Recommendations: Please call EPS with any questions, [phone]  Discussed with Dr. Ceron    EPS [phone].

## 2022-01-21 NOTE — PROGRESS NOTE ADULT - ASSESSMENT
76 year old  male admitted with slurred speech and weakness.  Hx of DM CAD AICD  HFrEF AICD PAD HTN COPD Hx of DVT on DOAC which was not taking.    ·	ACE due to ATN/ hypotension shock / transaminitis / acidosis as evidenced by low bicarb / resp failure   ·	started on cvvhd , will continue no loss , no gain   ·	on 2 pressors   ·	cont d5 with 3 amp of bicarbonate at 150 cc/h , follow ABG   ·	hematology notes appreciated   ·	check daily ph   ·	ALEJANDRO noted/ check DNA- C3 low C4 wnl doubt active lupus could be IRGN but cultures are negative   ·	sodium level stable   ·	will follow

## 2022-01-21 NOTE — PROGRESS NOTE ADULT - SUBJECTIVE AND OBJECTIVE BOX
DIAGNOSIS:   HOSPITAL DAY #:    STATUS POST:    POST OPERATIVE DAY #:     Vital Signs Last 24 Hrs  T(C): 34.8 (21 Jan 2022 20:00), Max: 35.3 (21 Jan 2022 16:00)  T(F): 94.7 (21 Jan 2022 20:00), Max: 95.6 (21 Jan 2022 16:00)  HR: 78 (21 Jan 2022 21:00) (78 - 96)  BP: --  BP(mean): --  RR: 30 (21 Jan 2022 21:00) (20 - 30)  SpO2: 96% (21 Jan 2022 21:00) (87% - 99%)    SUBJECTIVE: Pt seen    Pain: YES  [ ]   NO [ ]   Nausea: [ ] YES [ ] NO           Vomiting: [ ] YES [ ] NO  Flatus: [ ] YES [ ] NO             Bowel Movement: [ ] YES [ ] NO     Void: [ ]YES [ ]No      TAMIE DRAINAGE: SIGNIFICANT [ ]   NOT SIGNIFICANT [ ]   NOT APPLICABLE [ ]  YES [ ] NO    General Appearance: Appears well, NAD  Neck: Supple  Chest: Equal expansion bilaterally, equal breath sounds  CV: Pulse regular presently  Abdomen: Soft [ ] YES [ ]NO  DISTENDED [ ] YES [ ] NO TENDERNESS [ ]YES [ ]NO  INCISIONS: HEALING WELL [ ] YES  [ ] NO ERYTHEMA [ ] YES [ ] NO DRAINAGE [ ] YES  [ ] NO  Extremities: Grossly symmetric, CALF TENDERNESS [ ] YES  [ ] NO  udall site ok    LABS:                        9.9    11.09 )-----------( 46       ( 21 Jan 2022 04:30 )             30.7     01-21    133<L>  |  97<L>  |  50<H>  ----------------------------<  35<LL>  5.4<H>   |  11<L>  |  2.9<H>    Ca    6.9<L>      21 Jan 2022 04:30  Mg     2.3     01-21    TPro  6.2  /  Alb  3.5  /  TBili  13.6<H>  /  DBili  x   /  AST  1489<H>  /  ALT  520<H>  /  AlkPhos  312<H>  01-21    PT/INR - ( 21 Jan 2022 04:30 )   PT: >40.00 sec;   INR: 3.59 ratio         PTT - ( 21 Jan 2022 04:30 )  PTT:39.9 sec        ASSESSMENT: renal and medical follow up noted    GOOD POST OP COURSE [ ]  YES  [ ] NO  CONDITION IMPROVING  []  YES  [ ]  NO          PLAN:    CONTINUE PRESENT MANAGEMENT  [ ] YES  [ ] NO

## 2022-01-21 NOTE — CHART NOTE - NSCHARTNOTEFT_GEN_A_CORE
Updated patient's sister Gabriela regarding patient's condition and interventions. Explained that the patient has a very poor prognosis.

## 2022-01-21 NOTE — PROGRESS NOTE ADULT - ASSESSMENT
76 y.o male patient with extensive cardiac and PMH presented for slurred speech and altered mental status;    #Acute on chronic Overt Liver Failure  #Cardiac cirrhosis  #AHRF/ARDS s/p Intubation  #Shock of unclear etiology - sepsis vs cardiac? on Levo and Vaso  #ACE possible ATN on CVVHD  - LFTs 10/21: 2.7/123/43/32  - LFTS on admission: 7.8/287/417/183  - Medications reviewed  - worsening coagulopathy and hypoglycemia  - NH3 - 107  - CLD wokrup thus far - utox/stox negative. tylenol wnl  - Echo < from: TTE Echo Complete w/o Contrast w/ Doppler (01.19.22 @ 09:26) >   1. Left ventricular ejection fraction, by visual estimation, is <20%.   2. Severely decreased global left ventricular systolic function.   3. Multiple left ventricular regional wall motion abnormalities exist.   See wall motion findings.   4. Spectral Doppler shows impaired relaxation pattern of left   ventricular myocardial filling (Grade I diastolic dysfunction).   5. Moderately enlarged right ventricle.   6. Mildly enlarged left atrium.   7. Mildly enlarged right atrium.   8. Mild mitral valve regurgitation.   9. Moderate tricuspid regurgitation.  10. Mild aortic regurgitation.  11. The right atrial pressure is moderately elevated.    < end of copied text >      Rec  - c/w  lactulose 20mg and target 2-3bms daily  - Please obtain LFT, coagulation profile qdaily  - pleas f/u cld workup  - Please avoid hepatotoxic medications  - Target negative balance  - Target MAP >70  - Poor prognosis. GOC discussion

## 2022-01-21 NOTE — PROGRESS NOTE ADULT - ASSESSMENT
IMPRESSION:  Acute hypoxemic respiratory failure   AMS likely toxic metabolic  Sepsis POA  Septic / Cardiogenic shock   HO PE and retroperitoneal bleed  HO HFREF  ACE on CVVHD   ALI   Thrombocytopenia with coagulopathy   Right sided nodule to be followed   Probable CLAIR     PLAN:    CNS:  SAT.       HEENT: Oral care    PULMONARY:  HOB @ 45 degrees.  Aspiration precautions.  RR 28.  PEEP 8.  Wean O2.  FU DTA     CARDIOVASCULAR:   Avoid volume overload.  Trend lactate.  EPS FU Appreciated.  Wean Epi.   NaHCO3 drip    GI: GI prophylaxis.  OG feeding Bowel regimen.  Lactulose for 2 BMs per 24 hours.         RENAL:  Follow up lytes.  Correct as needed.  CVVHD even.        INFECTIOUS DISEASE: Follow up cultures. ABX per ID     HEMATOLOGICAL:  DVT prophylaxis.  Repeat CBC.     ENDOCRINE:  Follow up FS.  Insulin protocol if needed.   mg Q8.        MUSCULOSKELETAL:  Bed rest     MICU     Prognosis guarded.

## 2022-01-21 NOTE — PROGRESS NOTE ADULT - SUBJECTIVE AND OBJECTIVE BOX
Patient is a 76y old  Male who presents with a chief complaint of ACE, transaminitis, thrombocytopenia (20 Jan 2022 11:42)        Over Night Events:  Remains critically ill on MV.  Sedated.  on SAT.  On Levophed, Vaso, Dobutamine and Epi.          ROS:     All ROS are negative except HPI         PHYSICAL EXAM    ICU Vital Signs Last 24 Hrs  T(C): 34.6 (21 Jan 2022 04:00), Max: 34.6 (21 Jan 2022 00:00)  T(F): 94.2 (21 Jan 2022 04:00), Max: 94.2 (21 Jan 2022 00:00)  HR: 92 (21 Jan 2022 07:15) (50 - 96)  BP: --  BP(mean): --  ABP: 92/64 (21 Jan 2022 07:15) (74/54 - 112/66)  ABP(mean): 74 (21 Jan 2022 07:15) (60 - 82)  RR: 24 (21 Jan 2022 07:00) (12 - 24)  SpO2: 96% (21 Jan 2022 07:15) (66% - 100%)      CONSTITUTIONAL:  Ill appearing in  NAD    ENT:   Airway patent,   Mouth with normal mucosa.   No thrush    EYES:   Pupils equal,   Round and reactive to light.    CARDIAC:   Normal rate,   Regular rhythm.     edema      Vascular:  Normal systolic impulse  No Carotid bruits    RESPIRATORY:   No wheezing  Bilateral BS  Normal chest expansion  Not tachypneic,  No use of accessory muscles    GASTROINTESTINAL:  Abdomen soft,   Non-tender,   No guarding,   + BS    MUSCULOSKELETAL:   Range of motion is not limited,  No clubbing, cyanosis    NEUROLOGICAL:   Arousable,  Follows simple commands     SKIN:   Skin normal color for race,   Warm and dry   No evidence of rash.    PSYCHIATRIC:   No apparent risk to self or others.    HEMATOLOGICAL:  No cervical  lymphadenopathy.  no inguinal lymphadenopathy      01-20-22 @ 07:01  -  01-21-22 @ 07:00  --------------------------------------------------------  IN:    DOBUTamine: 9.5 mL    DOBUTamine: 14.3 mL    DOBUTamine: 76.4 mL    DOBUTamine: 286.5 mL    Enteral Tube Flush: 40 mL    EPINEPHrine: 147.8 mL    FentaNYL: 246.5 mL    Free Water: 120 mL    Glucerna: 80 mL    IV PiggyBack: 350 mL    Norepinephrine: 4789.4 mL    Vasopressin: 57.6 mL    Vital High Protein: 400 mL  Total IN: 6618 mL    OUT:    Indwelling Catheter - Urethral (mL): 5 mL    Other (mL): 4038 mL  Total OUT: 4043 mL    Total NET: 2575 mL      01-21-22 @ 07:01  -  01-21-22 @ 08:09  --------------------------------------------------------  IN:    DOBUTamine: 19.1 mL    EPINEPHrine: 16.7 mL    Norepinephrine: 298 mL    Vasopressin: 2.4 mL    Vital High Protein: 20 mL  Total IN: 356.2 mL    OUT:    FentaNYL: 0 mL    Indwelling Catheter - Urethral (mL): 0 mL  Total OUT: 0 mL    Total NET: 356.2 mL          LABS:                            9.9    11.09 )-----------( 46       ( 21 Jan 2022 04:30 )             30.7                         9.9    11.09 )-----------( 46       ( 01-21 @ 04:30 )             30.7                9.3    11.52 )-----------( 51       ( 01-20 @ 04:30 )             28.1                10.4   11.29 )-----------( 54       ( 01-19 @ 20:15 )             30.9                10.6   12.53 )-----------( 60       ( 01-19 @ 16:30 )             31.8                10.7   11.99 )-----------( 62       ( 01-19 @ 05:20 )             31.0                10.6   6.56  )-----------( 48       ( 01-18 @ 09:20 )             29.6                                          01-21    133<L>  |  97<L>  |  50<H>  ----------------------------<  35<LL>  5.4<H>   |  11<L>  |  2.9<H>    Ca    6.9<L>      21 Jan 2022 04:30  Mg     2.3     01-21    TPro  6.2  /  Alb  3.5  /  TBili  13.6<H>  /  DBili  x   /  AST  1489<H>  /  ALT  520<H>  /  AlkPhos  312<H>  01-21      PT/INR - ( 21 Jan 2022 04:30 )   PT: >40.00 sec;   INR: 3.59 ratio         PTT - ( 21 Jan 2022 04:30 )  PTT:39.9 sec                                                                                     LIVER FUNCTIONS - ( 21 Jan 2022 04:30 )  Alb: 3.5 g/dL / Pro: 6.2 g/dL / ALK PHOS: 312 U/L / ALT: 520 U/L / AST: 1489 U/L / GGT: x                                                  Culture - Sputum (collected 20 Jan 2022 11:25)  Source: .Sputum Sputum  Gram Stain (20 Jan 2022 22:18):    Moderate polymorphonuclear leukocytes per low power field    No squamous epithelial cells per low power field    Few Gram positive cocci in pairs per oil power field    Culture - Blood (collected 19 Jan 2022 21:33)  Source: .Blood Blood  Preliminary Report (21 Jan 2022 02:01):    No growth to date.    Culture - Blood (collected 18 Jan 2022 11:40)  Source: .Blood Blood  Preliminary Report (19 Jan 2022 23:01):    No growth to date.                                                   Mode: AC/ CMV (Assist Control/ Continuous Mandatory Ventilation)  RR (machine): 24  TV (machine): 450  FiO2: 60  PEEP: 8  ITime: 1  MAP: 13  PIP: 23                                      ABG - ( 21 Jan 2022 03:09 )  pH, Arterial: 7.15  pH, Blood: x     /  pCO2: 36    /  pO2: 115   / HCO3: 12    / Base Excess: -15.5 /  SaO2: 98.8  Lac 9.                MEDICATIONS  (STANDING):  aspirin  chewable 81 milliGRAM(s) Oral daily  chlorhexidine 0.12% Liquid 15 milliLiter(s) Swish and Spit two times a day  chlorhexidine 4% Liquid 1 Application(s) Topical <User Schedule>  CRRT Treatment    <Continuous>  DOBUTamine Infusion 10 MICROgram(s)/kG/Min (19.1 mL/Hr) IV Continuous <Continuous>  EPINEPHrine    Infusion 0.03 MICROgram(s)/kG/Min (7.16 mL/Hr) IV Continuous <Continuous>  fentaNYL   Infusion. 0.5 MICROgram(s)/kG/Hr (6.36 mL/Hr) IV Continuous <Continuous>  hydrocortisone sodium succinate Injectable 100 milliGRAM(s) IV Push every 8 hours  lactulose Syrup 10 Gram(s) Oral every 8 hours  memantine 5 milliGRAM(s) Oral at bedtime  meropenem  IVPB 500 milliGRAM(s) IV Intermittent every 12 hours  norepinephrine Infusion 0.05 MICROgram(s)/kG/Min (5.96 mL/Hr) IV Continuous <Continuous>  pantoprazole   Suspension 40 milliGRAM(s) Oral daily  PureFlow Dialysate RFP-400 (K 2 / Ca 3) 5000 milliLiter(s) (2000 mL/Hr) CRRT <Continuous>  senna 2 Tablet(s) Oral at bedtime  sodium bicarbonate 1300 milliGRAM(s) Oral three times a day  vasopressin Infusion 0.04 Unit(s)/Min (2.4 mL/Hr) IV Continuous <Continuous>    MEDICATIONS  (PRN):  ALBUTerol    90 MICROgram(s) HFA Inhaler 2 Puff(s) Inhalation every 6 hours PRN Shortness of Breath and/or Wheezing      New X-rays reviewed:                                                                                  ECHO    CXR interpreted by me:  ET OG OK>  Left pleural effusion

## 2022-01-21 NOTE — PROGRESS NOTE ADULT - SUBJECTIVE AND OBJECTIVE BOX
Gastroenterology progress note:     Patient is a 76y old  Male who presents with a chief complaint of ACE, transaminitis, thrombocytopenia (21 Jan 2022 10:02)       Admitted on: 01-17-22    We are following the patient for: liver injury       Interval History:    No acute events overnight.   - Diet - tube feeds  - last BM - 3-4 overnight  - Abdominal pain - none apppreciated      PAST MEDICAL & SURGICAL HISTORY:  Coronary Artery Disease    Hypercholesteremia    Myocardial Infarction    Hypertension    Gout    Diabetes mellitus    Renal insufficiency    Chronic obstructive pulmonary disease    Peripheral vascular disease    S/P aortobifemoral bypass surgery    Sickle cell trait    AICD (automatic cardioverter/defibrillator) present    Abdominal Hernia    s/p Femoral-Popliteal Bypass Surgery        MEDICATIONS  (STANDING):  aspirin  chewable 81 milliGRAM(s) Oral daily  chlorhexidine 0.12% Liquid 15 milliLiter(s) Swish and Spit two times a day  chlorhexidine 4% Liquid 1 Application(s) Topical <User Schedule>  CRRT Treatment    <Continuous>  dextrose 10%. 250 milliLiter(s) (1000 mL/Hr) IV Continuous <Continuous>  DOBUTamine Infusion 10 MICROgram(s)/kG/Min (19.1 mL/Hr) IV Continuous <Continuous>  EPINEPHrine    Infusion 0.03 MICROgram(s)/kG/Min (7.16 mL/Hr) IV Continuous <Continuous>  fentaNYL   Infusion. 0.5 MICROgram(s)/kG/Hr (6.36 mL/Hr) IV Continuous <Continuous>  hydrocortisone sodium succinate Injectable 100 milliGRAM(s) IV Push every 8 hours  lactulose Syrup 20 Gram(s) Oral every 8 hours  memantine 5 milliGRAM(s) Oral at bedtime  meropenem  IVPB 500 milliGRAM(s) IV Intermittent every 12 hours  norepinephrine Infusion 0.05 MICROgram(s)/kG/Min (5.96 mL/Hr) IV Continuous <Continuous>  pantoprazole   Suspension 40 milliGRAM(s) Oral daily  polyethylene glycol 3350 17 Gram(s) Oral daily  PureFlow Dialysate RFP-400 (K 2 / Ca 3) 5000 milliLiter(s) (2000 mL/Hr) CRRT <Continuous>  senna 2 Tablet(s) Oral at bedtime  sodium bicarbonate  Infusion 0.118 mEq/kG/Hr (100 mL/Hr) IV Continuous <Continuous>  vasopressin Infusion 0.04 Unit(s)/Min (2.4 mL/Hr) IV Continuous <Continuous>    MEDICATIONS  (PRN):  ALBUTerol    90 MICROgram(s) HFA Inhaler 2 Puff(s) Inhalation every 6 hours PRN Shortness of Breath and/or Wheezing      Allergies  No Known Drug Allergies  shellfish (Other; Urticaria)      Review of Systems:   unable to obtain    Physical Examination:  T(C): 35.3 (01-21-22 @ 16:00), Max: 35.3 (01-21-22 @ 16:00)  HR: 90 (01-21-22 @ 16:00) (80 - 96)  BP: --  RR: 26 (01-21-22 @ 16:00) (23 - 30)  SpO2: 91% (01-21-22 @ 16:00) (76% - 100%)      01-20-22 @ 07:01  -  01-21-22 @ 07:00  --------------------------------------------------------  IN: 6618 mL / OUT: 4043 mL / NET: 2575 mL    01-21-22 @ 07:01  -  01-21-22 @ 16:50  --------------------------------------------------------  IN: 4173 mL / OUT: 3602 mL / NET: 571 mL        GENERAL: intubated and sesdated  HEAD:  Atraumatic, Normocephalic  EYES: conjunctiva and sclera clear  NECK: Supple, no JVD or thyromegaly  CHEST/LUNG: Clear to auscultation bilaterally; No wheeze, rhonchi, or rales  HEART: Regular rate and rhythm; normal S1, S2, No murmurs.  ABDOMEN: Soft, nontender, distended; Bowel sounds present  NEUROLOGY: No asterixis or tremor.      Data:                        9.9    11.09 )-----------( 46       ( 21 Jan 2022 04:30 )             30.7     Hgb trend:  9.9  01-21-22 @ 04:30  9.3  01-20-22 @ 04:30  10.4  01-19-22 @ 20:15  10.6  01-19-22 @ 16:30  10.7  01-19-22 @ 05:20      01-19-22 @ 07:01  -  01-20-22 @ 07:00  --------------------------------------------------------  IN: 1473 mL      01-21    133<L>  |  97<L>  |  50<H>  ----------------------------<  35<LL>  5.4<H>   |  11<L>  |  2.9<H>    Ca    6.9<L>      21 Jan 2022 04:30  Mg     2.3     01-21    TPro  6.2  /  Alb  3.5  /  TBili  13.6<H>  /  DBili  x   /  AST  1489<H>  /  ALT  520<H>  /  AlkPhos  312<H>  01-21    Liver panel trend:  TBili 13.6   /   AST 1489   /      /   AlkP 312   /   Tptn 6.2   /   Alb 3.5    /   DBili --      01-21  TBili 9.8   /   AST 1018   /      /   AlkP 260   /   Tptn 6.4   /   Alb 3.7    /   DBili --      01-20  TBili 9.6   /   AST 1013   /      /   AlkP 277   /   Tptn 6.4   /   Alb 3.5    /   DBili --      01-19  TBili 9.9   /   AST --   /   ALT --   /   AlkP --   /   Tptn --   /   Alb --    /   DBili 6.8      01-19  TBili 9.2   /      /      /   AlkP 291   /   Tptn 6.5   /   Alb 3.4    /   DBili --      01-19  TBili 7.7   /      /      /   AlkP 290   /   Tptn 6.2   /   Alb 3.3    /   DBili --      01-18  TBili 7.1   /      /      /   AlkP 298   /   Tptn 6.2   /   Alb 3.3    /   DBili --      01-18  TBili 7.8   /      /      /   AlkP 287   /   Tptn 6.3   /   Alb 3.3    /   DBili --      01-17      PT/INR - ( 21 Jan 2022 04:30 )   PT: >40.00 sec;   INR: 3.59 ratio         PTT - ( 21 Jan 2022 04:30 )  PTT:39.9 sec    Culture - Sputum (collected 20 Jan 2022 11:25)  Source: .Sputum Sputum  Gram Stain (20 Jan 2022 22:18):    Moderate polymorphonuclear leukocytes per low power field    No squamous epithelial cells per low power field    Few Gram positive cocci in pairs per oil power field    Culture - Blood (collected 19 Jan 2022 21:33)  Source: .Blood Blood  Preliminary Report (21 Jan 2022 02:01):    No growth to date.         Radiology:

## 2022-01-21 NOTE — PROGRESS NOTE ADULT - ASSESSMENT
Mr. Berg is a 76M with a past medical history as described above who presented to the hospital for evaluation of weakness and slurred speech. The patient is encephalopathic upon my examination. Per family, patient has been having difficulty ambulating and speaking clearly for the past 5-6 days. He was in his usual state of health prior. They were concerned given his mental status and transported him to the ED.     #Acute on Chronic Liver Injury - mixed, likely 2/2 Congestive Hepatopathy  #Hypotensive Shock (Sepsis vs Cardiac related)  - Intubated and sedated  - multi-organ failure (ACE, ALI, AMS with elevated ammonia)  - ACE due to ATN  - Trend LFT, coagulation profile qdaily  - RUQ sono with Doppler: Liver: Within normal limits. Nondistended gallbladder. Ascites.   - avoid hepatotoxic medications  - f/u DIC panel - negative  - Hepatology recs appreciated  - Metabolic Encephalopathy, Ammonia level 107; on Lactulose 20mg q4 and target 2-3bms daily  - CT head and EEG negative    #H/O HFrEF  #Hypotensive shock  - Left ventricular ejection fraction, by visual estimation, is <20%  - currently on pressors (Levo and vaso)  - Multiple organ failure  - cardiology recs appreciated  - EPS increased rate of AICD to 80 BPM; AICD is not biventricular and increasing the rate may cause desynchrony. Recall EPS if Pressor requirements keep increasing.  - CVP 27, CO and SVR (can draw VBG from IJ central line)  - Continue pressor support, monitor lactic acid (Lactate uptrending.)  - Continue CVVH  - Hold Beta blockers    #Thrombocytopenia  #Microcytic anemia  #Coagulopathy with elevated D-dimer and normal fibrinogen  - Heme/onc recs appreciated  - peripheral smear: low plt count, crenated rbcs, occasional schistocytes  - F/u HIV; Viral panel neg for Hepatitis  - Treat underlying infection/critical illness  - Hemolytic panel: Haptoglobin <20, LDH 1692; Total bilirubin: 9.8, indirect 3.1    #Septic shock cryptogenic with GI/ possibiliites  #Unlikely  as no pyuria but has hematuria  - Significant transaminitis with hyperbilirubinemia  - f/u blood cultures   - DIC negative  - ID recs appreciated, recommending Meropenem 500mg iv q12h  - thrombocytopenia likely 2/2 sepsis    #ACE 2/2 ATN 2/2 hypotensive shock  - nephrology consult appreciated  - Started on CVVHD    Dispo: MICU  Diet: OG feeding   DVT PPx: not needed; Hypocoaguable  Code: Full

## 2022-01-22 NOTE — PROGRESS NOTE ADULT - NUTRITIONAL ASSESSMENT
This patient has been assessed with a concern for Malnutrition and has been determined to have a diagnosis/diagnoses of Severe protein-calorie malnutrition and Morbid obesity (BMI > 40).    This patient is being managed with:   Diet NPO with Tube Feed-  Tube Feeding Modality: Orogastric  Vital High Protein  Total Volume for 24 Hours (mL): 480  Continuous  Until Goal Tube Feed Rate (mL per Hour): 20  Tube Feed Duration (in Hours): 24  Tube Feed Start Time: 19:00  Free Water Flush   Total Volume per Flush (mL): 30   Frequency: Every 6 Hours  Entered: Jan 20 2022 11:18AM    

## 2022-01-22 NOTE — PROGRESS NOTE ADULT - PROVIDER SPECIALTY LIST ADULT
Nephrology
Critical Care
Critical Care
Nephrology
Surgery
Cardiology
Critical Care
Heme/Onc
Heme/Onc
Hepatology
Nephrology
Critical Care
Nephrology
Infectious Disease

## 2022-01-22 NOTE — PROGRESS NOTE ADULT - SUBJECTIVE AND OBJECTIVE BOX
seen and examined  intubated/ventilated   on pressors         PAST HISTORY  --------------------------------------------------------------------------------  No significant changes to PMH, PSH, FHx, SHx, unless otherwise noted    ALLERGIES & MEDICATIONS  --------------------------------------------------------------------------------  Allergies    No Known Drug Allergies  shellfish (Other; Urticaria)    Intolerances      Standing Inpatient Medications  aspirin  chewable 81 milliGRAM(s) Oral daily  chlorhexidine 0.12% Liquid 15 milliLiter(s) Swish and Spit two times a day  chlorhexidine 4% Liquid 1 Application(s) Topical <User Schedule>  CRRT Treatment    <Continuous>  DOBUTamine Infusion 10 MICROgram(s)/kG/Min IV Continuous <Continuous>  EPINEPHrine    Infusion 0.03 MICROgram(s)/kG/Min IV Continuous <Continuous>  fentaNYL   Infusion. 0.5 MICROgram(s)/kG/Hr IV Continuous <Continuous>  hydrocortisone sodium succinate Injectable 100 milliGRAM(s) IV Push every 8 hours  lactulose Syrup 20 Gram(s) Oral every 8 hours  memantine 5 milliGRAM(s) Oral at bedtime  meropenem  IVPB 500 milliGRAM(s) IV Intermittent every 12 hours  norepinephrine Infusion 0.05 MICROgram(s)/kG/Min IV Continuous <Continuous>  pantoprazole   Suspension 40 milliGRAM(s) Oral daily  polyethylene glycol 3350 17 Gram(s) Oral daily  PureFlow Dialysate RFP-400 (K 2 / Ca 3) 5000 milliLiter(s) CRRT <Continuous>  senna 2 Tablet(s) Oral at bedtime  sodium bicarbonate  Infusion 0.118 mEq/kG/Hr IV Continuous <Continuous>  vasopressin Infusion 0.04 Unit(s)/Min IV Continuous <Continuous>          VITALS/PHYSICAL EXAM  --------------------------------------------------------------------------------  T(C): 35 (01-22-22 @ 04:00), Max: 35.3 (01-21-22 @ 16:00)  HR: 84 (01-22-22 @ 07:00) (78 - 95)  BP: --  RR: 14 (01-22-22 @ 07:00) (14 - 30)  SpO2: 97% (01-22-22 @ 07:00) (88% - 99%)  Wt(kg): --        01-21-22 @ 07:01  -  01-22-22 @ 07:00  --------------------------------------------------------  IN: 9028 mL / OUT: 8719 mL / NET: 309 mL      Physical Exam:  	Gen: intubated/ventilated   	Pulm: B/L sheela   	CV:  S1S2; no rub  	Abd: +distended  	Vascular access:udall     LABS/STUDIES  --------------------------------------------------------------------------------              9.7    12.46 >-----------<  21       [01-22-22 @ 04:30]              28.9     134  |  98  |  31  ----------------------------<  93      [01-22-22 @ 04:30]  4.6   |  11  |  2.2        Ca     6.6     [01-22-22 @ 04:30]      Mg     2.0     [01-22-22 @ 04:30]    TPro  5.5  /  Alb  3.0  /  TBili  14.4  /  DBili  x   /  AST  6130  /  ALT  1599  /  AlkPhos  373  [01-22-22 @ 04:30]    PT/INR: PT >40.00, INR 5.52       [01-22-22 @ 05:20]  PTT: 40.3       [01-22-22 @ 05:20]      Creatinine Trend:  SCr 2.2 [01-22 @ 04:30]  SCr 2.9 [01-21 @ 04:30]  SCr 3.8 [01-20 @ 04:30]  SCr 3.6 [01-19 @ 20:15]  SCr 3.6 [01-19 @ 16:28]    Urinalysis - [01-19-22 @ 05:20]      Color Red / Appearance Turbid / SG 1.030 / pH 5.0      Gluc Negative / Ketone Trace  / Bili Negative / Urobili <2 mg/dL       Blood Large / Protein >600 mg/dL / Leuk Est Negative / Nitrite Negative      RBC >720 / WBC 2 / Hyaline  / Gran  / Sq Epi  / Non Sq Epi  / Bacteria     Urine Creatinine 6      [01-20-22 @ 02:40]  Urine Sodium <20.0      [01-20-22 @ 02:40]  Urine Urea Nitrogen <6      [01-20-22 @ 02:40]  Urine Osmolality 288      [01-19-22 @ 05:20]    Iron 65, TIBC 361, %sat 18      [01-19-22 @ 14:31]  Ferritin 193      [01-19-22 @ 14:31]  PTH -- (Ca --)      [10-11-21 @ 03:57]   114  Vitamin D (25OH) 47.5      [10-11-21 @ 09:08]  TSH 1.99      [01-19-22 @ 16:28]    HBsAg Nonreact      [01-19-22 @ 05:20]  HCV 0.31, Nonreact      [01-19-22 @ 05:20]  HIV Nonreact      [01-19-22 @ 14:31]    ALEJANDRO: titer 1:320, pattern Speckled      [01-19-22 @ 05:20]  C3 Complement 72      [01-20-22 @ 16:06]  C4 Complement 18      [01-20-22 @ 16:06]

## 2022-01-22 NOTE — PROGRESS NOTE ADULT - REASON FOR ADMISSION
ACE, transaminitis, thrombocytopenia

## 2022-01-22 NOTE — PROGRESS NOTE ADULT - SUBJECTIVE AND OBJECTIVE BOX
Patient is a 76y old  Male who presents with a chief complaint of ACE, transaminitis, thrombocytopenia (20 Jan 2022 11:42)        Over Night Events:          ROS:   unable to obtain 2/2 clinical condition         PHYSICAL EXAM  General: intubated  chest: decreased at bases  cvs: s1s2+  abd: soft, nt, nd, bs+  ext: no edema        A/P:   Acute hypoxemic respiratory failure   AMS likely toxic metabolic  Sepsis POA  Septic / Cardiogenic shock   HO PE and retroperitoneal bleed  HO HFREF  ACE on CVVHD   ALI   Thrombocytopenia with coagulopathy   Right sided nodule to be followed   Probable CLAIR     PLAN:  - c/w mechanical ventilation   - adjust as per ABG   - VAP precautions   - renal fu appreciated   - heme/onc fu   - replete electrolytes   - cardiology fu   - DVT ppx      Prognosis guarded.      CCT 36 min      Patient is a 76y old  Male who presents with a chief complaint of ACE, transaminitis, thrombocytopenia (20 Jan 2022 11:42)        Over Night Events:  multiorgan failure, liver failure         ROS:   unable to obtain 2/2 clinical condition         PHYSICAL EXAM  General: intubated  chest: decreased at bases  cvs: s1s2+  abd: soft, nt, nd, bs+  ext: + edema        A/P:   Acute hypoxemic respiratory failure   AMS likely toxic metabolic  Sepsis POA  Septic / Cardiogenic shock   HO PE and retroperitoneal bleed  HO HFREF  ACE on CVVHD   ALI   Thrombocytopenia with coagulopathy   Right sided nodule to be followed   Probable CLAIR     PLAN:  - c/w mechanical ventilation   - adjust as per ABG   - VAP precautions   - renal fu appreciated   - heme/onc fu   - replete electrolytes   - cardiology fu   - start NAC   - ID fu   - c/w antiviotics   - c/w CVVHD , renal fu   - DVT ppx      Prognosis guarded.      CCT 36 min

## 2022-01-22 NOTE — DISCHARGE NOTE FOR THE EXPIRED PATIENT - HOSPITAL COURSE
Chief Complaint: Weakness     Past Medical History: HFrEF (EF in 10/21: 24%) s/p AICD, CAD s/p MI w/stents 2007, HTN, HLD, DM, gout, CKD, COPD, PVD s/p aortobifemoral bypass, proximal right leg DVT (4/2021) on Eliquis (wasn't taking due to cost)    Mr. Berg is a 76M with a past medical history as described above who presented to the hospital for evaluation of weakness and slurred speech. The patient is encephalopathic upon my examination. Per family, patient has been having difficulty ambulating and speaking clearly for the past 5-6 days. He was in his usual state of health prior. They were concerned given his mental status and transported him to the ED.     In the ED, he was hypotensive and hypothermic. BP improved after LR bolus. Given Vanc/Cefepime and stress-dose steroids for possible myxedema. BNP not checked by ED, presumably will be high. CBC noted with severe thrombocytopenia. CMP with hyponatremia/hypochloremia as well as elevated LFTs and ACE. No abdominal imaging preformed at this point.    ROS: Denies headache, changes in vision, chest pain, palpitations, shortness of breath, cough, fever, chills, nausea, vomiting, diarrhea, and constipation. +weakenss, swollen BLE     Pt was intubated shortly after admission to ICU for acute hypoxemic respiratory failure with an AMS. Pt was also septic on admission and was in septic shock/cardiogenic shock, requiring up to 3 pressors. Pt went into multiorgan failure and required CVVHD for renal failure. Despite aggressive medical therapy, pt's medical condition was worsening. Pt's next of kin, Lesli (the daughter) was contacted on 1/22 on discussion about the pt's worsening medical status. She was agreeable to making the pt DNR. After further discussion with the pt's sister, she was agreeable to make the patient comfort measures only. Palliative care team was consulted for guidance. All of pt's medications were stopped, CVVHD stopped, was given medications for comfort, pressors were stopped, and then extubated. Was notified by the nurse around 4:15pm that pt passed. Went to assess the pt at bedside. Pt has an AICD which was not able to be deactivated with a magnet. Despite seeing a heart rate on the monitor because of the AICD, upon listening to the patient's chest, no breath sounds or heart sounds were appreciated. Based on the cardiopulmonary criteria, pt passed at 4:20pm. Family and attending were notified right way. Pt's family does not wish for an autopsy.

## 2022-01-22 NOTE — CHART NOTE - NSCHARTNOTEFT_GEN_A_CORE
Spoke to ICU resident Dr Dahl    Palliative care team was consulted for patient who is already DNR, now will be comfort measures only and DNI. Plan for palliative ventilator withdrawal, and stopping all life support (pressors and other drugs/feedings) today. Pt has an AICD which will need deactivation (d/w ICU resident). Pt on vent with PEEP 8 and FIO2 40% off sedation for 24hrs. Pt has metabolic encephalopathy r/t kidney/liver failure bilirubin 14.4 GFR 28 lactate 10.2. Family - daughter Lesli and sister/brother as per ICU resident goals of care note decided on DNR, pall vent withdrawal and DNI.     Spoke to ICU resident and gave recommendations for symptom management as follows:    - Glycopyrrolate 0.4mg IV x 1  - Ativan 1mg IV q 15 min PRN for restlessness and agitation x 24 hrs  - Dilaudid 1mg IV q 15min PRN for dyspnea x 24 hrs  - call x [phone] if symptoms are not controlled and or pt sustains for more than 2-3 hrs     ** give push of ativan and dilaudid prior to vent withdrawal.

## 2022-01-22 NOTE — PROGRESS NOTE ADULT - ASSESSMENT
76 year old  male admitted with slurred speech and weakness.  Hx of DM CAD AICD  HFrEF AICD PAD HTN COPD Hx of DVT on DOAC which was not taking.    ·	ACE due to ATN/ hypotension shock / transaminitis / acidosis as evidenced by low bicarb / resp failure   ·	continue cvvhd , will continue no loss , no gain   ·	on 2 pressors   ·	increase  d5 with 3 amp of bicarbonate to 200 cc/h , follow ABG   ·	check daily ph   ·	ALEJANDRO noted/ check DNA- C3 low C4 wnl doubt active lupus could be IRGN but cultures are negative   ·	EP notes appreciated   ·	will follow

## 2022-01-22 NOTE — GOALS OF CARE CONVERSATION - ADVANCED CARE PLANNING - CONVERSATION DETAILS
Spoke with Lesli, the daughter of the pt about pt's current medical condition. Stated that overall his prognosis is very poor and he is currently in multiorgan failure and despite several days of aggressive medical therapy, his condition continues to decline. Brought up the idea about placing the pt on CMO. Lesli will discuss with Gabriela about this but for now, she is agreeable to having the patient as DNR.
Gave a call back to Lesli, the daughter about whether she would like to proceed with CMO. As per daughter, she has discussed it with the pt's sister, Gabriela and have decided on CMO. Pt will become CMO now.

## 2022-01-22 NOTE — CHART NOTE - NSCHARTNOTESELECT_GEN_ALL_CORE
EPS- Interrogation
Family Update/Event Note
Palliative Care NP/Event Note
EPS- Interrogation
EPS- Interrogation
Event Note
Event Note
Update Fam/Event Note
Nephrology update/Event Note

## 2022-01-23 LAB
CULTURE RESULTS: SIGNIFICANT CHANGE UP
SPECIMEN SOURCE: SIGNIFICANT CHANGE UP

## 2022-01-24 LAB — DSDNA AB SER-ACNC: 18 IU/ML — SIGNIFICANT CHANGE UP

## 2022-01-25 LAB
CORTICOSTEROID BINDING GLOBULIN RESULT: 2.4 MG/DL — SIGNIFICANT CHANGE UP
CORTIS F/TOTAL MFR SERPL: 67 % — SIGNIFICANT CHANGE UP
CORTIS SERPL-MCNC: 50 UG/DL — HIGH
CORTISOL, FREE RESULT: 34 UG/DL — HIGH
CULTURE RESULTS: SIGNIFICANT CHANGE UP
SPECIMEN SOURCE: SIGNIFICANT CHANGE UP

## 2022-01-28 LAB
CORTICOSTEROID BINDING GLOBULIN RESULT: 1.7 MG/DL — SIGNIFICANT CHANGE UP
CORTIS F/TOTAL MFR SERPL: 88 % — SIGNIFICANT CHANGE UP
CORTIS SERPL-MCNC: 94 UG/DL — HIGH
CORTISOL, FREE RESULT: 82 UG/DL — HIGH
CULTURE RESULTS: SIGNIFICANT CHANGE UP
ORGANISM # SPEC MICROSCOPIC CNT: SIGNIFICANT CHANGE UP
ORGANISM # SPEC MICROSCOPIC CNT: SIGNIFICANT CHANGE UP
SPECIMEN SOURCE: SIGNIFICANT CHANGE UP

## 2022-02-06 DIAGNOSIS — E72.20 DISORDER OF UREA CYCLE METABOLISM, UNSPECIFIED: ICD-10-CM

## 2022-02-06 DIAGNOSIS — E11.649 TYPE 2 DIABETES MELLITUS WITH HYPOGLYCEMIA WITHOUT COMA: ICD-10-CM

## 2022-02-06 DIAGNOSIS — I13.0 HYPERTENSIVE HEART AND CHRONIC KIDNEY DISEASE WITH HEART FAILURE AND STAGE 1 THROUGH STAGE 4 CHRONIC KIDNEY DISEASE, OR UNSPECIFIED CHRONIC KIDNEY DISEASE: ICD-10-CM

## 2022-02-06 DIAGNOSIS — K72.10 CHRONIC HEPATIC FAILURE WITHOUT COMA: ICD-10-CM

## 2022-02-06 DIAGNOSIS — G92.8 OTHER TOXIC ENCEPHALOPATHY: ICD-10-CM

## 2022-02-06 DIAGNOSIS — E87.3 ALKALOSIS: ICD-10-CM

## 2022-02-06 DIAGNOSIS — D69.59 OTHER SECONDARY THROMBOCYTOPENIA: ICD-10-CM

## 2022-02-06 DIAGNOSIS — E78.5 HYPERLIPIDEMIA, UNSPECIFIED: ICD-10-CM

## 2022-02-06 DIAGNOSIS — Z66 DO NOT RESUSCITATE: ICD-10-CM

## 2022-02-06 DIAGNOSIS — D57.3 SICKLE-CELL TRAIT: ICD-10-CM

## 2022-02-06 DIAGNOSIS — D68.9 COAGULATION DEFECT, UNSPECIFIED: ICD-10-CM

## 2022-02-06 DIAGNOSIS — G47.33 OBSTRUCTIVE SLEEP APNEA (ADULT) (PEDIATRIC): ICD-10-CM

## 2022-02-06 DIAGNOSIS — E43 UNSPECIFIED SEVERE PROTEIN-CALORIE MALNUTRITION: ICD-10-CM

## 2022-02-06 DIAGNOSIS — A41.9 SEPSIS, UNSPECIFIED ORGANISM: ICD-10-CM

## 2022-02-06 DIAGNOSIS — K72.00 ACUTE AND SUBACUTE HEPATIC FAILURE WITHOUT COMA: ICD-10-CM

## 2022-02-06 DIAGNOSIS — I50.23 ACUTE ON CHRONIC SYSTOLIC (CONGESTIVE) HEART FAILURE: ICD-10-CM

## 2022-02-06 DIAGNOSIS — N17.0 ACUTE KIDNEY FAILURE WITH TUBULAR NECROSIS: ICD-10-CM

## 2022-02-06 DIAGNOSIS — R18.8 OTHER ASCITES: ICD-10-CM

## 2022-02-06 DIAGNOSIS — E11.22 TYPE 2 DIABETES MELLITUS WITH DIABETIC CHRONIC KIDNEY DISEASE: ICD-10-CM

## 2022-02-06 DIAGNOSIS — J44.9 CHRONIC OBSTRUCTIVE PULMONARY DISEASE, UNSPECIFIED: ICD-10-CM

## 2022-02-06 DIAGNOSIS — R57.0 CARDIOGENIC SHOCK: ICD-10-CM

## 2022-02-06 DIAGNOSIS — N18.9 CHRONIC KIDNEY DISEASE, UNSPECIFIED: ICD-10-CM

## 2022-02-06 DIAGNOSIS — N39.0 URINARY TRACT INFECTION, SITE NOT SPECIFIED: ICD-10-CM

## 2022-02-06 DIAGNOSIS — E11.51 TYPE 2 DIABETES MELLITUS WITH DIABETIC PERIPHERAL ANGIOPATHY WITHOUT GANGRENE: ICD-10-CM

## 2022-02-06 DIAGNOSIS — R65.21 SEVERE SEPSIS WITH SEPTIC SHOCK: ICD-10-CM

## 2022-02-06 DIAGNOSIS — E87.1 HYPO-OSMOLALITY AND HYPONATREMIA: ICD-10-CM

## 2022-02-06 DIAGNOSIS — E66.01 MORBID (SEVERE) OBESITY DUE TO EXCESS CALORIES: ICD-10-CM

## 2022-02-06 DIAGNOSIS — K92.1 MELENA: ICD-10-CM

## 2022-02-06 DIAGNOSIS — J80 ACUTE RESPIRATORY DISTRESS SYNDROME: ICD-10-CM

## 2022-02-06 DIAGNOSIS — Z51.5 ENCOUNTER FOR PALLIATIVE CARE: ICD-10-CM

## 2022-02-06 DIAGNOSIS — R53.1 WEAKNESS: ICD-10-CM

## 2022-05-03 NOTE — PROGRESS NOTE ADULT - SUBJECTIVE AND OBJECTIVE BOX
Date of Service  : 10-10-21 @ 16:14    INTERVAL HPI/OVERNIGHT EVENTS:  Vital Signs Last 24 Hrs  T(C): 36.6 (10 Oct 2021 12:00), Max: 36.6 (10 Oct 2021 05:53)  T(F): 97.8 (10 Oct 2021 12:00), Max: 97.8 (10 Oct 2021 05:53)  HR: 54 (10 Oct 2021 12:00) (54 - 60)  BP: 98/57 (10 Oct 2021 12:00) (98/57 - 121/64)  BP(mean): --  RR: 18 (10 Oct 2021 12:00) (18 - 20)  SpO2: 100% (10 Oct 2021 12:00) (95% - 100%)  I&O's Summary    MEDICATIONS  (STANDING):  carvedilol 25 milliGRAM(s) Oral every 12 hours  dextrose 40% Gel 15 Gram(s) Oral once  dextrose 5%. 1000 milliLiter(s) (100 mL/Hr) IV Continuous <Continuous>  dextrose 5%. 1000 milliLiter(s) (50 mL/Hr) IV Continuous <Continuous>  dextrose 50% Injectable 25 Gram(s) IV Push once  dextrose 50% Injectable 12.5 Gram(s) IV Push once  dextrose 50% Injectable 25 Gram(s) IV Push once  furosemide    Tablet 40 milliGRAM(s) Oral daily  glucagon  Injectable 1 milliGRAM(s) IntraMuscular once  heparin  Infusion. 1600 Unit(s)/Hr (16 mL/Hr) IV Continuous <Continuous>  insulin lispro (ADMELOG) corrective regimen sliding scale   SubCutaneous three times a day before meals  insulin lispro (ADMELOG) corrective regimen sliding scale   SubCutaneous at bedtime  melatonin 3 milliGRAM(s) Oral at bedtime  sacubitril 24 mG/valsartan 26 mG 1 Tablet(s) Oral two times a day    MEDICATIONS  (PRN):  nitroglycerin     SubLingual 0.4 milliGRAM(s) SubLingual every 5 minutes PRN Chest Pain    LABS:                        13.3   8.53  )-----------( 162      ( 10 Oct 2021 03:39 )             40.3     10-10    145  |  112<H>  |  25<H>  ----------------------------<  115<H>  4.6   |  20<L>  |  1.04    Ca    11.4<H>      10 Oct 2021 03:39  Mg     2.20     10-10    TPro  6.1  /  Alb  2.6<L>  /  TBili  3.8<H>  /  DBili  x   /  AST  57<H>  /  ALT  29  /  AlkPhos  111  10-10    PT/INR - ( 09 Oct 2021 05:23 )   PT: 19.2 sec;   INR: 1.71 ratio         PTT - ( 10 Oct 2021 12:46 )  PTT:118.9 sec    CAPILLARY BLOOD GLUCOSE      POCT Blood Glucose.: 200 mg/dL (10 Oct 2021 11:38)  POCT Blood Glucose.: 122 mg/dL (10 Oct 2021 07:56)  POCT Blood Glucose.: 150 mg/dL (09 Oct 2021 21:08)  POCT Blood Glucose.: 124 mg/dL (09 Oct 2021 17:21)      ABG - ( 10 Oct 2021 12:52 )  pH, Arterial: 7.44  pH, Blood: x     /  pCO2: 45    /  pO2: 182   / HCO3: 31    / Base Excess: 5.6   /  SaO2: 99.5                      Consultant(s) Notes Reviewed:  [x ] YES  [ ] NO    PHYSICAL EXAM:  GENERAL: NAD, well-groomed, well-developed, not in any distress ,  HEAD:  Atraumatic, Normocephalic  NECK: Supple, No JVD, Normal thyroid  NERVOUS SYSTEM:  Alert & Oriented X1 ,No focal deficit   CHEST/LUNG: Good air entry bilateral with no  rales, rhonchi, wheezing, or rubs  HEART: Regular rate and rhythm; No murmurs, rubs, or gallops  ABDOMEN: Soft, Nontender, Nondistended; Bowel sounds present  EXTREMITIES:  2+ Peripheral Pulses, No clubbing, cyanosis, or edema  SKIN: No rashes or lesions    Care Discussed with Consultants/Other Providers [ x] YES  [ ] NO 03-May-2022 04:55

## 2022-06-24 NOTE — SWALLOW BEDSIDE ASSESSMENT ADULT - SWALLOW EVAL: DIAGNOSIS
Patient is due for an appointment, please contact patient to schedule.  Once scheduled, please send refill requests back to pool.     
Refills sent per protocol.   
Spoke with patient, MWV has been scheduled for 8/1/22.   
1) Functional oral stage of swallow for puree, honey thick fluids, nectar thick fluids, and thin fluids marked by adequate acceptance, manipulation, and collection. Timely anterior to posterior transit/transfer. Adequate oral clearance noted. 2) Functional pharyngeal stage of swallow for puree, honey thick fluids, nectar thick fluids, and thin fluids marked by initiation of swallow trigger and hyolaryngeal excursion noted. No overt s/sx of penetration/aspiration noted.
1) Functional oral stage of swallow for puree, honey thick fluids, nectar thick fluids, and thin fluids marked by adequate acceptance, manipulation, and collection. Timely anterior to posterior transit/transfer. Adequate oral clearance noted. 2) Moderate pharyngeal stage of swallow for thin fluids marked by suspected delayed initiation of swallow trigger. Hyolaryngeal excursion noted upon digital palpation. Pt noted with delayed throat clear post oral intake suggestive of possible airway penetration/aspiration for thin liquids. 3) Functional pharyngeal stage of swallow for puree, honey thick fluids, and nectar thick fluids marked by initiation of swallow trigger and hyolaryngeal excursion noted. No overt s/sx of penetration/aspiration noted.
49.2

## 2023-01-05 NOTE — DIETITIAN NUTRITION RISK NOTIFICATION - ETIOLOGY-BASIS
Acute illness or injury Fluconazole Pregnancy And Lactation Text: This medication is Pregnancy Category C and it isn't know if it is safe during pregnancy. It is also excreted in breast milk.

## 2023-05-03 NOTE — PHYSICAL THERAPY INITIAL EVALUATION ADULT - PASSIVE RANGE OF MOTION EXAMINATION, REHAB EVAL
bilateral upper extremity Passive ROM was WFL (within functional limits)/bilateral lower extremity Passive ROM was WFL (within functional limits) No

## 2024-05-08 NOTE — CONSULT NOTE ADULT - REASON FOR ADMISSION
ACE, transaminitis, thrombocytopenia
Alert and oriented to person, place and time

## 2024-06-13 NOTE — AIRWAY PLACEMENT NOTE ADULT - INDICATIONS:
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  - improved diabetes control, her last A1C is 7.0% 09/2023  - no IRF/SRF on OCT both eyes today   - Emphasized BG/BP control     # Dry eye syndrome   Artificial tears over the counter    Monitor       # Myopia OS with secondary amblyopia  Does not wear glasses normally      # Pseudophakia both eyes   - s/p Yag cap 08/18/2022    # Ocular HTN   T33/29    /586  OCT RNFL done today showed some T/I thinning OD and S/T thinning OS   Recommend baseline VF next visit   Recommend observation for now, follow-up in 4 months, might consider starting travatan OU at bedtime if pressure persists to be elevated    Follow-up in 4 months FA transit OD and 24-2 VF     Jaimie Beltran MD     [facility]  [facility]     Attending Physician Attestation:  Complete documentation of historical and exam elements from today's encounter can be found in the full encounter summary report (not reduplicated in this progress note).  I personally obtained the chief complaint(s) and history of present illness.  I confirmed and edited as necessary the review of systems, past medical/surgical history, family history, social history, and examination findings as documented by others; and I examined the patient myself.  I personally reviewed the relevant tests, images, and reports as documented above.  I formulated and edited as necessary the assessment and plan and discussed the findings and management plan  with the patient and family. Jaimie Beltran MD       Route for mechanical ventilation